# Patient Record
Sex: FEMALE | Race: WHITE | NOT HISPANIC OR LATINO | Employment: FULL TIME | URBAN - METROPOLITAN AREA
[De-identification: names, ages, dates, MRNs, and addresses within clinical notes are randomized per-mention and may not be internally consistent; named-entity substitution may affect disease eponyms.]

---

## 2017-02-14 ENCOUNTER — OFFICE VISIT (OUTPATIENT)
Dept: ENDOCRINOLOGY | Facility: CLINIC | Age: 52
End: 2017-02-14

## 2017-02-14 VITALS
SYSTOLIC BLOOD PRESSURE: 118 MMHG | HEIGHT: 60 IN | HEART RATE: 89 BPM | DIASTOLIC BLOOD PRESSURE: 71 MMHG | WEIGHT: 141.2 LBS | BODY MASS INDEX: 27.72 KG/M2

## 2017-02-14 DIAGNOSIS — E10.65 TYPE 1 DIABETES MELLITUS WITH HYPERGLYCEMIA (H): ICD-10-CM

## 2017-02-14 DIAGNOSIS — E10.65 TYPE 1 DIABETES MELLITUS WITH HYPERGLYCEMIA (H): Primary | ICD-10-CM

## 2017-02-14 LAB
ALBUMIN SERPL-MCNC: 3.8 G/DL (ref 3.4–5)
ALP SERPL-CCNC: 72 U/L (ref 40–150)
ALT SERPL W P-5'-P-CCNC: 17 U/L (ref 0–50)
AST SERPL W P-5'-P-CCNC: 13 U/L (ref 0–45)
BILIRUB DIRECT SERPL-MCNC: 0.1 MG/DL (ref 0–0.2)
BILIRUB SERPL-MCNC: 0.4 MG/DL (ref 0.2–1.3)
CK SERPL-CCNC: 77 U/L (ref 30–225)
CREAT SERPL-MCNC: 0.62 MG/DL (ref 0.52–1.04)
CREAT UR-MCNC: 83 MG/DL
GFR SERPL CREATININE-BSD FRML MDRD: NORMAL ML/MIN/1.7M2
HBA1C MFR BLD: 8.3 % (ref 4.3–6)
MICROALBUMIN UR-MCNC: <5 MG/L
MICROALBUMIN/CREAT UR: NORMAL MG/G CR (ref 0–25)
POTASSIUM SERPL-SCNC: 4.6 MMOL/L (ref 3.4–5.3)
PROT SERPL-MCNC: 7.4 G/DL (ref 6.8–8.8)
T4 FREE SERPL-MCNC: 0.84 NG/DL (ref 0.76–1.46)
TSH SERPL DL<=0.05 MIU/L-ACNC: 1.57 MU/L (ref 0.4–4)

## 2017-02-14 ASSESSMENT — PAIN SCALES - GENERAL: PAINLEVEL: NO PAIN (0)

## 2017-02-14 NOTE — LETTER
2/14/2017       RE: Kiera Dobson  [address]  [facility] [address]     Dear Colleague,    Thank you for referring your patient, Kiera Dobson, to the [facility] ENDOCRINOLOGY at [facility]. Please see a copy of my visit note below.    HPI  Kiera Dobson is a 51 year old female with type 1 diabetes mellitus here today for a follow up visit.  She has mild/moderate nonproliferative diabetic retinopathy. No peripheral neuropathy or nephropathy.  Her hx is also significant for hypoglycemia unawareness.  Kiera was last seen in our clinic in August 2016.  She denies any recent severe hypoglycemia.  She has been using her Medtronic 530G insulin pump and Dexcom.  Her current basal insulin rates are:  Midnight= 0.4 units/hr.  3:30 am = 0.7 units/hr.  10 am  = 0.7 units/hr.  2200= 0.5 units/hr.  Pt's I/C ratio is 1:15.  Sensitivity is 70.  Her 24 hr basal dose is 16 units/24 hrs.  Pt's A1C is 8.3 % today and her previous A1C was 7.5 %.   We downloaded pt's insulin pump and Dexcom today.  She needs to check her blood sugar more frequent and bolus at the time of her meals. She has been using bolus insulin after meals.  Her average glucose was 232 with SD 96 over the past month.  No frequent hypoglycemia on her Dexcom download.  Most of her blood sugar are high.  On ROS today,she reports leg pain and some lower back/hip pain. Her legs feel restless.  No numbness in her feet.  Pt denies visual changes, n/v, SOB or cough.  No chest pain at his time or abd pain or diarrhea.      DIABETES CARE:  Retinopathy:mild/moderate nonproliferative diabetic retinopathy.  She was last seen here by Oph in 4/2016.  Nephropathy: urine microalbuminuria negative today. Neuropathy: none.  Feet: good pulses, no ulcers and normal monofilamentous exam.  Taking ASA: yes.  Lipids:   in Feb 2016.   Pt is taking Simvastatin.    ROS  Please see under HPI.    Allergies  Allergies   Allergen Reactions      Ampicillin Sodium Rash       Medications  Current Outpatient Prescriptions   Medication Sig Dispense Refill     insulin aspart (NOVOLOG VIAL) 100 UNITS/ML VIAL Use in insulin pump.  Pt uses appox 50-60 units in 24 hrs. 60 mL 3     simvastatin (ZOCOR) 80 MG tablet Take 1 tablet (80 mg) by mouth At Bedtime 90 tablet 3     aspirin 81 MG chewable tablet Take 81 mg by mouth every other day  108 tablet 3     blood glucose (ERICA CONTOUR NEXT) test strip Use to test blood sugar 10  times daily or as directed. 300 strip 11     FLUoxetine HCl (PROZAC PO) Take 10 mg by mouth        glucose ([employer] GLUCOSE) 40 % GEL Take 15 g by mouth as needed       glucose blood VI test strips strip Test up to ten times per day as directed 5 Box 8     glucagon (GLUCAGON EMERGENCY) 1 MG injection Inject 1 mg into the muscle once for 1 dose Use as directed 1 mg 2       Family History  family history includes Alzheimer Disease in her father; CANCER in her father; DIABETES in her father; Glaucoma in her father and mother; Hypertension in her mother.    Social History  Smoke: none.  ETOH: rare.   with children.  Pt works full time in a lab.    Past Medical History  Past Medical History   Diagnosis Date     Diabetes mellitus (H)      Type 1     Elevated cholesterol      Glaucoma      Glaucoma suspect     Glaucoma suspect      Kidney stones      Nonsenile cataract      Past Surgical History   Procedure Laterality Date     C sections  99,97,08     Extracorporeal shock wave lithotripsy, cystoscopy, insert stent ureter(s), combined  2004     Appendectomy open  1981     Biopsy of skin lesion  12/30/2011     returned January 2012 for additional removal     Laser holmium lithotripsy ureter(s), insert stent, combined  12/12/2013     Procedure: COMBINED CYSTOSCOPY, URETEROSCOPY, LASER HOLMIUM LITHOTRIPSY URETER(S), INSERT STENT;  Right Ureteroscopy Holmium Laser Lithotripsy Stent Placement;  Surgeon: Kirill Marlow MD;  Location: [facility] OR     S/P  right shoulder surgery in Dec 2012.    Physical Exam  /71  Pulse 89  Ht 1.524 m (5')  Wt 64 kg (141 lb 3.2 oz)  BMI 27.58 kg/m2  Body mass index is 27.58 kg/(m^2).    HEENT:  PERRLA; fundi not examined.  NECK: Thyroid nontender.  LUNGS: Clear b/l.  CARDIAC:  RRR.  ABDOMEN: Insulin infusion set and dexcom intact. Nontender.  Some areas of lipohypertrophy.  EXTREMITIES: No edema. Equal strength in LE's b/l.  FEET: Warm, no ulcers and normal monofilamentous exam.    RESULTS  Creatinine   Date Value Ref Range Status   02/14/2017 0.62 0.52 - 1.04 mg/dL Final   02/07/2008 0.80 0.60 - 1.30 mg/dL Final     GFR Estimate   Date Value Ref Range Status   02/14/2017 >90  Non  GFR Calc   >60 mL/min/1.7m2 Final     Hemoglobin A1C   Date Value Ref Range Status   07/16/2013 7.9 (A) 4.3 - 6.0 % Final     Potassium   Date Value Ref Range Status   02/14/2017 4.6 3.4 - 5.3 mmol/L Final     ALT   Date Value Ref Range Status   02/14/2017 17 0 - 50 U/L Final     AST   Date Value Ref Range Status   02/14/2017 13 0 - 45 U/L Final     TSH   Date Value Ref Range Status   02/14/2017 1.57 0.40 - 4.00 mU/L Final     T4 Free   Date Value Ref Range Status   02/14/2017 0.84 0.76 - 1.46 ng/dL Final       Cholesterol   Date Value Ref Range Status   02/10/2016 190 <200 mg/dL Final   08/18/2014 161 <200 mg/dL Final     Comment:     LDL Cholesterol is the primary guide to therapy.   The NCEP recommends further evaluation of: patients with cholesterol greater   than 200 mg/dL if additional risk factors are present, cholesterol greater   than   240 mg/dL, triglycerides greater than 150 mg/dL, or HDL less than 40 mg/dL.       HDL Cholesterol   Date Value Ref Range Status   02/10/2016 78 >49 mg/dL Final   08/18/2014 60 >50 mg/dL Final     LDL Cholesterol Calculated   Date Value Ref Range Status   02/10/2016 101 (H) <100 mg/dL Final     Comment:     Above desirable:  100-129 mg/dl   Borderline High:  130-159 mg/dL   High:              160-189 mg/dL   Very high:       >189 mg/dl     08/18/2014 88 0 - 129 mg/dL Final     Comment:     LDL Cholesterol is the primary guide to therapy: LDL-cholesterol goal in high   risk patients is <100 mg/dL and in very high risk patients is <70 mg/dL.       Triglycerides   Date Value Ref Range Status   02/10/2016 58 <150 mg/dL Final     Comment:     Fasting specimen   08/18/2014 67 0 - 150 mg/dL Final     Comment:     Fasting specimen     Cholesterol/HDL Ratio   Date Value Ref Range Status   08/18/2014 2.7 0.0 - 5.0 Final   09/17/2013 2.3 0.0 - 5.0 Final     A1C      8.3   2/14/2017  A1C      7.5   8/18/2016  A1C      7.6   5/5/2016  A1C      7.8    2/10/2016  A1C      7.9    9/2/2015  A1C      7.7    5/2015  A1C      8.6    1/28/2015  A1C      8.8    9/26/2014  A1C      8.0    7/2/2014  A1C      8.4    2/28/2014    ASSESSMENT/PLAN:    1.  TYPE 1 DIABETES MELLITUS: Uncontrolled type 1 diabetes mellitus at this time.  I asked Kiera to be sure and check her blood sugar premeals and at hs daily and to use bolus insulin at the time of her meal and snack.  No change in basal insulin rates today.  She is to continue to use her Dexcom sensor daily.  She remains normotensive.   Feet in good condition.  She is taking ASA daily.  Pt's TSH is normal today.    2.  MILD/MODERATE NPDR: Last seen by Oph here in 3/2016.  I placed an order for her to see Oph here this spring.    3.  HX OF + MICROALBUMINURIA:  Pt's microalbuminuria negative today.  Her creat/GFR are stable.    4. HYPERLIPIDEMIA:   in 2/2016.  Pt remains on high dose Simvastatin.  Since she has been on the high dose Simvastatin for years and tolerating this dose, Dr. Chahal would like her to remain on Simvastatin 80 mg daily.  Pt's hepatic panel and CK were normal today.    5.  ADHD: She is being followed by Yumi ANSARI ( [phone] ).  She remains on RX.    6.  TINNITUS: Seen here by ENT dx hearing loss.    7. HYPOGLYCEMIA UNAWARENESS: Major problem  for Kiera and she had 4 severe hypoglycemic episodes over the past 12 months.  Reminded her NOT to change her basal insulin rates or I/C ratio or settings without talking to use first and to wear her Dexcom daily.   She denies having any severe hypoglycemia since we last saw her in August 2016.    8.  HIP/LEG PAIN:  Will have pt see PCP for evaluation.  She also reports mild lower back pain.  Her CK was normal today.      9.   Return to Endocrine Clinic to see me in 2 months and  in 4 month.        Jo Jean PA-C

## 2017-02-14 NOTE — NURSING NOTE
Chief Complaint   Patient presents with     RECHECK     f/u type 1 DM        Initial /71  Pulse 89  Ht 1.524 m (5')  Wt 64 kg (141 lb 3.2 oz)  BMI 27.58 kg/m2 Estimated body mass index is 27.58 kg/(m^2) as calculated from the following:    Height as of this encounter: 1.524 m (5').    Weight as of this encounter: 64 kg (141 lb 3.2 oz).  Medication Reconciliation: complete     Amy Salazar CMA       Performed A1C test - patient tolerated well.    Amy Salazar CMA

## 2017-02-14 NOTE — MR AVS SNAPSHOT
After Visit Summary   2/14/2017    Kiera Dobson    MRN: [id number]           Patient Information     Date Of Birth          1965        Visit Information        Provider Department      2/14/2017 10:00 AM Jo Jean PA-C [facility] Endocrinology        Today's Diagnoses     Type 1 diabetes mellitus with hyperglycemia (H)    -  1       Follow-ups after your visit        Additional Services     FAMILY PRACTICE REFERRAL       Your provider has referred you to: PREFERRED PROVIDERS:  Please schedule pt to see Dr. May.    Please be aware that coverage of these services is subject to the terms and limitations of your health insurance plan.  Call member services at your health plan with any benefit or coverage questions.      Please bring the following with you to your appointment:    (1) Any X-Rays, CTs or MRIs which have been performed.  Contact the facility where they were done to arrange for  prior to your scheduled appointment.    (2) List of current medications   (3) This referral request   (4) Any documents/labs given to you for this referral            OPHTHALMOLOGY ADULT REFERRAL       Your provider has referred you to: PREFERRED PROVIDERS:  Schedule pt to see Oph for annual eye exam.    Please be aware that coverage of these services is subject to the terms and limitations of your health insurance plan.  Call member services at your health plan with any benefit or coverage questions.      Please bring the following with you to your appointment:    (1) Any X-Rays, CTs or MRIs which have been performed.  Contact the facility where they were done to arrange for  prior to your scheduled appointment.    (2) List of current medications  (3) This referral request   (4) Any documents/labs given to you for this referral                  Your next 10 appointments already scheduled     Feb 14, 2017 11:30 AM [facility]   LAB with [facility] ([facility])    [address] [place] [address]  [address] [facility] [address] [phone]           Patient must bring picture ID.  Patient should be prepared to give a urine specimen  Please do not eat 10-12 hours before your appointment if you are coming in fasting for labs on lipids, cholesterol, or glucose (sugar).  Pregnant women should follow their Care Team instructions. Water with medications is okay. Do not drink coffee or other fluids.   If you have concerns about taking  your medications, please ask at office or if scheduling via [employer], send a message by clicking on Secure Messaging, Message Your Care Team.            Feb 20, 2017  4:00 PM CST   (Arrive by 3:45 PM)   New Patient Visit with Mary Beth Mansfield MD   [facility] ([facility])    [address] [place] [address]  [address] [facility] [address] [phone]            Mar 28, 2017  8:15 AM CDT   RETURN RETINA with Devika Dorsey MD   Eye Clinic ([facility])    [address]  [address] [address]  [facility] [address]   [phone]            Apr 05, 2017  8:30 AM CDT   (Arrive by 8:15 AM)   RETURN DIABETES with Jo Jean PA-C   [facility] ([facility])    [address] [place] [address]  [address] [facility] [id number] [phone]              Future tests that were ordered for you today     Open Future Orders        Priority Expected Expires Ordered    TSH Routine  2/14/2018 2/14/2017    Creatinine Routine  2/14/2018 2/14/2017    Potassium Routine  2/14/2018 2/14/2017    CK total Routine  2/14/2018 2/14/2017    Hepatic panel Routine  2/14/2018 2/14/2017    T4 free Routine  2/14/2018 2/14/2017            Who to contact     Please call your clinic at [phone] to:    Ask questions about your health    Make or cancel appointments    Discuss your medicines    Learn about your test results    Speak to your doctor    If you have compliments or concerns about an experience at your clinic, or if you wish to file a complaint, please contact [facility] Patient Relations at [phone] or email us at [email or web address]         Additional Information About Your Visit        MyChart Information     [employer] gives you secure access to your electronic health record. If you see a primary care provider, you can also send messages to your care team and make appointments. If you have questions, please call your primary care clinic.  If you do not have a primary care provider, please call [phone] and they will assist you.      [employer] is an electronic gateway that provides easy, online access to your medical records. With [employer], you can request a clinic appointment, read your test results, renew a prescription or communicate with your care team.     To access your existing account, please contact your [facility] or call [phone] for assistance.        Care EveryWhere ID     This is your Care EveryWhere ID. This could be used by other organizations to access your [place] medical records  [id number]        Your Vitals Were     Pulse Height BMI (Body Mass Index)             89 1.524 m (5') 27.58 kg/m2          Blood Pressure from Last 3 Encounters:   02/14/17 118/71   08/18/16 114/62   06/20/16 113/70    Weight from Last 3 Encounters:   02/14/17 64 kg (141 lb 3.2 oz)   08/18/16 64.2 kg (141 lb 8 oz)   06/20/16 63.5 kg (139 lb 14.4 oz)              We Performed the Following     Albumin Random Urine Quantitative     FAMILY PRACTICE REFERRAL     OPHTHALMOLOGY ADULT REFERRAL          Today's Medication Changes          These changes are accurate as of: 2/14/17 11:20 AM.  If you have any questions, ask your nurse or doctor.               Stop taking these medicines if you haven't already. Please contact your care team if you have questions.     RITALIN 5 MG tablet    Generic drug:  methylphenidate   Stopped by:  Jo Jean PA-C                    Primary Care Provider Office Phone # Fax #    Paola Chahal [phone]        [facility] [address] [facility] [address]  [facility] [address]        Thank you!     Thank you for choosing [facility]  for your care. Our goal is always to provide you with excellent care. Hearing back from our patients is one way we can continue to improve our services. Please take a few minutes to complete the written survey that you may receive in the mail after your visit with us. Thank you!             Your Updated Medication List - Protect others around you: Learn how to safely use, store and throw away your medicines at www.disposemymeds.org.          This list is accurate as of: 2/14/17 11:20 AM.  Always use your most recent med list.                   Brand Name Dispense Instructions for use    aspirin 81 MG chewable tablet     108 tablet    Take 81 mg by mouth every other day       * blood glucose monitoring test strip    no brand specified    5 Box    Test up to ten times per day as directed       * blood glucose monitoring test strip    ERICA CONTOUR NEXT    300 strip    Use to test blood sugar 10  times daily or as directed.       CVS GLUCOSE 40 % Gel gel   Generic drug:  glucose      Take 15 g by mouth as needed       glucagon 1 MG kit    GLUCAGON EMERGENCY    1 mg    Inject 1 mg into the muscle once for 1 dose Use as directed       insulin aspart 100 UNITS/ML injection    NovoLOG VIAL    60 mL    Use in insulin pump.  Pt uses appox 50-60 units in 24 hrs.       PROZAC PO      Take 10 mg by mouth       simvastatin 80 MG tablet    ZOCOR    90 tablet    Take 1 tablet (80 mg) by mouth At Bedtime       * Notice:  This list has 2 medication(s) that are the same as other medications prescribed for you. Read the directions carefully, and ask your doctor or other care provider to review them with you.

## 2017-02-15 NOTE — PROGRESS NOTES
HPI  Kiera Dobson is a 51 year old female with type 1 diabetes mellitus here today for a follow up visit.  She has mild/moderate nonproliferative diabetic retinopathy. No peripheral neuropathy or nephropathy.  Her hx is also significant for hypoglycemia unawareness.  Kiera was last seen in our clinic in August 2016.  She denies any recent severe hypoglycemia.  She has been using her Medtronic 530G insulin pump and Dexcom.  Her current basal insulin rates are:  Midnight= 0.4 units/hr.  3:30 am = 0.7 units/hr.  10 am  = 0.7 units/hr.  2200= 0.5 units/hr.  Pt's I/C ratio is 1:15.  Sensitivity is 70.  Her 24 hr basal dose is 16 units/24 hrs.  Pt's A1C is 8.3 % today and her previous A1C was 7.5 %.   We downloaded pt's insulin pump and Dexcom today.  She needs to check her blood sugar more frequent and bolus at the time of her meals. She has been using bolus insulin after meals.  Her average glucose was 232 with SD 96 over the past month.  No frequent hypoglycemia on her Dexcom download.  Most of her blood sugar are high.  On ROS today,she reports leg pain and some lower back/hip pain. Her legs feel restless.  No numbness in her feet.  Pt denies visual changes, n/v, SOB or cough.  No chest pain at his time or abd pain or diarrhea.      DIABETES CARE:  Retinopathy:mild/moderate nonproliferative diabetic retinopathy.  She was last seen here by Oph in 4/2016.  Nephropathy: urine microalbuminuria negative today. Neuropathy: none.  Feet: good pulses, no ulcers and normal monofilamentous exam.  Taking ASA: yes.  Lipids:   in Feb 2016.   Pt is taking Simvastatin.    ROS  Please see under HPI.    Allergies  Allergies   Allergen Reactions     Ampicillin Sodium Rash       Medications  Current Outpatient Prescriptions   Medication Sig Dispense Refill     insulin aspart (NOVOLOG VIAL) 100 UNITS/ML VIAL Use in insulin pump.  Pt uses appox 50-60 units in 24 hrs. 60 mL 3     simvastatin (ZOCOR) 80 MG tablet Take 1 tablet (80  mg) by mouth At Bedtime 90 tablet 3     aspirin 81 MG chewable tablet Take 81 mg by mouth every other day  108 tablet 3     blood glucose (ERICA CONTOUR NEXT) test strip Use to test blood sugar 10  times daily or as directed. 300 strip 11     FLUoxetine HCl (PROZAC PO) Take 10 mg by mouth        glucose ([employer]S GLUCOSE) 40 % GEL Take 15 g by mouth as needed       glucose blood VI test strips strip Test up to ten times per day as directed 5 Box 8     glucagon (GLUCAGON EMERGENCY) 1 MG injection Inject 1 mg into the muscle once for 1 dose Use as directed 1 mg 2       Family History  family history includes Alzheimer Disease in her father; CANCER in her father; DIABETES in her father; Glaucoma in her father and mother; Hypertension in her mother.    Social History  Smoke: none.  ETOH: rare.   with children.  Pt works full time in a lab.    Past Medical History  Past Medical History   Diagnosis Date     Diabetes mellitus (H)      Type 1     Elevated cholesterol      Glaucoma      Glaucoma suspect     Glaucoma suspect      Kidney stones      Nonsenile cataract      Past Surgical History   Procedure Laterality Date     C sections  99,97,08     Extracorporeal shock wave lithotripsy, cystoscopy, insert stent ureter(s), combined  2004     Appendectomy open  1981     Biopsy of skin lesion  12/30/2011     returned January 2012 for additional removal     Laser holmium lithotripsy ureter(s), insert stent, combined  12/12/2013     Procedure: COMBINED CYSTOSCOPY, URETEROSCOPY, LASER HOLMIUM LITHOTRIPSY URETER(S), INSERT STENT;  Right Ureteroscopy Holmium Laser Lithotripsy Stent Placement;  Surgeon: Kirill Marlow MD;  Location: [facility]     S/P right shoulder surgery in Dec 2012.    Physical Exam  /71  Pulse 89  Ht 1.524 m (5')  Wt 64 kg (141 lb 3.2 oz)  BMI 27.58 kg/m2  Body mass index is 27.58 kg/(m^2).    HEENT:  PERRLA; fundi not examined.  NECK: Thyroid nontender.  LUNGS: Clear b/l.  CARDIAC:  RRR.  ABDOMEN:  Insulin infusion set and dexcom intact. Nontender.  Some areas of lipohypertrophy.  EXTREMITIES: No edema. Equal strength in LE's b/l.  FEET: Warm, no ulcers and normal monofilamentous exam.    RESULTS  Creatinine   Date Value Ref Range Status   02/14/2017 0.62 0.52 - 1.04 mg/dL Final   02/07/2008 0.80 0.60 - 1.30 mg/dL Final     GFR Estimate   Date Value Ref Range Status   02/14/2017 >90  Non  GFR Calc   >60 mL/min/1.7m2 Final     Hemoglobin A1C   Date Value Ref Range Status   07/16/2013 7.9 (A) 4.3 - 6.0 % Final     Potassium   Date Value Ref Range Status   02/14/2017 4.6 3.4 - 5.3 mmol/L Final     ALT   Date Value Ref Range Status   02/14/2017 17 0 - 50 U/L Final     AST   Date Value Ref Range Status   02/14/2017 13 0 - 45 U/L Final     TSH   Date Value Ref Range Status   02/14/2017 1.57 0.40 - 4.00 mU/L Final     T4 Free   Date Value Ref Range Status   02/14/2017 0.84 0.76 - 1.46 ng/dL Final       Cholesterol   Date Value Ref Range Status   02/10/2016 190 <200 mg/dL Final   08/18/2014 161 <200 mg/dL Final     Comment:     LDL Cholesterol is the primary guide to therapy.   The NCEP recommends further evaluation of: patients with cholesterol greater   than 200 mg/dL if additional risk factors are present, cholesterol greater   than   240 mg/dL, triglycerides greater than 150 mg/dL, or HDL less than 40 mg/dL.       HDL Cholesterol   Date Value Ref Range Status   02/10/2016 78 >49 mg/dL Final   08/18/2014 60 >50 mg/dL Final     LDL Cholesterol Calculated   Date Value Ref Range Status   02/10/2016 101 (H) <100 mg/dL Final     Comment:     Above desirable:  100-129 mg/dl   Borderline High:  130-159 mg/dL   High:             160-189 mg/dL   Very high:       >189 mg/dl     08/18/2014 88 0 - 129 mg/dL Final     Comment:     LDL Cholesterol is the primary guide to therapy: LDL-cholesterol goal in high   risk patients is <100 mg/dL and in very high risk patients is <70 mg/dL.       Triglycerides   Date  Value Ref Range Status   02/10/2016 58 <150 mg/dL Final     Comment:     Fasting specimen   08/18/2014 67 0 - 150 mg/dL Final     Comment:     Fasting specimen     Cholesterol/HDL Ratio   Date Value Ref Range Status   08/18/2014 2.7 0.0 - 5.0 Final   09/17/2013 2.3 0.0 - 5.0 Final     A1C      8.3   2/14/2017  A1C      7.5   8/18/2016  A1C      7.6   5/5/2016  A1C      7.8    2/10/2016  A1C      7.9    9/2/2015  A1C      7.7    5/2015  A1C      8.6    1/28/2015  A1C      8.8    9/26/2014  A1C      8.0    7/2/2014  A1C      8.4    2/28/2014    ASSESSMENT/PLAN:    1.  TYPE 1 DIABETES MELLITUS: Uncontrolled type 1 diabetes mellitus at this time.  I asked Kiera to be sure and check her blood sugar premeals and at hs daily and to use bolus insulin at the time of her meal and snack.  No change in basal insulin rates today.  She is to continue to use her Dexcom sensor daily.  She remains normotensive.   Feet in good condition.  She is taking ASA daily.  Pt's TSH is normal today.    2.  MILD/MODERATE NPDR: Last seen by Oph here in 3/2016.  I placed an order for her to see Oph here this spring.    3.  HX OF + MICROALBUMINURIA:  Pt's microalbuminuria negative today.  Her creat/GFR are stable.    4. HYPERLIPIDEMIA:   in 2/2016.  Pt remains on high dose Simvastatin.  Since she has been on the high dose Simvastatin for years and tolerating this dose, Dr. Chahal would like her to remain on Simvastatin 80 mg daily.  Pt's hepatic panel and CK were normal today.    5.  ADHD: She is being followed by Yumi ANSARI ( [phone] ).  She remains on RX.    6.  TINNITUS: Seen here by ENT dx hearing loss.    7. HYPOGLYCEMIA UNAWARENESS: Major problem for Kiera and she had 4 severe hypoglycemic episodes over the past 12 months.  Reminded her NOT to change her basal insulin rates or I/C ratio or settings without talking to use first and to wear her Dexcom daily.   She denies having any severe hypoglycemia since we last saw her in  August 2016.    8.  HIP/LEG PAIN:  Will have pt see PCP for evaluation.  She also reports mild lower back pain.  Her CK was normal today.      9.   Return to Endocrine Clinic to see me in 2 months and  in 4 month.

## 2017-02-20 ENCOUNTER — OFFICE VISIT (OUTPATIENT)
Dept: INTERNAL MEDICINE | Facility: CLINIC | Age: 52
End: 2017-02-20

## 2017-02-20 VITALS
SYSTOLIC BLOOD PRESSURE: 111 MMHG | OXYGEN SATURATION: 98 % | BODY MASS INDEX: 27.88 KG/M2 | RESPIRATION RATE: 18 BRPM | WEIGHT: 142 LBS | HEART RATE: 83 BPM | HEIGHT: 60 IN | TEMPERATURE: 98 F | DIASTOLIC BLOOD PRESSURE: 73 MMHG

## 2017-02-20 DIAGNOSIS — Z23 NEED FOR TDAP VACCINATION: ICD-10-CM

## 2017-02-20 DIAGNOSIS — Z23 NEED FOR INFLUENZA VACCINATION: ICD-10-CM

## 2017-02-20 DIAGNOSIS — M25.551 BILATERAL HIP PAIN: ICD-10-CM

## 2017-02-20 DIAGNOSIS — Z76.89 ENCOUNTER TO ESTABLISH CARE WITH NEW DOCTOR: Primary | ICD-10-CM

## 2017-02-20 DIAGNOSIS — E10.8 TYPE 1 DIABETES MELLITUS WITH COMPLICATION (H): ICD-10-CM

## 2017-02-20 DIAGNOSIS — M25.552 BILATERAL HIP PAIN: ICD-10-CM

## 2017-02-20 ASSESSMENT — PAIN SCALES - GENERAL: PAINLEVEL: MILD PAIN (3)

## 2017-02-20 NOTE — NURSING NOTE
"FLU VACCINE QUESTIONNAIRE:  Ask the following questions of all parties who want influenza vaccination:     CONTRAINDICATIONS  1.  Is the patient age less than 6 months?  NO  2.  Has the person to be vaccinated ever had Guillain-[place] syndrome? NO  3.  Has the person to be vaccinated had the vaccine this year? NO  4.  Is the person to be vaccinated sick today? NO  5.  Does the person to be vaccinated have an allergy to eggs or a component of the vaccine? NO  6.  Has the person to vaccinated ever had a serious reaction to an influenza vaccination in the past? NO    If the answer to ALL of the above questions is \"No\", then please administer the influenza vaccine per the standard protocol.  If the patient answered \"Yes\" to questions 1 or 2, do not administer the vaccine. If the patient answered \"Yes\" to question 3, do not administer the vaccine unless the patient is a child receiving the vaccine in two doses. If the patient answered \"Yes\" to questions 4, 5, and/or 6, get additional details on sickness and/or reaction and refer to provider. If you have any questions regarding contraindications, please refer to the provider.                                                         INFLUENZA VACCINATION NOTE      Information sheet given to patient and questions answered.        Administered Influenza Fluzone Quadrivalent and TDaP (see Immunizations in Chart Review). Patient tolerated well.  Leander Apodaca CMA at 4:50 PM on 2/20/2017       "

## 2017-02-20 NOTE — MR AVS SNAPSHOT
After Visit Summary   2/20/2017    Kiera Dobson    MRN: [id number]           Patient Information     Date Of Birth          1965        Visit Information        Provider Department      2/20/2017 4:00 PM Mary Beth Gonzalez MD [facility]        Today's Diagnoses     Encounter to establish care with new doctor    -  1    Need for Tdap vaccination        Need for influenza vaccination        Bilateral hip pain        Type 1 diabetes mellitus with complication (H)          Care Instructions    [facility] Center Medication Refill Request Information:  * Please contact your pharmacy regarding ANY request for medication refills.  ** [facility] Prescription Fax = [phone]  * Please allow 3 business days for routine medication refills.  * Please allow 5 business days for controlled substance medication refills.     [facility] Center Test Result notification information:  *You will be notified with in 7-10 days of your appointment day regarding the results of your test.  If you are on MyChart you will be notified as soon as the provider has reviewed the results and signed off on them.    Primary Care Center [phone]       For muscle pain: Consider taking Co-Q 10, 100 mg daily.     Make sure to come in fasting at least 8-10 hours for your labs.          Follow-ups after your visit        Follow-up notes from your care team     Return in about 4 weeks (around 3/20/2017) for Pap.      Your next 10 appointments already scheduled     Mar 24, 2017  8:10 AM CDT   (Arrive by 7:55 AM)   XR PELVIS AND HIP BILATERAL 2 VIEWS with UCXR1   [facility] ([facility])    [address] [address] [phone]           Please bring a list of your current medicines to your exam. (Include vitamins, minerals and over-thecounter medicines.) Leave your valuables at home.  Tell your doctor if there is a chance you may  be pregnant.  You do not need to do anything special for this exam.            Mar 24, 2017  8:30 AM CDT   LAB with  LAB   [facility]    [address] [place] [address]  1st Floor  [facility] [id number] [phone]           Patient must bring picture ID.  Patient should be prepared to give a urine specimen  Please do not eat 10-12 hours before your appointment if you are coming in fasting for labs on lipids, cholesterol, or glucose (sugar).  Pregnant women should follow their Care Team instructions. Water with medications is okay. Do not drink coffee or other fluids.   If you have concerns about taking  your medications, please ask at office or if scheduling via [employer], send a message by clicking on Secure Messaging, Message Your Care Team.            Mar 24, 2017  9:00 AM CDT   (Arrive by 8:45 AM)   Return Visit with Mary Beth Mansfield MD   [facility] ([facility])    [address] [place] [address]  4th [facility] [id number] [phone]            Mar 28, 2017  8:15 AM CDT   RETURN RETINA with Devika Dorsey MD   [facility] ([facility])    [address]  [address] [facility]  9th Fl Clin [address]  [facility] [address]   [phone]            Apr 05, 2017  8:30 AM CDT   (Arrive by 8:15 AM)   RETURN DIABETES with Jo Jean PA-C   [facility] ([facility])    [address] [place] [address]  3rd Floor  [facility] [id number] [phone]              Future tests that were ordered for you today     Open Future Orders        Priority Expected Expires Ordered    XR Pelvis and Hip Bilateral 2 Views Routine 2/20/2017 2/20/2018 2/20/2017    Lipid panel reflex to direct LDL Routine 2/20/2017 3/6/2017 2/20/2017            Who to contact     Please call your clinic at [phone] to:    Ask questions about your health    Make or cancel appointments    Discuss your  "medicines    Learn about your test results    Speak to your doctor   If you have compliments or concerns about an experience at your clinic, or if you wish to file a complaint, please contact [facility] Patient Relations at [phone] or email us at [email or web address]         Additional Information About Your Visit        MyChart Information     [employer] gives you secure access to your electronic health record. If you see a primary care provider, you can also send messages to your care team and make appointments. If you have questions, please call your primary care clinic.  If you do not have a primary care provider, please call [phone] and they will assist you.      [employer] is an electronic gateway that provides easy, online access to your medical records. With [employer], you can request a clinic appointment, read your test results, renew a prescription or communicate with your care team.     To access your existing account, please contact your [facility] or call [phone] for assistance.        Care EveryWhere ID     This is your Care EveryWhere ID. This could be used by other organizations to access your [facility] medical records  [id number]        Your Vitals Were     Pulse Temperature Respirations Height Pulse Oximetry Breastfeeding?    83 98  F (36.7  C) (Oral) 18 1.518 m (4' 11.75\") 98% No    BMI (Body Mass Index)                   27.97 kg/m2            Blood Pressure from Last 3 Encounters:   02/20/17 111/73   02/14/17 118/71   08/18/16 114/62    Weight from Last 3 Encounters:   02/20/17 64.4 kg (142 lb)   02/14/17 64 kg (141 lb 3.2 oz)   08/18/16 64.2 kg (141 lb 8 oz)              We Performed the Following     FLU VACCINE, AGE >=3 YR     TDAP (BOOSTRIX), AGE 10-64 YR        Primary Care Provider Office Phone # Fax #    Paola Chahal [phone]        [facility] [address] [place] [address]  [facility] " [id number]        Thank you!     Thank you for choosing [facility]  for your care. Our goal is always to provide you with excellent care. Hearing back from our patients is one way we can continue to improve our services. Please take a few minutes to complete the written survey that you may receive in the mail after your visit with us. Thank you!             Your Updated Medication List - Protect others around you: Learn how to safely use, store and throw away your medicines at www.disposemymeds.org.          This list is accurate as of: 2/20/17  4:56 PM.  Always use your most recent med list.                   Brand Name Dispense Instructions for use    aspirin 81 MG chewable tablet     108 tablet    Take 81 mg by mouth every other day       * blood glucose monitoring test strip    no brand specified    5 Box    Test up to ten times per day as directed       * blood glucose monitoring test strip    ERICA CONTOUR NEXT    300 strip    Use to test blood sugar 10  times daily or as directed.       CVS GLUCOSE 40 % Gel gel   Generic drug:  glucose      Take 15 g by mouth as needed       glucagon 1 MG kit    GLUCAGON EMERGENCY    1 mg    Inject 1 mg into the muscle once for 1 dose Use as directed       humaLOG 100 UNIT/ML injection   Generic drug:  insulin lispro          insulin aspart 100 UNITS/ML injection    NovoLOG VIAL    60 mL    Use in insulin pump.  Pt uses appox 50-60 units in 24 hrs.       PROZAC PO      Take 10 mg by mouth       simvastatin 80 MG tablet    ZOCOR    90 tablet    Take 1 tablet (80 mg) by mouth At Bedtime       * Notice:  This list has 2 medication(s) that are the same as other medications prescribed for you. Read the directions carefully, and ask your doctor or other care provider to review them with you.

## 2017-02-20 NOTE — NURSING NOTE
.  Chief Complaint   Patient presents with     Establish Care     Here to establish care     Leander Apodaca CMA at 4:15 PM on 2/20/2017

## 2017-02-20 NOTE — PROGRESS NOTES
"S: Kiera is here to establish care. She has had T1DM for 47 years. She was diagnosed at 28 mos old. She has an insulin pump and follows with endocrine, however, she has been told to establish primary care for her other medical nees. She does get chest pain when her blood sugar is elevated. She also has been having pain in her feet. She has seen podiatry and has some tendon issues. She also gets pain in her hips and legs when she lies down at night. She has been tested for neuropathy and does not have this, so was advised to see primary care.     PMHx, Surg Hx, Fam Hx, Soc Hx Reviewed.     Social History     Social History     Marital status:      Spouse name: N/A     Number of children: N/A     Years of education: N/A     Occupational History     Not on file.     Social History Main Topics     Smoking status: Never Smoker     Smokeless tobacco: Never Used     Alcohol use Yes      Comment: occasionally 1-2 beers     Drug use: No     Sexual activity: Yes     Partners: Male     Other Topics Concern     Not on file     Social History Narrative      ROS: 10 point ROS neg other than the symptoms noted above in the HPI.    PE:   /73 (BP Location: Right arm, Patient Position: Chair, Cuff Size: Adult Regular)  Pulse 83  Temp 98  F (36.7  C) (Oral)  Resp 18  Ht 1.518 m (4' 11.75\")  Wt 64.4 kg (142 lb)  SpO2 98%  Breastfeeding? No  BMI 27.97 kg/m2  General: Pleasant female, in NAD  ENT: TMs normal, oropharynx clear  Neck: no LAD  Resp: lungs CAB  CV: Heart RRR, no MRG  Abd: Soft, NT, ND, nl bowel sounds  Ext: WWP, no LE edema  MSK: Pain with R hip flexion, IR/ER, no pain with L hip flexion, IR/ER  Neuro: AO3, no focal deficits.     A/P:   Kiera was seen today for establish care.    Diagnoses and all orders for this visit:    Encounter to establish care with new doctor  Health Maintenance   Topic Date Due     FOOT EXAM Q1 YEAR( NO INBASKET)  11/04/1966     PAP SCREENING Q3 YR (SYSTEM ASSIGNED)  11/04/1988     LIPID " MONITORING Q1 YEAR( NO INBASKET)  02/10/2017     EYE EXAM Q1 YEAR( NO INBASKET)  04/19/2017     A1C Q6 MO( NO INBASKET)  08/14/2017     CREATININE Q1 YEAR (NO INBASKET)  02/14/2018     MICROALBUMIN Q1 YEAR( NO INBASKET)  02/14/2018     TSH W/ FREE T4 REFLEX Q2 YEAR (NO INBASKET)  02/14/2019     TETANUS IMMUNIZATION (SYSTEM ASSIGNED)  02/20/2027     PNEUMOVAX 1X HI RISK PATIENT < 65 (NO IB MSG)  Completed     INFLUENZA VACCINE (SYSTEM ASSIGNED)  Completed   Will return for pap smear at future date    Need for Tdap vaccination  -     TDAP (BOOSTRIX), AGE 10-64 YR    Need for influenza vaccination  -     FLU VACCINE, AGE >=3 YR    Bilateral hip pain. Concern for OA as prior xray showed mild degenerative changes.   -     XR Pelvis and Hip Bilateral 2 Views; Future   Consider taking co-q 10 to help with muscle pain    Type 1 diabetes mellitus with complication (H)  -     Lipid panel reflex to direct LDL; Future      Mary Beth Mansfield MD

## 2017-02-20 NOTE — PATIENT INSTRUCTIONS
[facility] Medication Refill Request Information:  * Please contact your pharmacy regarding ANY request for medication refills.  ** [facility] Prescription Fax = [phone]  * Please allow 3 business days for routine medication refills.  * Please allow 5 business days for controlled substance medication refills.     [facility] Test Result notification information:  *You will be notified with in 7-10 days of your appointment day regarding the results of your test.  If you are on MyChart you will be notified as soon as the provider has reviewed the results and signed off on them.    [facility] [phone]       For muscle pain: Consider taking Co-Q 10, 100 mg daily.     Make sure to come in fasting at least 8-10 hours for your labs.

## 2017-03-24 ENCOUNTER — OFFICE VISIT (OUTPATIENT)
Dept: INTERNAL MEDICINE | Facility: CLINIC | Age: 52
End: 2017-03-24

## 2017-03-24 VITALS
BODY MASS INDEX: 28.16 KG/M2 | WEIGHT: 143 LBS | SYSTOLIC BLOOD PRESSURE: 118 MMHG | HEART RATE: 86 BPM | DIASTOLIC BLOOD PRESSURE: 73 MMHG

## 2017-03-24 DIAGNOSIS — R07.89 ATYPICAL CHEST PAIN: ICD-10-CM

## 2017-03-24 DIAGNOSIS — M25.551 HIP PAIN, RIGHT: ICD-10-CM

## 2017-03-24 DIAGNOSIS — E10.8 TYPE 1 DIABETES MELLITUS WITH COMPLICATION (H): ICD-10-CM

## 2017-03-24 DIAGNOSIS — Z12.4 SCREENING FOR CERVICAL CANCER: Primary | ICD-10-CM

## 2017-03-24 LAB
CHOLEST SERPL-MCNC: 154 MG/DL
HDLC SERPL-MCNC: 73 MG/DL
INTERPRETATION ECG - MUSE: NORMAL
LDLC SERPL CALC-MCNC: 69 MG/DL
NONHDLC SERPL-MCNC: 81 MG/DL
TRIGL SERPL-MCNC: 59 MG/DL

## 2017-03-24 ASSESSMENT — PAIN SCALES - GENERAL: PAINLEVEL: NO PAIN (0)

## 2017-03-24 NOTE — MR AVS SNAPSHOT
After Visit Summary   3/24/2017    Kiera Dobson    MRN: [id number]           Patient Information     Date Of Birth          1965        Visit Information        Provider Department      3/24/2017 9:00 AM Mary Beth Gonzalez MD [facility]        Today's Diagnoses     Screening for cervical cancer    -  1    Atypical chest pain        Hip pain, right          Care Instructions    Primary Care Center Medication Refill Request Information:  * Please contact your pharmacy regarding ANY request for medication refills.  ** [facility] Prescription Fax = [phone]  * Please allow 3 business days for routine medication refills.  * Please allow 5 business days for controlled substance medication refills.     Primary Care Center Test Result notification information:  *You will be notified within 7-10 days of your appointment day regarding the results of your test.  If you are on MyChart you will be notified as soon as the provider has reviewed the results and signed off on them.    Please schedule the following appointments:  Sports Medicine [phone] (5th Floor [facility])          Follow-ups after your visit        Additional Services     SPORTS MEDICINE REFERRAL       Your provider has referred you to:  [facility]: Sports Medicine Clinic [facility] [phone]   [email or web address]    Please be aware that coverage of these services is subject to the terms and limitations of your health insurance plan.  Call member services at your health plan with any benefit or coverage questions.      Please bring the following to your appointment:    >>   Any x-rays, CTs or MRIs which have been performed.  Contact the facility where they were done to arrange for  prior to your scheduled appointment.    >>   List of current medications   >>   This referral request   >>   Any documents/labs given to you for this referral                  Your  next 10 appointments already scheduled     Mar 28, 2017  8:15 AM CDT   RETURN RETINA with Devika Dorsey MD   Eye Clinic ([facility])    [address]  [address]  9th [address] Clin [address]a  [facility] [address] [phone]            Apr 05, 2017  8:30 AM CDT   (Arrive by 8:15 AM)   RETURN DIABETES with ML Jaime [facility] Endocrinology ([facility] [place])    [address]  3rd Floor  [facility] [place] [id number] [phone]              Who to contact     Please call your clinic at [phone] to:    Ask questions about your health    Make or cancel appointments    Discuss your medicines    Learn about your test results    Speak to your doctor   If you have compliments or concerns about an experience at your clinic, or if you wish to file a complaint, please contact [facility] Patient Relations at [phone] or email us at [email or web address]         Additional Information About Your Visit        SciGitharResponsa Information     Leeot gives you secure access to your electronic health record. If you see a primary care provider, you can also send messages to your care team and make appointments. If you have questions, please call your primary care clinic.  If you do not have a primary care provider, please call [phone] and they will assist you.      GoGarden is an electronic gateway that provides easy, online access to your medical records. With GoGarden, you can request a clinic appointment, read your test results, renew a prescription or communicate with your care team.     To access your existing account, please contact your [facility] or call [phone] for assistance.        Care EveryWhere ID     This is your Care EveryWhere ID. This could be used by other organizations to access your [place] medical records  [id number]        Your Vitals Were     Pulse BMI (Body  Mass Index)                86 28.16 kg/m2           Blood Pressure from Last 3 Encounters:   03/24/17 118/73   02/20/17 111/73   02/14/17 118/71    Weight from Last 3 Encounters:   03/24/17 64.9 kg (143 lb)   02/20/17 64.4 kg (142 lb)   02/14/17 64 kg (141 lb 3.2 oz)              We Performed the Following     EKG 12-lead complete w/read - Clinics     HPV High Risk Types DNA Cervical     Pap imaged thin layer diagnostic with HPV (select HPV order below)     SPORTS MEDICINE REFERRAL        Primary Care Provider Office Phone # Fax #    Mary Beth Nely Mansfield [phone]       [address] [place] [address]        Thank you!     Thank you for choosing [facility]  for your care. Our goal is always to provide you with excellent care. Hearing back from our patients is one way we can continue to improve our services. Please take a few minutes to complete the written survey that you may receive in the mail after your visit with us. Thank you!             Your Updated Medication List - Protect others around you: Learn how to safely use, store and throw away your medicines at www.disposemymeds.org.          This list is accurate as of: 3/24/17  9:28 AM.  Always use your most recent med list.                   Brand Name Dispense Instructions for use    aspirin 81 MG chewable tablet     108 tablet    Take 81 mg by mouth every other day       * blood glucose monitoring test strip    no brand specified    5 Box    Test up to ten times per day as directed       * blood glucose monitoring test strip    ERICA CONTOUR NEXT    300 strip    Use to test blood sugar 10  times daily or as directed.       CVS GLUCOSE 40 % Gel gel   Generic drug:  glucose      Take 15 g by mouth as needed       glucagon 1 MG kit    GLUCAGON EMERGENCY    1 mg    Inject 1 mg into the muscle once for 1 dose Use as directed       humaLOG 100 UNIT/ML injection   Generic drug:  insulin  lispro          insulin aspart 100 UNITS/ML injection    NovoLOG VIAL    60 mL    Use in insulin pump.  Pt uses appox 50-60 units in 24 hrs.       PROZAC PO      Take 10 mg by mouth       simvastatin 80 MG tablet    ZOCOR    90 tablet    Take 1 tablet (80 mg) by mouth At Bedtime       * Notice:  This list has 2 medication(s) that are the same as other medications prescribed for you. Read the directions carefully, and ask your doctor or other care provider to review them with you.

## 2017-03-24 NOTE — PROGRESS NOTES
S: Kiera is here for a pap smear today. She had her hip xrays earlier today and also lipids drawn. She continues to have R groin pain. She also notes her sugars have been higher for a while, and while this happened she was having chest pains. These are substernal, last for a few hours, not associated with activity. She had a stress test in 2010 that was normal. She had a normal EKG a year ago. She has had these pains for years when her sugars are high.     PMHx, Prob list reviewed    O:   /73  Pulse 86  Wt 64.9 kg (143 lb)  BMI 28.16 kg/m2  General: Pleasant female, in NAD  Resp: lungs CAB  CV: Heart RRR, no MRG  Gyn: External genitalia, vulva, vagina without lesions. No vaginal discharge. Cervix normal in appearance, no discharge    EKG: NSR, no ST changes    A/P:   Kiera was seen today for gyn exam.    Diagnoses and all orders for this visit:    Screening for cervical cancer  -     Pap imaged thin layer diagnostic with HPV (select HPV order below)  -     HPV High Risk Types DNA Cervical    Atypical chest pain. EKG completely normal, likely not cardiac.   -     EKG 12-lead complete w/read - Clinics    Hip pain, right. Based on exam last time, and xrays today, feel would benefit from seeing someone in ortho, eval if needs to have injection vs PT vs replacement  -     SPORTS MEDICINE REFERRAL      Mary Beth Mansfield MD  03/24/17

## 2017-03-24 NOTE — NURSING NOTE
Chief Complaint   Patient presents with     Gyn Exam     Pt is here for a pap smear.      Hanh Johnson LPN March 24, 2017 9:14 AM

## 2017-03-24 NOTE — PATIENT INSTRUCTIONS
Primary Care Center Medication Refill Request Information:  * Please contact your pharmacy regarding ANY request for medication refills.  ** [facility] Prescription Fax = [phone]  * Please allow 3 business days for routine medication refills.  * Please allow 5 business days for controlled substance medication refills.     Primary Care Center Test Result notification information:  *You will be notified within 7-10 days of your appointment day regarding the results of your test.  If you are on MyChart you will be notified as soon as the provider has reviewed the results and signed off on them.    Please schedule the following appointments:  Sports Medicine [phone] (5th Floor [facility])

## 2017-03-28 LAB
COPATH REPORT: NORMAL
PAP: NORMAL

## 2017-03-30 LAB
FINAL DIAGNOSIS: NORMAL
HPV HR 12 DNA CVX QL NAA+PROBE: NEGATIVE
HPV16 DNA SPEC QL NAA+PROBE: NEGATIVE
HPV18 DNA SPEC QL NAA+PROBE: NEGATIVE
SPECIMEN DESCRIPTION: NORMAL

## 2017-04-03 ENCOUNTER — OFFICE VISIT (OUTPATIENT)
Dept: OPHTHALMOLOGY | Facility: CLINIC | Age: 52
End: 2017-04-03
Attending: OPHTHALMOLOGY
Payer: COMMERCIAL

## 2017-04-03 DIAGNOSIS — E10.3399: ICD-10-CM

## 2017-04-03 DIAGNOSIS — H40.003 GLAUCOMA SUSPECT, BOTH EYES: ICD-10-CM

## 2017-04-03 DIAGNOSIS — E10.9 TYPE 1 DIABETES, HBA1C GOAL < 7% (H): Primary | ICD-10-CM

## 2017-04-03 PROCEDURE — 92134 CPTRZ OPH DX IMG PST SGM RTA: CPT | Mod: ZF | Performed by: OPHTHALMOLOGY

## 2017-04-03 PROCEDURE — 99213 OFFICE O/P EST LOW 20 MIN: CPT | Mod: ZF

## 2017-04-03 PROCEDURE — 92015 DETERMINE REFRACTIVE STATE: CPT | Mod: ZF

## 2017-04-03 ASSESSMENT — REFRACTION_MANIFEST
OS_AXIS: 180
OS_ADD: +2.00
OD_CYLINDER: +0.25
OD_AXIS: 014
OD_SPHERE: -0.75
OS_SPHERE: -0.25
OD_ADD: +2.00
OS_CYLINDER: +0.50

## 2017-04-03 ASSESSMENT — TONOMETRY
OD_IOP_MMHG: 14
IOP_METHOD: TONOPEN
OS_IOP_MMHG: 10

## 2017-04-03 ASSESSMENT — REFRACTION_WEARINGRX
OS_SPHERE: -0.25
OS_AXIS: 153
OD_SPHERE: -1.00
OS_CYLINDER: +0.25
OD_CYLINDER: +0.75
OD_AXIS: 025

## 2017-04-03 ASSESSMENT — SLIT LAMP EXAM - LIDS
COMMENTS: NORMAL
COMMENTS: NORMAL

## 2017-04-03 ASSESSMENT — VISUAL ACUITY
OD_CC: J1
OS_CC: 20/30
CORRECTION_TYPE: GLASSES
METHOD: SNELLEN - LINEAR
OS_CC+: +2
OS_CC: J1
OD_CC+: -1
OD_CC: 20/30

## 2017-04-03 ASSESSMENT — EXTERNAL EXAM - LEFT EYE: OS_EXAM: NORMAL

## 2017-04-03 ASSESSMENT — CONF VISUAL FIELD
OS_NORMAL: 1
OD_NORMAL: 1

## 2017-04-03 ASSESSMENT — CUP TO DISC RATIO
OS_RATIO: 0.6
OD_RATIO: 0.6

## 2017-04-03 ASSESSMENT — EXTERNAL EXAM - RIGHT EYE: OD_EXAM: NORMAL

## 2017-04-03 NOTE — MR AVS SNAPSHOT
After Visit Summary   4/3/2017    Kiera Dobson    MRN: [id number]           Patient Information     Date Of Birth          1965        Visit Information        Provider Department      4/3/2017 9:15 AM Devika Dorsey MD Eye Clinic        Today's Diagnoses     Type 1 diabetes, HbA1c goal < 7% (H)    -  1    Glaucoma suspect, both eyes        Moderate nonproliferative diabetic retinopathy associated with type 1 diabetes mellitus, without macular edema (H)           Follow-ups after your visit        Follow-up notes from your care team     Return in about 1 year (around 4/3/2018) for OCT OU.      Your next 10 appointments already scheduled     Apr 05, 2017  8:30 AM CDT   (Arrive by 8:15 AM)   RETURN DIABETES with Jo Jean PA-C   [facility] Endocrinology ([facility] [place])    [address]  3rd [facility] [id number] [phone]            Jun 06, 2017  9:15 AM CDT   RETURN GENERAL with Curtis Costa MD   Eye Clinic ([facility])    [address]  [address]  [address] Clin [address]  [facility] [id number] [phone]              Future tests that were ordered for you today     Open Future Orders        Priority Expected Expires Ordered    OCT Retina Spectralis OU (both eyes) Routine  10/5/2018 4/3/2017            Who to contact     Please call your clinic at [phone] to:    Ask questions about your health    Make or cancel appointments    Discuss your medicines    Learn about your test results    Speak to your doctor   If you have compliments or concerns about an experience at your clinic, or if you wish to file a complaint, please contact [facility] Patient Relations at [phone] or email us at [email or web address]         Additional Information About Your Visit        MyChart Information     "[employer]hart gives you secure access to your electronic health record. If you  see a primary care provider, you can also send messages to your care team and make appointments. If you have questions, please call your primary care clinic.  If you do not have a primary care provider, please call [phone] and they will assist you.      GlucoVista is an electronic gateway that provides easy, online access to your medical records. With GlucoVista, you can request a clinic appointment, read your test results, renew a prescription or communicate with your care team.     To access your existing account, please contact your [facility] or call [phone] for assistance.        Care EveryWhere ID     This is your Care EveryWhere ID. This could be used by other organizations to access your [place] medical records  [id number]         Blood Pressure from Last 3 Encounters:   03/24/17 118/73   02/20/17 111/73   02/14/17 118/71    Weight from Last 3 Encounters:   03/24/17 64.9 kg (143 lb)   02/20/17 64.4 kg (142 lb)   02/14/17 64 kg (141 lb 3.2 oz)              We Performed the Following     OCT Retina Spectralis OU (both eyes)        Primary Care Provider Office Phone # Fax #    Mary Beth Nely Mansfield [phone]       [address] [address]        Thank you!     Thank you for choosing EYE CLINIC  for your care. Our goal is always to provide you with excellent care. Hearing back from our patients is one way we can continue to improve our services. Please take a few minutes to complete the written survey that you may receive in the mail after your visit with us. Thank you!             Your Updated Medication List - Protect others around you: Learn how to safely use, store and throw away your medicines at www.disposemymeds.org.          This list is accurate as of: 4/3/17 11:42 AM.  Always use your most recent med list.                   Brand Name Dispense Instructions for use    aspirin 81 MG chewable tablet      108 tablet    Take 81 mg by mouth every other day       * blood glucose monitoring test strip    no brand specified    5 Box    Test up to ten times per day as directed       * blood glucose monitoring test strip    ERICA CONTOUR NEXT    300 strip    Use to test blood sugar 10  times daily or as directed.       CVS GLUCOSE 40 % Gel gel   Generic drug:  glucose      Take 15 g by mouth as needed       glucagon 1 MG kit    GLUCAGON EMERGENCY    1 mg    Inject 1 mg into the muscle once for 1 dose Use as directed       humaLOG 100 UNIT/ML injection   Generic drug:  insulin lispro          insulin aspart 100 UNITS/ML injection    NovoLOG VIAL    60 mL    Use in insulin pump.  Pt uses appox 50-60 units in 24 hrs.       PROZAC PO      Take 10 mg by mouth       simvastatin 80 MG tablet    ZOCOR    90 tablet    Take 1 tablet (80 mg) by mouth At Bedtime       * Notice:  This list has 2 medication(s) that are the same as other medications prescribed for you. Read the directions carefully, and ask your doctor or other care provider to review them with you.

## 2017-04-03 NOTE — NURSING NOTE
Chief Complaints and History of Present Illnesses   Patient presents with     Diabetic Eye Exam     s/p Glaucoma suspect, both eyes (Primary Dx); Cortical, lamellar...     HPI    Affected eye(s):  Both   Symptoms:     Decreased vision   No floaters   No flashes   No tearing   Dryness      Duration:  12 months   Frequency:  Constant       Do you have eye pain now?:  Yes   Location:  OS   Pain Level:  Mild Pain (2)   Pain Duration:  2 days   Pain Frequency:  Intermittent   Pain Characteristics:  Aching      Comments:  States va is the same since last visit.                        A1C                      7.9                 07/16/2013                 A1C                      7.9                 04/03/2013                 A1C                      8.4                 02/07/2013                 A1C                      8.2                 10/10/2012                 A1C                      9.0                 09/04/2012     BS: 180 on average, 77 this morning           Anton Vela  9:34 AM April 3, 2017

## 2017-04-03 NOTE — PROGRESS NOTES
CC - Diabetes mellitus eval    INTERVAL HISTORY -   No changes, mild glare, ?increaed floaters.  Last A1c 8.3 on 12/2016    HPI  -  Diabetes mellitus I sees Tirso    RETINAL IMAGING  OCT 4/3/17  RE: thin no fluid, stable  LE: thin no fluid, stable      FA 3-22-16  Right eye - (transit) normal filling, normal macula, 1+ peripheral MAs and ischemia, no NV  Left eye - normal filling, normal macula, 1+ peripheral MAs and ischemia, no NV    ASSESSMENT:    1.  Mild/moderate NPDR (based on FA) OU with DM I no DME   - last FA 3/2016 showed retinopathy, not visible on exam   - BP/BG control   - RTC 1 year    2.  NS OU   - mild, has seen Igor      3. OAG suspect   - has seen Igor last 4/2016   - advise f/u next few months    4.  Asteroid hyalosis OD    return to clinic 1 year retina, OCT OU    ATTESTATION     Attending Physician Attestation:     Complete documentation of historical and exam elements from today's encounter can be found in the full encounter summary report (not redupilcated in this progress note).  I personally obtained the chief complaint(s) and hisotry of present illness., I have confirmed and edited as necessary the Past medical history/Past Surgical History, Social history, Family medical history,  Review of systems, and exam/neuro findings as obtained by the technician or others. I have examined this patient myself. , I personally viewed the relevant tests, image(s), reports, and studies listed above and the documentation reflects my findings and interpretation. and I formulated and edited as necessary the assessment and plan and discussed the findings and management plan with the patient and family.    Devika Dorsey MD, PhD  , Vitreoretinal Surgery  Department of Ophthalmology  [facility]

## 2017-04-05 ENCOUNTER — OFFICE VISIT (OUTPATIENT)
Dept: ENDOCRINOLOGY | Facility: CLINIC | Age: 52
End: 2017-04-05

## 2017-04-05 VITALS
HEIGHT: 60 IN | BODY MASS INDEX: 28.21 KG/M2 | WEIGHT: 143.7 LBS | DIASTOLIC BLOOD PRESSURE: 70 MMHG | HEART RATE: 97 BPM | SYSTOLIC BLOOD PRESSURE: 112 MMHG

## 2017-04-05 DIAGNOSIS — E10.319 TYPE 1 DIABETES MELLITUS WITH RETINOPATHY OF BOTH EYES WITHOUT MACULAR EDEMA, UNSPECIFIED RETINOPATHY SEVERITY (H): Primary | ICD-10-CM

## 2017-04-05 NOTE — MR AVS SNAPSHOT
After Visit Summary   4/5/2017    Kiera Dobson    MRN: [id number]           Patient Information     Date Of Birth          1965        Visit Information        Provider Department      4/5/2017 8:30 AM Jo Jean PA-C [facility] Endocrinology         Follow-ups after your visit        Your next 10 appointments already scheduled     Jun 01, 2017  3:00 PM CDT   (Arrive by 2:45 PM)   RETURN DIABETES with ML Jaime [facility] Endocrinology ([facility] [place])    [address]  3rd [facility] [id number] [phone]            Jun 06, 2017  9:15 AM CDT   RETURN GENERAL with Curtis Costa MD   Eye Clinic ([facility])    [address] [facility]  [address]  [address] Clin [address]  [facility] [place] [id number] [phone]              Who to contact     Please call your clinic at [phone] to:    Ask questions about your health    Make or cancel appointments    Discuss your medicines    Learn about your test results    Speak to your doctor   If you have compliments or concerns about an experience at your clinic, or if you wish to file a complaint, please contact [facility] Patient Relations at [phone] or email us at [email or web address]         Additional Information About Your Visit        MyChart Information     [employer] gives you secure access to your electronic health record. If you see a primary care provider, you can also send messages to your care team and make appointments. If you have questions, please call your primary care clinic.  If you do not have a primary care provider, please call [phone] and they will assist you.      JackPot Rewards is an electronic gateway that provides easy, online access to your medical records. With Jack[employer] Rewards, you can request a clinic appointment, read your test results, renew a prescription or communicate with your care team.     To access your  "existing account, please contact your [facility] or call [phone] for assistance.        Care EveryWhere ID     This is your Care EveryWhere ID. This could be used by other organizations to access your [place] medical records  [id number]        Your Vitals Were     Pulse Height BMI (Body Mass Index)             97 1.518 m (4' 11.75\") 28.3 kg/m2          Blood Pressure from Last 3 Encounters:   04/05/17 112/70   03/24/17 118/73   02/20/17 111/73    Weight from Last 3 Encounters:   04/05/17 65.2 kg (143 lb 11.2 oz)   03/24/17 64.9 kg (143 lb)   02/20/17 64.4 kg (142 lb)              Today, you had the following     No orders found for display         Today's Medication Changes          These changes are accurate as of: 4/5/17  9:19 AM.  If you have any questions, ask your nurse or doctor.               Stop taking these medicines if you haven't already. Please contact your care team if you have questions.     humaLOG 100 UNIT/ML injection   Generic drug:  insulin lispro                    Primary Care Provider Office Phone # Fax #    Mary Beth Nely Mansfield [phone]       [address] [address]        Thank you!     Thank you for choosing [facility]  for your care. Our goal is always to provide you with excellent care. Hearing back from our patients is one way we can continue to improve our services. Please take a few minutes to complete the written survey that you may receive in the mail after your visit with us. Thank you!             Your Updated Medication List - Protect others around you: Learn how to safely use, store and throw away your medicines at www.disposemymeds.org.          This list is accurate as of: 4/5/17  9:19 AM.  Always use your most recent med list.                   Brand Name Dispense Instructions for use    aspirin 81 MG chewable tablet     108 tablet    Take 81 mg by mouth every " other day       * blood glucose monitoring test strip    no brand specified    5 Box    Test up to ten times per day as directed       * blood glucose monitoring test strip    ERICA CONTOUR NEXT    300 strip    Use to test blood sugar 10  times daily or as directed.       CVS GLUCOSE 40 % Gel gel   Generic drug:  glucose      Take 15 g by mouth as needed       glucagon 1 MG kit    GLUCAGON EMERGENCY    1 mg    Inject 1 mg into the muscle once for 1 dose Use as directed       insulin aspart 100 UNITS/ML injection    NovoLOG VIAL    60 mL    Use in insulin pump.  Pt uses appox 50-60 units in 24 hrs.       PROZAC PO      Take 10 mg by mouth       simvastatin 80 MG tablet    ZOCOR    90 tablet    Take 1 tablet (80 mg) by mouth At Bedtime       * Notice:  This list has 2 medication(s) that are the same as other medications prescribed for you. Read the directions carefully, and ask your doctor or other care provider to review them with you.

## 2017-04-05 NOTE — PROGRESS NOTES
HPI  Kiera Dobson is a 51 year old female with type 1 diabetes mellitus here today for a follow up visit.  She has mild/moderate nonproliferative diabetic retinopathy. No peripheral neuropathy or nephropathy.  Her hx is also significant for hypoglycemia unawareness.  She denies any recent severe hypoglycemia.  She has been using her Medtronic 530G insulin pump and Dexcom.  Her current basal insulin rates are:  Midnight= 0.4 units/hr.  3:30 am = 0.7 units/hr.  10 am  = 0.7 units/hr.  2200= 0.5 units/hr.  Pt's I/C ratio is 1:15.  Sensitivity is 70.  Her 24 hr basal dose is 15.35 units/24 hrs.  Pt's A1C was 8.3 % in 2/2017 and her previous A1C was 7.5 %.   We downloaded pt's insulin pump and Dexcom today.  She has been checking her blood sugar more frequent and using bolus insulin for all food intake.  Her average glucose was 189 ( was 232 )  over the past month.  No frequent hypoglycemia on her Dexcom download.  On ROS today,she reports leg, back and hip pain. She has been told she has arthritis.  No numbness in her feet.  Pt denies visual changes, n/v, SOB or cough.  No chest pain at his time or abd pain or diarrhea.      DIABETES CARE:  Retinopathy:mild/moderate nonproliferative diabetic retinopathy.  She was last seen here by Oph yesterday.  Nephropathy: urine microalbuminuria negative in 2/2017. Neuropathy: none.  Feet: good pulses, no ulcers and normal monofilamentous exam.  Taking ASA: yes.  Lipids:  LDL 69 on 3/24/2017.    Pt is taking Simvastatin.    ROS  Please see under HPI.    Allergies  Allergies   Allergen Reactions     Ampicillin Sodium Rash       Medications  Current Outpatient Prescriptions   Medication Sig Dispense Refill     insulin aspart (NOVOLOG VIAL) 100 UNITS/ML VIAL Use in insulin pump.  Pt uses appox 50-60 units in 24 hrs. 60 mL 3     simvastatin (ZOCOR) 80 MG tablet Take 1 tablet (80 mg) by mouth At Bedtime 90 tablet 3     aspirin 81 MG chewable tablet Take 81 mg by mouth every other day   "108 tablet 3     blood glucose (ERICA CONTOUR NEXT) test strip Use to test blood sugar 10  times daily or as directed. 300 strip 11     FLUoxetine HCl (PROZAC PO) Take 10 mg by mouth        glucose ([employer] GLUCOSE) 40 % GEL Take 15 g by mouth as needed       glucose blood VI test strips strip Test up to ten times per day as directed 5 Box 8     glucagon (GLUCAGON EMERGENCY) 1 MG injection Inject 1 mg into the muscle once for 1 dose Use as directed 1 mg 2       Family History  family history includes Alzheimer Disease in her father; Autoimmune Disease in her mother; CANCER in her father; DIABETES in her father; Glaucoma in her father and mother; Hypertension in her mother.    Social History  Smoke: none.  ETOH: rare.   with children.  Pt works full time in a lab.    Past Medical History  Past Medical History:   Diagnosis Date     Diabetes mellitus (H)     Type 1     Elevated cholesterol      Glaucoma     Glaucoma suspect     Glaucoma suspect      Kidney stones      Nonsenile cataract      Past Surgical History:   Procedure Laterality Date     APPENDECTOMY OPEN  1981     BIOPSY OF SKIN LESION  12/30/2011    returned January 2012 for additional removal     c sections  99,97,08     EXTRACORPOREAL SHOCK WAVE LITHOTRIPSY, CYSTOSCOPY, INSERT STENT URETER(S), COMBINED  2004     LASER HOLMIUM LITHOTRIPSY URETER(S), INSERT STENT, COMBINED  12/12/2013    Procedure: COMBINED CYSTOSCOPY, URETEROSCOPY, LASER HOLMIUM LITHOTRIPSY URETER(S), INSERT STENT;  Right Ureteroscopy Holmium Laser Lithotripsy Stent Placement;  Surgeon: Kirill Marlow MD;  Location: [facility]     S/P right shoulder surgery in Dec 2012.    Physical Exam  /70 (BP Location: Right arm, Patient Position: Chair, Cuff Size: Adult Regular)  Pulse 97  Ht 1.518 m (4' 11.75\")  Wt 65.2 kg (143 lb 11.2 oz)  BMI 28.3 kg/m2  Body mass index is 28.3 kg/(m^2).    HEENT:  PERRLA; fundi not examined.  NECK: Thyroid nontender.  LUNGS: Clear b/l.  CARDIAC:  " RRR.  ABDOMEN: Insulin infusion set and dexcom intact. Nontender.  Some areas of lipohypertrophy.  EXTREMITIES: No edema. Equal strength in LE's b/l.  FEET: Warm, no ulcers and normal monofilamentous exam.    RESULTS  Creatinine   Date Value Ref Range Status   02/14/2017 0.62 0.52 - 1.04 mg/dL Final     GFR Estimate   Date Value Ref Range Status   02/14/2017 >90  Non  GFR Calc   >60 mL/min/1.7m2 Final     Hemoglobin A1C   Date Value Ref Range Status   07/16/2013 7.9 (A) 4.3 - 6.0 % Final     Potassium   Date Value Ref Range Status   02/14/2017 4.6 3.4 - 5.3 mmol/L Final     ALT   Date Value Ref Range Status   02/14/2017 17 0 - 50 U/L Final     AST   Date Value Ref Range Status   02/14/2017 13 0 - 45 U/L Final     TSH   Date Value Ref Range Status   02/14/2017 1.57 0.40 - 4.00 mU/L Final     T4 Free   Date Value Ref Range Status   02/14/2017 0.84 0.76 - 1.46 ng/dL Final       Cholesterol   Date Value Ref Range Status   03/24/2017 154 <200 mg/dL Final   02/10/2016 190 <200 mg/dL Final     HDL Cholesterol   Date Value Ref Range Status   03/24/2017 73 >49 mg/dL Final   02/10/2016 78 >49 mg/dL Final     LDL Cholesterol Calculated   Date Value Ref Range Status   03/24/2017 69 <100 mg/dL Final     Comment:     Desirable:       <100 mg/dl   02/10/2016 101 (H) <100 mg/dL Final     Comment:     Above desirable:  100-129 mg/dl   Borderline High:  130-159 mg/dL   High:             160-189 mg/dL   Very high:       >189 mg/dl       Triglycerides   Date Value Ref Range Status   03/24/2017 59 <150 mg/dL Final   02/10/2016 58 <150 mg/dL Final     Comment:     Fasting specimen     Cholesterol/HDL Ratio   Date Value Ref Range Status   08/18/2014 2.7 0.0 - 5.0 Final   09/17/2013 2.3 0.0 - 5.0 Final       A1C      8.3   2/14/2017  A1C      7.5   8/18/2016  A1C      7.6   5/5/2016  A1C      7.8    2/10/2016  A1C      7.9    9/2/2015  A1C      7.7    5/2015  A1C      8.6    1/28/2015  A1C      8.8    9/26/2014  A1C       8.0    7/2/2014  A1C      8.4    2/28/2014    ASSESSMENT/PLAN:    1.  TYPE 1 DIABETES MELLITUS:Type 1 diabetes mellitus complicated by retinopathy and hypoglycemia unawareness.  Kiera's blood sugar values have improved. She is checking her blood sugar more frequent and using bolus insulin for all food intake.  No change in insulin rates today.  I asked Kiera to be sure and check her blood sugar premeals and at hs daily and to use bolus insulin at the time of her meal and snack.  She is to continue to use her Dexcom sensor daily.  She remains normotensive.   Feet in good condition.  She is taking ASA daily.  Pt's TSH is normal in Feb 2017.    2.  MILD/MODERATE NPDR: Last seen by Oph here yesterday.    3.  HX OF + MICROALBUMINURIA:  Pt's microalbuminuria negative in 2/2017.  Her creat/GFR are stable.    4. HYPERLIPIDEMIA:  LDL 69 in 3/2017.  Pt remains on high dose Simvastatin.  Since she has been on the high dose Simvastatin for years and tolerating this dose, Dr. Chahal would like her to remain on Simvastatin 80 mg daily.  Pt's hepatic panel and CK were normal in 2/2017.    5.  ADHD: She is being followed by Yumi ANSARI ( [phone] ).  She remains on RX.    6.  TINNITUS: Seen here by ENT dx hearing loss.    7. HYPOGLYCEMIA UNAWARENESS: She denies having any recent severe hypoglycemia.  Continue to wear Dexcom daily.    8. Return to Endocrine Clinic to see me in May 2017 to check A1C.

## 2017-04-05 NOTE — NURSING NOTE
"Chief Complaint   Patient presents with     RECHECK     DIABETES TYPE 1 F/U        Initial /70 (BP Location: Right arm, Patient Position: Chair, Cuff Size: Adult Regular)  Pulse 97  Ht 1.518 m (4' 11.75\")  Wt 65.2 kg (143 lb 11.2 oz)  BMI 28.3 kg/m2 Estimated body mass index is 28.3 kg/(m^2) as calculated from the following:    Height as of this encounter: 1.518 m (4' 11.75\").    Weight as of this encounter: 65.2 kg (143 lb 11.2 oz).  Medication Reconciliation: complete         Amy Salazar, MACI         "

## 2017-04-05 NOTE — LETTER
4/5/2017       RE: Kiera Dobson  [address]  [facility] [address]     Dear Colleague,    Thank you for referring your patient, Kiera Dobson, to the [facility] ENDOCRINOLOGY at [facility]. Please see a copy of my visit note below.    HPI  Kiera Dobson is a 51 year old female with type 1 diabetes mellitus here today for a follow up visit.  She has mild/moderate nonproliferative diabetic retinopathy. No peripheral neuropathy or nephropathy.  Her hx is also significant for hypoglycemia unawareness.  She denies any recent severe hypoglycemia.  She has been using her Medtronic 530G insulin pump and Dexcom.  Her current basal insulin rates are:  Midnight= 0.4 units/hr.  3:30 am = 0.7 units/hr.  10 am  = 0.7 units/hr.  2200= 0.5 units/hr.  Pt's I/C ratio is 1:15.  Sensitivity is 70.  Her 24 hr basal dose is 15.35 units/24 hrs.  Pt's A1C was 8.3 % in 2/2017 and her previous A1C was 7.5 %.   We downloaded pt's insulin pump and Dexcom today.  She has been checking her blood sugar more frequent and using bolus insulin for all food intake.  Her average glucose was 189 ( was 232 )  over the past month.  No frequent hypoglycemia on her Dexcom download.  On ROS today,she reports leg, back and hip pain. She has been told she has arthritis.  No numbness in her feet.  Pt denies visual changes, n/v, SOB or cough.  No chest pain at his time or abd pain or diarrhea.      DIABETES CARE:  Retinopathy:mild/moderate nonproliferative diabetic retinopathy.  She was last seen here by Oph yesterday.  Nephropathy: urine microalbuminuria negative in 2/2017. Neuropathy: none.  Feet: good pulses, no ulcers and normal monofilamentous exam.  Taking ASA: yes.  Lipids:  LDL 69 on 3/24/2017.    Pt is taking Simvastatin.    ROS  Please see under HPI.    Allergies  Allergies   Allergen Reactions     Ampicillin Sodium Rash       Medications  Current Outpatient Prescriptions   Medication Sig Dispense Refill      insulin aspart (NOVOLOG VIAL) 100 UNITS/ML VIAL Use in insulin pump.  Pt uses appox 50-60 units in 24 hrs. 60 mL 3     simvastatin (ZOCOR) 80 MG tablet Take 1 tablet (80 mg) by mouth At Bedtime 90 tablet 3     aspirin 81 MG chewable tablet Take 81 mg by mouth every other day  108 tablet 3     blood glucose (ERICA CONTOUR NEXT) test strip Use to test blood sugar 10  times daily or as directed. 300 strip 11     FLUoxetine HCl (PROZAC PO) Take 10 mg by mouth        glucose (CVS GLUCOSE) 40 % GEL Take 15 g by mouth as needed       glucose blood VI test strips strip Test up to ten times per day as directed 5 Box 8     glucagon (GLUCAGON EMERGENCY) 1 MG injection Inject 1 mg into the muscle once for 1 dose Use as directed 1 mg 2       Family History  family history includes Alzheimer Disease in her father; Autoimmune Disease in her mother; CANCER in her father; DIABETES in her father; Glaucoma in her father and mother; Hypertension in her mother.    Social History  Smoke: none.  ETOH: rare.   with children.  Pt works full time in a lab.    Past Medical History  Past Medical History:   Diagnosis Date     Diabetes mellitus (H)     Type 1     Elevated cholesterol      Glaucoma     Glaucoma suspect     Glaucoma suspect      Kidney stones      Nonsenile cataract      Past Surgical History:   Procedure Laterality Date     APPENDECTOMY OPEN  1981     BIOPSY OF SKIN LESION  12/30/2011    returned January 2012 for additional removal     c sections  99,97,08     EXTRACORPOREAL SHOCK WAVE LITHOTRIPSY, CYSTOSCOPY, INSERT STENT URETER(S), COMBINED  2004     LASER HOLMIUM LITHOTRIPSY URETER(S), INSERT STENT, COMBINED  12/12/2013    Procedure: COMBINED CYSTOSCOPY, URETEROSCOPY, LASER HOLMIUM LITHOTRIPSY URETER(S), INSERT STENT;  Right Ureteroscopy Holmium Laser Lithotripsy Stent Placement;  Surgeon: Kirill Marlow MD;  Location: [facility]     S/P right shoulder surgery in Dec 2012.    Physical Exam  /70 (BP Location:  "Right arm, Patient Position: Chair, Cuff Size: Adult Regular)  Pulse 97  Ht 1.518 m (4' 11.75\")  Wt 65.2 kg (143 lb 11.2 oz)  BMI 28.3 kg/m2  Body mass index is 28.3 kg/(m^2).    HEENT:  PERRLA; fundi not examined.  NECK: Thyroid nontender.  LUNGS: Clear b/l.  CARDIAC:  RRR.  ABDOMEN: Insulin infusion set and dexcom intact. Nontender.  Some areas of lipohypertrophy.  EXTREMITIES: No edema. Equal strength in LE's b/l.  FEET: Warm, no ulcers and normal monofilamentous exam.    RESULTS  Creatinine   Date Value Ref Range Status   02/14/2017 0.62 0.52 - 1.04 mg/dL Final     GFR Estimate   Date Value Ref Range Status   02/14/2017 >90  Non  GFR Calc   >60 mL/min/1.7m2 Final     Hemoglobin A1C   Date Value Ref Range Status   07/16/2013 7.9 (A) 4.3 - 6.0 % Final     Potassium   Date Value Ref Range Status   02/14/2017 4.6 3.4 - 5.3 mmol/L Final     ALT   Date Value Ref Range Status   02/14/2017 17 0 - 50 U/L Final     AST   Date Value Ref Range Status   02/14/2017 13 0 - 45 U/L Final     TSH   Date Value Ref Range Status   02/14/2017 1.57 0.40 - 4.00 mU/L Final     T4 Free   Date Value Ref Range Status   02/14/2017 0.84 0.76 - 1.46 ng/dL Final       Cholesterol   Date Value Ref Range Status   03/24/2017 154 <200 mg/dL Final   02/10/2016 190 <200 mg/dL Final     HDL Cholesterol   Date Value Ref Range Status   03/24/2017 73 >49 mg/dL Final   02/10/2016 78 >49 mg/dL Final     LDL Cholesterol Calculated   Date Value Ref Range Status   03/24/2017 69 <100 mg/dL Final     Comment:     Desirable:       <100 mg/dl   02/10/2016 101 (H) <100 mg/dL Final     Comment:     Above desirable:  100-129 mg/dl   Borderline High:  130-159 mg/dL   High:             160-189 mg/dL   Very high:       >189 mg/dl       Triglycerides   Date Value Ref Range Status   03/24/2017 59 <150 mg/dL Final   02/10/2016 58 <150 mg/dL Final     Comment:     Fasting specimen     Cholesterol/HDL Ratio   Date Value Ref Range Status   08/18/2014 2.7 " 0.0 - 5.0 Final   09/17/2013 2.3 0.0 - 5.0 Final       A1C      8.3   2/14/2017  A1C      7.5   8/18/2016  A1C      7.6   5/5/2016  A1C      7.8    2/10/2016  A1C      7.9    9/2/2015  A1C      7.7    5/2015  A1C      8.6    1/28/2015  A1C      8.8    9/26/2014  A1C      8.0    7/2/2014  A1C      8.4    2/28/2014    ASSESSMENT/PLAN:    1.  TYPE 1 DIABETES MELLITUS:Type 1 diabetes mellitus complicated by retinopathy and hypoglycemia unawareness.  Kiera's blood sugar values have improved. She is checking her blood sugar more frequent and using bolus insulin for all food intake.  No change in insulin rates today.  I asked Kiera to be sure and check her blood sugar premeals and at hs daily and to use bolus insulin at the time of her meal and snack.  She is to continue to use her Dexcom sensor daily.  She remains normotensive.   Feet in good condition.  She is taking ASA daily.  Pt's TSH is normal in Feb 2017.    2.  MILD/MODERATE NPDR: Last seen by Oph here yesterday.    3.  HX OF + MICROALBUMINURIA:  Pt's microalbuminuria negative in 2/2017.  Her creat/GFR are stable.    4. HYPERLIPIDEMIA:  LDL 69 in 3/2017.  Pt remains on high dose Simvastatin.  Since she has been on the high dose Simvastatin for years and tolerating this dose, Dr. Chahal would like her to remain on Simvastatin 80 mg daily.  Pt's hepatic panel and CK were normal in 2/2017.    5.  ADHD: She is being followed by Yumi ANSARI ( [phone] ).  She remains on RX.    6.  TINNITUS: Seen here by ENT dx hearing loss.    7. HYPOGLYCEMIA UNAWARENESS: She denies having any recent severe hypoglycemia.  Continue to wear Dexcom daily.    8. Return to Endocrine Clinic to see me in May 2017 to check A1C.    Again, thank you for allowing me to participate in the care of your patient.      Sincerely,    Jo Jean PA-C

## 2017-04-30 DIAGNOSIS — E10.9 DIABETES MELLITUS TYPE 1 (H): Primary | ICD-10-CM

## 2017-05-01 ENCOUNTER — TELEPHONE (OUTPATIENT)
Dept: ENDOCRINOLOGY | Facility: CLINIC | Age: 52
End: 2017-05-01

## 2017-05-02 NOTE — TELEPHONE ENCOUNTER
[facility] Prior Authorization Team   Phone: [phone]  Fax: [phone]    PA Initiation    Medication: ERICA CONTOUR NEXT) test strip  Insurance Company: [employer] - Phone [phone] Fax [phone]  Pharmacy Filling the Rx: [id number] - [place], [place] - [address] [facility]  Filling Pharmacy Phone: [phone]  Filling Pharmacy Fax:    Start Date: 5/2/2017

## 2017-05-04 NOTE — TELEPHONE ENCOUNTER
Prior Authorization Approval    Authorization Effective Date: 5/4/2017  Authorization Expiration Date: 5/4/2018  Medication: ERICA CONTOUR NEXT) test strip - APPROVED  Approved Dose/Quantity:   Reference #: [id number]   Insurance Company: [employer] - Phone [phone] Fax [phone]  Expected CoPay: $60.07     CoPay Card Available:      Foundation Assistance Needed:    Which Pharmacy is filling the prescription (Not needed for infusion/clinic administered): [id number] - [address]  Pharmacy Notified: Yes  Patient Notified: Yes Comment:  LEFT VOICE MESSAGE

## 2017-05-18 ASSESSMENT — ENCOUNTER SYMPTOMS
MYALGIAS: 1
NIGHT SWEATS: 1
LEG PAIN: 1
HALLUCINATIONS: 0
TINGLING: 1
INCREASED ENERGY: 0
PARALYSIS: 0
ALTERED TEMPERATURE REGULATION: 1
NECK MASS: 0
NUMBNESS: 1
STIFFNESS: 1
ARTHRALGIAS: 1
HYPOTENSION: 0
EYE REDNESS: 0
WEIGHT LOSS: 0
LOSS OF CONSCIOUSNESS: 0
HOARSE VOICE: 0
SINUS CONGESTION: 0
EXERCISE INTOLERANCE: 0
NECK PAIN: 1
EYE WATERING: 0
TREMORS: 0
RESPIRATORY PAIN: 0
EYE IRRITATION: 0
POLYDIPSIA: 0
MUSCLE WEAKNESS: 0
INSOMNIA: 1
DECREASED CONCENTRATION: 1
FEVER: 0
WEIGHT GAIN: 0
WEAKNESS: 0
SPEECH CHANGE: 0
DISTURBANCES IN COORDINATION: 0
DIZZINESS: 0
MUSCLE CRAMPS: 1
SMELL DISTURBANCE: 0
TACHYCARDIA: 0
LIGHT-HEADEDNESS: 0
POLYPHAGIA: 0
DOUBLE VISION: 0
SYNCOPE: 0
EYE PAIN: 0
NERVOUS/ANXIOUS: 1
SNORES LOUDLY: 0
PANIC: 0
SORE THROAT: 1
BACK PAIN: 0
TROUBLE SWALLOWING: 0
CLAUDICATION: 1
POSTURAL DYSPNEA: 0
CHILLS: 1
SLEEP DISTURBANCES DUE TO BREATHING: 0
PALPITATIONS: 0
COUGH: 1
DECREASED LIBIDO: 0
JOINT SWELLING: 0
ORTHOPNEA: 0
DYSPNEA ON EXERTION: 1
SHORTNESS OF BREATH: 1
HYPERTENSION: 0
MEMORY LOSS: 1
DECREASED APPETITE: 1
HEADACHES: 1
SINUS PAIN: 0
SEIZURES: 0
HEMOPTYSIS: 0
FATIGUE: 1
HOT FLASHES: 1
COUGH DISTURBING SLEEP: 0
WHEEZING: 0
DEPRESSION: 0
LEG SWELLING: 0
SPUTUM PRODUCTION: 0
TASTE DISTURBANCE: 0

## 2017-06-01 ENCOUNTER — OFFICE VISIT (OUTPATIENT)
Dept: ENDOCRINOLOGY | Facility: CLINIC | Age: 52
End: 2017-06-01

## 2017-06-01 VITALS
WEIGHT: 146 LBS | HEART RATE: 97 BPM | DIASTOLIC BLOOD PRESSURE: 70 MMHG | BODY MASS INDEX: 29.43 KG/M2 | SYSTOLIC BLOOD PRESSURE: 116 MMHG | HEIGHT: 59 IN

## 2017-06-01 DIAGNOSIS — E10.9 TYPE 1 DIABETES MELLITUS WITHOUT COMPLICATION (H): Primary | ICD-10-CM

## 2017-06-01 LAB — HBA1C MFR BLD: 7.7 % (ref 4.3–6)

## 2017-06-01 ASSESSMENT — PAIN SCALES - GENERAL: PAINLEVEL: NO PAIN (0)

## 2017-06-01 NOTE — MR AVS SNAPSHOT
After Visit Summary   6/1/2017    Kiera Dobson    MRN: [id number]           Patient Information     Date Of Birth          1965        Visit Information        Provider Department      6/1/2017 3:00 PM Jo Jean PA-C [facility] Endocrinology         Follow-ups after your visit        Your next 10 appointments already scheduled     Jun 06, 2017  9:15 AM CDT   RETURN GENERAL with Curtis Costa MD   Eye Clinic ([facility])    [address]  [address] [place] [address]  [facility]  [facility] [address]   [phone]            Aug 15, 2017 11:00 AM CDT   (Arrive by 10:45 AM)   RETURN DIABETES with Paola Chahal MD   [facility] Endocrinology ([facility])    [address] [place] [id number] [phone]            Dec 01, 2017  9:30 AM CST   (Arrive by 9:15 AM)   RETURN DIABETES with ML Jaime [facility] Endocrinology ([facility])    [address] [place] [id number] [phone]              Who to contact     Please call your clinic at [phone] to:    Ask questions about your health    Make or cancel appointments    Discuss your medicines    Learn about your test results    Speak to your doctor   If you have compliments or concerns about an experience at your clinic, or if you wish to file a complaint, please contact [facility] Patient Relations at [phone] or email us at [email or web address]         Additional Information About Your Visit        MyChart Information     [employer] gives you secure access to your electronic health record. If you see a primary care provider, you can also send messages to your care team and make appointments. If you have questions, please call your primary care clinic.  If you do not have a primary care provider, please call [phone] and they will assist you.    "   "[employer] is an electronic gateway that provides easy, online access to your medical records. With "[employer] you can request a clinic appointment, read your test results, renew a prescription or communicate with your care team.     To access your existing account, please contact your [facility] or call [phone] for assistance.        Care EveryWhere ID     This is your Care EveryWhere ID. This could be used by other organizations to access your [place] medical records  [id number]        Your Vitals Were     Pulse Height BMI (Body Mass Index)             97 1.499 m (4' 11\") 29.49 kg/m2          Blood Pressure from Last 3 Encounters:   06/01/17 116/70   04/05/17 112/70   03/24/17 118/73    Weight from Last 3 Encounters:   06/01/17 66.2 kg (146 lb)   04/05/17 65.2 kg (143 lb 11.2 oz)   03/24/17 64.9 kg (143 lb)              Today, you had the following     No orders found for display       Primary Care Provider Office Phone # Fax #    Mary Beth Nely Mansfield [phone]       [address]  [facility] [address]        Thank you!     Thank you for choosing [facility]  for your care. Our goal is always to provide you with excellent care. Hearing back from our patients is one way we can continue to improve our services. Please take a few minutes to complete the written survey that you may receive in the mail after your visit with us. Thank you!             Your Updated Medication List - Protect others around you: Learn how to safely use, store and throw away your medicines at www.disposemymeds.org.          This list is accurate as of: 6/1/17  3:35 PM.  Always use your most recent med list.                   Brand Name Dispense Instructions for use    aspirin 81 MG chewable tablet     108 tablet    Take 81 mg by mouth every other day       * blood glucose monitoring test strip    no brand specified    5 Box    Test up to ten " times per day as directed       * blood glucose monitoring test strip    ERICA CONTOUR NEXT    300 strip    Use to test blood sugar 10  times daily or as directed.       [employer] GLUCOSE 40 % Gel gel   Generic drug:  glucose      Take 15 g by mouth as needed       glucagon 1 MG kit    GLUCAGON EMERGENCY    1 mg    Inject 1 mg into the muscle once for 1 dose Use as directed       insulin aspart 100 UNITS/ML injection    NovoLOG VIAL    60 mL    Use in insulin pump.  Pt uses appox 50-60 units in 24 hrs.       PROZAC PO      Take 10 mg by mouth       simvastatin 80 MG tablet    ZOCOR    90 tablet    Take 1 tablet (80 mg) by mouth At Bedtime       * Notice:  This list has 2 medication(s) that are the same as other medications prescribed for you. Read the directions carefully, and ask your doctor or other care provider to review them with you.

## 2017-06-01 NOTE — LETTER
6/1/2017     RE: Kiera Dobson  [address]  [facility] [address]     Dear Colleague,    Thank you for referring your patient, Kiera Dobson, to the [facility] ENDOCRINOLOGY at [facility]. Please see a copy of my visit note below.    HPI  Kiera Dobson is a 51 year old female with type 1 diabetes mellitus here today for a follow up visit.  She has mild/moderate nonproliferative diabetic retinopathy. No peripheral neuropathy or nephropathy.  Her hx is also significant for hypoglycemia unawareness.  She denies any recent severe hypoglycemia.  She has been using her Medtronic 530G insulin pump and Dexcom.  Her current basal insulin rates are:  Midnight= 0.4 units/hr.  3:30 am = 0.7 units/hr.  10 am  = 0.7 units/hr.  2200= 0.5 units/hr.  Pt's I/C ratio is 1:15.  Sensitivity is 70.  Her 24 hr basal dose is 15.35 units/24 hrs.  Pt's A1C is 7.7 % today and her previous A1C was 8.3 % in 2/2017.  We downloaded pt's insulin pump and Dexcom today.  Her postprandial blood sugar values are high.  No frequent hypoglycemia on her Dexcom download.  On ROS today,she reports leg, back and hip pain. She has been told she has arthritis.  No numbness in her feet.  Pt denies visual changes, n/v, SOB or cough.  No chest pain at his time or abd pain or diarrhea.      DIABETES CARE:  Retinopathy:mild/moderate nonproliferative diabetic retinopathy.  She was last seen here by Oph in 4/2017.  Nephropathy: urine microalbuminuria negative in 2/2017. Neuropathy: none.  Feet: good pulses, no ulcers and normal monofilamentous exam.  Taking ASA: yes.  Lipids:  LDL 69 on 3/24/2017.    Pt is taking Simvastatin.    ROS  Please see under HPI.    Allergies  Allergies   Allergen Reactions     Ampicillin Sodium Rash       Medications  Current Outpatient Prescriptions   Medication Sig Dispense Refill     blood glucose monitoring (ERICA CONTOUR NEXT) test strip Use to test blood sugar 10  times daily or as  "directed. 300 strip 11     insulin aspart (NOVOLOG VIAL) 100 UNITS/ML VIAL Use in insulin pump.  Pt uses appox 50-60 units in 24 hrs. 60 mL 3     simvastatin (ZOCOR) 80 MG tablet Take 1 tablet (80 mg) by mouth At Bedtime 90 tablet 3     aspirin 81 MG chewable tablet Take 81 mg by mouth every other day  108 tablet 3     FLUoxetine HCl (PROZAC PO) Take 10 mg by mouth        glucose (CVS GLUCOSE) 40 % GEL Take 15 g by mouth as needed       glucose blood VI test strips strip Test up to ten times per day as directed 5 Box 8     glucagon (GLUCAGON EMERGENCY) 1 MG injection Inject 1 mg into the muscle once for 1 dose Use as directed 1 mg 2       Family History  family history includes Alzheimer Disease in her father; Autoimmune Disease in her mother; CANCER in her father; DIABETES in her father; Glaucoma in her father and mother; Hypertension in her mother.    Social History  Smoke: none.  ETOH: rare.   with children.  Pt works full time in a lab.    Past Medical History  Past Medical History:   Diagnosis Date     Diabetes mellitus (H)     Type 1     Elevated cholesterol      Glaucoma     Glaucoma suspect     Glaucoma suspect      Kidney stones      Nonsenile cataract      Past Surgical History:   Procedure Laterality Date     APPENDECTOMY OPEN  1981     BIOPSY OF SKIN LESION  12/30/2011    returned January 2012 for additional removal     c sections  99,97,08     EXTRACORPOREAL SHOCK WAVE LITHOTRIPSY, CYSTOSCOPY, INSERT STENT URETER(S), COMBINED  2004     LASER HOLMIUM LITHOTRIPSY URETER(S), INSERT STENT, COMBINED  12/12/2013    Procedure: COMBINED CYSTOSCOPY, URETEROSCOPY, LASER HOLMIUM LITHOTRIPSY URETER(S), INSERT STENT;  Right Ureteroscopy Holmium Laser Lithotripsy Stent Placement;  Surgeon: Kirill Marlow MD;  Location: [facility] OR     S/P right shoulder surgery in Dec 2012.    Physical Exam  /70  Pulse 97  Ht 1.499 m (4' 11\")  Wt 66.2 kg (146 lb)  BMI 29.49 kg/m2  Body mass index is 29.49 " kg/(m^2).    HEENT:  PERRLA; fundi not examined.  NECK: Thyroid nontender.  LUNGS: Clear b/l.  CARDIAC:  RRR.  ABDOMEN: Insulin infusion set and dexcom intact. Nontender.  Some areas of lipohypertrophy.  EXTREMITIES: No edema. Equal strength in LE's b/l.  FEET: Warm, no ulcers and normal monofilamentous exam.    RESULTS  Creatinine   Date Value Ref Range Status   02/14/2017 0.62 0.52 - 1.04 mg/dL Final     GFR Estimate   Date Value Ref Range Status   02/14/2017 >90  Non  GFR Calc   >60 mL/min/1.7m2 Final     Hemoglobin A1C   Date Value Ref Range Status   07/16/2013 7.9 (A) 4.3 - 6.0 % Final     Potassium   Date Value Ref Range Status   02/14/2017 4.6 3.4 - 5.3 mmol/L Final     ALT   Date Value Ref Range Status   02/14/2017 17 0 - 50 U/L Final     AST   Date Value Ref Range Status   02/14/2017 13 0 - 45 U/L Final     TSH   Date Value Ref Range Status   02/14/2017 1.57 0.40 - 4.00 mU/L Final     T4 Free   Date Value Ref Range Status   02/14/2017 0.84 0.76 - 1.46 ng/dL Final       Cholesterol   Date Value Ref Range Status   03/24/2017 154 <200 mg/dL Final   02/10/2016 190 <200 mg/dL Final     HDL Cholesterol   Date Value Ref Range Status   03/24/2017 73 >49 mg/dL Final   02/10/2016 78 >49 mg/dL Final     LDL Cholesterol Calculated   Date Value Ref Range Status   03/24/2017 69 <100 mg/dL Final     Comment:     Desirable:       <100 mg/dl   02/10/2016 101 (H) <100 mg/dL Final     Comment:     Above desirable:  100-129 mg/dl   Borderline High:  130-159 mg/dL   High:             160-189 mg/dL   Very high:       >189 mg/dl       Triglycerides   Date Value Ref Range Status   03/24/2017 59 <150 mg/dL Final   02/10/2016 58 <150 mg/dL Final     Comment:     Fasting specimen     Cholesterol/HDL Ratio   Date Value Ref Range Status   08/18/2014 2.7 0.0 - 5.0 Final   09/17/2013 2.3 0.0 - 5.0 Final     A1C      7.7   6/1/2017  A1C      8.3   2/14/2017  A1C      7.5   8/18/2016  A1C      7.6   5/5/2016  A1C      7.8     2/10/2016  A1C      7.9    9/2/2015  A1C      7.7    5/2015  A1C      8.6    1/28/2015  A1C      8.8    9/26/2014  A1C      8.0    7/2/2014  A1C      8.4    2/28/2014    ASSESSMENT/PLAN:    1.  TYPE 1 DIABETES MELLITUS:Type 1 diabetes mellitus complicated by retinopathy and hypoglycemia unawareness.  Kiera's postprandial blood sugar values have been high.  I had her change her I/C ratio to 1:12 ( was 1:15 ).  She is checking her blood sugar premeals daily and take bolus insulin for all food intake.  She is to continue to use her Dexcom sensor daily.  She remains normotensive.   Feet in good condition.  She is taking ASA daily.  Pt's TSH is normal in Feb 2017.    2.  MILD/MODERATE NPDR: Last seen by Oph here in 4/2017.    3.  HX OF + MICROALBUMINURIA:  Pt's microalbuminuria negative in 2/2017.  Her creat/GFR are stable.    4. HYPERLIPIDEMIA:  LDL 69 in 3/2017.  Pt remains on high dose Simvastatin.  Since she has been on the high dose Simvastatin for years and tolerating this dose, Dr. Chahal would like her to remain on Simvastatin 80 mg daily.  Pt's hepatic panel and CK were normal in 2/2017.    5.  ADHD: She is being followed by Yumi ANSARI ( [phone] ).  She remains on RX.    6.  TINNITUS: Seen here by ENT dx hearing loss.    7. HYPOGLYCEMIA UNAWARENESS: She denies having any recent severe hypoglycemia.  Continue to wear Dexcom daily.    8. Return to Endocrine Clinic to see Dr. Chahal in August 2017.    Again, thank you for allowing me to participate in the care of your patient.      Sincerely,    Jo Jean PA-C

## 2017-06-06 DIAGNOSIS — H40.003 GLAUCOMA SUSPECT, BILATERAL: Primary | ICD-10-CM

## 2017-06-13 NOTE — PROGRESS NOTES
HPI  Kiera Dobson is a 51 year old female with type 1 diabetes mellitus here today for a follow up visit.  She has mild/moderate nonproliferative diabetic retinopathy. No peripheral neuropathy or nephropathy.  Her hx is also significant for hypoglycemia unawareness.  She denies any recent severe hypoglycemia.  She has been using her Medtronic 530G insulin pump and Dexcom.  Her current basal insulin rates are:  Midnight= 0.4 units/hr.  3:30 am = 0.7 units/hr.  10 am  = 0.7 units/hr.  2200= 0.5 units/hr.  Pt's I/C ratio is 1:15.  Sensitivity is 70.  Her 24 hr basal dose is 15.35 units/24 hrs.  Pt's A1C is 7.7 % today and her previous A1C was 8.3 % in 2/2017.  We downloaded pt's insulin pump and Dexcom today.  Her postprandial blood sugar values are high.  No frequent hypoglycemia on her Dexcom download.  On ROS today,she reports leg, back and hip pain. She has been told she has arthritis.  No numbness in her feet.  Pt denies visual changes, n/v, SOB or cough.  No chest pain at his time or abd pain or diarrhea.      DIABETES CARE:  Retinopathy:mild/moderate nonproliferative diabetic retinopathy.  She was last seen here by Oph in 4/2017.  Nephropathy: urine microalbuminuria negative in 2/2017. Neuropathy: none.  Feet: good pulses, no ulcers and normal monofilamentous exam.  Taking ASA: yes.  Lipids:  LDL 69 on 3/24/2017.    Pt is taking Simvastatin.    ROS  Please see under HPI.    Allergies  Allergies   Allergen Reactions     Ampicillin Sodium Rash       Medications  Current Outpatient Prescriptions   Medication Sig Dispense Refill     blood glucose monitoring (ERICA CONTOUR NEXT) test strip Use to test blood sugar 10  times daily or as directed. 300 strip 11     insulin aspart (NOVOLOG VIAL) 100 UNITS/ML VIAL Use in insulin pump.  Pt uses appox 50-60 units in 24 hrs. 60 mL 3     simvastatin (ZOCOR) 80 MG tablet Take 1 tablet (80 mg) by mouth At Bedtime 90 tablet 3     aspirin 81 MG chewable tablet Take 81 mg by  "mouth every other day  108 tablet 3     FLUoxetine HCl (PROZAC PO) Take 10 mg by mouth        glucose (CVS GLUCOSE) 40 % GEL Take 15 g by mouth as needed       glucose blood VI test strips strip Test up to ten times per day as directed 5 Box 8     glucagon (GLUCAGON EMERGENCY) 1 MG injection Inject 1 mg into the muscle once for 1 dose Use as directed 1 mg 2       Family History  family history includes Alzheimer Disease in her father; Autoimmune Disease in her mother; CANCER in her father; DIABETES in her father; Glaucoma in her father and mother; Hypertension in her mother.    Social History  Smoke: none.  ETOH: rare.   with children.  Pt works full time in a lab.    Past Medical History  Past Medical History:   Diagnosis Date     Diabetes mellitus (H)     Type 1     Elevated cholesterol      Glaucoma     Glaucoma suspect     Glaucoma suspect      Kidney stones      Nonsenile cataract      Past Surgical History:   Procedure Laterality Date     APPENDECTOMY OPEN  1981     BIOPSY OF SKIN LESION  12/30/2011    returned January 2012 for additional removal     c sections  99,97,08     EXTRACORPOREAL SHOCK WAVE LITHOTRIPSY, CYSTOSCOPY, INSERT STENT URETER(S), COMBINED  2004     LASER HOLMIUM LITHOTRIPSY URETER(S), INSERT STENT, COMBINED  12/12/2013    Procedure: COMBINED CYSTOSCOPY, URETEROSCOPY, LASER HOLMIUM LITHOTRIPSY URETER(S), INSERT STENT;  Right Ureteroscopy Holmium Laser Lithotripsy Stent Placement;  Surgeon: Kirill Marlow MD;  Location: [facility]     S/P right shoulder surgery in Dec 2012.    Physical Exam  /70  Pulse 97  Ht 1.499 m (4' 11\")  Wt 66.2 kg (146 lb)  BMI 29.49 kg/m2  Body mass index is 29.49 kg/(m^2).    HEENT:  PERRLA; fundi not examined.  NECK: Thyroid nontender.  LUNGS: Clear b/l.  CARDIAC:  RRR.  ABDOMEN: Insulin infusion set and dexcom intact. Nontender.  Some areas of lipohypertrophy.  EXTREMITIES: No edema. Equal strength in LE's b/l.  FEET: Warm, no ulcers and normal " monofilamentous exam.    RESULTS  Creatinine   Date Value Ref Range Status   02/14/2017 0.62 0.52 - 1.04 mg/dL Final     GFR Estimate   Date Value Ref Range Status   02/14/2017 >90  Non  GFR Calc   >60 mL/min/1.7m2 Final     Hemoglobin A1C   Date Value Ref Range Status   07/16/2013 7.9 (A) 4.3 - 6.0 % Final     Potassium   Date Value Ref Range Status   02/14/2017 4.6 3.4 - 5.3 mmol/L Final     ALT   Date Value Ref Range Status   02/14/2017 17 0 - 50 U/L Final     AST   Date Value Ref Range Status   02/14/2017 13 0 - 45 U/L Final     TSH   Date Value Ref Range Status   02/14/2017 1.57 0.40 - 4.00 mU/L Final     T4 Free   Date Value Ref Range Status   02/14/2017 0.84 0.76 - 1.46 ng/dL Final       Cholesterol   Date Value Ref Range Status   03/24/2017 154 <200 mg/dL Final   02/10/2016 190 <200 mg/dL Final     HDL Cholesterol   Date Value Ref Range Status   03/24/2017 73 >49 mg/dL Final   02/10/2016 78 >49 mg/dL Final     LDL Cholesterol Calculated   Date Value Ref Range Status   03/24/2017 69 <100 mg/dL Final     Comment:     Desirable:       <100 mg/dl   02/10/2016 101 (H) <100 mg/dL Final     Comment:     Above desirable:  100-129 mg/dl   Borderline High:  130-159 mg/dL   High:             160-189 mg/dL   Very high:       >189 mg/dl       Triglycerides   Date Value Ref Range Status   03/24/2017 59 <150 mg/dL Final   02/10/2016 58 <150 mg/dL Final     Comment:     Fasting specimen     Cholesterol/HDL Ratio   Date Value Ref Range Status   08/18/2014 2.7 0.0 - 5.0 Final   09/17/2013 2.3 0.0 - 5.0 Final     A1C      7.7   6/1/2017  A1C      8.3   2/14/2017  A1C      7.5   8/18/2016  A1C      7.6   5/5/2016  A1C      7.8    2/10/2016  A1C      7.9    9/2/2015  A1C      7.7    5/2015  A1C      8.6    1/28/2015  A1C      8.8    9/26/2014  A1C      8.0    7/2/2014  A1C      8.4    2/28/2014    ASSESSMENT/PLAN:    1.  TYPE 1 DIABETES MELLITUS:Type 1 diabetes mellitus complicated by retinopathy and hypoglycemia  unawareness.  Kiera's postprandial blood sugar values have been high.  I had her change her I/C ratio to 1:12 ( was 1:15 ).  She is checking her blood sugar premeals daily and take bolus insulin for all food intake.  She is to continue to use her Dexcom sensor daily.  She remains normotensive.   Feet in good condition.  She is taking ASA daily.  Pt's TSH is normal in Feb 2017.    2.  MILD/MODERATE NPDR: Last seen by Oph here in 4/2017.    3.  HX OF + MICROALBUMINURIA:  Pt's microalbuminuria negative in 2/2017.  Her creat/GFR are stable.    4. HYPERLIPIDEMIA:  LDL 69 in 3/2017.  Pt remains on high dose Simvastatin.  Since she has been on the high dose Simvastatin for years and tolerating this dose, Dr. Chahal would like her to remain on Simvastatin 80 mg daily.  Pt's hepatic panel and CK were normal in 2/2017.    5.  ADHD: She is being followed by Yumi Ram [facility] ( [phone] ).  She remains on RX.    6.  TINNITUS: Seen here by ENT dx hearing loss.    7. HYPOGLYCEMIA UNAWARENESS: She denies having any recent severe hypoglycemia.  Continue to wear Dexcom daily.    8. Return to Endocrine Clinic to see Dr. Chahal in August 2017.

## 2017-07-13 ENCOUNTER — OFFICE VISIT (OUTPATIENT)
Dept: OPHTHALMOLOGY | Facility: CLINIC | Age: 52
End: 2017-07-13
Attending: OPHTHALMOLOGY
Payer: COMMERCIAL

## 2017-07-13 DIAGNOSIS — H52.13 MYOPIA, BILATERAL: ICD-10-CM

## 2017-07-13 DIAGNOSIS — H25.013 CORTICAL SENILE CATARACT, BILATERAL: ICD-10-CM

## 2017-07-13 DIAGNOSIS — H40.003 GLAUCOMA SUSPECT, BILATERAL: Primary | ICD-10-CM

## 2017-07-13 PROCEDURE — 99212 OFFICE O/P EST SF 10 MIN: CPT | Mod: ZF

## 2017-07-13 PROCEDURE — 92133 CPTRZD OPH DX IMG PST SGM ON: CPT | Mod: ZF | Performed by: OPHTHALMOLOGY

## 2017-07-13 PROCEDURE — 92083 EXTENDED VISUAL FIELD XM: CPT | Mod: ZF | Performed by: OPHTHALMOLOGY

## 2017-07-13 ASSESSMENT — TONOMETRY
IOP_METHOD: APPLANATION
OD_IOP_MMHG: 14
OS_IOP_MMHG: 14

## 2017-07-13 ASSESSMENT — CUP TO DISC RATIO
OS_RATIO: 0.6
OD_RATIO: 0.6

## 2017-07-13 ASSESSMENT — REFRACTION_WEARINGRX
OS_SPHERE: -0.25
OS_AXIS: 153
OD_SPHERE: -1.00
OD_CYLINDER: +0.75
OD_AXIS: 025
OS_CYLINDER: +0.25

## 2017-07-13 ASSESSMENT — VISUAL ACUITY
METHOD: SNELLEN - LINEAR
OS_CC: 20/30
OD_CC: 20/30

## 2017-07-13 ASSESSMENT — CONF VISUAL FIELD
OD_NORMAL: 1
OS_NORMAL: 1

## 2017-07-13 ASSESSMENT — SLIT LAMP EXAM - LIDS
COMMENTS: NORMAL
COMMENTS: NORMAL

## 2017-07-13 ASSESSMENT — EXTERNAL EXAM - RIGHT EYE: OD_EXAM: NORMAL

## 2017-07-13 ASSESSMENT — EXTERNAL EXAM - LEFT EYE: OS_EXAM: NORMAL

## 2017-07-13 NOTE — PROGRESS NOTES
Assessment & Plan      Kiera Dobson is a 51 year old female with the following diagnoses:   1. Glaucoma suspect, bilateral    2. Myopia, bilateral    3. Cortical senile cataract, bilateral         Maximum intraocular pressure upper teens both eyes  Family history: positive in mother and possibly father  Trauma history: negative     Intraocular pressure good today both eyes   Nerve appearance unchanged from previous     Today's testing:  Visual field: Mild inferior scatter as before - improved PSD both eyes   OCT Nerve fiber layer: Large disc area, stable nerve fiber layer thickness both eyes   Early visually significant cortical cataract    Continue to monitor for now  No evidence of glaucoma at this time, recheck gonio, visual field and OCT in 1 year          Attending Physician Attestation:  I have seen and examined this patient.  I have confirmed and edited as necessary the chief complaint(s), history of present illness, review of systems, relevant history, and examination findings as documented by others.  I have personally reviewed the relevant tests, images, and reports as documented above.  I have confirmed and edited as necessary the assessment and plan and agree with this note.  Attending Physician Image/Tesing Attestation: I personally reviewed the ophthalmic test(s) associated with this encounter, agree with the interpretation(s) as documented by the resident/fellow, and have edited the corresponding report(s) as necessary.  Curtis Costa MD

## 2017-07-13 NOTE — NURSING NOTE
Chief Complaints and History of Present Illnesses   Patient presents with     Follow Up For     s/p Glaucoma suspect, both eyes (Primary Dx);      HPI    Affected eye(s):  Both   Symptoms:     No blurred vision   No decreased vision   Floaters (Comment: RE)   No flashes   No Dryness   No itching      Duration:  3 months   Frequency:  Constant       Do you have eye pain now?:  No      Comments:  States va is the same since last visit.  Lab Results       Component                Value               Date                       A1C                      7.9                 07/16/2013                 A1C                      7.9                 04/03/2013                 A1C                      8.4                 02/07/2013                 A1C                      8.2                 10/10/2012                 A1C                      9.0                 09/04/2012    Last A1c  7.3   5/2017          BS: 80 this morning  Anton Vela  3:21 PM July 13, 2017

## 2017-07-13 NOTE — MR AVS SNAPSHOT
After Visit Summary   7/13/2017    Kiera Dobson    MRN: [id number]           Patient Information     Date Of Birth          1965        Visit Information        Provider Department      7/13/2017 3:00 PM Curtis Costa MD [facility]        Today's Diagnoses     Glaucoma suspect, bilateral    -  1    Myopia, bilateral        Cortical senile cataract, bilateral           Follow-ups after your visit        Follow-up notes from your care team     Return in about 1 year (around 7/13/2018) for Gonio with physician prior to dilation, OCT NFL, 24-2 Dynamic VF.      Your next 10 appointments already scheduled     Aug 15, 2017 11:00 AM CDT   (Arrive by 10:45 AM)   RETURN DIABETES with Paola Chahal MD   [facility] ([facility])    [address] [place] [address] [phone]            Dec 01, 2017  9:30 AM CST   (Arrive by 9:15 AM)   RETURN DIABETES with Jo Jean PA-C   [facility] ([facility])    [address] [place] [address] [phone]              Who to contact     Please call your clinic at [phone] to:    Ask questions about your health    Make or cancel appointments    Discuss your medicines    Learn about your test results    Speak to your doctor   If you have compliments or concerns about an experience at your clinic, or if you wish to file a complaint, please contact [facility] Patient Relations at [phone] or email us at [email or web address]         Additional Information About Your Visit        MyChart Information     [employer] gives you secure access to your electronic health record. If you see a primary care provider, you can also send messages to your care team and make appointments. If you have questions, please call your primary care clinic.  If you do not have a primary care  provider, please call [phone] and they will assist you.      [employer] is an electronic gateway that provides easy, online access to your medical records. With [employer], you can request a clinic appointment, read your test results, renew a prescription or communicate with your care team.     To access your existing account, please contact your [facility] or call [phone] for assistance.        Care EveryWhere ID     This is your Care EveryWhere ID. This could be used by other organizations to access your [facility] medical records  [id number]         Blood Pressure from Last 3 Encounters:   06/01/17 116/70   04/05/17 112/70   03/24/17 118/73    Weight from Last 3 Encounters:   06/01/17 66.2 kg (146 lb)   04/05/17 65.2 kg (143 lb 11.2 oz)   03/24/17 64.9 kg (143 lb)              We Performed the Following     Glaucoma Top OU     OCT Optic Nerve RNFL Spectralis OU (both eyes)        Primary Care Provider Office Phone # Fax #    Mary Beth Nely Robby [place] [phone]       [facility] [address]  [facility] [address]        Equal Access to Services     TUNDE COTA AH: Hadii sola loveo Somarcellus, waaxda luqadaha, qaybta kaalmada adeegyada, marylu gallardo. So [facility] [phone].    ATENCIÓN: Si habla español, tiene a burnett disposición servicios gratuitos de asistencia lingüística. [facility] [phone].    We comply with applicable federal civil rights laws and [place] laws. We do not discriminate on the basis of race, color, national origin, age, disability sex, sexual orientation or gender identity.            Thank you!     Thank you for choosing EYE CLINIC  for your care. Our goal is always to provide you with excellent care. Hearing back from our patients is one way we can continue to improve our services. Please take a few minutes to complete the written survey that you may receive in the mail after your visit with  us. Thank you!             Your Updated Medication List - Protect others around you: Learn how to safely use, store and throw away your medicines at www.disposemymeds.org.          This list is accurate as of: 7/13/17  4:28 PM.  Always use your most recent med list.                   Brand Name Dispense Instructions for use Diagnosis    aspirin 81 MG chewable tablet     108 tablet    Take 81 mg by mouth every other day    Diabetes mellitus type 1 (H)       * blood glucose monitoring test strip    no brand specified    5 Box    Test up to ten times per day as directed    Diabetes mellitus (H)       * blood glucose monitoring test strip    ERICA CONTOUR NEXT    300 strip    Use to test blood sugar 10  times daily or as directed.    Diabetes mellitus type 1 (H)       CVS GLUCOSE 40 % Gel gel   Generic drug:  glucose      Take 15 g by mouth as needed        glucagon 1 MG kit    GLUCAGON EMERGENCY    1 mg    Inject 1 mg into the muscle once for 1 dose Use as directed    Diabetes mellitus type I (H)       insulin aspart 100 UNITS/ML injection    NovoLOG VIAL    60 mL    Use in insulin pump.  Pt uses appox 50-60 units in 24 hrs.    Calculus of kidney       PROZAC PO      Take 10 mg by mouth        simvastatin 80 MG tablet    ZOCOR    90 tablet    Take 1 tablet (80 mg) by mouth At Bedtime    Calculus of kidney       * Notice:  This list has 2 medication(s) that are the same as other medications prescribed for you. Read the directions carefully, and ask your doctor or other care provider to review them with you.

## 2017-07-13 NOTE — LETTER
7/13/2017       RE: Kiera Dobson  [address]  [facility] [address]     Dear Colleague,    Thank you for referring your patient, Kiera Dobson, to the EYE CLINIC at [facility]. Please see a copy of my visit note below.    Assessment & Plan      Kiera Dobson is a 51 year old female with the following diagnoses:   1. Glaucoma suspect, bilateral    2. Myopia, bilateral    3. Cortical senile cataract, bilateral         Maximum intraocular pressure upper teens both eyes  Family history: positive in mother and possibly father  Trauma history: negative     Intraocular pressure good today both eyes   Nerve appearance unchanged from previous     Today's testing:  Visual field: Mild inferior scatter as before - improved PSD both eyes   OCT Nerve fiber layer: Large disc area, stable nerve fiber layer thickness both eyes   Early visually significant cortical cataract    Continue to monitor for now  No evidence of glaucoma at this time, recheck gonio, visual field and OCT in 1 year          Attending Physician Attestation:  I have seen and examined this patient.  I have confirmed and edited as necessary the chief complaint(s), history of present illness, review of systems, relevant history, and examination findings as documented by others.  I have personally reviewed the relevant tests, images, and reports as documented above.  I have confirmed and edited as necessary the assessment and plan and agree with this note.  Attending Physician Image/Tesing Attestation: I personally reviewed the ophthalmic test(s) associated with this encounter, agree with the interpretation(s) as documented by the resident/fellow, and have edited the corresponding report(s) as necessary.  Curtis Costa MD    Again, thank you for allowing me to participate in the care of your patient.      Sincerely,    Curtis Costa MD

## 2017-07-29 DIAGNOSIS — N20.0 CALCULUS OF KIDNEY: ICD-10-CM

## 2017-07-31 NOTE — TELEPHONE ENCOUNTER
simvastatin 80 MG     Last Written Prescription Date:  8/1/16  Last Fill Quantity: 90,   # refills: 3  Last Office Visit : 6/1/17  Future Office visit:  8/15/17

## 2017-08-01 RX ORDER — SIMVASTATIN 80 MG
80 TABLET ORAL AT BEDTIME
Qty: 90 TABLET | Refills: 3 | Status: SHIPPED | OUTPATIENT
Start: 2017-08-01 | End: 2018-08-01

## 2017-08-04 ENCOUNTER — MYC REFILL (OUTPATIENT)
Dept: ENDOCRINOLOGY | Facility: CLINIC | Age: 52
End: 2017-08-04

## 2017-08-04 DIAGNOSIS — N20.0 CALCULUS OF KIDNEY: ICD-10-CM

## 2017-08-04 RX ORDER — SIMVASTATIN 80 MG
80 TABLET ORAL AT BEDTIME
Qty: 90 TABLET | Refills: 3 | Status: CANCELLED | OUTPATIENT
Start: 2017-08-04

## 2017-08-15 ENCOUNTER — OFFICE VISIT (OUTPATIENT)
Dept: ENDOCRINOLOGY | Facility: CLINIC | Age: 52
End: 2017-08-15

## 2017-08-15 VITALS
BODY MASS INDEX: 29.76 KG/M2 | SYSTOLIC BLOOD PRESSURE: 94 MMHG | DIASTOLIC BLOOD PRESSURE: 66 MMHG | HEART RATE: 83 BPM | WEIGHT: 147.6 LBS | HEIGHT: 59 IN

## 2017-08-15 DIAGNOSIS — E10.9 TYPE 1 DIABETES MELLITUS WITHOUT COMPLICATION (H): Primary | ICD-10-CM

## 2017-08-15 ASSESSMENT — PAIN SCALES - GENERAL: PAINLEVEL: NO PAIN (0)

## 2017-08-15 NOTE — LETTER
8/15/2017       RE: Kiera Dobson  [address]  [facility] [id number]     Dear Colleague,    Thank you for referring your patient, Kiera Dobson, to the [facility] ENDOCRINOLOGY at [facility]. Please see a copy of my visit note below.    This 51  year old woman returns for f/u of her type 1 diabetes that she has had for 45+ years. I haven't seen her in over a year, but she has been in to see Jazmyne Jean several times.   She is using a  530g pump with Dexcom sensor. She finds this much preferable to the minimed sensor, even though she no longer has the  low threshold suspend feature. Since she started using the dexcom, she hasn't had needed others to assist with the treatment of hypoglycemia. The sensor warns her when she is getting low. She doesn't feel her low sugars herself.    Her current pump settings are  Midnight 0.9  3:30 am 0.7  22:00 0.5   Her carb ratio is 12, and correction is 70 with target of 100/150 midnight to 7 am and 21:00 to midnight; 90/120 during day.    Her low glucose alarm is set at 80, but she usually waits until it drops lower before treating a low.  She says that 95% of the time it does go further.      Her CGM shows her average glucose is 160 with 1.1% below 54 mg/dl, 4.5  Below 70, 60.7% , 34.8% above 180 and 12.8 % above 250.  77% of time the CGM was active during the last 2 weeks.  She is bolusing ~ 3 times per day and almost always uses the bolus wizard.    REview of the CGM data over the last two weeks shows she generally drifts down between midnight and 6 am.  She often drops below 80 about 3 hours after a bolus. She admits that she feels she has to eat to keep her sugars up.  On occasion she forgets to cover her food.  This occurs when she gets busy at work, or is in the car.    She has seen her eye MD and things are OK. She denies feet concerns.  She has no CP, SOB.  Her mood is OK>      Current Outpatient Prescriptions on File Prior  "to Visit:  simvastatin (ZOCOR) 80 MG tablet Take 1 tablet (80 mg) by mouth At Bedtime   blood glucose monitoring (ERICA CONTOUR NEXT) test strip Use to test blood sugar 10  times daily or as directed.   insulin aspart (NOVOLOG VIAL) 100 UNITS/ML VIAL Use in insulin pump.  Pt uses appox 50-60 units in 24 hrs.   aspirin 81 MG chewable tablet Take 81 mg by mouth every other day    FLUoxetine HCl (PROZAC PO) Take 10 mg by mouth    glucose ([employer]S GLUCOSE) 40 % GEL Take 15 g by mouth as needed   glucose blood VI test strips strip Test up to ten times per day as directed   glucagon (GLUCAGON EMERGENCY) 1 MG injection Inject 1 mg into the muscle once for 1 dose Use as directed     No current facility-administered medications on file prior to visit.     ROS: 10 point ROS neg other than the symptoms noted above in the HPI.    SO Hx - Oldest daughter is in college.  Two sons are younger., no cigs    Vital signs:   BP 94/66  Pulse 83  Ht 1.499 m (4' 11\")  Wt 67 kg (147 lb 9.6 oz)  BMI 29.81 kg/m2  Estimated body mass index is 29.81 kg/(m^2) as calculated from the following:    Height as of this encounter: 1.499 m (4' 11\").    Weight as of this encounter: 67 kg (147 lb 9.6 oz).    VSS  NAD  Eyes - no periorbital edema, conjunctival injection, scleral icterus  CV - RRR.  Normal pulses in feet.  No edema  Neuro - sensation intact to monofilament on soles of feet.  DTR 2/4 biceps  Skin - normal texture   Feet - no ulcers     Recent Labs   Lab Test 06/01/17 03/24/17   0906  02/14/17   1157  02/14/17   1149 02/14/17 03/01/16   0223  02/10/16   0949   07/16/13   1635  04/03/13   1516   A1C   --    --    --    --    --    --    --    --    --   7.9*  7.9*   HEMOGLOBINA1  7.7*   --    --    --   8.3*   < >   --    --    < >   --    --    TSH   --    --    --   1.57   --    --    --   1.13   < >   --    --    T4   --    --    --   0.84   --    --    --   0.84   < >   --    --    LDL   --   69   --    --    --    --    --   " 101*   < >   --    --    HDL   --   73   --    --    --    --    --   78   < >   --    --    TRIG   --   59   --    --    --    --    --   58   < >   --    --    CR   --    --    --   0.62   --    --   0.59  0.66   < >   --    --    MICROL   --    --   <5   --    --    --    --   8   < >   --    --     < > = values in this interval not displayed.     Assessment and plan:    1.  Diabetes control.  Her A1c is OK for someone with unawareness.  Encouraged her to abort lows by eating when she sees a double arrow down or when the alarm sounds at 80.  She seems to be low overnight and 3+ hours after a bolus. Will reduce basals to:  Mid 0.85  3:30 am 0.65  22:00 0.5  Will have her stop by to download pump and sensor next week to see if these rates are better for her.    2.  Diabetes complications.  Up to date with screens and has none.    3.  Hyperlipidemia.  On statin.    F/u with Jazmyne Jean in 3 mo and f/u with me in 6 mo      I spent 25 minutes with this patient face to face and explained the conditions and plans (more than 50% of time was counseling/coordination of diabetes care) . The patient understood and is satisfied with today's visit.     Paola Chahal MD

## 2017-08-15 NOTE — MR AVS SNAPSHOT
After Visit Summary   8/15/2017    Kiera Dobson    MRN: [id number]           Patient Information     Date Of Birth          1965        Visit Information        Provider Department      8/15/2017 11:00 AM Paola Chahal MD [facility]        Today's Diagnoses     Type 1 diabetes mellitus without complication (HCC)    -  1      Care Instructions    Stop by next week to have the pump and dexcom downloaded for me to review.    Eat 15 grams of carb when you dexcom alarms at 80    Don't wait to treat a low.  Eat as soon as the dexcom alarms          Follow-ups after your visit        Your next 10 appointments already scheduled     Sep 13, 2017 12:30 PM CDT   (Arrive by 12:15 PM)   RETURN DIABETES with ML Jaime [facility] ([facility])    [address] [place] [address] [id number] [phone]            Dec 01, 2017  9:30 AM CST   (Arrive by 9:15 AM)   RETURN DIABETES with ML Jaime [facility] ([facility])    [address] [place] [address] [id number] [phone]            Feb 13, 2018  3:00 PM CST   (Arrive by 2:45 PM)   RETURN DIABETES with MD XAVI Ravi [facility] ([facility])    [address] [place] [address] [id number] [phone]              Who to contact     Please call your clinic at [phone] to:    Ask questions about your health    Make or cancel appointments    Discuss your medicines    Learn about your test results    Speak to your doctor   If you have compliments or concerns about an experience at your clinic, or if you wish to file a complaint, please contact [facility] Patient Relations at [phone] or email us at [email or web address]         Additional Information About Your Visit        Marry  "Information     [employer] gives you secure access to your electronic health record. If you see a primary care provider, you can also send messages to your care team and make appointments. If you have questions, please call your primary care clinic.  If you do not have a primary care provider, please call [phone] and they will assist you.      [employer] is an electronic gateway that provides easy, online access to your medical records. With [employer], you can request a clinic appointment, read your test results, renew a prescription or communicate with your care team.     To access your existing account, please contact your [facility] or call [phone] for assistance.        Care EveryWhere ID     This is your Care EveryWhere ID. This could be used by other organizations to access your [place] medical records  [id number]        Your Vitals Were     Pulse Height BMI (Body Mass Index)             83 1.499 m (4' 11\") 29.81 kg/m2          Blood Pressure from Last 3 Encounters:   08/15/17 94/66   06/01/17 116/70   04/05/17 112/70    Weight from Last 3 Encounters:   08/15/17 67 kg (147 lb 9.6 oz)   06/01/17 66.2 kg (146 lb)   04/05/17 65.2 kg (143 lb 11.2 oz)              Today, you had the following     No orders found for display       Primary Care Provider Office Phone # Fax #    Mary Beth Nely [place] [phone]       [address]  [facility] [address]        Equal Access to Services     TUNDE COTA : Hadii aad ku hadasho Soomaali, waaxda luqadaha, qaybta kaalmada adeegyada, marylu ghotra . So [facility] [phone].    ATENCIÓN: Si habla español, tiene a burnett disposición servicios gratuitos de asistencia lingüística. Llame al [phone].    We comply with applicable federal civil rights laws and [place] laws. We do not discriminate on the basis of race, color, national origin, age, disability sex, sexual orientation or " gender identity.            Thank you!     Thank you for choosing [facility] ENDOCRINOLOGY  for your care. Our goal is always to provide you with excellent care. Hearing back from our patients is one way we can continue to improve our services. Please take a few minutes to complete the written survey that you may receive in the mail after your visit with us. Thank you!             Your Updated Medication List - Protect others around you: Learn how to safely use, store and throw away your medicines at www.disposemymeds.org.          This list is accurate as of: 8/15/17 11:59 PM.  Always use your most recent med list.                   Brand Name Dispense Instructions for use Diagnosis    aspirin 81 MG chewable tablet     108 tablet    Take 81 mg by mouth every other day    Diabetes mellitus type 1 (H)       * blood glucose monitoring test strip    no brand specified    5 Box    Test up to ten times per day as directed    Diabetes mellitus (H)       * blood glucose monitoring test strip    ERICA CONTOUR NEXT    300 strip    Use to test blood sugar 10  times daily or as directed.    Diabetes mellitus type 1 (H)       CVS GLUCOSE 40 % Gel gel   Generic drug:  glucose      Take 15 g by mouth as needed        glucagon 1 MG kit    GLUCAGON EMERGENCY    1 mg    Inject 1 mg into the muscle once for 1 dose Use as directed    Diabetes mellitus type I (H)       insulin aspart 100 UNITS/ML injection    NovoLOG VIAL    60 mL    Use in insulin pump.  Pt uses appox 50-60 units in 24 hrs.    Calculus of kidney       PROZAC PO      Take 10 mg by mouth        simvastatin 80 MG tablet    ZOCOR    90 tablet    Take 1 tablet (80 mg) by mouth At Bedtime    Calculus of kidney       * Notice:  This list has 2 medication(s) that are the same as other medications prescribed for you. Read the directions carefully, and ask your doctor or other care provider to review them with you.

## 2017-08-15 NOTE — PATIENT INSTRUCTIONS
Stop by next week to have the pump and dexcom downloaded for me to review.    Eat 15 grams of carb when you dexcom alarms at 80    Don't wait to treat a low.  Eat as soon as the dexcom alarms

## 2017-08-16 NOTE — PROGRESS NOTES
This 51  year old woman returns for f/u of her type 1 diabetes that she has had for 45+ years. I haven't seen her in over a year, but she has been in to see Jazmyne Jean several times.   She is using a  530g pump with Dexcom sensor. She finds this much preferable to the minimed sensor, even though she no longer has the  low threshold suspend feature. Since she started using the dexcom, she hasn't had needed others to assist with the treatment of hypoglycemia. The sensor warns her when she is getting low. She doesn't feel her low sugars herself.    Her current pump settings are  Midnight 0.9  3:30 am 0.7  22:00 0.5   Her carb ratio is 12, and correction is 70 with target of 100/150 midnight to 7 am and 21:00 to midnight; 90/120 during day.    Her low glucose alarm is set at 80, but she usually waits until it drops lower before treating a low.  She says that 95% of the time it does go further.      Her CGM shows her average glucose is 160 with 1.1% below 54 mg/dl, 4.5  Below 70, 60.7% , 34.8% above 180 and 12.8 % above 250.  77% of time the CGM was active during the last 2 weeks.  She is bolusing ~ 3 times per day and almost always uses the bolus wizard.    REview of the CGM data over the last two weeks shows she generally drifts down between midnight and 6 am.  She often drops below 80 about 3 hours after a bolus. She admits that she feels she has to eat to keep her sugars up.  On occasion she forgets to cover her food.  This occurs when she gets busy at work, or is in the car.    She has seen her eye MD and things are OK. She denies feet concerns.  She has no CP, SOB.  Her mood is OK>      Current Outpatient Prescriptions on File Prior to Visit:  simvastatin (ZOCOR) 80 MG tablet Take 1 tablet (80 mg) by mouth At Bedtime   blood glucose monitoring ([employer] CONTOUR NEXT) test strip Use to test blood sugar 10  times daily or as directed.   insulin aspart (NOVOLOG VIAL) 100 UNITS/ML VIAL Use in insulin pump.  Pt uses  "appox 50-60 units in 24 hrs.   aspirin 81 MG chewable tablet Take 81 mg by mouth every other day    FLUoxetine HCl (PROZAC PO) Take 10 mg by mouth    glucose (CVS GLUCOSE) 40 % GEL Take 15 g by mouth as needed   glucose blood VI test strips strip Test up to ten times per day as directed   glucagon (GLUCAGON EMERGENCY) 1 MG injection Inject 1 mg into the muscle once for 1 dose Use as directed     No current facility-administered medications on file prior to visit.     ROS: 10 point ROS neg other than the symptoms noted above in the HPI.    SO Hx - Oldest daughter is in college.  Two sons are younger., no cigs    Vital signs:   BP 94/66  Pulse 83  Ht 1.499 m (4' 11\")  Wt 67 kg (147 lb 9.6 oz)  BMI 29.81 kg/m2  Estimated body mass index is 29.81 kg/(m^2) as calculated from the following:    Height as of this encounter: 1.499 m (4' 11\").    Weight as of this encounter: 67 kg (147 lb 9.6 oz).    VSS  NAD  Eyes - no periorbital edema, conjunctival injection, scleral icterus  CV - RRR.  Normal pulses in feet.  No edema  Neuro - sensation intact to monofilament on soles of feet.  DTR 2/4 biceps  Skin - normal texture   Feet - no ulcers     Recent Labs   Lab Test 06/01/17 03/24/17   0906  02/14/17   1157  02/14/17   1149 02/14/17 03/01/16   0223  02/10/16   0949   07/16/13   1635  04/03/13   1516   A1C   --    --    --    --    --    --    --    --    --   7.9*  7.9*   HEMOGLOBINA1  7.7*   --    --    --   8.3*   < >   --    --    < >   --    --    TSH   --    --    --   1.57   --    --    --   1.13   < >   --    --    T4   --    --    --   0.84   --    --    --   0.84   < >   --    --    LDL   --   69   --    --    --    --    --   101*   < >   --    --    HDL   --   73   --    --    --    --    --   78   < >   --    --    TRIG   --   59   --    --    --    --    --   58   < >   --    --    CR   --    --    --   0.62   --    --   0.59  0.66   < >   --    --    MICROL   --    --   <5   --    --    --    --   8  "  < >   --    --     < > = values in this interval not displayed.     Assessment and plan:    1.  Diabetes control.  Her A1c is OK for someone with unawareness.  Encouraged her to abort lows by eating when she sees a double arrow down or when the alarm sounds at 80.  She seems to be low overnight and 3+ hours after a bolus. Will reduce basals to:  Mid 0.85  3:30 am 0.65  22:00 0.5  Will have her stop by to download pump and sensor next week to see if these rates are better for her.    2.  Diabetes complications.  Up to date with screens and has none.    3.  Hyperlipidemia.  On statin.    F/u with Jazmyne Jean in 3 mo and f/u with me in 6 mo      I spent 25 minutes with this patient face to face and explained the conditions and plans (more than 50% of time was counseling/coordination of diabetes care) . The patient understood and is satisfied with today's visit.     Paola Chahal MD

## 2017-09-13 ENCOUNTER — OFFICE VISIT (OUTPATIENT)
Dept: ENDOCRINOLOGY | Facility: CLINIC | Age: 52
End: 2017-09-13

## 2017-09-13 VITALS
HEIGHT: 60 IN | DIASTOLIC BLOOD PRESSURE: 66 MMHG | SYSTOLIC BLOOD PRESSURE: 98 MMHG | HEART RATE: 60 BPM | BODY MASS INDEX: 28.7 KG/M2 | WEIGHT: 146.2 LBS

## 2017-09-13 DIAGNOSIS — E10.9 TYPE 1 DIABETES MELLITUS WITHOUT COMPLICATION (H): Primary | ICD-10-CM

## 2017-09-13 LAB — HBA1C MFR BLD: 7.9 % (ref 4.3–6)

## 2017-09-13 NOTE — MR AVS SNAPSHOT
After Visit Summary   9/13/2017    Kiera Dobson    MRN: [id number]           Patient Information     Date Of Birth          1965        Visit Information        Provider Department      9/13/2017 12:30 PM Jo Jean PA-C M [facility] Endocrinology        Today's Diagnoses     Type 1 diabetes mellitus without complication (HCC)    -  1       Follow-ups after your visit        Your next 10 appointments already scheduled     Oct 18, 2017  1:00 PM CDT   (Arrive by 12:45 PM)   RETURN DIABETES with ML Jaime [facility] Endocrinology ([facility])    [address] [place] [id number]   [phone]            Dec 01, 2017  9:30 AM CST   (Arrive by 9:15 AM)   RETURN DIABETES with ML Jaime [facility] Endocrinology ([facility])    [address] [place] [id number] [phone]            Feb 13, 2018  3:00 PM CST   (Arrive by 2:45 PM)   RETURN DIABETES with MD XAVI Ravi [facility] Endocrinology ([facility])    [address] [place] [id number] [phone]              Who to contact     Please call your clinic at [phone] to:    Ask questions about your health    Make or cancel appointments    Discuss your medicines    Learn about your test results    Speak to your doctor   If you have compliments or concerns about an experience at your clinic, or if you wish to file a complaint, please contact [facility] Patient Relations at [phone] or email us at [email or web address]         Additional Information About Your Visit        MyChart Information     Panonohart gives you secure access to your electronic health record. If you see a primary care provider, you can also send messages to your care team and make appointments. If you have questions, please call your  "primary care clinic.  If you do not have a primary care provider, please call [phone] and they will assist you.      [employer] is an electronic gateway that provides easy, online access to your medical records. With [employer], you can request a clinic appointment, read your test results, renew a prescription or communicate with your care team.     To access your existing account, please contact your [facility] or call [phone] for assistance.        Care EveryWhere ID     This is your Care EveryWhere ID. This could be used by other organizations to access your [place] medical records  [id number]        Your Vitals Were     Pulse Height BMI (Body Mass Index)             60 1.518 m (4' 11.75\") 28.79 kg/m2          Blood Pressure from Last 3 Encounters:   09/13/17 98/66   08/15/17 94/66   06/01/17 116/70    Weight from Last 3 Encounters:   09/13/17 66.3 kg (146 lb 3.2 oz)   08/15/17 67 kg (147 lb 9.6 oz)   06/01/17 66.2 kg (146 lb)              We Performed the Following     Hemoglobin A1c POCT          Today's Medication Changes          These changes are accurate as of: 9/13/17 11:59 PM.  If you have any questions, ask your nurse or doctor.               Stop taking these medicines if you haven't already. Please contact your care team if you have questions.     PROZAC PO                    Primary Care Provider Office Phone # Fax #    Mary Beth Nely Mansfield [phone]       [address] [place] [address] [address] [place] [id number]        Equal Access to Services     TUNDE COTA : Hadii sola noyola Somarcellus, waaxda luqadaha, qaybta kaalmabriana huidanie HubNamianiya gallardo. So [facility] [phone].    ATENCIÓN: Si habla español, tiene a burnett disposición servicios gratuitos de asistencia lingüística. Llame al [phone].    We comply with applicable federal civil rights laws and [place] laws. We do not discriminate on the basis of race, color, " national origin, age, disability sex, sexual orientation or gender identity.            Thank you!     Thank you for choosing [facility] ENDOCRINOLOGY  for your care. Our goal is always to provide you with excellent care. Hearing back from our patients is one way we can continue to improve our services. Please take a few minutes to complete the written survey that you may receive in the mail after your visit with us. Thank you!             Your Updated Medication List - Protect others around you: Learn how to safely use, store and throw away your medicines at www.disposemymeds.org.          This list is accurate as of: 9/13/17 11:59 PM.  Always use your most recent med list.                   Brand Name Dispense Instructions for use Diagnosis    aspirin 81 MG chewable tablet     108 tablet    Take 81 mg by mouth every other day    Diabetes mellitus type 1 (H)       * blood glucose monitoring test strip    no brand specified    5 Box    Test up to ten times per day as directed    Diabetes mellitus (H)       * blood glucose monitoring test strip    ERICA CONTOUR NEXT    300 strip    Use to test blood sugar 10  times daily or as directed.    Diabetes mellitus type 1 (H)       CVS GLUCOSE 40 % Gel gel   Generic drug:  glucose      Take 15 g by mouth as needed        glucagon 1 MG kit    GLUCAGON EMERGENCY    1 mg    Inject 1 mg into the muscle once for 1 dose Use as directed    Diabetes mellitus type I (H)       insulin aspart 100 UNITS/ML injection    NovoLOG VIAL    60 mL    Use in insulin pump.  Pt uses appox 50-60 units in 24 hrs.    Calculus of kidney       simvastatin 80 MG tablet    ZOCOR    90 tablet    Take 1 tablet (80 mg) by mouth At Bedtime    Calculus of kidney       * Notice:  This list has 2 medication(s) that are the same as other medications prescribed for you. Read the directions carefully, and ask your doctor or other care provider to review them with you.

## 2017-09-13 NOTE — NURSING NOTE
"Chief Complaint   Patient presents with     RECHECK     DIABETES TYPE 1 F/U        Initial BP 98/66 (BP Location: Right arm, Patient Position: Sitting, Cuff Size: Adult Regular)  Pulse 60  Ht 1.518 m (4' 11.75\")  Wt 66.3 kg (146 lb 3.2 oz)  BMI 28.79 kg/m2 Estimated body mass index is 28.79 kg/(m^2) as calculated from the following:    Height as of this encounter: 1.518 m (4' 11.75\").    Weight as of this encounter: 66.3 kg (146 lb 3.2 oz).  Medication Reconciliation: complete       Performed A1C test - patient tolerated well.    Amy Salazar, [facility]       "

## 2017-09-13 NOTE — LETTER
9/13/2017       RE: Kiera Dobson  [address]  [place] [facility] [address]     Dear Colleague,    Thank you for referring your patient, Kiera Dobson, to the [facility] ENDOCRINOLOGY at [facility]. Please see a copy of my visit note below.    HPI  Kiera Dobson is a 51 year old female with type 1 diabetes mellitus here today for a follow up visit.  She has mild/moderate nonproliferative diabetic retinopathy. No peripheral neuropathy or nephropathy.  Her hx is also significant for hypoglycemia unawareness.  She denies any recent severe hypoglycemia.  She has been using her Medtronic 530G insulin pump and Dexcom.  Her current basal insulin rates are:  Midnight= 0.85 units/hr.  3 am = 0.65 units/hr.  10 am  = 0.65 units/hr.  2200= 0.5 units/hr.  Pt's I/C ratio is 1:12.  Sensitivity is 70.  Her 24 hr basal dose is 15.9 units/24 hrs.  Pt's A1C is 7.9 % today and her previous A1C was 7.7 % in 6/2017.  We downloaded pt's insulin pump and Dexcom today.  Kiera need to work on checking her blood sugar more frequent.  She has only been averaging 1-2 blood sugar checks per day.  Her blood sugar readings are erratic.  Most of her blood sugar values are high.  Her average glucose was 169 with SD 49 over the past month.  Her Dexcom download did not show any overnight hypoglycemia.  On ROS today, she stopped taking Prozac since she felt that the Prozac was making her very tired.  She did talk to her therapist about this med change.  No visual changes.  She has gained weight.  Kiera reports leg, back and hip pain. She has been told she has arthritis.  No numbness in her feet.  Pt denies n/v, SOB or cough.No chest pain at his time or abd pain or diarrhea.    DIABETES CARE:  Retinopathy:mild/moderate nonproliferative diabetic retinopathy.  She was last seen here by Oph in July 2017.   Nephropathy: urine microalbuminuria negative in 2/2017. Neuropathy: none.  Feet: good pulses, no ulcers and normal  monofilamentous exam.  Taking ASA: yes.  Lipids:  LDL 69 on 3/24/2017.    Pt is taking Simvastatin.    ROS  Please see under HPI.    Allergies  Allergies   Allergen Reactions     Ampicillin Sodium Rash       Medications  Current Outpatient Prescriptions   Medication Sig Dispense Refill     simvastatin (ZOCOR) 80 MG tablet Take 1 tablet (80 mg) by mouth At Bedtime 90 tablet 3     blood glucose monitoring (ERICA CONTOUR NEXT) test strip Use to test blood sugar 10  times daily or as directed. 300 strip 11     insulin aspart (NOVOLOG VIAL) 100 UNITS/ML VIAL Use in insulin pump.  Pt uses appox 50-60 units in 24 hrs. 60 mL 3     aspirin 81 MG chewable tablet Take 81 mg by mouth every other day  108 tablet 3     glucose (CVS GLUCOSE) 40 % GEL Take 15 g by mouth as needed       glucose blood VI test strips strip Test up to ten times per day as directed 5 Box 8     glucagon (GLUCAGON EMERGENCY) 1 MG injection Inject 1 mg into the muscle once for 1 dose Use as directed 1 mg 2       Family History  family history includes Alzheimer Disease in her father; Autoimmune Disease in her mother; CANCER in her father; DIABETES in her father; Glaucoma in her father and mother; Hypertension in her mother.    Social History  Smoke: none.  ETOH: rare.   with children.  Pt works full time in a lab.    Past Medical History  Past Medical History:   Diagnosis Date     Diabetes mellitus (H)     Type 1     Elevated cholesterol      Glaucoma     Glaucoma suspect     Glaucoma suspect      Kidney stones      Nonsenile cataract      Past Surgical History:   Procedure Laterality Date     APPENDECTOMY OPEN  1981     BIOPSY OF SKIN LESION  12/30/2011    returned January 2012 for additional removal     c sections  99,97,08     EXTRACORPOREAL SHOCK WAVE LITHOTRIPSY, CYSTOSCOPY, INSERT STENT URETER(S), COMBINED  2004     LASER HOLMIUM LITHOTRIPSY URETER(S), INSERT STENT, COMBINED  12/12/2013    Procedure: COMBINED CYSTOSCOPY, URETEROSCOPY, LASER  "HOLMIUM LITHOTRIPSY URETER(S), INSERT STENT;  Right Ureteroscopy Holmium Laser Lithotripsy Stent Placement;  Surgeon: Kirill Marlow MD;  Location: [facility] OR     S/P right shoulder surgery in Dec 2012.    Physical Exam  BP 98/66 (BP Location: Right arm, Patient Position: Sitting, Cuff Size: Adult Regular)  Pulse 60  Ht 1.518 m (4' 11.75\")  Wt 66.3 kg (146 lb 3.2 oz)  BMI 28.79 kg/m2  Body mass index is 28.79 kg/(m^2).    HEENT:  PERRLA; fundi not examined.  NECK: Thyroid nontender.  LUNGS: Clear b/l.  CARDIAC:  RRR.  ABDOMEN: Insulin infusion set and dexcom intact. Nontender.  Some areas of lipohypertrophy.  EXTREMITIES: No edema. Equal strength in LE's b/l.  FEET: Warm, no ulcers and normal monofilamentous exam.    RESULTS  Creatinine   Date Value Ref Range Status   02/14/2017 0.62 0.52 - 1.04 mg/dL Final     GFR Estimate   Date Value Ref Range Status   02/14/2017 >90  Non  GFR Calc   >60 mL/min/1.7m2 Final     Hemoglobin A1C   Date Value Ref Range Status   07/16/2013 7.9 (A) 4.3 - 6.0 % Final     Potassium   Date Value Ref Range Status   02/14/2017 4.6 3.4 - 5.3 mmol/L Final     ALT   Date Value Ref Range Status   02/14/2017 17 0 - 50 U/L Final     AST   Date Value Ref Range Status   02/14/2017 13 0 - 45 U/L Final     TSH   Date Value Ref Range Status   02/14/2017 1.57 0.40 - 4.00 mU/L Final     T4 Free   Date Value Ref Range Status   02/14/2017 0.84 0.76 - 1.46 ng/dL Final       Cholesterol   Date Value Ref Range Status   03/24/2017 154 <200 mg/dL Final   02/10/2016 190 <200 mg/dL Final     HDL Cholesterol   Date Value Ref Range Status   03/24/2017 73 >49 mg/dL Final   02/10/2016 78 >49 mg/dL Final     LDL Cholesterol Calculated   Date Value Ref Range Status   03/24/2017 69 <100 mg/dL Final     Comment:     Desirable:       <100 mg/dl   02/10/2016 101 (H) <100 mg/dL Final     Comment:     Above desirable:  100-129 mg/dl   Borderline High:  130-159 mg/dL   High:             160-189 mg/dL   " Very high:       >189 mg/dl       Triglycerides   Date Value Ref Range Status   03/24/2017 59 <150 mg/dL Final   02/10/2016 58 <150 mg/dL Final     Comment:     Fasting specimen     Cholesterol/HDL Ratio   Date Value Ref Range Status   08/18/2014 2.7 0.0 - 5.0 Final   09/17/2013 2.3 0.0 - 5.0 Final     A1C      7.9   9/13/2017  A1C      7.7   6/1/2017  A1C      8.3   2/14/2017  A1C      7.5   8/18/2016  A1C      7.6   5/5/2016  A1C      7.8    2/10/2016  A1C      7.9    9/2/2015  A1C      7.7    5/2015  A1C      8.6    1/28/2015  A1C      8.8    9/26/2014  A1C      8.0    7/2/2014  A1C      8.4    2/28/2014    ASSESSMENT/PLAN:    1.  TYPE 1 DIABETES MELLITUS:Type 1 diabetes mellitus complicated by retinopathy and hypoglycemia unawareness.  Kiera stopped taking her Prozac and has been checking her blood sugars less.  I asked her to see her therapist again and to work on checking her blood sugars fasting each day, at noon, predinner and at bedtime daily.  No change in insulin doses today.  She remains normotensive.   Feet in good condition.  She is taking ASA daily.  Pt's TSH is normal in Feb 2017.    2.  MILD/MODERATE NPDR: Last seen by Oph here in July 2017.    3.  HX OF + MICROALBUMINURIA:  Pt's microalbuminuria negative in 2/2017.  Her creat/GFR are stable.    4. HYPERLIPIDEMIA:  LDL 69 in 3/2017.  Pt remains on high dose Simvastatin.  Since she has been on the high dose Simvastatin for years and tolerating this dose, Dr. Chahal would like her to remain on Simvastatin 80 mg daily.  Pt's hepatic panel and CK were normal in 2/2017.    5.  ADHD: She is being followed by Yumi ANSARI ( [phone] ).  I asked her to see her therapist soon.  Kiera is finding it difficult again to focus on her diabetes care since she stopped the Prozac.    6.  TINNITUS: Seen here by ENT dx hearing loss.    7. HYPOGLYCEMIA UNAWARENESS: She denies having any recent severe hypoglycemia.  Continue to wear Dexcom daily.    8. Return to  Endocrine Clinic to see Dr. Chahal in     Again, thank you for allowing me to participate in the care of your patient.      Sincerely,    Jo Jean PA-C

## 2017-10-18 ENCOUNTER — OFFICE VISIT (OUTPATIENT)
Dept: ENDOCRINOLOGY | Facility: CLINIC | Age: 52
End: 2017-10-18

## 2017-10-18 VITALS — HEIGHT: 60 IN

## 2017-10-18 DIAGNOSIS — Z23 NEED FOR PROPHYLACTIC VACCINATION AND INOCULATION AGAINST INFLUENZA: Primary | ICD-10-CM

## 2017-10-18 ASSESSMENT — PAIN SCALES - GENERAL: PAINLEVEL: NO PAIN (0)

## 2017-10-18 NOTE — MR AVS SNAPSHOT
After Visit Summary   10/18/2017    Kiera Dobson    MRN: [id number]           Patient Information     Date Of Birth          1965        Visit Information        Provider Department      10/18/2017 1:00 PM Jo [facility] PA-C [facility]        Today's Diagnoses     Need for prophylactic vaccination and inoculation against influenza    -  1       Follow-ups after your visit        Your next 10 appointments already scheduled     Dec 01, 2017  9:30 AM CST   (Arrive by 9:15 AM)   RETURN DIABETES with ML [facility] ([facility])    [address] [place] [address] [phone]            Feb 13, 2018  3:00 PM CST   (Arrive by 2:45 PM)   RETURN DIABETES with MD XAVI Ravi [facility] Endocrinology ([facility])    [address] [place] [address] [phone]              Who to contact     Please call your clinic at [phone] to:    Ask questions about your health    Make or cancel appointments    Discuss your medicines    Learn about your test results    Speak to your doctor   If you have compliments or concerns about an experience at your clinic, or if you wish to file a complaint, please contact [facility] Patient Relations at [phone] or email us at [email or web address]         Additional Information About Your Visit        MyChart Information     HashParadet gives you secure access to your electronic health record. If you see a primary care provider, you can also send messages to your care team and make appointments. If you have questions, please call your primary care clinic.  If you do not have a primary care provider, please call [phone] and they will assist you.      TechFaith is an electronic gateway that provides easy, online access to your medical records. With [employer], you  "can request a clinic appointment, read your test results, renew a prescription or communicate with your care team.     To access your existing account, please contact your [facility] or call [phone] for assistance.        Care EveryWhere ID     This is your Care EveryWhere ID. This could be used by other organizations to access your [place] medical records  [id number]        Your Vitals Were     Height                   1.518 m (4' 11.75\")            Blood Pressure from Last 3 Encounters:   10/18/17 (P) 113/74   09/13/17 98/66   08/15/17 94/66    Weight from Last 3 Encounters:   09/13/17 66.3 kg (146 lb 3.2 oz)   08/15/17 67 kg (147 lb 9.6 oz)   06/01/17 66.2 kg (146 lb)              We Performed the Following     C FLU VAC QUADRIVALENT SPLIT VIRUS 3+YRS IM        Primary Care Provider Office Phone # Fax #    Mary Beth Nely Mansfield [phone]       [address] [place] [address] [address]  [facility] [address]        Equal Access to Services     [facility]: Hadii aad ku hadasho Soomaali, waaxda luqadaha, qaybta kaalmada adefroilan, marylu ghotra . So [facility] [phone].    ATENCIÓN: Si habla español, tiene a burnett disposición servicios gratuitos de asistencia lingüística. Marcy al [phone].    We comply with applicable federal civil rights laws and [place] laws. We do not discriminate on the basis of race, color, national origin, age, disability, sex, sexual orientation, or gender identity.            Thank you!     Thank you for choosing [facility] ENDOCRINOLOGY  for your care. Our goal is always to provide you with excellent care. Hearing back from our patients is one way we can continue to improve our services. Please take a few minutes to complete the written survey that you may receive in the mail after your visit with us. Thank you!             Your Updated Medication List - Protect others around you: Learn how to safely use, store " and throw away your medicines at www.disposemymeds.org.          This list is accurate as of: 10/18/17  2:57 PM.  Always use your most recent med list.                   Brand Name Dispense Instructions for use Diagnosis    aspirin 81 MG chewable tablet     108 tablet    Take 81 mg by mouth every other day    Diabetes mellitus type 1 (H)       * blood glucose monitoring test strip    no brand specified    5 Box    Test up to ten times per day as directed    Diabetes mellitus (H)       * blood glucose monitoring test strip    ERICA CONTOUR NEXT    300 strip    Use to test blood sugar 10  times daily or as directed.    Diabetes mellitus type 1 (H)       CVS GLUCOSE 40 % Gel gel   Generic drug:  glucose      Take 15 g by mouth as needed        glucagon 1 MG kit    GLUCAGON EMERGENCY    1 mg    Inject 1 mg into the muscle once for 1 dose Use as directed    Diabetes mellitus type I (H)       insulin aspart 100 UNITS/ML injection    NovoLOG VIAL    60 mL    Use in insulin pump.  Pt uses appox 50-60 units in 24 hrs.    Calculus of kidney       simvastatin 80 MG tablet    ZOCOR    90 tablet    Take 1 tablet (80 mg) by mouth At Bedtime    Calculus of kidney       * Notice:  This list has 2 medication(s) that are the same as other medications prescribed for you. Read the directions carefully, and ask your doctor or other care provider to review them with you.

## 2017-10-18 NOTE — LETTER
10/18/2017       RE: Kiera Dobson  [address]  [place] [facility] [address]     Dear Colleague,    Thank you for referring your patient, Kiera Dobson, to the [facility] ENDOCRINOLOGY at [facility]. Please see a copy of my visit note below.        Again, thank you for allowing me to participate in the care of your patient.      Sincerely,    Jo Jean PA-C

## 2017-10-18 NOTE — PROGRESS NOTES
HPI  Kiera Dobson is a 51 year old female with type 1 diabetes mellitus here today for a follow up visit.  She has mild/moderate nonproliferative diabetic retinopathy. No peripheral neuropathy or nephropathy.  Her hx is also significant for hypoglycemia unawareness.  She denies any recent severe hypoglycemia.  She has been using her Medtronic 530G insulin pump and Dexcom.  Her current basal insulin rates are:  Midnight= 0.85 units/hr.  3 am = 0.65 units/hr.  10 am  = 0.65 units/hr.  2200= 0.5 units/hr.  Pt's I/C ratio is 1:12.  Sensitivity is 70.  Her 24 hr basal dose is 15.9 units/24 hrs.  Pt's A1C was 7.9 % on 9/13/2017 and her previous A1C was 7.7 % in 6/2017.  We downloaded pt's insulin pump and Dexcom today.  Kiera has been having hypoglycemia overnight and in the early am hours.  s are erratic.  Most of her blood sugar values are high.  Her average glucose was 156 over the past month.  On ROS today, she stopped taking Prozac since she felt that the Prozac was making her very tired.  She did talk to her therapist about this med change.  She states she is doing fine at this time. And has been attending yoga classes which she finds helpful.  No visual changes.  She has gained weight.  Kiera reports leg, back and hip pain. She has been told she has arthritis.  No numbness in her feet.  Pt denies n/v, SOB or cough.No chest pain at his time or abd pain or diarrhea.    DIABETES CARE:  Retinopathy:mild/moderate nonproliferative diabetic retinopathy.  She was last seen here by Oph in July 2017.   Nephropathy: urine microalbuminuria negative in 2/2017. Neuropathy: none.  Feet: good pulses, no ulcers and normal monofilamentous exam.  Taking ASA: yes.  Lipids:  LDL 69 on 3/24/2017.    Pt is taking Simvastatin.    ROS  Please see under HPI.    Allergies  Allergies   Allergen Reactions     Ampicillin Sodium Rash       Medications  Current Outpatient Prescriptions   Medication Sig Dispense Refill     simvastatin (ZOCOR) 80 MG  "tablet Take 1 tablet (80 mg) by mouth At Bedtime 90 tablet 3     blood glucose monitoring (ERICA CONTOUR NEXT) test strip Use to test blood sugar 10  times daily or as directed. 300 strip 11     insulin aspart (NOVOLOG VIAL) 100 UNITS/ML VIAL Use in insulin pump.  Pt uses appox 50-60 units in 24 hrs. 60 mL 3     aspirin 81 MG chewable tablet Take 81 mg by mouth every other day  108 tablet 3     glucose (CVS GLUCOSE) 40 % GEL Take 15 g by mouth as needed       glucose blood VI test strips strip Test up to ten times per day as directed 5 Box 8     glucagon (GLUCAGON EMERGENCY) 1 MG injection Inject 1 mg into the muscle once for 1 dose Use as directed 1 mg 2       Family History  family history includes Alzheimer Disease in her father; Autoimmune Disease in her mother; CANCER in her father; DIABETES in her father; Glaucoma in her father and mother; Hypertension in her mother.    Social History  Smoke: none.  ETOH: rare.   with children.  Pt works full time in a lab.    Past Medical History  Past Medical History:   Diagnosis Date     Diabetes mellitus (H)     Type 1     Elevated cholesterol      Glaucoma     Glaucoma suspect     Glaucoma suspect      Kidney stones      Nonsenile cataract      Past Surgical History:   Procedure Laterality Date     APPENDECTOMY OPEN  1981     BIOPSY OF SKIN LESION  12/30/2011    returned January 2012 for additional removal     c sections  99,97,08     EXTRACORPOREAL SHOCK WAVE LITHOTRIPSY, CYSTOSCOPY, INSERT STENT URETER(S), COMBINED  2004     LASER HOLMIUM LITHOTRIPSY URETER(S), INSERT STENT, COMBINED  12/12/2013    Procedure: COMBINED CYSTOSCOPY, URETEROSCOPY, LASER HOLMIUM LITHOTRIPSY URETER(S), INSERT STENT;  Right Ureteroscopy Holmium Laser Lithotripsy Stent Placement;  Surgeon: Kirill Marlow MD;  Location: [facility]     S/P right shoulder surgery in Dec 2012.    Physical Exam  BP (P) 113/74  Pulse (P) 73  Ht 1.518 m (4' 11.75\")  There is no height or weight on file to " calculate BMI.    HEENT:  PERRLA; fundi not examined.  NECK: Thyroid nontender.  LUNGS: Clear b/l.  CARDIAC:  RRR.  ABDOMEN: Insulin infusion set and dexcom intact. Nontender.  Some areas of lipohypertrophy.  EXTREMITIES: No edema. Equal strength in LE's b/l.  FEET: Warm, no ulcers and normal monofilamentous exam.    RESULTS  Creatinine   Date Value Ref Range Status   02/14/2017 0.62 0.52 - 1.04 mg/dL Final     GFR Estimate   Date Value Ref Range Status   02/14/2017 >90  Non  GFR Calc   >60 mL/min/1.7m2 Final     Hemoglobin A1C   Date Value Ref Range Status   07/16/2013 7.9 (A) 4.3 - 6.0 % Final     Potassium   Date Value Ref Range Status   02/14/2017 4.6 3.4 - 5.3 mmol/L Final     ALT   Date Value Ref Range Status   02/14/2017 17 0 - 50 U/L Final     AST   Date Value Ref Range Status   02/14/2017 13 0 - 45 U/L Final     TSH   Date Value Ref Range Status   02/14/2017 1.57 0.40 - 4.00 mU/L Final     T4 Free   Date Value Ref Range Status   02/14/2017 0.84 0.76 - 1.46 ng/dL Final       Cholesterol   Date Value Ref Range Status   03/24/2017 154 <200 mg/dL Final   02/10/2016 190 <200 mg/dL Final     HDL Cholesterol   Date Value Ref Range Status   03/24/2017 73 >49 mg/dL Final   02/10/2016 78 >49 mg/dL Final     LDL Cholesterol Calculated   Date Value Ref Range Status   03/24/2017 69 <100 mg/dL Final     Comment:     Desirable:       <100 mg/dl   02/10/2016 101 (H) <100 mg/dL Final     Comment:     Above desirable:  100-129 mg/dl   Borderline High:  130-159 mg/dL   High:             160-189 mg/dL   Very high:       >189 mg/dl       Triglycerides   Date Value Ref Range Status   03/24/2017 59 <150 mg/dL Final   02/10/2016 58 <150 mg/dL Final     Comment:     Fasting specimen     Cholesterol/HDL Ratio   Date Value Ref Range Status   08/18/2014 2.7 0.0 - 5.0 Final   09/17/2013 2.3 0.0 - 5.0 Final     A1C      7.9   9/13/2017  A1C      7.7   6/1/2017  A1C      8.3   2/14/2017  A1C      7.5   8/18/2016  A1C       7.6   5/5/2016  A1C      7.8    2/10/2016  A1C      7.9    9/2/2015  A1C      7.7    5/2015  A1C      8.6    1/28/2015  A1C      8.8    9/26/2014  A1C      8.0    7/2/2014  A1C      8.4    2/28/2014    ASSESSMENT/PLAN:    1.  TYPE 1 DIABETES MELLITUS:Type 1 diabetes mellitus complicated by retinopathy and hypoglycemia unawareness.  In view of overnight and early am hypoglycemia, I had patient reduce her midnight and 3 am basal insulin rates.  Pt's new basal insulin rates are:  Midnight = 0.8 units/hr.  3 am = 0.6 units/hr.  10 am = 0.65 units/hr.  2200 = 0.5 units/hr.  She remains normotensive.   Feet in good condition.  She is taking ASA daily.  Pt's TSH is normal in Feb 2017.    2.  MILD/MODERATE NPDR: Last seen by Oph here in July 2017.    3.  HX OF + MICROALBUMINURIA:  Pt's microalbuminuria negative in 2/2017.  Her creat/GFR are stable.    4. HYPERLIPIDEMIA:  LDL 69 in 3/2017.  Pt remains on high dose Simvastatin.  Since she has been on the high dose Simvastatin for years and tolerating this dose, Dr. Chahal would like her to remain on Simvastatin 80 mg daily.  Pt's hepatic panel and CK were normal in 2/2017.    5.  ADHD: She is being followed by Yumi ANSARI ( [phone] ).  I asked her to see her therapist in follow up.    6.  TINNITUS: Seen here by ENT dx hearing loss.    7. HYPOGLYCEMIA UNAWARENESS: She denies having any recent severe hypoglycemia.  She is having low blood sugars in the 60 's overnight and in the early am hours.  Continue to wear Dexcom daily.    8. Return to Endocrine Clinic to see me in Dec 2017 and Dr. Chahal in Feb 2018.

## 2017-10-18 NOTE — PROGRESS NOTES
Injectable Influenza Immunization Documentation    1.  Is the person to be vaccinated sick today?   No    2. Does the person to be vaccinated have an allergy to a component   of the vaccine?   No    3. Has the person to be vaccinated ever had a serious reaction   to influenza vaccine in the past?   No    4. Has the person to be vaccinated ever had Guillain-Barré syndrome?   No    Form completed by ezio nixon

## 2017-10-18 NOTE — NURSING NOTE
Chief Complaint   Patient presents with     RECHECK     F/U TYPE I DM     Anupama Jacques, [facility]  Endocrinology & Diabetes 3G

## 2017-11-08 DIAGNOSIS — N20.0 CALCULUS OF KIDNEY: ICD-10-CM

## 2017-11-30 ASSESSMENT — ENCOUNTER SYMPTOMS
HALLUCINATIONS: 0
JOINT SWELLING: 1
EYE PAIN: 0
PANIC: 0
EYE REDNESS: 0
POLYDIPSIA: 1
SMELL DISTURBANCE: 0
MEMORY LOSS: 1
MUSCLE WEAKNESS: 1
SINUS CONGESTION: 0
TROUBLE SWALLOWING: 0
EYE WATERING: 0
HEMATURIA: 0
FEVER: 0
ALTERED TEMPERATURE REGULATION: 1
LOSS OF CONSCIOUSNESS: 0
WEIGHT GAIN: 1
INSOMNIA: 1
BACK PAIN: 1
TASTE DISTURBANCE: 0
ORTHOPNEA: 0
LIGHT-HEADEDNESS: 0
SYNCOPE: 0
DECREASED LIBIDO: 1
FLANK PAIN: 0
DYSURIA: 1
WEAKNESS: 1
LEG PAIN: 1
PARALYSIS: 0
EYE IRRITATION: 0
PALPITATIONS: 1
DIFFICULTY URINATING: 0
SKIN CHANGES: 0
SLEEP DISTURBANCES DUE TO BREATHING: 0
TREMORS: 1
SORE THROAT: 0
SINUS PAIN: 0
CHILLS: 1
DECREASED APPETITE: 0
HOARSE VOICE: 0
NIGHT SWEATS: 1
HOT FLASHES: 1
NECK PAIN: 1
DISTURBANCES IN COORDINATION: 0
SEIZURES: 0
NECK MASS: 0
FATIGUE: 1
SPEECH CHANGE: 1
HYPERTENSION: 0
DEPRESSION: 1
MYALGIAS: 1
POLYPHAGIA: 0
NUMBNESS: 1
POOR WOUND HEALING: 0
HEADACHES: 1
DECREASED CONCENTRATION: 1
STIFFNESS: 1
DOUBLE VISION: 0
INCREASED ENERGY: 1
EXERCISE INTOLERANCE: 1
HYPOTENSION: 1
WEIGHT LOSS: 0
DIZZINESS: 1
ARTHRALGIAS: 1
NERVOUS/ANXIOUS: 1
NAIL CHANGES: 0
MUSCLE CRAMPS: 0
TINGLING: 1

## 2017-12-01 ENCOUNTER — OFFICE VISIT (OUTPATIENT)
Dept: ENDOCRINOLOGY | Facility: CLINIC | Age: 52
End: 2017-12-01

## 2017-12-01 VITALS
DIASTOLIC BLOOD PRESSURE: 68 MMHG | HEIGHT: 60 IN | SYSTOLIC BLOOD PRESSURE: 102 MMHG | WEIGHT: 142 LBS | BODY MASS INDEX: 27.88 KG/M2 | HEART RATE: 92 BPM

## 2017-12-01 DIAGNOSIS — E10.65 TYPE 1 DIABETES MELLITUS WITH HYPERGLYCEMIA (H): Primary | ICD-10-CM

## 2017-12-01 LAB — HBA1C MFR BLD: 7.4 % (ref 4.3–6)

## 2017-12-01 NOTE — LETTER
12/1/2017       RE: Kiera Dobson  [address]  [place] [id number]     Dear Colleague,    Thank you for referring your patient, Kiera Dobson, to the [facility] ENDOCRINOLOGY at [facility]. Please see a copy of my visit note below.    HPI  Kiera Dobson is a 52 year old female with type 1 diabetes mellitus here today for a follow up visit.  She has mild/moderate nonproliferative diabetic retinopathy. No peripheral neuropathy or nephropathy.  Her hx is also significant for hypoglycemia unawareness.  She denies any recent severe hypoglycemia.  She has been using her Medtronic 530G insulin pump and Dexcom.  Her current basal insulin rates are:  Midnight= 0.8 units/hr.  3 am = 0.6 units/hr.  10 am  = 0.65 units/hr.  2200= 0.5 units/hr.  Pt's I/C ratio is 1:12.  Sensitivity is 70.  Her 24 hr basal dose is 15.4 units/24 hrs.  Pt's A1C is 7.4 % today and her previous A1C was 7.9 % on 9/13/2017.   We downloaded pt's insulin pump and Dexcom today.  She has not been checking her blood sugar frequent enough.  Kiera has 1-2 blood sugar checks per day.  Her blood sugar readings are erratic.  She has noticed she has better insulin absorption when she places in infusion set on her abdomen and avoids her arms and legs.  Her average glucose on her Dexcom download was 162.  No overnight hypoglycemia.  On ROS today, she reports fatigue.  No visual changes.  She has lost some weight.  Kiera reports leg, back and hip pain. She has been told she has arthritis.  No numbness in her feet.  Pt denies n/v, SOB or cough.No chest pain at his time or abd pain or diarrhea.    DIABETES CARE:  Retinopathy:mild/moderate nonproliferative diabetic retinopathy.  She was last seen here by Oph in July 2017.   Nephropathy: urine microalbuminuria negative in 2/2017. Neuropathy: none.  Feet: good pulses, no ulcers and normal monofilamentous exam.  Taking ASA: yes.  Lipids:  LDL 69 on 3/24/2017.    Pt is taking  Simvastatin.    ROS  Please see under HPI.    Allergies  Allergies   Allergen Reactions     Ampicillin Sodium Rash       Medications  Current Outpatient Prescriptions   Medication Sig Dispense Refill     NOVOLOG VIAL 100 UNIT/ML soln USE IN INSULIN PUMP. PATIENT USES APPROXIMITELY 50-60 UNITS IN 24 HOURS 60 mL 3     simvastatin (ZOCOR) 80 MG tablet Take 1 tablet (80 mg) by mouth At Bedtime 90 tablet 3     blood glucose monitoring (ERICA CONTOUR NEXT) test strip Use to test blood sugar 10  times daily or as directed. 300 strip 11     aspirin 81 MG chewable tablet Take 81 mg by mouth every other day  108 tablet 3     glucose (CVS GLUCOSE) 40 % GEL Take 15 g by mouth as needed       glucose blood VI test strips strip Test up to ten times per day as directed 5 Box 8     glucagon (GLUCAGON EMERGENCY) 1 MG injection Inject 1 mg into the muscle once for 1 dose Use as directed 1 mg 2       Family History  family history includes Alzheimer Disease in her father; Autoimmune Disease in her mother; CANCER in her father; DIABETES in her father; Glaucoma in her father and mother; Hypertension in her mother.    Social History  Smoke: none.  ETOH: rare.   with children.  Pt works full time in a lab.    Past Medical History  Past Medical History:   Diagnosis Date     Diabetes mellitus (H)     Type 1     Elevated cholesterol      Glaucoma     Glaucoma suspect     Glaucoma suspect      Kidney stones      Nonsenile cataract      Past Surgical History:   Procedure Laterality Date     APPENDECTOMY OPEN  1981     BIOPSY OF SKIN LESION  12/30/2011    returned January 2012 for additional removal     c sections  99,97,08     EXTRACORPOREAL SHOCK WAVE LITHOTRIPSY, CYSTOSCOPY, INSERT STENT URETER(S), COMBINED  2004     LASER HOLMIUM LITHOTRIPSY URETER(S), INSERT STENT, COMBINED  12/12/2013    Procedure: COMBINED CYSTOSCOPY, URETEROSCOPY, LASER HOLMIUM LITHOTRIPSY URETER(S), INSERT STENT;  Right Ureteroscopy Holmium Laser Lithotripsy  "Stent Placement;  Surgeon: Kirill Marlow MD;  Location: [facility]     S/P right shoulder surgery in Dec 2012.    Physical Exam  /68 (BP Location: Right arm, Patient Position: Sitting, Cuff Size: Adult Regular)  Pulse 92  Ht 1.518 m (4' 11.75\")  Wt 64.4 kg (142 lb)  BMI 27.97 kg/m2  Body mass index is 27.97 kg/(m^2).    HEENT:  PERRLA; fundi not examined.  NECK: Thyroid nontender.  LUNGS: Clear b/l.  CARDIAC:  RRR.  ABDOMEN: Insulin infusion set and dexcom intact. Nontender.  Some areas of lipohypertrophy.  EXTREMITIES: No edema. Equal strength in LE's b/l.  FEET: Warm, no ulcers and normal monofilamentous exam.    RESULTS  Creatinine   Date Value Ref Range Status   02/14/2017 0.62 0.52 - 1.04 mg/dL Final     GFR Estimate   Date Value Ref Range Status   02/14/2017 >90  Non  GFR Calc   >60 mL/min/1.7m2 Final     Hemoglobin A1C   Date Value Ref Range Status   07/16/2013 7.9 (A) 4.3 - 6.0 % Final     Potassium   Date Value Ref Range Status   02/14/2017 4.6 3.4 - 5.3 mmol/L Final     ALT   Date Value Ref Range Status   02/14/2017 17 0 - 50 U/L Final     AST   Date Value Ref Range Status   02/14/2017 13 0 - 45 U/L Final     TSH   Date Value Ref Range Status   02/14/2017 1.57 0.40 - 4.00 mU/L Final     T4 Free   Date Value Ref Range Status   02/14/2017 0.84 0.76 - 1.46 ng/dL Final       Cholesterol   Date Value Ref Range Status   03/24/2017 154 <200 mg/dL Final   02/10/2016 190 <200 mg/dL Final     HDL Cholesterol   Date Value Ref Range Status   03/24/2017 73 >49 mg/dL Final   02/10/2016 78 >49 mg/dL Final     LDL Cholesterol Calculated   Date Value Ref Range Status   03/24/2017 69 <100 mg/dL Final     Comment:     Desirable:       <100 mg/dl   02/10/2016 101 (H) <100 mg/dL Final     Comment:     Above desirable:  100-129 mg/dl   Borderline High:  130-159 mg/dL   High:             160-189 mg/dL   Very high:       >189 mg/dl       Triglycerides   Date Value Ref Range Status   03/24/2017 59 <150 " mg/dL Final   02/10/2016 58 <150 mg/dL Final     Comment:     Fasting specimen     Cholesterol/HDL Ratio   Date Value Ref Range Status   08/18/2014 2.7 0.0 - 5.0 Final   09/17/2013 2.3 0.0 - 5.0 Final     A1C      7.4   12/1/2017  A1C      7.9   9/13/2017  A1C      7.7   6/1/2017  A1C      8.3   2/14/2017  A1C      7.5   8/18/2016  A1C      7.6   5/5/2016  A1C      7.8    2/10/2016  A1C      7.9    9/2/2015  A1C      7.7    5/2015  A1C      8.6    1/28/2015  A1C      8.8    9/26/2014  A1C      8.0    7/2/2014  A1C      8.4    2/28/2014    ASSESSMENT/PLAN:    1.  TYPE 1 DIABETES MELLITUS:Type 1 diabetes mellitus complicated by retinopathy and hypoglycemia unawareness.  I asked Kiera to check her FBS each am and also check her blood sugar prelunch/at noon and predinner DAILY and to use her abdomen for her infusion site since she gets better insulin absorption when she uses her abdomen.  She remains normotensive.   Feet in good condition.  She is taking ASA daily.  Pt's TSH is normal in Feb 2017.  Kiera had the flu vaccine this season.    2.  MILD/MODERATE NPDR: Last seen by Oph here in July 2017.    3.  HX OF + MICROALBUMINURIA:  Pt's microalbuminuria negative in 2/2017.  Her creat/GFR are stable.    4. HYPERLIPIDEMIA:  LDL 69 in 3/2017.  Pt remains on high dose Simvastatin.  Since she has been on the high dose Simvastatin for years and tolerating this dose, Dr. Chahal would like her to remain on Simvastatin 80 mg daily.  Pt's hepatic panel and CK were normal in 2/2017.    5.  ADHD: She is being followed by Yumi ANSARI ( [phone] ).  I asked her to see her therapist in follow up.  Kiera is not longer taking Prozac.    6.  TINNITUS: Seen here by ENT dx hearing loss.    7. HYPOGLYCEMIA UNAWARENESS: She denies having any recent severe hypoglycemia.    Continue to wear Dexcom daily.    8. Return to Endocrine Clinic to see Dr. Chahal in early Feb 2018.    Sincerely,    Jo Jean PA-C

## 2017-12-01 NOTE — MR AVS SNAPSHOT
After Visit Summary   12/1/2017    Kiera Dobson    MRN: [id number]           Patient Information     Date Of Birth          1965        Visit Information        Provider Department      12/1/2017 9:30 AM Jo Jean PA-C [facility] Endocrinology        Today's Diagnoses     Type 1 diabetes mellitus with hyperglycemia (H)    -  1       Follow-ups after your visit        Your next 10 appointments already scheduled     Feb 13, 2018  3:00 PM CST   (Arrive by 2:45 PM)   RETURN DIABETES with MD XAVI Ravi [facility] Endocrinology ([facility])    [address] [place] [address] [id number] [phone]              Who to contact     Please call your clinic at [phone] to:    Ask questions about your health    Make or cancel appointments    Discuss your medicines    Learn about your test results    Speak to your doctor   If you have compliments or concerns about an experience at your clinic, or if you wish to file a complaint, please contact [facility] Patient Relations at [phone] or email us at [email or web address]         Additional Information About Your Visit        MyChart Information     [facility] [facility] [employer] gives you secure access to your electronic health record. If you see a primary care provider, you can also send messages to your care team and make appointments. If you have questions, please call your primary care clinic.  If you do not have a primary care provider, please call [phone] and they will assist you.      [employer] is an electronic gateway that provides easy, online access to your medical records. With [employer], you can request a clinic appointment, read your test results, renew a prescription or communicate with your care team.     To access your existing account, please contact your [facility] or call [phone] for assistance.        Care  "EveryWhere ID     This is your Care EveryWhere ID. This could be used by other organizations to access your [place] medical records  [id number]        Your Vitals Were     Pulse Height BMI (Body Mass Index)             92 1.518 m (4' 11.75\") 27.97 kg/m2          Blood Pressure from Last 3 Encounters:   12/01/17 102/68   10/18/17 (P) 113/74   09/13/17 98/66    Weight from Last 3 Encounters:   12/01/17 64.4 kg (142 lb)   09/13/17 66.3 kg (146 lb 3.2 oz)   08/15/17 67 kg (147 lb 9.6 oz)              We Performed the Following     Hemoglobin A1c POCT        Primary Care Provider Office Phone # Fax #    Mary Beth Nely Mansfield [phone]       [address] [place] [phone]  [facility] [address]        Equal Access to Services     : Hadii sola gavin hadasho Somarcellus, waaxda luqadaha, qaybta kaalmada adeegyada, waxay luis enriquein hayrobert ghotra . So [facility] [phone].    ATENCIÓN: Si habla español, tiene a burnett disposición servicios gratuitos de asistencia lingüística. Marcy al [phone].    We comply with applicable federal civil rights laws and [place] laws. We do not discriminate on the basis of race, color, national origin, age, disability, sex, sexual orientation, or gender identity.            Thank you!     Thank you for choosing [facility] ENDOCRINOLOGY  for your care. Our goal is always to provide you with excellent care. Hearing back from our patients is one way we can continue to improve our services. Please take a few minutes to complete the written survey that you may receive in the mail after your visit with us. Thank you!             Your Updated Medication List - Protect others around you: Learn how to safely use, store and throw away your medicines at www.disposemymeds.org.          This list is accurate as of: 12/1/17 11:59 PM.  Always use your most recent med list.                   Brand Name Dispense Instructions for use Diagnosis    aspirin 81 MG chewable tablet "     108 tablet    Take 81 mg by mouth every other day    Diabetes mellitus type 1 (H)       * blood glucose monitoring test strip    no brand specified    5 Box    Test up to ten times per day as directed    Diabetes mellitus (H)       * blood glucose monitoring test strip    ERICA CONTOUR NEXT    300 strip    Use to test blood sugar 10  times daily or as directed.    Diabetes mellitus type 1 (H)       CVS GLUCOSE 40 % Gel gel   Generic drug:  glucose      Take 15 g by mouth as needed        glucagon 1 MG kit    GLUCAGON EMERGENCY    1 mg    Inject 1 mg into the muscle once for 1 dose Use as directed    Diabetes mellitus type I (H)       NovoLOG VIAL 100 UNITS/ML injection   Generic drug:  insulin aspart     60 mL    USE IN INSULIN PUMP. PATIENT USES APPROXIMITELY 50-60 UNITS IN 24 HOURS    Calculus of kidney       simvastatin 80 MG tablet    ZOCOR    90 tablet    Take 1 tablet (80 mg) by mouth At Bedtime    Calculus of kidney       * Notice:  This list has 2 medication(s) that are the same as other medications prescribed for you. Read the directions carefully, and ask your doctor or other care provider to review them with you.

## 2017-12-01 NOTE — NURSING NOTE
"Chief Complaint   Patient presents with     RECHECK     DIABETES TYPE 1 F/U        Initial /68 (BP Location: Right arm, Patient Position: Sitting, Cuff Size: Adult Regular)  Pulse 92  Ht 1.518 m (4' 11.75\")  Wt 64.4 kg (142 lb)  BMI 27.97 kg/m2 Estimated body mass index is 27.97 kg/(m^2) as calculated from the following:    Height as of this encounter: 1.518 m (4' 11.75\").    Weight as of this encounter: 64.4 kg (142 lb).  Medication Reconciliation: complete       Performed A1C test - patient tolerated well.    Amy Salazar, [facility]       "

## 2017-12-05 NOTE — PROGRESS NOTES
HPI  Kiera Dobson is a 52 year old female with type 1 diabetes mellitus here today for a follow up visit.  She has mild/moderate nonproliferative diabetic retinopathy. No peripheral neuropathy or nephropathy.  Her hx is also significant for hypoglycemia unawareness.  She denies any recent severe hypoglycemia.  She has been using her Medtronic 530G insulin pump and Dexcom.  Her current basal insulin rates are:  Midnight= 0.8 units/hr.  3 am = 0.6 units/hr.  10 am  = 0.65 units/hr.  2200= 0.5 units/hr.  Pt's I/C ratio is 1:12.  Sensitivity is 70.  Her 24 hr basal dose is 15.4 units/24 hrs.  Pt's A1C is 7.4 % today and her previous A1C was 7.9 % on 9/13/2017.   We downloaded pt's insulin pump and Dexcom today.  She has not been checking her blood sugar frequent enough.  Kiera has 1-2 blood sugar checks per day.  Her blood sugar readings are erratic.  She has noticed she has better insulin absorption when she places in infusion set on her abdomen and avoids her arms and legs.  Her average glucose on her Dexcom download was 162.  No overnight hypoglycemia.  On ROS today, she reports fatigue.  No visual changes.  She has lost some weight.  Kiera reports leg, back and hip pain. She has been told she has arthritis.  No numbness in her feet.  Pt denies n/v, SOB or cough.No chest pain at his time or abd pain or diarrhea.    DIABETES CARE:  Retinopathy:mild/moderate nonproliferative diabetic retinopathy.  She was last seen here by Oph in July 2017.   Nephropathy: urine microalbuminuria negative in 2/2017. Neuropathy: none.  Feet: good pulses, no ulcers and normal monofilamentous exam.  Taking ASA: yes.  Lipids:  LDL 69 on 3/24/2017.    Pt is taking Simvastatin.    ROS  Please see under HPI.    Allergies  Allergies   Allergen Reactions     Ampicillin Sodium Rash       Medications  Current Outpatient Prescriptions   Medication Sig Dispense Refill     NOVOLOG VIAL 100 UNIT/ML soln USE IN INSULIN PUMP. PATIENT USES APPROXIMITELY  "50-60 UNITS IN 24 HOURS 60 mL 3     simvastatin (ZOCOR) 80 MG tablet Take 1 tablet (80 mg) by mouth At Bedtime 90 tablet 3     blood glucose monitoring (ERICA CONTOUR NEXT) test strip Use to test blood sugar 10  times daily or as directed. 300 strip 11     aspirin 81 MG chewable tablet Take 81 mg by mouth every other day  108 tablet 3     glucose ([facility]S GLUCOSE) 40 % GEL Take 15 g by mouth as needed       glucose blood VI test strips strip Test up to ten times per day as directed 5 Box 8     glucagon (GLUCAGON EMERGENCY) 1 MG injection Inject 1 mg into the muscle once for 1 dose Use as directed 1 mg 2       Family History  family history includes Alzheimer Disease in her father; Autoimmune Disease in her mother; CANCER in her father; DIABETES in her father; Glaucoma in her father and mother; Hypertension in her mother.    Social History  Smoke: none.  ETOH: rare.   with children.  Pt works full time in a lab.    Past Medical History  Past Medical History:   Diagnosis Date     Diabetes mellitus (H)     Type 1     Elevated cholesterol      Glaucoma     Glaucoma suspect     Glaucoma suspect      Kidney stones      Nonsenile cataract      Past Surgical History:   Procedure Laterality Date     APPENDECTOMY OPEN  1981     BIOPSY OF SKIN LESION  12/30/2011    returned January 2012 for additional removal     c sections  99,97,08     EXTRACORPOREAL SHOCK WAVE LITHOTRIPSY, CYSTOSCOPY, INSERT STENT URETER(S), COMBINED  2004     LASER HOLMIUM LITHOTRIPSY URETER(S), INSERT STENT, COMBINED  12/12/2013    Procedure: COMBINED CYSTOSCOPY, URETEROSCOPY, LASER HOLMIUM LITHOTRIPSY URETER(S), INSERT STENT;  Right Ureteroscopy Holmium Laser Lithotripsy Stent Placement;  Surgeon: Kirill Marlow MD;  Location: [facility]     S/P right shoulder surgery in Dec 2012.    Physical Exam  /68 (BP Location: Right arm, Patient Position: Sitting, Cuff Size: Adult Regular)  Pulse 92  Ht 1.518 m (4' 11.75\")  Wt 64.4 kg (142 lb)  BMI " 27.97 kg/m2  Body mass index is 27.97 kg/(m^2).    HEENT:  PERRLA; fundi not examined.  NECK: Thyroid nontender.  LUNGS: Clear b/l.  CARDIAC:  RRR.  ABDOMEN: Insulin infusion set and dexcom intact. Nontender.  Some areas of lipohypertrophy.  EXTREMITIES: No edema. Equal strength in LE's b/l.  FEET: Warm, no ulcers and normal monofilamentous exam.    RESULTS  Creatinine   Date Value Ref Range Status   02/14/2017 0.62 0.52 - 1.04 mg/dL Final     GFR Estimate   Date Value Ref Range Status   02/14/2017 >90  Non  GFR Calc   >60 mL/min/1.7m2 Final     Hemoglobin A1C   Date Value Ref Range Status   07/16/2013 7.9 (A) 4.3 - 6.0 % Final     Potassium   Date Value Ref Range Status   02/14/2017 4.6 3.4 - 5.3 mmol/L Final     ALT   Date Value Ref Range Status   02/14/2017 17 0 - 50 U/L Final     AST   Date Value Ref Range Status   02/14/2017 13 0 - 45 U/L Final     TSH   Date Value Ref Range Status   02/14/2017 1.57 0.40 - 4.00 mU/L Final     T4 Free   Date Value Ref Range Status   02/14/2017 0.84 0.76 - 1.46 ng/dL Final       Cholesterol   Date Value Ref Range Status   03/24/2017 154 <200 mg/dL Final   02/10/2016 190 <200 mg/dL Final     HDL Cholesterol   Date Value Ref Range Status   03/24/2017 73 >49 mg/dL Final   02/10/2016 78 >49 mg/dL Final     LDL Cholesterol Calculated   Date Value Ref Range Status   03/24/2017 69 <100 mg/dL Final     Comment:     Desirable:       <100 mg/dl   02/10/2016 101 (H) <100 mg/dL Final     Comment:     Above desirable:  100-129 mg/dl   Borderline High:  130-159 mg/dL   High:             160-189 mg/dL   Very high:       >189 mg/dl       Triglycerides   Date Value Ref Range Status   03/24/2017 59 <150 mg/dL Final   02/10/2016 58 <150 mg/dL Final     Comment:     Fasting specimen     Cholesterol/HDL Ratio   Date Value Ref Range Status   08/18/2014 2.7 0.0 - 5.0 Final   09/17/2013 2.3 0.0 - 5.0 Final     A1C      7.4   12/1/2017  A1C      7.9   9/13/2017  A1C      7.7    6/1/2017  A1C      8.3   2/14/2017  A1C      7.5   8/18/2016  A1C      7.6   5/5/2016  A1C      7.8    2/10/2016  A1C      7.9    9/2/2015  A1C      7.7    5/2015  A1C      8.6    1/28/2015  A1C      8.8    9/26/2014  A1C      8.0    7/2/2014  A1C      8.4    2/28/2014    ASSESSMENT/PLAN:    1.  TYPE 1 DIABETES MELLITUS:Type 1 diabetes mellitus complicated by retinopathy and hypoglycemia unawareness.  I asked Kiera to check her FBS each am and also check her blood sugar prelunch/at noon and predinner DAILY and to use her abdomen for her infusion site since she gets better insulin absorption when she uses her abdomen.  She remains normotensive.   Feet in good condition.  She is taking ASA daily.  Pt's TSH is normal in Feb 2017.  Kiera had the flu vaccine this season.    2.  MILD/MODERATE NPDR: Last seen by Oph here in July 2017.    3.  HX OF + MICROALBUMINURIA:  Pt's microalbuminuria negative in 2/2017.  Her creat/GFR are stable.    4. HYPERLIPIDEMIA:  LDL 69 in 3/2017.  Pt remains on high dose Simvastatin.  Since she has been on the high dose Simvastatin for years and tolerating this dose, Dr. Chahal would like her to remain on Simvastatin 80 mg daily.  Pt's hepatic panel and CK were normal in 2/2017.    5.  ADHD: She is being followed by Yumi ANSARI ( [phone] ).  I asked her to see her therapist in follow up.  Kiera is not longer taking Prozac.    6.  TINNITUS: Seen here by ENT dx hearing loss.    7. HYPOGLYCEMIA UNAWARENESS: She denies having any recent severe hypoglycemia.    Continue to wear Dexcom daily.    8. Return to Endocrine Clinic to see Dr. Chahal in early Feb 2018.

## 2018-02-13 ENCOUNTER — OFFICE VISIT (OUTPATIENT)
Dept: ENDOCRINOLOGY | Facility: CLINIC | Age: 53
End: 2018-02-13
Payer: COMMERCIAL

## 2018-02-13 VITALS
WEIGHT: 143.2 LBS | DIASTOLIC BLOOD PRESSURE: 75 MMHG | SYSTOLIC BLOOD PRESSURE: 139 MMHG | BODY MASS INDEX: 28.87 KG/M2 | HEART RATE: 109 BPM | HEIGHT: 59 IN

## 2018-02-13 DIAGNOSIS — E10.9 TYPE 1 DIABETES MELLITUS WITHOUT COMPLICATION (H): Primary | ICD-10-CM

## 2018-02-13 ASSESSMENT — PAIN SCALES - GENERAL: PAINLEVEL: NO PAIN (0)

## 2018-02-13 NOTE — PATIENT INSTRUCTIONS
Use square wave bolus at the start of a meal you will be eating over a period of time.    Enter all carbs and sugars before meals so you can use the pump setting in your pump to calculate a bolus    Come back and download pump and CGM before Tues noon next week

## 2018-02-13 NOTE — MR AVS SNAPSHOT
After Visit Summary   2/13/2018    Kiera Dobson    MRN: [id number]           Patient Information     Date Of Birth          1965        Visit Information        Provider Department      2/13/2018 3:00 PM Paola Chahal MD [facility]        Today's Diagnoses     Type 1 diabetes mellitus without complication (HCC)    -  1      Care Instructions    Use square wave bolus at the start of a meal you will be eating over a period of time.    Enter all carbs and sugars before meals so you can use the pump setting in your pump to calculate a bolus    Come back and download pump and CGM before Tues noon next week            Follow-ups after your visit        Your next 10 appointments already scheduled     Mar 15, 2018 11:00 AM CDT   (Arrive by 10:45 AM)   RETURN DIABETES with Jo Jean PA-C   [facility] ([facility])    [address] [place] [id number] [phone]            May 29, 2018 11:00 AM CDT   (Arrive by 10:45 AM)   RETURN DIABETES with Paola Chahal MD   [facility] ([facility])    [address] [place] [id number] [phone]            Aug 14, 2018 12:00 PM CDT   (Arrive by 11:45 AM)   RETURN DIABETES with Paola Chahal MD   [facility] ([facility])    [address] [place] [id number] [phone]              Future tests that were ordered for you today     Open Future Orders        Priority Expected Expires Ordered    Albumin Random Urine Quantitative with Creat Ratio Routine 2/13/2018 2/20/2018 2/13/2018    Creatinine Routine 2/13/2018 2/20/2018 2/13/2018            Who to contact     Please call your clinic at [phone] to:    Ask questions about your health    Make or cancel appointments    Discuss your medicines    Learn about your test  "results    Speak to your doctor            Additional Information About Your Visit        "[employer]hart Information     [employer] gives you secure access to your electronic health record. If you see a primary care provider, you can also send messages to your care team and make appointments. If you have questions, please call your primary care clinic.  If you do not have a primary care provider, please call [phone] and they will assist you.      [employer] is an electronic gateway that provides easy, online access to your medical records. With [employer], you can request a clinic appointment, read your test results, renew a prescription or communicate with your care team.     To access your existing account, please contact your [facility] or call [phone] for assistance.        Care EveryWhere ID     This is your Care EveryWhere ID. This could be used by other organizations to access your [facility] medical records  [id number]        Your Vitals Were     Pulse Height BMI (Body Mass Index)             109 1.499 m (4' 11\") 28.92 kg/m2          Blood Pressure from Last 3 Encounters:   02/13/18 139/75   12/01/17 102/68   10/18/17 (P) 113/74    Weight from Last 3 Encounters:   02/13/18 65 kg (143 lb 3.2 oz)   12/01/17 64.4 kg (142 lb)   09/13/17 66.3 kg (146 lb 3.2 oz)               Primary Care Provider Office Phone # Fax #    Mary Beth Nely Mansfield [phone]       [address]  [facility] [address]        Equal Access to Services     TUNDE COTA AH: Hadii aad ku hadasho Soomaali, waaxda luqadaha, qaybta kaalmada adeegyada, marylu gallardo. So [facility] [phone].    ATENCIÓN: Si habla español, tiene a burnett disposición servicios gratuitos de asistencia lingüística. Llame al [phone].    We comply with applicable federal civil rights laws and [place] laws. We do not discriminate on the basis of race, color, national origin, age, " disability, sex, sexual orientation, or gender identity.            Thank you!     Thank you for choosing [facility]  for your care. Our goal is always to provide you with excellent care. Hearing back from our patients is one way we can continue to improve our services. Please take a few minutes to complete the written survey that you may receive in the mail after your visit with us. Thank you!             Your Updated Medication List - Protect others around you: Learn how to safely use, store and throw away your medicines at www.disposemymeds.org.          This list is accurate as of 2/13/18  5:39 PM.  Always use your most recent med list.                   Brand Name Dispense Instructions for use Diagnosis    aspirin 81 MG chewable tablet     108 tablet    Take 81 mg by mouth every other day    Diabetes mellitus type 1 (H)       * blood glucose monitoring test strip    no brand specified    5 Box    Test up to ten times per day as directed    Diabetes mellitus (H)       * blood glucose monitoring test strip    ERICA CONTOUR NEXT    300 strip    Use to test blood sugar 10  times daily or as directed.    Diabetes mellitus type 1 (H)       CVS GLUCOSE 40 % Gel gel   Generic drug:  glucose      Take 15 g by mouth as needed        glucagon 1 MG kit    GLUCAGON EMERGENCY    1 mg    Inject 1 mg into the muscle once for 1 dose Use as directed    Diabetes mellitus type I (H)       NovoLOG VIAL 100 UNITS/ML injection   Generic drug:  insulin aspart     60 mL    USE IN INSULIN PUMP. PATIENT USES APPROXIMITELY 50-60 UNITS IN 24 HOURS    Calculus of kidney       simvastatin 80 MG tablet    ZOCOR    90 tablet    Take 1 tablet (80 mg) by mouth At Bedtime    Calculus of kidney       * Notice:  This list has 2 medication(s) that are the same as other medications prescribed for you. Read the directions carefully, and ask your doctor or other care provider to review them with you.

## 2018-02-13 NOTE — LETTER
2/13/2018       RE: Kiera Dobson  [address]  [facility] [id number]     Dear Colleague,    Thank you for referring your patient, Kiera Dobson, to the [facility] at [facility]. Please see a copy of my visit note below.    This 52 year old woman returns for f/u of her type 1 diabetes that she has had for 45+ years.  She is co-managed with sarah Jean    She is using a  530g pump with Dexcom sensor. She finds this much preferable to the minimed sensor, even though she no longer has the  low threshold suspend feature. Since she started using the dexcom, she hasn't had needed others to assist with the treatment of hypoglycemia. The sensor warns her when she is getting low. She doesn't feel her low sugars herself.        Her current pump settings are  Midnight 0.75  3:00 am 0.6  10 am 0.65  10 pm 0.5    Her carb ratio is 12, and correction is 70 with target of 100/150 midnight to 7 am and 21:00 to midnight; 90/120 during day.  However she has not been using the bolus wizard lately and as we reviewed the data it became clear that she is giving herself 1 unit for every 70 g of carbohydrate that she eats.  She has not been entering glucose values into the pump so a correction can be can be calculated.  She has been giving herself correction doses occasionally but she cannot tell me exactly how she calculates them.    Her CGM shows her average glucose is 179 with 3% below 54 mg/dl, 5% below 70, 40% between  , 52%  above 180.  93% of time the CGM was active during the last 2 weeks.  She is bolusing ~ 3 times per day.  She has suspended her pump for hours at a time on 5 of the last 14 days to avoid low blood sugars.    Review of the CGM data over the last two weeks shows she sometimes drifts down to below 75 3 AM but generally is above or within target overnight.  During the day, it appears she always gives herself a bolus more than 30 minutes after the sugars  "start to go up with food.  She admits she is not having much success in giving herself a bolus before her breakfast and lunch.  This is because she eats while she is driving or working and she was concerned about getting low with these activities.  Because she has been running so low pre-meal, she does not want to bolus until after her meals.  When she does this, she frequently gets up into the 300s after a meal.  There are several days where she is low for hours at a time.  She then becomes hyperglycemic does a correction and dropped low again.  She says her life    She has seen her eye MD and things are OK. She denies feet concerns.  She has no CP, SOB.  Her mood is OK.      Current Outpatient Prescriptions on File Prior to Visit:  NOVOLOG VIAL 100 UNIT/ML soln USE IN INSULIN PUMP. PATIENT USES APPROXIMITELY 50-60 UNITS IN 24 HOURS   simvastatin (ZOCOR) 80 MG tablet Take 1 tablet (80 mg) by mouth At Bedtime   blood glucose monitoring (ERICA CONTOUR NEXT) test strip Use to test blood sugar 10  times daily or as directed.   glucagon (GLUCAGON EMERGENCY) 1 MG injection Inject 1 mg into the muscle once for 1 dose Use as directed   aspirin 81 MG chewable tablet Take 81 mg by mouth every other day    glucose (CVS GLUCOSE) 40 % GEL Take 15 g by mouth as needed   glucose blood VI test strips strip Test up to ten times per day as directed     No current facility-administered medications on file prior to visit.     ROS: 10 point ROS neg other than the symptoms noted above in the HPI.    So Hx -  with three children.  10 yo son will be going to national chess tournament in [place] in April and she will be accompanying him    Vital signs:   /75  Pulse 109  Ht 1.499 m (4' 11\")  Wt 65 kg (143 lb 3.2 oz)  BMI 28.92 kg/m2  Estimated body mass index is 28.92 kg/(m^2) as calculated from the following:    Height as of this encounter: 1.499 m (4' 11\").    Weight as of this encounter: 65 kg (143 lb 3.2 " oz).    NAD  Eyes - no periorbital edema, conjunctival injection, scleral icterus  Neck - no thyromegaly  Lungs - clear  CV - RRR.  Normal S1, S2 w.o murmur or gallop   Neuro -   DTR 2/4 biceps  Skin - normal texture   Mood - not depressed      Recent Labs   Lab Test 12/01/17 09/13/17 03/24/17   0906  02/14/17   1157  02/14/17   1149   03/01/16   0223  02/10/16   0949   07/16/13   1635  04/03/13   1516   A1C   --    --    --    --    --    --    --    --    --    --   7.9*  7.9*   HEMOGLOBINA1  7.4*  7.9*   < >   --    --    --    < >   --    --    < >   --    --    TSH   --    --    --    --    --   1.57   --    --   1.13   < >   --    --    T4   --    --    --    --    --   0.84   --    --   0.84   < >   --    --    LDL   --    --    --   69   --    --    --    --   101*   < >   --    --    HDL   --    --    --   73   --    --    --    --   78   < >   --    --    TRIG   --    --    --   59   --    --    --    --   58   < >   --    --    CR   --    --    --    --    --   0.62   --   0.59  0.66   < >   --    --    MICROL   --    --    --    --   <5   --    --    --   8   < >   --    --     < > = values in this interval not displayed.     A1c 7.4    Assessment and plan:    1.  Diabetes control.  Her A1c is quite good but her overall glycemic control is very erratic.  I think this is largely because she stopped using the bolus wizard and is using the wrong numbers for her manual calculations of her doses.  I urged her to use the settings in the pump, always entered glucose values and carbs she plans to eat, and carefully monitor her sugars for the next few days.  I would like her to come back and download both the pump and the CGM in about a week so I can review them.  I am concerned that the basal rate at 3 AM is too high and that she has too high of a basal rate during the day, but I cannot really justify changing these today based on the data we have available to us.    2.  Diabetes complications.  Will check  renal function today.  Up to date with eye visits.  Feet are OK.    3.  CVD risk. She is on statin.  BP is OK.    F/u with Jazmyne Jean in one month, me in May and again in 6 mo    Paola Chahal MD

## 2018-02-13 NOTE — PROGRESS NOTES
This 52 year old woman returns for f/u of her type 1 diabetes that she has had for 45+ years.  She is co-managed with sarah Jean    She is using a  530g pump with Dexcom sensor. She finds this much preferable to the minimed sensor, even though she no longer has the  low threshold suspend feature. Since she started using the dexcom, she hasn't had needed others to assist with the treatment of hypoglycemia. The sensor warns her when she is getting low. She doesn't feel her low sugars herself.        Her current pump settings are  Midnight 0.75  3:00 am 0.6  10 am 0.65  10 pm 0.5    Her carb ratio is 12, and correction is 70 with target of 100/150 midnight to 7 am and 21:00 to midnight; 90/120 during day.  However she has not been using the bolus wizard lately and as we reviewed the data it became clear that she is giving herself 1 unit for every 70 g of carbohydrate that she eats.  She has not been entering glucose values into the pump so a correction can be can be calculated.  She has been giving herself correction doses occasionally but she cannot tell me exactly how she calculates them.    Her CGM shows her average glucose is 179 with 3% below 54 mg/dl, 5% below 70, 40% between  , 52%  above 180.  93% of time the CGM was active during the last 2 weeks.  She is bolusing ~ 3 times per day.  She has suspended her pump for hours at a time on 5 of the last 14 days to avoid low blood sugars.    Review of the CGM data over the last two weeks shows she sometimes drifts down to below 75 3 AM but generally is above or within target overnight.  During the day, it appears she always gives herself a bolus more than 30 minutes after the sugars start to go up with food.  She admits she is not having much success in giving herself a bolus before her breakfast and lunch.  This is because she eats while she is driving or working and she was concerned about getting low with these activities.  Because she has been running so low  "pre-meal, she does not want to bolus until after her meals.  When she does this, she frequently gets up into the 300s after a meal.  There are several days where she is low for hours at a time.  She then becomes hyperglycemic does a correction and dropped low again.  She says her life    She has seen her eye MD and things are OK. She denies feet concerns.  She has no CP, SOB.  Her mood is OK.      Current Outpatient Prescriptions on File Prior to Visit:  NOVOLOG VIAL 100 UNIT/ML soln USE IN INSULIN PUMP. PATIENT USES APPROXIMITELY 50-60 UNITS IN 24 HOURS   simvastatin (ZOCOR) 80 MG tablet Take 1 tablet (80 mg) by mouth At Bedtime   blood glucose monitoring (myZamana CONTOUR NEXT) test strip Use to test blood sugar 10  times daily or as directed.   glucagon (GLUCAGON EMERGENCY) 1 MG injection Inject 1 mg into the muscle once for 1 dose Use as directed   aspirin 81 MG chewable tablet Take 81 mg by mouth every other day    glucose (CVS GLUCOSE) 40 % GEL Take 15 g by mouth as needed   glucose blood VI test strips strip Test up to ten times per day as directed     No current facility-administered medications on file prior to visit.     ROS: 10 point ROS neg other than the symptoms noted above in the HPI.    So Hx -  with three children.  10 yo son will be going to national chess tournament in [place] in April and she will be accompanying him    Vital signs:   /75  Pulse 109  Ht 1.499 m (4' 11\")  Wt 65 kg (143 lb 3.2 oz)  BMI 28.92 kg/m2  Estimated body mass index is 28.92 kg/(m^2) as calculated from the following:    Height as of this encounter: 1.499 m (4' 11\").    Weight as of this encounter: 65 kg (143 lb 3.2 oz).    NAD  Eyes - no periorbital edema, conjunctival injection, scleral icterus  Neck - no thyromegaly  Lungs - clear  CV - RRR.  Normal S1, S2 w.o murmur or gallop   Neuro -   DTR 2/4 biceps  Skin - normal texture   Mood - not depressed      Recent Labs   Lab Test 12/01/17 09/13/17 03/24/17   " 0906  02/14/17   1157  02/14/17   1149   03/01/16   0223  02/10/16   0949   07/16/13   1635  04/03/13   1516   A1C   --    --    --    --    --    --    --    --    --    --   7.9*  7.9*   HEMOGLOBINA1  7.4*  7.9*   < >   --    --    --    < >   --    --    < >   --    --    TSH   --    --    --    --    --   1.57   --    --   1.13   < >   --    --    T4   --    --    --    --    --   0.84   --    --   0.84   < >   --    --    LDL   --    --    --   69   --    --    --    --   101*   < >   --    --    HDL   --    --    --   73   --    --    --    --   78   < >   --    --    TRIG   --    --    --   59   --    --    --    --   58   < >   --    --    CR   --    --    --    --    --   0.62   --   0.59  0.66   < >   --    --    MICROL   --    --    --    --   <5   --    --    --   8   < >   --    --     < > = values in this interval not displayed.     A1c 7.4    Assessment and plan:    1.  Diabetes control.  Her A1c is quite good but her overall glycemic control is very erratic.  I think this is largely because she stopped using the bolus wizard and is using the wrong numbers for her manual calculations of her doses.  I urged her to use the settings in the pump, always entered glucose values and carbs she plans to eat, and carefully monitor her sugars for the next few days.  I would like her to come back and download both the pump and the CGM in about a week so I can review them.  I am concerned that the basal rate at 3 AM is too high and that she has too high of a basal rate during the day, but I cannot really justify changing these today based on the data we have available to us.    2.  Diabetes complications.  Will check renal function today.  Up to date with eye visits.  Feet are OK.    3.  CVD risk. She is on statin.  BP is OK.    F/u with Jazmyne Jean in one month, me in May and again in 6 mo    Paola Chahal MD

## 2018-03-15 ENCOUNTER — OFFICE VISIT (OUTPATIENT)
Dept: ENDOCRINOLOGY | Facility: CLINIC | Age: 53
End: 2018-03-15
Payer: COMMERCIAL

## 2018-03-15 VITALS
BODY MASS INDEX: 28.54 KG/M2 | DIASTOLIC BLOOD PRESSURE: 74 MMHG | WEIGHT: 141.3 LBS | HEART RATE: 96 BPM | SYSTOLIC BLOOD PRESSURE: 125 MMHG

## 2018-03-15 DIAGNOSIS — E10.65 TYPE 1 DIABETES MELLITUS WITH HYPERGLYCEMIA (H): Primary | ICD-10-CM

## 2018-03-15 LAB — HBA1C MFR BLD: 7.4 % (ref 4.3–6)

## 2018-03-15 ASSESSMENT — PAIN SCALES - GENERAL: PAINLEVEL: MILD PAIN (2)

## 2018-03-15 NOTE — MR AVS SNAPSHOT
After Visit Summary   3/15/2018    Kiera Dobson    MRN: [id number]           Patient Information     Date Of Birth          1965        Visit Information        Provider Department      3/15/2018 11:00 AM Jo Jean PA-C [facility]        Today's Diagnoses     Type 1 diabetes mellitus with hyperglycemia (H)    -  1       Follow-ups after your visit        Your next 10 appointments already scheduled     May 29, 2018 11:00 AM CDT   (Arrive by 10:45 AM)   RETURN DIABETES with MD XAVI Ravi [facility] ([facility])    [address] [place] [id number] [phone]            Aug 14, 2018 12:00 PM CDT   (Arrive by 11:45 AM)   RETURN DIABETES with Paola Chahal MD   [facility] ([facility])    [address] [place] [id number] [phone]              Who to contact     Please call your clinic at [phone] to:    Ask questions about your health    Make or cancel appointments    Discuss your medicines    Learn about your test results    Speak to your doctor            Additional Information About Your Visit        [employer] Information     [employer] gives you secure access to your electronic health record. If you see a primary care provider, you can also send messages to your care team and make appointments. If you have questions, please call your primary care clinic.  If you do not have a primary care provider, please call [phone] and they will assist you.      [employer] is an electronic gateway that provides easy, online access to your medical records. With [employer], you can request a clinic appointment, read your test results, renew a prescription or communicate with your care team.     To access your existing account, please contact your [facility] or call [phone] for assistance.        Care  EveryWhere ID     This is your Care EveryWhere ID. This could be used by other organizations to access your [facility] medical records  [id number]        Your Vitals Were     Pulse BMI (Body Mass Index)                96 28.54 kg/m2           Blood Pressure from Last 3 Encounters:   03/15/18 125/74   02/13/18 139/75   12/01/17 102/68    Weight from Last 3 Encounters:   03/15/18 64.1 kg (141 lb 4.8 oz)   02/13/18 65 kg (143 lb 3.2 oz)   12/01/17 64.4 kg (142 lb)              We Performed the Following     Hemoglobin A1c POCT        Primary Care Provider Office Phone # Fax #    Mary Beth Nely Mansfield [phone]       [address] [place] [address] [address] [place] [id number]        Equal Access to Services     [facility] CIPRIANO : Hadii aad ku hadasho Somarcellus, waaxda luqadaha, qaybta kaalmada galindo, marylu ghotra . [facility] [phone].    ATENCIÓN: Si habla español, tiene a burnett disposición servicios gratuitos de asistencia lingüística. Marcy al [phone].    We comply with applicable federal civil rights laws and [place] laws. We do not discriminate on the basis of race, color, national origin, age, disability, sex, sexual orientation, or gender identity.            Thank you!     Thank you for choosing [facility]  for your care. Our goal is always to provide you with excellent care. Hearing back from our patients is one way we can continue to improve our services. Please take a few minutes to complete the written survey that you may receive in the mail after your visit with us. Thank you!             Your Updated Medication List - Protect others around you: Learn how to safely use, store and throw away your medicines at www.disposemymeds.org.          This list is accurate as of 3/15/18 11:59 PM.  Always use your most recent med list.                   Brand Name Dispense Instructions for use Diagnosis    aspirin 81 MG chewable tablet     108 tablet    Take 81  mg by mouth every other day    Diabetes mellitus type 1 (H)       * blood glucose monitoring test strip    no brand specified    5 Box    Test up to ten times per day as directed    Diabetes mellitus (H)       * blood glucose monitoring test strip    ERICA CONTOUR NEXT    300 strip    Use to test blood sugar 10  times daily or as directed.    Diabetes mellitus type 1 (H)       CVS GLUCOSE 40 % Gel gel   Generic drug:  glucose      Take 15 g by mouth as needed        glucagon 1 MG kit    GLUCAGON EMERGENCY    1 mg    Inject 1 mg into the muscle once for 1 dose Use as directed    Diabetes mellitus type I (H)       NovoLOG VIAL 100 UNITS/ML injection   Generic drug:  insulin aspart     60 mL    USE IN INSULIN PUMP. PATIENT USES APPROXIMITELY 50-60 UNITS IN 24 HOURS    Calculus of kidney       simvastatin 80 MG tablet    ZOCOR    90 tablet    Take 1 tablet (80 mg) by mouth At Bedtime    Calculus of kidney       * Notice:  This list has 2 medication(s) that are the same as other medications prescribed for you. Read the directions carefully, and ask your doctor or other care provider to review them with you.

## 2018-03-15 NOTE — LETTER
3/15/2018     RE: Kiera Dobson  [address]  [facility] [address]     Dear Colleague,    Thank you for referring your patient, Kiera Dobson, to the [facility] ENDOCRINOLOGY at [facility]. Please see a copy of my visit note below.    HPI  Kiera Dobson is a 52 year old female with type 1 diabetes mellitus here today for a follow up visit.  Kiera has had type 1 diabetes for 45 + years.  She has mild/moderate nonproliferative diabetic retinopathy. No peripheral neuropathy or nephropathy.  Her hx is also significant for hypoglycemia unawareness.   She has been using her Medtronic 530G insulin pump and Dexcom.  Her current basal insulin rates are:  Midnight= 0.7 units/hr.  3 am = 0.6 units/hr.  10 am  = 0.65 units/hr.  2200= 0.5 units/hr.  Pt's I/C ratio is 1:12.  Sensitivity is 70.  Her 24 hr basal dose is 15.1 units/24 hrs.  Pt's A1C is 7.4 % today and her previous A1C was 7.4 % on 12/1/2017.   We downloaded pt's insulin pump and Dexcom today.  She has been doing a better job of using the BolusWizard feature on her insulin pump and not suspending her insulin pump as frequent.  Her average glucose was 166 with SD 66 over the past 2 weeks.  She has been doing a better job with entering her glucose value in her pump and receiving her insulin correction dose.  No frequent hypoglycemia.  She has noticed she has better insulin absorption when she places her infusion set on her abdomen and avoids her arms and legs.  On ROS today, she is back to exercising and trying to make healthy food choices.  No visual changes.  Kiera reports leg, back and hip pain. She has been told she has arthritis.  No numbness in her feet.  Pt denies n/v, SOB or cough.No chest pain at his time or abd pain or diarrhea.    DIABETES CARE:  Retinopathy:mild/moderate nonproliferative diabetic retinopathy.  She was last seen here by Oph in July 2017.   Nephropathy: urine microalbuminuria negative in 2/2017.  Neuropathy: none.  Feet: good pulses, no ulcers and normal monofilamentous exam.  Taking ASA: yes.  Lipids:  LDL 69 on 3/24/2017.    Pt is taking Simvastatin.    ROS  Please see under HPI.    Allergies  Allergies   Allergen Reactions     Ampicillin Sodium Rash       Medications  Current Outpatient Prescriptions   Medication Sig Dispense Refill     NOVOLOG VIAL 100 UNIT/ML soln USE IN INSULIN PUMP. PATIENT USES APPROXIMITELY 50-60 UNITS IN 24 HOURS 60 mL 3     simvastatin (ZOCOR) 80 MG tablet Take 1 tablet (80 mg) by mouth At Bedtime 90 tablet 3     blood glucose monitoring ([employer] CONTOUR NEXT) test strip Use to test blood sugar 10  times daily or as directed. 300 strip 11     aspirin 81 MG chewable tablet Take 81 mg by mouth every other day  108 tablet 3     glucose (CVS GLUCOSE) 40 % GEL Take 15 g by mouth as needed       glucose blood VI test strips strip Test up to ten times per day as directed 5 Box 8     glucagon (GLUCAGON EMERGENCY) 1 MG injection Inject 1 mg into the muscle once for 1 dose Use as directed 1 mg 2       Family History  family history includes Alzheimer Disease in her father; Autoimmune Disease in her mother; CANCER in her father; DIABETES in her father; Glaucoma in her father and mother; Hypertension in her mother.    Social History  Smoke: none.  ETOH: rare.   with children.  Pt works full time in a lab.    Past Medical History  Past Medical History:   Diagnosis Date     Diabetes mellitus (H)     Type 1     Elevated cholesterol      Glaucoma     Glaucoma suspect     Glaucoma suspect      Kidney stones      Nonsenile cataract      Past Surgical History:   Procedure Laterality Date     APPENDECTOMY OPEN  1981     BIOPSY OF SKIN LESION  12/30/2011    returned January 2012 for additional removal     c sections  99,97,08     EXTRACORPOREAL SHOCK WAVE LITHOTRIPSY, CYSTOSCOPY, INSERT STENT URETER(S), COMBINED  2004     LASER HOLMIUM LITHOTRIPSY URETER(S), INSERT STENT, COMBINED  12/12/2013     Procedure: COMBINED CYSTOSCOPY, URETEROSCOPY, LASER HOLMIUM LITHOTRIPSY URETER(S), INSERT STENT;  Right Ureteroscopy Holmium Laser Lithotripsy Stent Placement;  Surgeon: Kirill Marlow MD;  Location: [facility] OR     S/P right shoulder surgery in Dec 2012.    Physical Exam  /74  Pulse 96  Wt 64.1 kg (141 lb 4.8 oz)  BMI 28.54 kg/m2  Body mass index is 28.54 kg/(m^2).    HEENT:  PERRLA; fundi not examined.  NECK: Thyroid nontender.  LUNGS: Clear b/l.  CARDIAC:  RRR.  ABDOMEN:Nontender. Some areas of lipohypertrophy.  EXTREMITIES: No edema. Equal strength in LE's b/l.  FEET: Warm, no ulcers and normal monofilamentous exam.    RESULTS  Creatinine   Date Value Ref Range Status   02/14/2017 0.62 0.52 - 1.04 mg/dL Final     GFR Estimate   Date Value Ref Range Status   02/14/2017 >90  Non  GFR Calc   >60 mL/min/1.7m2 Final     Hemoglobin A1C   Date Value Ref Range Status   07/16/2013 7.9 (A) 4.3 - 6.0 % Final     Potassium   Date Value Ref Range Status   02/14/2017 4.6 3.4 - 5.3 mmol/L Final     ALT   Date Value Ref Range Status   02/14/2017 17 0 - 50 U/L Final     AST   Date Value Ref Range Status   02/14/2017 13 0 - 45 U/L Final     TSH   Date Value Ref Range Status   02/14/2017 1.57 0.40 - 4.00 mU/L Final     T4 Free   Date Value Ref Range Status   02/14/2017 0.84 0.76 - 1.46 ng/dL Final       Cholesterol   Date Value Ref Range Status   03/24/2017 154 <200 mg/dL Final   02/10/2016 190 <200 mg/dL Final     HDL Cholesterol   Date Value Ref Range Status   03/24/2017 73 >49 mg/dL Final   02/10/2016 78 >49 mg/dL Final     LDL Cholesterol Calculated   Date Value Ref Range Status   03/24/2017 69 <100 mg/dL Final     Comment:     Desirable:       <100 mg/dl   02/10/2016 101 (H) <100 mg/dL Final     Comment:     Above desirable:  100-129 mg/dl   Borderline High:  130-159 mg/dL   High:             160-189 mg/dL   Very high:       >189 mg/dl       Triglycerides   Date Value Ref Range Status   03/24/2017 59  <150 mg/dL Final   02/10/2016 58 <150 mg/dL Final     Comment:     Fasting specimen     Cholesterol/HDL Ratio   Date Value Ref Range Status   08/18/2014 2.7 0.0 - 5.0 Final   09/17/2013 2.3 0.0 - 5.0 Final     A1C      7.4   3/15/2018  A1C      7.4   12/1/2017  A1C      7.9   9/13/2017  A1C      7.7   6/1/2017  A1C      8.3   2/14/2017  A1C      7.5   8/18/2016  A1C      7.6   5/5/2016  A1C      7.8    2/10/2016  A1C      7.9    9/2/2015  A1C      7.7    5/2015  A1C      8.6    1/28/2015  A1C      8.8    9/26/2014  A1C      8.0    7/2/2014  A1C      8.4    2/28/2014    ASSESSMENT/PLAN:    1.  TYPE 1 DIABETES MELLITUS:Type 1 diabetes mellitus complicated by retinopathy and hypoglycemia unawareness.  The only change I made on her insulin pump today was to change her target from 7 am - 7 pm to 100-120.  She will continue to working entering her blood sugar in her pump, use the BolusWizard feature and bolus at the time she eats food.   I asked Kiera to check her FBS each am and also check her blood sugar prelunch/at noon and predinner DAILY and to use her abdomen for her infusion site since she gets better insulin absorption when she uses her abdomen.  She remains normotensive.   Feet in good condition.  She is taking ASA daily.  Pt's TSH is normal in Feb 2017.  Kiera had the flu vaccine this season.    2.  MILD/MODERATE NPDR: Last seen by Oph here in July 2017.    3.  HX OF + MICROALBUMINURIA:  Pt's microalbuminuria negative in 2/2017.  Her creat/GFR are stable.    4. HYPERLIPIDEMIA:  LDL 69 in 3/2017.  Pt remains on high dose Simvastatin.  Since she has been on the high dose Simvastatin for years and tolerating this dose.  Pt's hepatic panel and CK were normal in 2/2017.    5.  ADHD: She is being followed by Yumi ANSARI ( [phone] ).    6.  TINNITUS: Seen here by ENT dx hearing loss.    7. HYPOGLYCEMIA UNAWARENESS: She denies having any recent severe hypoglycemia.    Continue to wear Dexcom daily.    8. Return  to Endocrine Clinic to see Dr. Chahal in May 2018.    Again, thank you for allowing me to participate in the care of your patient.      Sincerely,    Jo Jean PA-C

## 2018-03-15 NOTE — NURSING NOTE
"Chief Complaint   Patient presents with     RECHECK     return diabetes        Initial /74  Pulse 96  Wt 64.1 kg (141 lb 4.8 oz)  BMI 28.54 kg/m2 Estimated body mass index is 28.54 kg/(m^2) as calculated from the following:    Height as of 2/13/18: 1.499 m (4' 11\").    Weight as of this encounter: 64.1 kg (141 lb 4.8 oz).  Medication Reconciliation: complete    "

## 2018-03-17 NOTE — PROGRESS NOTES
HPI  Kiera Dobson is a 52 year old female with type 1 diabetes mellitus here today for a follow up visit.  Kiera has had type 1 diabetes for 45 + years.  She has mild/moderate nonproliferative diabetic retinopathy. No peripheral neuropathy or nephropathy.  Her hx is also significant for hypoglycemia unawareness.   She has been using her Medtronic 530G insulin pump and Dexcom.  Her current basal insulin rates are:  Midnight= 0.7 units/hr.  3 am = 0.6 units/hr.  10 am  = 0.65 units/hr.  2200= 0.5 units/hr.  Pt's I/C ratio is 1:12.  Sensitivity is 70.  Her 24 hr basal dose is 15.1 units/24 hrs.  Pt's A1C is 7.4 % today and her previous A1C was 7.4 % on 12/1/2017.   We downloaded pt's insulin pump and Dexcom today.  She has been doing a better job of using the BolusWizard feature on her insulin pump and not suspending her insulin pump as frequent.  Her average glucose was 166 with SD 66 over the past 2 weeks.  She has been doing a better job with entering her glucose value in her pump and receiving her insulin correction dose.  No frequent hypoglycemia.  She has noticed she has better insulin absorption when she places her infusion set on her abdomen and avoids her arms and legs.  On ROS today, she is back to exercising and trying to make healthy food choices.  No visual changes.  Kiera reports leg, back and hip pain. She has been told she has arthritis.  No numbness in her feet.  Pt denies n/v, SOB or cough.No chest pain at his time or abd pain or diarrhea.    DIABETES CARE:  Retinopathy:mild/moderate nonproliferative diabetic retinopathy.  She was last seen here by Oph in July 2017.   Nephropathy: urine microalbuminuria negative in 2/2017. Neuropathy: none.  Feet: good pulses, no ulcers and normal monofilamentous exam.  Taking ASA: yes.  Lipids:  LDL 69 on 3/24/2017.    Pt is taking Simvastatin.    ROS  Please see under HPI.    Allergies  Allergies   Allergen Reactions     Ampicillin Sodium Rash        Medications  Current Outpatient Prescriptions   Medication Sig Dispense Refill     NOVOLOG VIAL 100 UNIT/ML soln USE IN INSULIN PUMP. PATIENT USES APPROXIMITELY 50-60 UNITS IN 24 HOURS 60 mL 3     simvastatin (ZOCOR) 80 MG tablet Take 1 tablet (80 mg) by mouth At Bedtime 90 tablet 3     blood glucose monitoring (ERICA CONTOUR NEXT) test strip Use to test blood sugar 10  times daily or as directed. 300 strip 11     aspirin 81 MG chewable tablet Take 81 mg by mouth every other day  108 tablet 3     glucose ([facility] GLUCOSE) 40 % GEL Take 15 g by mouth as needed       glucose blood VI test strips strip Test up to ten times per day as directed 5 Box 8     glucagon (GLUCAGON EMERGENCY) 1 MG injection Inject 1 mg into the muscle once for 1 dose Use as directed 1 mg 2       Family History  family history includes Alzheimer Disease in her father; Autoimmune Disease in her mother; CANCER in her father; DIABETES in her father; Glaucoma in her father and mother; Hypertension in her mother.    Social History  Smoke: none.  ETOH: rare.   with children.  Pt works full time in a lab.    Past Medical History  Past Medical History:   Diagnosis Date     Diabetes mellitus (H)     Type 1     Elevated cholesterol      Glaucoma     Glaucoma suspect     Glaucoma suspect      Kidney stones      Nonsenile cataract      Past Surgical History:   Procedure Laterality Date     APPENDECTOMY OPEN  1981     BIOPSY OF SKIN LESION  12/30/2011    returned January 2012 for additional removal     c sections  99,97,08     EXTRACORPOREAL SHOCK WAVE LITHOTRIPSY, CYSTOSCOPY, INSERT STENT URETER(S), COMBINED  2004     LASER HOLMIUM LITHOTRIPSY URETER(S), INSERT STENT, COMBINED  12/12/2013    Procedure: COMBINED CYSTOSCOPY, URETEROSCOPY, LASER HOLMIUM LITHOTRIPSY URETER(S), INSERT STENT;  Right Ureteroscopy Holmium Laser Lithotripsy Stent Placement;  Surgeon: Kirill Marlow MD;  Location: [facility]     S/P right shoulder surgery in Dec  2012.    Physical Exam  /74  Pulse 96  Wt 64.1 kg (141 lb 4.8 oz)  BMI 28.54 kg/m2  Body mass index is 28.54 kg/(m^2).    HEENT:  PERRLA; fundi not examined.  NECK: Thyroid nontender.  LUNGS: Clear b/l.  CARDIAC:  RRR.  ABDOMEN:Nontender. Some areas of lipohypertrophy.  EXTREMITIES: No edema. Equal strength in LE's b/l.  FEET: Warm, no ulcers and normal monofilamentous exam.    RESULTS  Creatinine   Date Value Ref Range Status   02/14/2017 0.62 0.52 - 1.04 mg/dL Final     GFR Estimate   Date Value Ref Range Status   02/14/2017 >90  Non  GFR Calc   >60 mL/min/1.7m2 Final     Hemoglobin A1C   Date Value Ref Range Status   07/16/2013 7.9 (A) 4.3 - 6.0 % Final     Potassium   Date Value Ref Range Status   02/14/2017 4.6 3.4 - 5.3 mmol/L Final     ALT   Date Value Ref Range Status   02/14/2017 17 0 - 50 U/L Final     AST   Date Value Ref Range Status   02/14/2017 13 0 - 45 U/L Final     TSH   Date Value Ref Range Status   02/14/2017 1.57 0.40 - 4.00 mU/L Final     T4 Free   Date Value Ref Range Status   02/14/2017 0.84 0.76 - 1.46 ng/dL Final       Cholesterol   Date Value Ref Range Status   03/24/2017 154 <200 mg/dL Final   02/10/2016 190 <200 mg/dL Final     HDL Cholesterol   Date Value Ref Range Status   03/24/2017 73 >49 mg/dL Final   02/10/2016 78 >49 mg/dL Final     LDL Cholesterol Calculated   Date Value Ref Range Status   03/24/2017 69 <100 mg/dL Final     Comment:     Desirable:       <100 mg/dl   02/10/2016 101 (H) <100 mg/dL Final     Comment:     Above desirable:  100-129 mg/dl   Borderline High:  130-159 mg/dL   High:             160-189 mg/dL   Very high:       >189 mg/dl       Triglycerides   Date Value Ref Range Status   03/24/2017 59 <150 mg/dL Final   02/10/2016 58 <150 mg/dL Final     Comment:     Fasting specimen     Cholesterol/HDL Ratio   Date Value Ref Range Status   08/18/2014 2.7 0.0 - 5.0 Final   09/17/2013 2.3 0.0 - 5.0 Final     A1C      7.4   3/15/2018  A1C      7.4    12/1/2017  A1C      7.9   9/13/2017  A1C      7.7   6/1/2017  A1C      8.3   2/14/2017  A1C      7.5   8/18/2016  A1C      7.6   5/5/2016  A1C      7.8    2/10/2016  A1C      7.9    9/2/2015  A1C      7.7    5/2015  A1C      8.6    1/28/2015  A1C      8.8    9/26/2014  A1C      8.0    7/2/2014  A1C      8.4    2/28/2014    ASSESSMENT/PLAN:    1.  TYPE 1 DIABETES MELLITUS:Type 1 diabetes mellitus complicated by retinopathy and hypoglycemia unawareness.  The only change I made on her insulin pump today was to change her target from 7 am - 7 pm to 100-120.  She will continue to working entering her blood sugar in her pump, use the BolusWizard feature and bolus at the time she eats food.   I asked Kiera to check her FBS each am and also check her blood sugar prelunch/at noon and predinner DAILY and to use her abdomen for her infusion site since she gets better insulin absorption when she uses her abdomen.  She remains normotensive.   Feet in good condition.  She is taking ASA daily.  Pt's TSH is normal in Feb 2017.  Kiera had the flu vaccine this season.    2.  MILD/MODERATE NPDR: Last seen by Oph here in July 2017.    3.  HX OF + MICROALBUMINURIA:  Pt's microalbuminuria negative in 2/2017.  Her creat/GFR are stable.    4. HYPERLIPIDEMIA:  LDL 69 in 3/2017.  Pt remains on high dose Simvastatin.  Since she has been on the high dose Simvastatin for years and tolerating this dose.  Pt's hepatic panel and CK were normal in 2/2017.    5.  ADHD: She is being followed by [facility] ( [phone] ).    6.  TINNITUS: Seen here by ENT dx hearing loss.    7. HYPOGLYCEMIA UNAWARENESS: She denies having any recent severe hypoglycemia.    Continue to wear Dexcom daily.    8. Return to [facility]ocrine Clinic to see Dr. Chahal in May 2018.

## 2018-08-01 DIAGNOSIS — N20.0 CALCULUS OF KIDNEY: ICD-10-CM

## 2018-08-02 RX ORDER — SIMVASTATIN 80 MG
80 TABLET ORAL AT BEDTIME
Qty: 30 TABLET | Refills: 0 | Status: SHIPPED | OUTPATIENT
Start: 2018-08-02 | End: 2019-03-20

## 2018-08-02 NOTE — TELEPHONE ENCOUNTER
simvastatin (ZOCOR) 80 MG tablet   Last Written Prescription Date:  8/1/17  Last Fill Quantity: 90,   # refills: 3  Last Office Visit :3/15/18  Future Office visit:  8/14/18    Routing refill request to provider for review/approval because: LDL

## 2018-08-14 ENCOUNTER — OFFICE VISIT (OUTPATIENT)
Dept: ENDOCRINOLOGY | Facility: CLINIC | Age: 53
End: 2018-08-14
Payer: COMMERCIAL

## 2018-08-14 VITALS
HEIGHT: 60 IN | SYSTOLIC BLOOD PRESSURE: 125 MMHG | BODY MASS INDEX: 27.56 KG/M2 | DIASTOLIC BLOOD PRESSURE: 77 MMHG | WEIGHT: 140.4 LBS | HEART RATE: 92 BPM

## 2018-08-14 DIAGNOSIS — E10.9 TYPE 1 DIABETES MELLITUS WITHOUT COMPLICATION (H): ICD-10-CM

## 2018-08-14 DIAGNOSIS — E10.9 TYPE 1 DIABETES MELLITUS WITHOUT COMPLICATION (H): Primary | ICD-10-CM

## 2018-08-14 LAB
CHOLEST SERPL-MCNC: 152 MG/DL
CREAT SERPL-MCNC: 0.62 MG/DL (ref 0.52–1.04)
CREAT UR-MCNC: 105 MG/DL
GFR SERPL CREATININE-BSD FRML MDRD: >90 ML/MIN/1.7M2
HBA1C MFR BLD: 7.6 % (ref 4.3–6)
HDLC SERPL-MCNC: 61 MG/DL
LDLC SERPL CALC-MCNC: 72 MG/DL
MICROALBUMIN UR-MCNC: <5 MG/L
MICROALBUMIN/CREAT UR: NORMAL MG/G CR (ref 0–25)
NONHDLC SERPL-MCNC: 91 MG/DL
TRIGL SERPL-MCNC: 94 MG/DL
TSH SERPL DL<=0.005 MIU/L-ACNC: 1.93 MU/L (ref 0.4–4)

## 2018-08-14 NOTE — NURSING NOTE
Chief Complaint   Patient presents with     RECHECK     Type I Diabetes      Performed capillary puncture for A1C testing. Patient tolerated well.    Mahnaz Salmeron, [facility]  Endocrinology & Diabetes

## 2018-08-14 NOTE — PROGRESS NOTES
This 52 year old woman returns for f/u of her type 1 diabetes that she has had for 45+ years.  She is co-managed with sarah Jean    She is using a  530g pump with Dexcom sensor.      Her current pump settings are  Midnight 0.7  3:00 am 0.55  10 am 0.65  10 pm 0.5    Her carb ratio is 12    correction  70 with target of 100/150 midnight to 7 am and 21:00 to midnight; 100/120 during day.  Active insulin is 3 hours.  During the last month, she wore her Dex, 89% of the time.  Her average glucose on the sensor was 169.  She was in target 50% of the time, above 1 8042% of the time, above 2 5014.5% of the time, below 78.1% of the time, and below 50 for 2.5% of the time.    Reviewing the individual days along with her pump settings shows that she uses the bolus wizard about half of the time.  She tends to fall between midnight and 6 AM most days.  She felt to below 70 about 50% of the time during the last 2 weeks.  During the day, when she uses the bolus wizard she generally comes to target but when she just uses the easy bolus she gets quite high, into the low 300s.  She generally recognizes her low sugars when she hears the low glucose alarm go off on her sensor.  Her sugars do seem to be higher at bedtime than they do before dinner.    She has seen her eye MD and things are OK. She denies feet concerns.  She has no SOB.  Her mood is OK, but she is very stressed by her son's mental health issues.  She has occasional CP that she has had for years and has not been found to be ischemic in origin.  The pain is made worse when she assumes a crouched over position and presses on her sternum.    She also has had some hot flashes in recent weeks.  Her last menstrual period was a year ago.  These episodes are associated with flushing, sweating, and feeling very warm.  They last several minutes before going away.  She has not had any tremor or weight loss.    Current Outpatient Prescriptions on File Prior to Visit:  aspirin 81 MG  "chewable tablet Take 81 mg by mouth every other day    blood glucose monitoring (ERICA CONTOUR NEXT) test strip Use to test blood sugar 10  times daily or as directed.   glucose ([facility] GLUCOSE) 40 % GEL Take 15 g by mouth as needed   glucose blood VI test strips strip Test up to ten times per day as directed   NOVOLOG VIAL 100 UNIT/ML soln USE IN INSULIN PUMP. PATIENT USES APPROXIMITELY 50-60 UNITS IN 24 HOURS   simvastatin (ZOCOR) 80 MG tablet Take 1 tablet (80 mg) by mouth At Bedtime   glucagon (GLUCAGON EMERGENCY) 1 MG injection Inject 1 mg into the muscle once for 1 dose Use as directed     No current facility-administered medications on file prior to visit.     ROS: 10 point ROS neg other than the symptoms noted above in the HPI.    So Hx -  with 3 children.  17 yo son is depressed, anxious and in therapy    Vital signs:   /77 (BP Location: Right arm, Patient Position: Sitting, Cuff Size: Adult Regular)  Pulse 92  Ht 1.511 m (4' 11.5\")  Wt 63.7 kg (140 lb 6.4 oz)  BMI 27.88 kg/m2  Estimated body mass index is 27.88 kg/(m^2) as calculated from the following:    Height as of this encounter: 1.511 m (4' 11.5\").    Weight as of this encounter: 63.7 kg (140 lb 6.4 oz).    VSS  NAD  Eyes - no periorbital edema, conjunctival injection, scleral icterus  Neck - no thyromegaly  CV - RRR.  Normal pulses in feet.  No edema  Neuro - sensation intact to monofilament on soles of feet.  DTR 2/4 biceps  Skin - normal texture   Feet - no ulcers     Recent Labs   Lab Test  08/14/18   1322  08/14/18   1320 08/14/18 03/15/18   03/24/17   0906  02/14/17   1157  02/14/17   1149   02/10/16   0949   07/16/13   1635  04/03/13   1516   A1C   --    --    --    --    --    --    --    --    --    --    --   7.9*  7.9*   HEMOGLOBINA1   --    --   7.6*  7.4*   < >   --    --    --    < >   --    < >   --    --    TSH   --   1.93   --    --    --    --    --   1.57   --   1.13   < >   --    --    T4   --    --    --    --    -- "    --    --   0.84   --   0.84   < >   --    --    LDL   --   72   --    --    --   69   --    --    --   101*   < >   --    --    HDL   --   61   --    --    --   73   --    --    --   78   < >   --    --    TRIG   --   94   --    --    --   59   --    --    --   58   < >   --    --    CR   --   0.62   --    --    --    --    --   0.62   < >  0.66   < >   --    --    MICROL  <5   --    --    --    --    --   <5   --    --   8   < >   --    --     < > = values in this interval not displayed.       Assessment and plan:    1.  Diabetes control.  Kiera continues to struggle with recognizing her low blood sugars, although with the Dex com she has not had severe hypoglycemia in recent months.  Her overall glucose control is pretty reasonable but she does get hypoglycemic most mornings and high after 10 pm.  Consequently, we made the following changes to her basal rates:   Midnight 0.65 (was 0.7)  3 am 0.55  10 am 0.65  22:00 0.6 (was 0.5)   Given her past history with severe hypoglycemia and her ongoing inability to recognize low glucose values, I think she would be much better managed with a low glucose suspend pump.  I will have her meet with the diabetes educator to talk about her choices.  It is not clear that her pump is out of warranty.  If it is not, I will see if we can petition her insurer to allow her to make a pump change now.  This could be  life-saving change for her.    2.  Diabetes complications.  We will check her renal function today.  She is up-to-date with her eye visits.  She has no foot concerns.    3.  Hot flashes.  These do sound like perimenopausal hot flashes.  However, I will check a TSH to be sure she has not developed hypothyroidism.    4.perimenopausal status.  I will check her DEXA to be sure her bone density is not severely low.  Women with diabetes are at increased risk for osteoporosis and I would not want her to start menopause with a low bone density.    5.CVD risk.  Her blood pressure is  well controlled.  She is on a statin.        F/u with Jazmyne Jean in 3 mo and f/u with me in 6 mo    Paola Chahal MD

## 2018-08-14 NOTE — LETTER
8/14/2018       RE: Kiera Dobson  [address]  [place] [facility] [address]     Dear Colleague,    Thank you for referring your patient, Kiera Dobosn, to the [facility] ENDOCRINOLOGY at [facility]. Please see a copy of my visit note below.    This 52 year old woman returns for f/u of her type 1 diabetes that she has had for 45+ years.  She is co-managed with sarah Jean    She is using a  530g pump with Dexcom sensor.      Her current pump settings are  Midnight 0.7  3:00 am 0.55  10 am 0.65  10 pm 0.5    Her carb ratio is 12    correction  70 with target of 100/150 midnight to 7 am and 21:00 to midnight; 100/120 during day.  Active insulin is 3 hours.  During the last month, she wore her Dex, 89% of the time.  Her average glucose on the sensor was 169.  She was in target 50% of the time, above 1 8042% of the time, above 2 5014.5% of the time, below 78.1% of the time, and below 50 for 2.5% of the time.    Reviewing the individual days along with her pump settings shows that she uses the bolus wizard about half of the time.  She tends to fall between midnight and 6 AM most days.  She felt to below 70 about 50% of the time during the last 2 weeks.  During the day, when she uses the bolus wizard she generally comes to target but when she just uses the easy bolus she gets quite high, into the low 300s.  She generally recognizes her low sugars when she hears the low glucose alarm go off on her sensor.  Her sugars do seem to be higher at bedtime than they do before dinner.    She has seen her eye MD and things are OK. She denies feet concerns.  She has no SOB.  Her mood is OK, but she is very stressed by her son's mental health issues.  She has occasional CP that she has had for years and has not been found to be ischemic in origin.  The pain is made worse when she assumes a crouched over position and presses on her sternum.    She also has had some hot flashes in recent weeks.  Her  "last menstrual period was a year ago.  These episodes are associated with flushing, sweating, and feeling very warm.  They last several minutes before going away.  She has not had any tremor or weight loss.    Current Outpatient Prescriptions on File Prior to Visit:  aspirin 81 MG chewable tablet Take 81 mg by mouth every other day    blood glucose monitoring (ERICA CONTOUR NEXT) test strip Use to test blood sugar 10  times daily or as directed.   glucose ([facility] GLUCOSE) 40 % GEL Take 15 g by mouth as needed   glucose blood VI test strips strip Test up to ten times per day as directed   NOVOLOG VIAL 100 UNIT/ML soln USE IN INSULIN PUMP. PATIENT USES APPROXIMITELY 50-60 UNITS IN 24 HOURS   simvastatin (ZOCOR) 80 MG tablet Take 1 tablet (80 mg) by mouth At Bedtime   glucagon (GLUCAGON EMERGENCY) 1 MG injection Inject 1 mg into the muscle once for 1 dose Use as directed     No current facility-administered medications on file prior to visit.     ROS: 10 point ROS neg other than the symptoms noted above in the HPI.    So Hx -  with 3 children.  17 yo son is depressed, anxious and in therapy    Vital signs:   /77 (BP Location: Right arm, Patient Position: Sitting, Cuff Size: Adult Regular)  Pulse 92  Ht 1.511 m (4' 11.5\")  Wt 63.7 kg (140 lb 6.4 oz)  BMI 27.88 kg/m2  Estimated body mass index is 27.88 kg/(m^2) as calculated from the following:    Height as of this encounter: 1.511 m (4' 11.5\").    Weight as of this encounter: 63.7 kg (140 lb 6.4 oz).    VSS  NAD  Eyes - no periorbital edema, conjunctival injection, scleral icterus  Neck - no thyromegaly  CV - RRR.  Normal pulses in feet.  No edema  Neuro - sensation intact to monofilament on soles of feet.  DTR 2/4 biceps  Skin - normal texture   Feet - no ulcers     Recent Labs   Lab Test  08/14/18   1322  08/14/18   1320 08/14/18 03/15/18   03/24/17   0906  02/14/17   1157  02/14/17   1149   02/10/16   0949   07/16/13   1635  04/03/13   1516   A1C   --   "  --    --    --    --    --    --    --    --    --    --   7.9*  7.9*   HEMOGLOBINA1   --    --   7.6*  7.4*   < >   --    --    --    < >   --    < >   --    --    TSH   --   1.93   --    --    --    --    --   1.57   --   1.13   < >   --    --    T4   --    --    --    --    --    --    --   0.84   --   0.84   < >   --    --    LDL   --   72   --    --    --   69   --    --    --   101*   < >   --    --    HDL   --   61   --    --    --   73   --    --    --   78   < >   --    --    TRIG   --   94   --    --    --   59   --    --    --   58   < >   --    --    CR   --   0.62   --    --    --    --    --   0.62   < >  0.66   < >   --    --    MICROL  <5   --    --    --    --    --   <5   --    --   8   < >   --    --     < > = values in this interval not displayed.       Assessment and plan:    1.  Diabetes control.  Kiera continues to struggle with recognizing her low blood sugars, although with the Dex com she has not had severe hypoglycemia in recent months.  Her overall glucose control is pretty reasonable but she does get hypoglycemic most mornings and high after 10 pm.  Consequently, we made the following changes to her basal rates:   Midnight 0.65 (was 0.7)  3 am 0.55  10 am 0.65  22:00 0.6 (was 0.5)   Given her past history with severe hypoglycemia and her ongoing inability to recognize low glucose values, I think she would be much better managed with a low glucose suspend pump.  I will have her meet with the diabetes educator to talk about her choices.  It is not clear that her pump is out of warranty.  If it is not, I will see if we can petition her insurer to allow her to make a pump change now.  This could be  life-saving change for her.    2.  Diabetes complications.  We will check her renal function today.  She is up-to-date with her eye visits.  She has no foot concerns.    3.  Hot flashes.  These do sound like perimenopausal hot flashes.  However, I will check a TSH to be sure she has not developed  hypothyroidism.    4.perimenopausal status.  I will check her DEXA to be sure her bone density is not severely low.  Women with diabetes are at increased risk for osteoporosis and I would not want her to start menopause with a low bone density.    5.CVD risk.  Her blood pressure is well controlled.  She is on a statin.        F/u with Jazmyne Jean in 3 mo and f/u with me in 6 mo      Again, thank you for allowing me to participate in the care of your patient.      Sincerely,    Paola Chahal MD

## 2018-08-14 NOTE — MR AVS SNAPSHOT
After Visit Summary   8/14/2018    Kiera Dobson    MRN: [id number]           Patient Information     Date Of Birth          1965        Visit Information        Provider Department      8/14/2018 12:00 PM Paola Chahal MD [facility]        Today's Diagnoses     Type 1 diabetes mellitus without complication (HCC)    -  1       Follow-ups after your visit        Additional Services     DIABETES EDUCATOR REFERRAL       DIABETES SELF MANAGEMENT TRAINING (DSMT)      Your provider has referred you to Diabetes Education: [facility]: Diabetes Education - [facility] [phone] [email or web address]    A  will call you to make your appointment. If it has been more than 3 business days since your referral was placed, please call the above phone number to schedule.    Type of training and number of hours: Previous Diagnosis: Follow-up DSMT - 2 hours.    Diabetes Type: Type 1   Medicare covers: 10 hours of initial DSMT in 12 month period from the time of first visit, plus 2 hours of follow-up DSMT annually, and additional hours as requested for insulin training.         Diabetes Co-Morbidities: none               A1C Goal:  <8.0       A1C is: Lab Results       Component                Value               Date                       A1C                      7.9                 07/16/2013              MEDICAL NUTRITION THERAPY (MNT) for Diabetes    Medical Nutrition Therapy with a Registered Dietitian can be provided in coordination with Diabetes Self-Management Training to assist in achieving optimal diabetes management.    MNT Type and Hours: Do not initiate MNT at this time.                       Medicare will cover: 3 hours initial MNT in 12 month period after first visit, plus 2 hours of follow-up MNT annually        Diabetes Education Topics: Insulin Pump Therapy: Current  Pump User  Would like her to get low glucose suspend pump    Special Educational Needs Requiring Individual DSMT: None      Please be aware that coverage of these services is subject to the terms and limitations of your health insurance plan.  Call member services at your health plan to determine Diabetes Self-Management Training (Codes  and ) and Medical Nutrition Therapy (Codes 84954 and 49314) benefits and ask which blood glucose monitor brands are covered by your plan.  Please bring the following with you to your appointment:    (1)  List of current medications   (2)  List of Blood Glucose Monitor brands that are covered by your insurance plan  (3)  Blood Glucose Monitor and log book  (4)   Food records for the 3 days prior to your visit    The Certified Diabetes Educator may make diabetes medication adjustments per the CDE Protocol and Collaborative Practice Agreement.                  Follow-up notes from your care team     Return for f/u 3 mo with Jazmyne Jean and f/u 6 mo with me.      Your next 10 appointments already scheduled     Aug 14, 2018  1:15 PM CDT   LAB with  LAB   [facility] ([facility])    [address] [place] [address] [phone]           Please do not eat 10-12 hours before your appointment if you are coming in fasting for labs on lipids, cholesterol, or glucose (sugar). This does not apply to pregnant women. Water, hot tea and black coffee (with nothing added) are okay. Do not drink other fluids, diet soda or chew gum.            Aug 29, 2018  3:30 PM CDT   (Arrive by 3:15 PM)   Diabetic Education with Arlin Calles RN   [facility] ([facility])    [address] [place] [address]   [phone]            Nov 14, 2018  9:30 AM CST   (Arrive by 9:15 AM)   RETURN DIABETES with Jo Jean PA-C   [facility] ([facility])  "   [address] [id number] [phone]            Feb 12, 2019  2:30 PM CST   (Arrive by 2:15 PM)   RETURN DIABETES with Paola Chahal MD   [facility] ([facility])    [address] [id number] [phone]              Future tests that were ordered for you today     Open Future Orders        Priority Expected Expires Ordered    Creatinine Routine 8/14/2018 9/14/2018 8/14/2018    Lipid Profile Routine 8/14/2018 9/14/2018 8/14/2018    TSH Routine 8/14/2018 9/14/2018 8/14/2018    Albumin Random Urine Quantitative with Creat Ratio Routine 8/14/2018 9/14/2018 8/14/2018            Who to contact     Please call your clinic at [phone] to:    Ask questions about your health    Make or cancel appointments    Discuss your medicines    Learn about your test results    Speak to your doctor            Additional Information About Your Visit        [employer] Information     [employer] gives you secure access to your electronic health record. If you see a primary care provider, you can also send messages to your care team and make appointments. If you have questions, please call your primary care clinic.  If you do not have a primary care provider, please call [phone] and they will assist you.      [employer] is an electronic gateway that provides easy, online access to your medical records. With [employer], you can request a clinic appointment, read your test results, renew a prescription or communicate with your care team.     To access your existing account, please contact your [facility] or call [phone] for assistance.        Care EveryWhere ID     This is your Care EveryWhere ID. This could be used by other organizations to access your [place] medical records  [id number]        Your Vitals Were     Pulse Height BMI (Body Mass Index)             92 1.511 m (4' 11.5\") 27.88 " kg/m2          Blood Pressure from Last 3 Encounters:   08/14/18 125/77   03/15/18 125/74   02/13/18 139/75    Weight from Last 3 Encounters:   08/14/18 63.7 kg (140 lb 6.4 oz)   03/15/18 64.1 kg (141 lb 4.8 oz)   02/13/18 65 kg (143 lb 3.2 oz)              We Performed the Following     DIABETES EDUCATOR REFERRAL     Hemoglobin A1c POCT        Primary Care Provider Office Phone # Fax #    Mary Beth Nely Mansfield [phone]       [address] [place] [address] 741  [facility] [address]        Equal Access to Services     [facility] : Hadii sola Lazo, waaxda albina, qaybta kaalmada galindo, marylu ghotra . So [facility] [phone].    ATENCIÓN: Si habla español, tiene a burnett disposición servicios gratuitos de asistencia lingüística. LlAshtabula [facility] Medical Center [phone].    We comply with applicable federal civil rights laws and [place] laws. We do not discriminate on the basis of race, color, national origin, age, disability, sex, sexual orientation, or gender identity.            Thank you!     Thank you for choosing [facility] ENDOCRINOLOGY  for your care. Our goal is always to provide you with excellent care. Hearing back from our patients is one way we can continue to improve our services. Please take a few minutes to complete the written survey that you may receive in the mail after your visit with us. Thank you!             Your Updated Medication List - Protect others around you: Learn how to safely use, store and throw away your medicines at www.disposemymeds.org.          This list is accurate as of 8/14/18  1:05 PM.  Always use your most recent med list.                   Brand Name Dispense Instructions for use Diagnosis    aspirin 81 MG chewable tablet     108 tablet    Take 81 mg by mouth every other day    Diabetes mellitus type 1 (H)       * blood glucose monitoring test strip    no brand specified    5 Box    Test up to ten times per day as directed    Diabetes  mellitus (H)       * blood glucose monitoring test strip    ERICA CONTOUR NEXT    300 strip    Use to test blood sugar 10  times daily or as directed.    Diabetes mellitus type 1 (H)       CVS GLUCOSE 40 % (400 mg/mL) Gel gel   Generic drug:  glucose      Take 15 g by mouth as needed        glucagon 1 MG kit    GLUCAGON EMERGENCY    1 mg    Inject 1 mg into the muscle once for 1 dose Use as directed    Diabetes mellitus type I (H)       NovoLOG VIAL 100 UNITS/ML injection   Generic drug:  insulin aspart     60 mL    USE IN INSULIN PUMP. PATIENT USES APPROXIMITELY 50-60 UNITS IN 24 HOURS    Calculus of kidney       simvastatin 80 MG tablet    ZOCOR    30 tablet    Take 1 tablet (80 mg) by mouth At Bedtime    Calculus of kidney       * Notice:  This list has 2 medication(s) that are the same as other medications prescribed for you. Read the directions carefully, and ask your doctor or other care provider to review them with you.

## 2018-09-13 ENCOUNTER — ALLIED HEALTH/NURSE VISIT (OUTPATIENT)
Dept: EDUCATION SERVICES | Facility: CLINIC | Age: 53
End: 2018-09-13
Payer: COMMERCIAL

## 2018-09-13 DIAGNOSIS — E10.65 TYPE 1 DIABETES MELLITUS WITH HYPERGLYCEMIA (H): Primary | ICD-10-CM

## 2018-09-13 DIAGNOSIS — N20.0 CALCULUS OF KIDNEY: ICD-10-CM

## 2018-09-13 NOTE — MR AVS SNAPSHOT
After Visit Summary   9/13/2018    Kiera Dobson    MRN: [id number]           Patient Information     Date Of Birth          1965        Visit Information        Provider Department      9/13/2018 3:30 PM Arlin Calles RN [facility]        Care Instructions    1.  Expect contact from [employer].      2.  Let me know if you decide to go ahead with it so we can initiate paperwork on our end and schedule your pump start.    Arlin Calles RN,CDE  [address] [address]  Phone: [phone]  [email or web address]                Follow-ups after your visit        Your next 10 appointments already scheduled     Nov 14, 2018  9:30 AM CST   (Arrive by 9:15 AM)   RETURN DIABETES with Jo Jean PA-C   [facility] ([facility])    [address] [place] [address] [phone]            Feb 12, 2019  2:30 PM CST   (Arrive by 2:15 PM)   RETURN DIABETES with Paola Chahal MD   [facility] ([facility])    [address] [place] [address] [phone]              Who to contact     Please call your clinic at [phone] to:    Ask questions about your health    Make or cancel appointments    Discuss your medicines    Learn about your test results    Speak to your doctor            Additional Information About Your Visit        [employer] Information     [employer] gives you secure access to your electronic health record. If you see a primary care provider, you can also send messages to your care team and make appointments. If you have questions, please call your primary care clinic.  If you do not have a primary care provider, please call [phone] and they will assist you.      [employer] is an electronic gateway that provides easy, online access to your medical records. With [employer], you can request a clinic appointment, read  your test results, renew a prescription or communicate with your care team.     To access your existing account, please contact your [facility] or call [phone] for assistance.        Care EveryWhere ID     This is your Care EveryWhere ID. This could be used by other organizations to access your [place] medical records  [id number]         Blood Pressure from Last 3 Encounters:   08/14/18 125/77   03/15/18 125/74   02/13/18 139/75    Weight from Last 3 Encounters:   08/14/18 63.7 kg (140 lb 6.4 oz)   03/15/18 64.1 kg (141 lb 4.8 oz)   02/13/18 65 kg (143 lb 3.2 oz)              Today, you had the following     No orders found for display       Primary Care Provider Office Phone # Fax #    Mary Beth Nely Mansfield [phone]       [address] [place] [address] 741  [facility] [address]        Equal Access to Services     TUNDE COTA : Hadii aad ku hadasho Soomaali, waaxda luqadaha, qaybta kaalmada adeegyada, waxay luis enriquein danita ghotra . So [facility] [phone].    ATENCIÓN: Si habla español, tiene a burnett disposición servicios gratuitos de asistencia lingüística. Llame al [phone].    We comply with applicable federal civil rights laws and [place] laws. We do not discriminate on the basis of race, color, national origin, age, disability, sex, sexual orientation, or gender identity.            Thank you!     Thank you for choosing [facility] DIABETES  for your care. Our goal is always to provide you with excellent care. Hearing back from our patients is one way we can continue to improve our services. Please take a few minutes to complete the written survey that you may receive in the mail after your visit with us. Thank you!             Your Updated Medication List - Protect others around you: Learn how to safely use, store and throw away your medicines at www.disposemymeds.org.          This list is accurate as of 9/13/18  4:22 PM.  Always use your most  recent med list.                   Brand Name Dispense Instructions for use Diagnosis    aspirin 81 MG chewable tablet     108 tablet    Take 81 mg by mouth every other day    Diabetes mellitus type 1 (H)       * blood glucose monitoring test strip    no brand specified    5 Box    Test up to ten times per day as directed    Diabetes mellitus (H)       * blood glucose monitoring test strip    ERICA CONTOUR NEXT    300 strip    Use to test blood sugar 10  times daily or as directed.    Diabetes mellitus type 1 (H)       CVS GLUCOSE 40 % (400 mg/mL) Gel gel   Generic drug:  glucose      Take 15 g by mouth as needed        glucagon 1 MG kit    GLUCAGON EMERGENCY    1 mg    Inject 1 mg into the muscle once for 1 dose Use as directed    Diabetes mellitus type I (H)       NovoLOG VIAL 100 UNITS/ML injection   Generic drug:  insulin aspart     60 mL    USE IN INSULIN PUMP. PATIENT USES APPROXIMITELY 50-60 UNITS IN 24 HOURS    Calculus of kidney       simvastatin 80 MG tablet    ZOCOR    30 tablet    Take 1 tablet (80 mg) by mouth At Bedtime    Calculus of kidney       * Notice:  This list has 2 medication(s) that are the same as other medications prescribed for you. Read the directions carefully, and ask your doctor or other care provider to review them with you.

## 2018-09-13 NOTE — PATIENT INSTRUCTIONS
1.  Expect contact from [employer].      2.  Let me know if you decide to go ahead with it so we can initiate paperwork on our end and schedule your pump start.    Arlin Calles RN,CDE  [address] [place] [address]  Phone: [phone]  [email or web address]

## 2018-09-16 NOTE — TELEPHONE ENCOUNTER
simvastatin (ZOCOR) 80 MG  Last Written Prescription Date:  8/2/18  Last Fill Quantity: 30,   # refills: 0  Last Office Visit : 8/14/18  Future Office visit:   11/14/18    Routing refill request to provider for review/approval because:  PLEASE AUTH/SIGN MED OVERRIDE

## 2018-09-17 NOTE — PROGRESS NOTES
"DIABETES EDUCATION NOTE:    Kiera Dobson presents today for education related to Type 1 diabetes.    Patient is being treated with:  diet, exercise, insulin and SMBG  She is accompanied by self    PATIENT CONCERNS RELATED TO DIABETES SELF MANAGEMENT:  Controlling blood sugar    Current Diabetes Management per Patient:  Taking diabetes medications?   yes:     Diabetes Medication(s)     Diabetic Other Sig    glucagon (GLUCAGON EMERGENCY) 1 MG injection Inject 1 mg into the muscle once for 1 dose Use as directed    glucose (CVS GLUCOSE) 40 % GEL Take 15 g by mouth as needed    Insulin Sig    NOVOLOG VIAL 100 UNIT/ML soln USE IN INSULIN PUMP. PATIENT USES APPROXIMITELY 50-60 UNITS IN 24 HOURS        Monitoring  Patient glucose self monitoring as follows: four times daily.   BG meter: Contour Next USB Link (with Medtronic Pump) meter  BG results: not available     BG values are: Not in goal  Patient's most recent   Lab Results   Component Value Date    A1C 7.9 07/16/2013    is not meeting goal of <7.0    Exercise: no regular exercise program    Nutrition:   Patient currently eats 3 meals 0-3 snacks     Hypoglycemia:   Patient is at risk of hypoglycemia? Yes           Stress Management:                        EDUCATION and INSTRUCTION PROVIDED AT THIS VISIT:           Reviewed and demonstrated operation of the following pumps:  The Omnipod 400, Medtronic 670G with Guardian 3 sensor, and Tandem X2 with Dexcom G6 sensor.     Discussed unique features of each pump and what qualities are most important to patient.  Discussion included the operation of the 670G Hybrid Closed Loop system and the particular behaviors around that pump that need to be adhered to, including using the bolus calculator, counting carbohydrates accurately, calibrating the sensor appropriately.  Discussed that the results seen in the studies of the system that were done were a result of staying in \"auto\" mode > 85% of the time.      Explained the " upgrade process, which includes making sure that warranty has  on the current pump, completing the CMN, processing through the pump company and approval by insurance company.  Also reviewed training that would be necessary to ensure safe operation of the pump.      Kiera Mckay is interested in pursuing a Tandem insulin pump.  She likes her DexCom and does not want to stop using that.     Patient-stated goal written and given to Kiera Mckay Stage.  Verbalized and demonstrated understanding of instructions.     PLAN:  AOB sent to DexCom this evening  AVS printed and given to patient    FOLLOW-UP:    For pump start    Time spent with patient at today's visit was 60 minutes.      Any diabetes medication dose changes were made via the CDE Protocol and Collaborative Practice Agreement with [facility] and [facility].  A copy of this encounter was provided to patient's referring provider.

## 2018-09-18 ENCOUNTER — MEDICAL CORRESPONDENCE (OUTPATIENT)
Dept: HEALTH INFORMATION MANAGEMENT | Facility: CLINIC | Age: 53
End: 2018-09-18

## 2018-09-18 ENCOUNTER — DOCUMENTATION ONLY (OUTPATIENT)
Dept: ENDOCRINOLOGY | Facility: CLINIC | Age: 53
End: 2018-09-18

## 2018-09-18 RX ORDER — SIMVASTATIN 80 MG
80 TABLET ORAL AT BEDTIME
Qty: 90 TABLET | Refills: 3 | Status: SHIPPED | OUTPATIENT
Start: 2018-09-18 | End: 2019-09-09

## 2018-09-18 NOTE — PROGRESS NOTES
[facility] CLINICAL DOCUMENTATION    Form Documentation Form or Letter Request    Type or form/letter needing completion: elli  Provider: Tirso/Stevan  Has provider seen patient for office visit related to reason for form request? Yes  Date form needed: ASAP  Once completed: Fax form to: Paul

## 2018-09-19 ENCOUNTER — MEDICAL CORRESPONDENCE (OUTPATIENT)
Dept: HEALTH INFORMATION MANAGEMENT | Facility: CLINIC | Age: 53
End: 2018-09-19

## 2018-11-14 ENCOUNTER — OFFICE VISIT (OUTPATIENT)
Dept: ENDOCRINOLOGY | Facility: CLINIC | Age: 53
End: 2018-11-14
Payer: COMMERCIAL

## 2018-11-14 VITALS
DIASTOLIC BLOOD PRESSURE: 73 MMHG | HEART RATE: 125 BPM | WEIGHT: 137 LBS | HEIGHT: 60 IN | SYSTOLIC BLOOD PRESSURE: 121 MMHG | BODY MASS INDEX: 26.9 KG/M2

## 2018-11-14 DIAGNOSIS — E10.9 TYPE 1 DIABETES MELLITUS WITHOUT COMPLICATION (H): Primary | ICD-10-CM

## 2018-11-14 LAB — HBA1C MFR BLD: 7.9 % (ref 4.3–6)

## 2018-11-14 ASSESSMENT — PAIN SCALES - GENERAL: PAINLEVEL: NO PAIN (0)

## 2018-11-14 NOTE — MR AVS SNAPSHOT
After Visit Summary   11/14/2018    Kiera Dobson    MRN: [id number]           Patient Information     Date Of Birth          1965        Visit Information        Provider Department      11/14/2018 9:30 AM Jo Jean PA-C [facility] End[facility]        Today's Diagnoses     Type 1 diabetes mellitus without complication (HCC)    -  1       Follow-ups after your visit        Your next 10 appointments already scheduled     Nov 15, 2018  8:30 AM CST   Nurse Visit with  Med Nurse   [facility] ([facility])    [address] [place] [address] [phone]            Feb 12, 2019  2:30 PM CST   (Arrive by 2:15 PM)   RETURN DIABETES with MD XAVI Ravi [facility] ([facility])    [address] [place] [address] [phone]              Who to contact     Please call your clinic at [phone] to:    Ask questions about your health    Make or cancel appointments    Discuss your medicines    Learn about your test results    Speak to your doctor            Additional Information About Your Visit        MyChart Information     [employer] gives you secure access to your electronic health record. If you see a primary care provider, you can also send messages to your care team and make appointments. If you have questions, please call your primary care clinic.  If you do not have a primary care provider, please call [phone] and they will assist you.      [employer] is an electronic gateway that provides easy, online access to your medical records. With [employer], you can request a clinic appointment, read your test results, renew a prescription or communicate with your care team.     To access your existing account, please contact your [facility] or call [phone] for assistance.        Care EveryWhere ID     This is your Care  "EveryWhere ID. This could be used by other organizations to access your [place] medical records  [id number]        Your Vitals Were     Pulse Height BMI (Body Mass Index)             125 1.511 m (4' 11.5\") 27.21 kg/m2          Blood Pressure from Last 3 Encounters:   11/14/18 121/73   08/14/18 125/77   03/15/18 125/74    Weight from Last 3 Encounters:   11/14/18 62.1 kg (137 lb)   08/14/18 63.7 kg (140 lb 6.4 oz)   03/15/18 64.1 kg (141 lb 4.8 oz)              We Performed the Following     Hemoglobin A1c POCT        Primary Care Provider Office Phone # Fax #    Mary Beth Nely Mansfield [phone] [address] [facility] [address]        Equal Access to Services     [facility] : Hadii aad ku hadasho Somarcellus, waaxda luqadaha, qaybta kaalmada adeudayyada, marylu ghotra . So [facility] [phone].    ATENCIÓN: Si habla español, tiene a burnett disposición servicios gratuitos de asistencia lingüística. Llame al [phone].    We comply with applicable federal civil rights laws and [place] laws. We do not discriminate on the basis of race, color, national origin, age, disability, sex, sexual orientation, or gender identity.            Thank you!     Thank you for choosing [facility] ENDOCRINOLOGY  for your care. Our goal is always to provide you with excellent care. Hearing back from our patients is one way we can continue to improve our services. Please take a few minutes to complete the written survey that you may receive in the mail after your visit with us. Thank you!             Your Updated Medication List - Protect others around you: Learn how to safely use, store and throw away your medicines at www.disposemymeds.org.          This list is accurate as of 11/14/18 10:37 AM.  Always use your most recent med list.                   Brand Name Dispense Instructions for use Diagnosis    aspirin 81 MG chewable tablet     108 tablet    Take 81 mg by mouth every " other day    Diabetes mellitus type 1 (H)       * blood glucose monitoring test strip    no brand specified    5 Box    Test up to ten times per day as directed    Diabetes mellitus (H)       * blood glucose monitoring test strip    ERICA CONTOUR NEXT    300 strip    Use to test blood sugar 10  times daily or as directed.    Diabetes mellitus type 1 (H)       CVS GLUCOSE 40 % (400 mg/mL) Gel gel   Generic drug:  glucose      Take 15 g by mouth as needed        glucagon 1 MG kit    GLUCAGON EMERGENCY    1 mg    Inject 1 mg into the muscle once for 1 dose Use as directed    Diabetes mellitus type I (H)       NovoLOG VIAL 100 UNITS/ML injection   Generic drug:  insulin aspart     60 mL    USE IN INSULIN PUMP. PATIENT USES APPROXIMITELY 50-60 UNITS IN 24 HOURS    Calculus of kidney       * simvastatin 80 MG tablet    ZOCOR    30 tablet    Take 1 tablet (80 mg) by mouth At Bedtime    Calculus of kidney       * simvastatin 80 MG tablet    ZOCOR    90 tablet    Take 1 tablet (80 mg) by mouth At Bedtime    Calculus of kidney       * Notice:  This list has 4 medication(s) that are the same as other medications prescribed for you. Read the directions carefully, and ask your doctor or other care provider to review them with you.

## 2018-11-14 NOTE — PROGRESS NOTES
HPI  Kiera Dobson is a 53 year old female with type 1 diabetes mellitus here today for a follow up visit.  Kiera has had type 1 diabetes for 45 + years.  She has mild/moderate nonproliferative diabetic retinopathy. No peripheral neuropathy or nephropathy.  Her hx is also significant for hypoglycemia unawareness.  She has been using her Medtronic 530G insulin pump and Dexcom.  Her current basal insulin rates are:  Midnight= 0.65 units/hr.  3 am = 0.55 units/hr.  10 am  = 0.65 units/hr.  22:30= 0.6 units/hr.  Pt's I/C ratio is 1:12.  Sensitivity is 70.  Her 24 hr basal dose is 14.825 units/24 hrs.  Pt's A1C is 7.9 % today and her previous A1C was 7.6 %.  We downloaded pt's insulin pump and Dexcom today.  Kiera has been having am hypoglycemia.  She also has higher blood sugar readings late afternoon and late evening.  She states she has been taking her insulin bolus after she eats.  Reminded her to bolus before meals.  Her average glucose was 169 with SD 69 over the past 30 days.  On ROS today, she states she is under a lot of stress at home and work.  I suggested that she meet with Felicita Renae for support and evaluation which she has agreed to today.  She denies any thoughts of self harm.  Pt states her chest is sore and she has been having headaches from the stress.  Her chest is sore when she presses on her chest. No n/v or diaphoresis and she has had several stable EKGs done in the past. She did not want to have an EKG done today.  No visual changes.  No numbness in her feet or feet.  Pt denies SOB or cough.  Pt denies abd pain, diarrhea, dysuria or hematuria.  No foot ulcers on exam today.    DIABETES CARE:  Retinopathy:mild/moderate nonproliferative diabetic retinopathy.  She was last seen here by Oph in July 2017.   Nephropathy: urine microalbuminuria negative in 8/2018. Neuropathy: none.  Feet: good pulses, no ulcers and normal monofilamentous exam today.  Taking ASA: yes.  72 on 8/14/2018.    Pt is taking  Simvastatin.    ROS  Please see under HPI.    Allergies  Allergies   Allergen Reactions     Ampicillin Sodium Rash       Medications  Current Outpatient Prescriptions   Medication Sig Dispense Refill     aspirin 81 MG chewable tablet Take 81 mg by mouth every other day  108 tablet 3     blood glucose monitoring (ERICA CONTOUR NEXT) test strip Use to test blood sugar 10  times daily or as directed. 300 strip 11     glucose ([facility] GLUCOSE) 40 % GEL Take 15 g by mouth as needed       glucose blood VI test strips strip Test up to ten times per day as directed 5 Box 8     NOVOLOG VIAL 100 UNIT/ML soln USE IN INSULIN PUMP. PATIENT USES APPROXIMITELY 50-60 UNITS IN 24 HOURS 60 mL 3     simvastatin (ZOCOR) 80 MG tablet Take 1 tablet (80 mg) by mouth At Bedtime 90 tablet 3     simvastatin (ZOCOR) 80 MG tablet Take 1 tablet (80 mg) by mouth At Bedtime 30 tablet 0     glucagon (GLUCAGON EMERGENCY) 1 MG injection Inject 1 mg into the muscle once for 1 dose Use as directed 1 mg 2       Family History  family history includes Alzheimer Disease in her father; Autoimmune Disease in her mother; Cancer in her father; Diabetes in her father; Glaucoma in her father and mother; Hypertension in her mother.    Social History  Smoke: none.  ETOH: rare.   with children.  Pt works full time in a lab.    Past Medical History  Past Medical History:   Diagnosis Date     Diabetes mellitus (H)     Type 1     Elevated cholesterol      Glaucoma     Glaucoma suspect     Glaucoma suspect      Kidney stones      Nonsenile cataract      Past Surgical History:   Procedure Laterality Date     APPENDECTOMY OPEN  1981     BIOPSY OF SKIN LESION  12/30/2011    returned January 2012 for additional removal     c sections  99,97,08     EXTRACORPOREAL SHOCK WAVE LITHOTRIPSY, CYSTOSCOPY, INSERT STENT URETER(S), COMBINED  2004     LASER HOLMIUM LITHOTRIPSY URETER(S), INSERT STENT, COMBINED  12/12/2013    Procedure: COMBINED CYSTOSCOPY, URETEROSCOPY, LASER  "HOLMIUM LITHOTRIPSY URETER(S), INSERT STENT;  Right Ureteroscopy Holmium Laser Lithotripsy Stent Placement;  Surgeon: Kirill Marlow MD;  Location: [facility] OR     S/P right shoulder surgery in Dec 2012.    Physical Exam  /73  Pulse 125  Ht 1.511 m (4' 11.5\")  Wt 62.1 kg (137 lb)  BMI 27.21 kg/m2  Body mass index is 27.21 kg/(m^2).    HEENT:  PERRLA; fundi not examined.  NECK: Thyroid nontender.  LUNGS: Clear b/l.  CARDIAC:  RRR.  ABDOMEN:Nontender. Some areas of lipohypertrophy.  EXTREMITIES: No edema. Equal strength in LE's b/l.  FEET: Warm, no ulcers and normal monofilamentous exam.    RESULTS  Creatinine   Date Value Ref Range Status   08/14/2018 0.62 0.52 - 1.04 mg/dL Final     GFR Estimate   Date Value Ref Range Status   08/14/2018 >90 >60 mL/min/1.7m2 Final     Comment:     Non  GFR Calc     Hemoglobin A1C   Date Value Ref Range Status   07/16/2013 7.9 (A) 4.3 - 6.0 % Final     Potassium   Date Value Ref Range Status   02/14/2017 4.6 3.4 - 5.3 mmol/L Final     ALT   Date Value Ref Range Status   02/14/2017 17 0 - 50 U/L Final     AST   Date Value Ref Range Status   02/14/2017 13 0 - 45 U/L Final     TSH   Date Value Ref Range Status   08/14/2018 1.93 0.40 - 4.00 mU/L Final     T4 Free   Date Value Ref Range Status   02/14/2017 0.84 0.76 - 1.46 ng/dL Final       Cholesterol   Date Value Ref Range Status   08/14/2018 152 <200 mg/dL Final   03/24/2017 154 <200 mg/dL Final     HDL Cholesterol   Date Value Ref Range Status   08/14/2018 61 >49 mg/dL Final   03/24/2017 73 >49 mg/dL Final     LDL Cholesterol Calculated   Date Value Ref Range Status   08/14/2018 72 <100 mg/dL Final     Comment:     Desirable:       <100 mg/dl   03/24/2017 69 <100 mg/dL Final     Comment:     Desirable:       <100 mg/dl     Triglycerides   Date Value Ref Range Status   08/14/2018 94 <150 mg/dL Final   03/24/2017 59 <150 mg/dL Final     Cholesterol/HDL Ratio   Date Value Ref Range Status   08/18/2014 2.7 0.0 - " 5.0 Final   09/17/2013 2.3 0.0 - 5.0 Final     A1C      7.9   11/14/2018    A1C      7.6   8/14/2018  A1C      7.4   3/15/2018  A1C      7.4   12/1/2017  A1C      7.9   9/13/2017  A1C      7.7   6/1/2017  A1C      8.3   2/14/2017  A1C      7.5   8/18/2016  A1C      7.6   5/5/2016  A1C      7.8    2/10/2016  A1C      7.9    9/2/2015  A1C      7.7    5/2015  A1C      8.6    1/28/2015  A1C      8.8    9/26/2014  A1C      8.0    7/2/2014  A1C      8.4    2/28/2014    ASSESSMENT/PLAN:    1.  TYPE 1 DIABETES MELLITUS:Type 1 diabetes mellitus complicated by retinopathy and hypoglycemia unawareness.  I had Kiera change her 3 am basal insulin rate to 0.5 units/hr today ( was 0.55 units/hr ) in view of am hypoglycemia.  If she continues to have am hypoglycemia, sh is to further reduce her 3 am basal insulin rate 0.45 units/hr.  No other insulin rate changes today.  She is in the process of upgrading her insulin pump.  She remains normotensive.   Feet in good condition with normal monofilamentous exam today.  She is taking ASA daily.  Pt's TSH is normal in August 2018.  Flu vaccine given today.    2.  MILD/MODERATE NPDR: Last seen by Oph here in July 2017.  Reminded her to see Oph annually.    3.  HX OF + MICROALBUMINURIA:  Pt's microalbuminuria negative in Aug 2018.  Her creat/GFR are stable.    4. HYPERLIPIDEMIA:  LDL 72 in 8/2018.   Pt remains on high dose Simvastatin.  Since she has been on the high dose Simvastatin for years and tolerating this dose.    5.  ADHD/STRESS: Pt to meet with Felicita Renae.    6. HYPOGLYCEMIA UNAWARENESS: She denies having any recent severe hypoglycemia.    Continue to wear Dexcom daily.    7. Return to Endocrine Clinic to see Dr. Chahal in Feb 2019.

## 2018-11-14 NOTE — LETTER
11/14/2018       RE: Kiera Dobson  [address]  [facility] [place] [address]     Dear Colleague,    Thank you for referring your patient, Kiera Dobson, to the [facility] ENDOCRINOLOGY at [facility]. Please see a copy of my visit note below.    HPI  Kiera Dobson is a 53 year old female with type 1 diabetes mellitus here today for a follow up visit.  Kiera has had type 1 diabetes for 45 + years.  She has mild/moderate nonproliferative diabetic retinopathy. No peripheral neuropathy or nephropathy.  Her hx is also significant for hypoglycemia unawareness.  She has been using her Medtronic 530G insulin pump and Dexcom.  Her current basal insulin rates are:  Midnight= 0.65 units/hr.  3 am = 0.55 units/hr.  10 am  = 0.65 units/hr.  22:30= 0.6 units/hr.  Pt's I/C ratio is 1:12.  Sensitivity is 70.  Her 24 hr basal dose is 14.825 units/24 hrs.  Pt's A1C is 7.9 % today and her previous A1C was 7.6 %.  We downloaded pt's insulin pump and Dexcom today.  Kiera has been having am hypoglycemia.  She also has higher blood sugar readings late afternoon and late evening.  She states she has been taking her insulin bolus after she eats.  Reminded her to bolus before meals.  Her average glucose was 169 with SD 69 over the past 30 days.  On ROS today, she states she is under a lot of stress at home and work.  I suggested that she meet with Felicita Reane for support and evaluation which she has agreed to today.  She denies any thoughts of self harm.  Pt states her chest is sore and she has been having headaches from the stress.  Her chest is sore when she presses on her chest. No n/v or diaphoresis and she has had several stable EKGs done in the past. She did not want to have an EKG done today.  No visual changes.  No numbness in her feet or feet.  Pt denies SOB or cough.  Pt denies abd pain, diarrhea, dysuria or hematuria.  No foot ulcers on exam today.    DIABETES CARE:  Retinopathy:mild/moderate  nonproliferative diabetic retinopathy.  She was last seen here by Oph in July 2017.   Nephropathy: urine microalbuminuria negative in 8/2018. Neuropathy: none.  Feet: good pulses, no ulcers and normal monofilamentous exam today.  Taking ASA: yes.  72 on 8/14/2018.    Pt is taking Simvastatin.    ROS  Please see under HPI.    Allergies  Allergies   Allergen Reactions     Ampicillin Sodium Rash       Medications  Current Outpatient Prescriptions   Medication Sig Dispense Refill     aspirin 81 MG chewable tablet Take 81 mg by mouth every other day  108 tablet 3     blood glucose monitoring (ERICA CONTOUR NEXT) test strip Use to test blood sugar 10  times daily or as directed. 300 strip 11     glucose ([facility] GLUCOSE) 40 % GEL Take 15 g by mouth as needed       glucose blood VI test strips strip Test up to ten times per day as directed 5 Box 8     NOVOLOG VIAL 100 UNIT/ML soln USE IN INSULIN PUMP. PATIENT USES APPROXIMITELY 50-60 UNITS IN 24 HOURS 60 mL 3     simvastatin (ZOCOR) 80 MG tablet Take 1 tablet (80 mg) by mouth At Bedtime 90 tablet 3     simvastatin (ZOCOR) 80 MG tablet Take 1 tablet (80 mg) by mouth At Bedtime 30 tablet 0     glucagon (GLUCAGON EMERGENCY) 1 MG injection Inject 1 mg into the muscle once for 1 dose Use as directed 1 mg 2       Family History  family history includes Alzheimer Disease in her father; Autoimmune Disease in her mother; Cancer in her father; Diabetes in her father; Glaucoma in her father and mother; Hypertension in her mother.    Social History  Smoke: none.  ETOH: rare.   with children.  Pt works full time in a lab.    Past Medical History  Past Medical History:   Diagnosis Date     Diabetes mellitus (H)     Type 1     Elevated cholesterol      Glaucoma     Glaucoma suspect     Glaucoma suspect      Kidney stones      Nonsenile cataract      Past Surgical History:   Procedure Laterality Date     APPENDECTOMY OPEN  1981     BIOPSY OF SKIN LESION  12/30/2011    returned January  "2012 for additional removal     c sections  99,97,08     EXTRACORPOREAL SHOCK WAVE LITHOTRIPSY, CYSTOSCOPY, INSERT STENT URETER(S), COMBINED  2004     LASER HOLMIUM LITHOTRIPSY URETER(S), INSERT STENT, COMBINED  12/12/2013    Procedure: COMBINED CYSTOSCOPY, URETEROSCOPY, LASER HOLMIUM LITHOTRIPSY URETER(S), INSERT STENT;  Right Ureteroscopy Holmium Laser Lithotripsy Stent Placement;  Surgeon: Kirill Marlow MD;  Location: [facility] OR     S/P right shoulder surgery in Dec 2012.    Physical Exam  /73  Pulse 125  Ht 1.511 m (4' 11.5\")  Wt 62.1 kg (137 lb)  BMI 27.21 kg/m2  Body mass index is 27.21 kg/(m^2).    HEENT:  PERRLA; fundi not examined.  NECK: Thyroid nontender.  LUNGS: Clear b/l.  CARDIAC:  RRR.  ABDOMEN:Nontender. Some areas of lipohypertrophy.  EXTREMITIES: No edema. Equal strength in LE's b/l.  FEET: Warm, no ulcers and normal monofilamentous exam.    RESULTS  Creatinine   Date Value Ref Range Status   08/14/2018 0.62 0.52 - 1.04 mg/dL Final     GFR Estimate   Date Value Ref Range Status   08/14/2018 >90 >60 mL/min/1.7m2 Final     Comment:     Non  GFR Calc     Hemoglobin A1C   Date Value Ref Range Status   07/16/2013 7.9 (A) 4.3 - 6.0 % Final     Potassium   Date Value Ref Range Status   02/14/2017 4.6 3.4 - 5.3 mmol/L Final     ALT   Date Value Ref Range Status   02/14/2017 17 0 - 50 U/L Final     AST   Date Value Ref Range Status   02/14/2017 13 0 - 45 U/L Final     TSH   Date Value Ref Range Status   08/14/2018 1.93 0.40 - 4.00 mU/L Final     T4 Free   Date Value Ref Range Status   02/14/2017 0.84 0.76 - 1.46 ng/dL Final       Cholesterol   Date Value Ref Range Status   08/14/2018 152 <200 mg/dL Final   03/24/2017 154 <200 mg/dL Final     HDL Cholesterol   Date Value Ref Range Status   08/14/2018 61 >49 mg/dL Final   03/24/2017 73 >49 mg/dL Final     LDL Cholesterol Calculated   Date Value Ref Range Status   08/14/2018 72 <100 mg/dL Final     Comment:     Desirable:       " <100 mg/dl   03/24/2017 69 <100 mg/dL Final     Comment:     Desirable:       <100 mg/dl     Triglycerides   Date Value Ref Range Status   08/14/2018 94 <150 mg/dL Final   03/24/2017 59 <150 mg/dL Final     Cholesterol/HDL Ratio   Date Value Ref Range Status   08/18/2014 2.7 0.0 - 5.0 Final   09/17/2013 2.3 0.0 - 5.0 Final     A1C      7.9   11/14/2018    A1C      7.6   8/14/2018  A1C      7.4   3/15/2018  A1C      7.4   12/1/2017  A1C      7.9   9/13/2017  A1C      7.7   6/1/2017  A1C      8.3   2/14/2017  A1C      7.5   8/18/2016  A1C      7.6   5/5/2016  A1C      7.8    2/10/2016  A1C      7.9    9/2/2015  A1C      7.7    5/2015  A1C      8.6    1/28/2015  A1C      8.8    9/26/2014  A1C      8.0    7/2/2014  A1C      8.4    2/28/2014    ASSESSMENT/PLAN:    1.  TYPE 1 DIABETES MELLITUS:Type 1 diabetes mellitus complicated by retinopathy and hypoglycemia unawareness.  I had Kiera change her 3 am basal insulin rate to 0.5 units/hr today ( was 0.55 units/hr ) in view of am hypoglycemia.  If she continues to have am hypoglycemia, sh is to further reduce her 3 am basal insulin rate 0.45 units/hr.  No other insulin rate changes today.  She is in the process of upgrading her insulin pump.  She remains normotensive.   Feet in good condition with normal monofilamentous exam today.  She is taking ASA daily.  Pt's TSH is normal in August 2018.  Flu vaccine given today.    2.  MILD/MODERATE NPDR: Last seen by Oph here in July 2017.  Reminded her to see Oph annually.    3.  HX OF + MICROALBUMINURIA:  Pt's microalbuminuria negative in Aug 2018.  Her creat/GFR are stable.    4. HYPERLIPIDEMIA:  LDL 72 in 8/2018.   Pt remains on high dose Simvastatin.  Since she has been on the high dose Simvastatin for years and tolerating this dose.    5.  ADHD/STRESS: Pt to meet with Felicita Renae.    6. HYPOGLYCEMIA UNAWARENESS: She denies having any recent severe hypoglycemia.    Continue to wear Dexcom daily.    7. Return to Endocrine Clinic  to see Dr. Chahal in Feb 2019.    Again, thank you for allowing me to participate in the care of your patient.      Sincerely,    Jo Jean PA-C

## 2019-01-02 ENCOUNTER — TELEPHONE (OUTPATIENT)
Dept: ENDOCRINOLOGY | Facility: CLINIC | Age: 54
End: 2019-01-02

## 2019-01-02 NOTE — TELEPHONE ENCOUNTER
[facility] Call Center    Phone Message    May a detailed message be left on voicemail: yes    Reason for Call: Other: [facility] called in to see if we received fax for pts CGN/Diabetic Supplies. Please confirm by calling [phone]. Thank you.     Action Taken: Message routed to:  [facility] ([facility]): Endocrinology

## 2019-02-12 ENCOUNTER — OFFICE VISIT (OUTPATIENT)
Dept: ENDOCRINOLOGY | Facility: CLINIC | Age: 54
End: 2019-02-12
Payer: COMMERCIAL

## 2019-02-12 VITALS
BODY MASS INDEX: 27.05 KG/M2 | SYSTOLIC BLOOD PRESSURE: 134 MMHG | DIASTOLIC BLOOD PRESSURE: 71 MMHG | HEIGHT: 60 IN | HEART RATE: 101 BPM | WEIGHT: 137.8 LBS

## 2019-02-12 DIAGNOSIS — M94.0 COSTOCHONDRITIS: ICD-10-CM

## 2019-02-12 DIAGNOSIS — G44.219 EPISODIC TENSION-TYPE HEADACHE, NOT INTRACTABLE: ICD-10-CM

## 2019-02-12 DIAGNOSIS — E10.9 TYPE 1 DIABETES MELLITUS WITHOUT COMPLICATION (H): Primary | ICD-10-CM

## 2019-02-12 LAB — HBA1C MFR BLD: 7.4 % (ref 4.3–6)

## 2019-02-12 ASSESSMENT — MIFFLIN-ST. JEOR: SCORE: 1153.62

## 2019-02-12 NOTE — NURSING NOTE
Chief Complaint   Patient presents with     RECHECK     Type 1 Diabetes     Capillary puncture performed for Hemoglobin A1C test. Patient tolerated well.    Naheed Gallardo MA

## 2019-02-12 NOTE — Clinical Note
Not sure I signed the neurology referral in time for her to have appt made before she left.  Can you check and if not, help her make appt?Thanks!Francine Chahal

## 2019-02-12 NOTE — LETTER
RE: Kiera Dobson  [address]  [place] [address]     Dear Colleague,    Thank you for referring your patient, Kiera Dobson, to the [facility] ENDOCRINOLOGY at [facility]. Please see a copy of my visit note below.    This 53 year old woman returns for f/u of her type 1 diabetes that she has had for 45+ years.  She is co-managed with sarah Jean    She is using a  530g pump with Dexcom sensor.      Her current pump settings are  Midnight 0.65  2:00 am 0.5  10 am 0.65  10:30 pm 0.6    Her carb ratio is 12    correction  70 with target of 100/150 midnight to 7 am and 21:00 to midnight; 100/120 during day.    Active insulin is 3 hours.    During the last month, she wore her Dex, 93% of the time.  Her average glucose on the sensor was 171.  She was in target 54% of the time, 180-250 42% of the time, above 250 10.9% of the time, 50-70 3.2% of the time, and below 50 for 0.9% of the time.    Reviewing the individual days along with her pump settings shows that she boluses 4.8 times a day on average, nearly all of them done manually.    She tends to fall between midnight and 6 AM if she has a late dinner and boluses after she eats.  On other nights she tends to be stable.  She has a lot of difficulty getting her meal boluses before and even with her meal.    She generally recognizes her low sugars when she hears the low glucose alarm go off on her sensor.  She makes sure her BG is above 100 whenever she drives.    She has seen her eye MD and things are OK. She denies feet concerns.  She has no SOB.  Her mood is OK, but she is very stressed by her son's mental health issues.  She has occasional CP that she has had for years and has not been found to be ischemic in origin. The pain has been worse lately and actually interferes with her ability to do her usual work.  Sometimes she feels nauseated with the pain, but she never has vomiting, SOB.  The pain is made worse when she  "assumes a crouched over position and presses on her sternum.  She has taken ibuprofen 600 mg to relieve these sx and that hasn't worked.    She also has had a stabbing pain occurs behind her left ear about 4 times a day.  It lasts for about 10 seconds. This has been going on for weeks.  This too interferes with her ability to concentrate on her daily activities. She doesn't have pain elsewhere in her head.  The pain is not preceded by any change in vision, smell.  She has no neurological sx with it.    She is up to date with her eye visits and has no vision concerns.  Her feet are fine.\      Current Outpatient Medications on File Prior to Visit:  aspirin 81 MG chewable tablet Take 81 mg by mouth every other day    glucose ([facility] GLUCOSE) 40 % GEL Take 15 g by mouth as needed   glucose blood VI test strips strip Test up to ten times per day as directed   NOVOLOG VIAL 100 UNIT/ML soln USE IN INSULIN PUMP. PATIENT USES APPROXIMITELY 50-60 UNITS IN 24 HOURS   simvastatin (ZOCOR) 80 MG tablet Take 1 tablet (80 mg) by mouth At Bedtime   simvastatin (ZOCOR) 80 MG tablet Take 1 tablet (80 mg) by mouth At Bedtime   glucagon (GLUCAGON EMERGENCY) 1 MG injection Inject 1 mg into the muscle once for 1 dose Use as directed     No current facility-administered medications on file prior to visit.     ROS: 10 point ROS neg other than the symptoms noted above in the HPI.  So Hx -  with 3 children.  Sons age 11 and 18 both have mental health issues that are stressful for her.    Vital signs:   /71   Pulse 101   Ht 1.511 m (4' 11.5\")   Wt 62.5 kg (137 lb 12.8 oz)   BMI 27.37 kg/m     Estimated body mass index is 27.37 kg/m  as calculated from the following:    Height as of this encounter: 1.511 m (4' 11.5\").    Weight as of this encounter: 62.5 kg (137 lb 12.8 oz).    VSS  NAD  Eyes - no periorbital edema, conjunctival injection, scleral icterus  Neck - no thyromegaly  Lungs - clear  CV - RRR.  Normal S1, S2 w/o murmur " or gallop.  No carotid bruits.  JVD absent at 45 degree..  No edema  Chest - non tender when pressure is applied to lateral ribs, but she winces when I press on sternum.  She has no palpable mass over sternum  Abd - NBS, soft and non-tender without hepatomegaly or masses  Neuro- DTR 2/4 biceps  Skin - normal texture   Mood - anxious.      Recent Labs   Lab Test 02/12/19 11/14/18 08/14/18  1322 08/14/18  1320  03/24/17  0906 02/14/17  1157 02/14/17  1149  02/10/16  0949  07/16/13  1635 04/03/13  1516   A1C  --   --   --   --   --   --   --   --   --   --   --  7.9* 7.9*   HEMOGLOBINA1 7.4* 7.9*  --   --    < >  --   --   --    < >  --    < >  --   --    TSH  --   --   --  1.93  --   --   --  1.57  --  1.13   < >  --   --    T4  --   --   --   --   --   --   --  0.84  --  0.84   < >  --   --    LDL  --   --   --  72  --  69  --   --   --  101*   < >  --   --    HDL  --   --   --  61  --  73  --   --   --  78   < >  --   --    TRIG  --   --   --  94  --  59  --   --   --  58   < >  --   --    CR  --   --   --  0.62  --   --   --  0.62   < > 0.66   < >  --   --    MICROL  --   --  <5  --   --   --  <5  --   --  8   < >  --   --     < > = values in this interval not displayed.       Assessment and plan:    1.  Diabetes control.  Her overall control is fine and she is not having serious hypoglycemia.  However, I think she would do better if she were able to get her insulin bolus 15 minutes before her meals.  Unfortunately we have been unsuccessful in overcoming the barriers she confronts in doing so.  I did recommend she consider an alternative approach such as using fiasp or afrezza.  She is not eager to do either of these.  Her 530 G pump is out of warranty.  I recommended she reviewed the available pumps with our nurse educator.  Since she likes her Dex com, she might do well by getting a tandem IQ pump.  The low glucose suspended function would be very beneficial for her.    2.  Diabetes complications.  Up to date  with screens and has none.    3.  Chest pain.  This is been a long-standing problem for her that has not really changed over the years.  On exam today she has clear evidence of costochondritis.  I recommended she take ibuprofen 600 mg every 8 hours for the next week or 2 to see if that relieves the pain.  She does not appear to have other evidence of rheumatologic disease so this is probably just an isolated costochondritis.  If this pain is not relieved by ibuprofen, I will ask her to see her primary care doctor for further evaluation.  I also sent her some exercises in the mail that she might try to relieve her pain.    4.CVD risk.  She is on a statin and her LDL is at target.  Her blood pressure is fine.    5.  Headaches.  Will refer to neurology for w/u    F/u with Jazmyne Jean in 3 mo and f/u with me in 6 mo      I spent 40 minutes with this patient face to face and explained the conditions and plans (more than 50% of time was counseling/coordination of diabetes care) . The patient understood and is satisfied with today's visit.     Paola Chahal MD

## 2019-02-12 NOTE — PATIENT INSTRUCTIONS
We talked about the tanden IQ pump and fiasp, the fast acting novolog.    Take ibuprofen 400 mg every 8 hours with food for 1-2 weeks

## 2019-02-12 NOTE — PROGRESS NOTES
This 53 year old woman returns for f/u of her type 1 diabetes that she has had for 45+ years.  She is co-managed with sarah Jean    She is using a  530g pump with Dexcom sensor.      Her current pump settings are  Midnight 0.65  2:00 am 0.5  10 am 0.65  10:30 pm 0.6    Her carb ratio is 12    correction  70 with target of 100/150 midnight to 7 am and 21:00 to midnight; 100/120 during day.    Active insulin is 3 hours.    During the last month, she wore her Dex, 93% of the time.  Her average glucose on the sensor was 171.  She was in target 54% of the time, 180-250 42% of the time, above 250 10.9% of the time, 50-70 3.2% of the time, and below 50 for 0.9% of the time.    Reviewing the individual days along with her pump settings shows that she boluses 4.8 times a day on average, nearly all of them done manually.    She tends to fall between midnight and 6 AM if she has a late dinner and boluses after she eats.  On other nights she tends to be stable.  She has a lot of difficulty getting her meal boluses before and even with her meal.    She generally recognizes her low sugars when she hears the low glucose alarm go off on her sensor.  She makes sure her BG is above 100 whenever she drives.    She has seen her eye MD and things are OK. She denies feet concerns.  She has no SOB.  Her mood is OK, but she is very stressed by her son's mental health issues.  She has occasional CP that she has had for years and has not been found to be ischemic in origin. The pain has been worse lately and actually interferes with her ability to do her usual work.  Sometimes she feels nauseated with the pain, but she never has vomiting, SOB.  The pain is made worse when she assumes a crouched over position and presses on her sternum.  She has taken ibuprofen 600 mg to relieve these sx and that hasn't worked.    She also has had a stabbing pain occurs behind her left ear about 4 times a day.  It lasts for about 10 seconds. This has been  "going on for weeks.  This too interferes with her ability to concentrate on her daily activities. She doesn't have pain elsewhere in her head.  The pain is not preceded by any change in vision, smell.  She has no neurological sx with it.    She is up to date with her eye visits and has no vision concerns.  Her feet are fine.\      Current Outpatient Medications on File Prior to Visit:  aspirin 81 MG chewable tablet Take 81 mg by mouth every other day    glucose ([facility] GLUCOSE) 40 % GEL Take 15 g by mouth as needed   glucose blood VI test strips strip Test up to ten times per day as directed   NOVOLOG VIAL 100 UNIT/ML soln USE IN INSULIN PUMP. PATIENT USES APPROXIMITELY 50-60 UNITS IN 24 HOURS   simvastatin (ZOCOR) 80 MG tablet Take 1 tablet (80 mg) by mouth At Bedtime   simvastatin (ZOCOR) 80 MG tablet Take 1 tablet (80 mg) by mouth At Bedtime   glucagon (GLUCAGON EMERGENCY) 1 MG injection Inject 1 mg into the muscle once for 1 dose Use as directed     No current facility-administered medications on file prior to visit.     ROS: 10 point ROS neg other than the symptoms noted above in the HPI.  So Hx -  with 3 children.  Sons age 11 and 18 both have mental health issues that are stressful for her.    Vital signs:   /71   Pulse 101   Ht 1.511 m (4' 11.5\")   Wt 62.5 kg (137 lb 12.8 oz)   BMI 27.37 kg/m    Estimated body mass index is 27.37 kg/m  as calculated from the following:    Height as of this encounter: 1.511 m (4' 11.5\").    Weight as of this encounter: 62.5 kg (137 lb 12.8 oz).    VSS  NAD  Eyes - no periorbital edema, conjunctival injection, scleral icterus  Neck - no thyromegaly  Lungs - clear  CV - RRR.  Normal S1, S2 w/o murmur or gallop.  No carotid bruits.  JVD absent at 45 degree..  No edema  Chest - non tender when pressure is applied to lateral ribs, but she winces when I press on sternum.  She has no palpable mass over sternum  Abd - NBS, soft and non-tender without hepatomegaly or " masses  Neuro- DTR 2/4 biceps  Skin - normal texture   Mood - anxious.      Recent Labs   Lab Test 02/12/19 11/14/18 08/14/18  1322 08/14/18  1320  03/24/17  0906 02/14/17  1157 02/14/17  1149  02/10/16  0949  07/16/13  1635 04/03/13  1516   A1C  --   --   --   --   --   --   --   --   --   --   --  7.9* 7.9*   HEMOGLOBINA1 7.4* 7.9*  --   --    < >  --   --   --    < >  --    < >  --   --    TSH  --   --   --  1.93  --   --   --  1.57  --  1.13   < >  --   --    T4  --   --   --   --   --   --   --  0.84  --  0.84   < >  --   --    LDL  --   --   --  72  --  69  --   --   --  101*   < >  --   --    HDL  --   --   --  61  --  73  --   --   --  78   < >  --   --    TRIG  --   --   --  94  --  59  --   --   --  58   < >  --   --    CR  --   --   --  0.62  --   --   --  0.62   < > 0.66   < >  --   --    MICROL  --   --  <5  --   --   --  <5  --   --  8   < >  --   --     < > = values in this interval not displayed.       Assessment and plan:    1.  Diabetes control.  Her overall control is fine and she is not having serious hypoglycemia.  However, I think she would do better if she were able to get her insulin bolus 15 minutes before her meals.  Unfortunately we have been unsuccessful in overcoming the barriers she confronts in doing so.  I did recommend she consider an alternative approach such as using fiasp or afrezza.  She is not eager to do either of these.  Her 530 G pump is out of warranty.  I recommended she reviewed the available pumps with our nurse educator.  Since she likes her Dex com, she might do well by getting a tandem IQ pump.  The low glucose suspended function would be very beneficial for her.    2.  Diabetes complications.  Up to date with screens and has none.    3.  Chest pain.  This is been a long-standing problem for her that has not really changed over the years.  On exam today she has clear evidence of costochondritis.  I recommended she take ibuprofen 600 mg every 8 hours for the next week  or 2 to see if that relieves the pain.  She does not appear to have other evidence of rheumatologic disease so this is probably just an isolated costochondritis.  If this pain is not relieved by ibuprofen, I will ask her to see her primary care doctor for further evaluation.  I also sent her some exercises in the mail that she might try to relieve her pain.    4.CVD risk.  She is on a statin and her LDL is at target.  Her blood pressure is fine.    5.  Headaches.  Will refer to neurology for w/u    F/u with Jazmyne Jean in 3 mo and f/u with me in 6 mo      I spent 40 minutes with this patient face to face and explained the conditions and plans (more than 50% of time was counseling/coordination of diabetes care) . The patient understood and is satisfied with today's visit.     Paola Chahal MD

## 2019-03-06 ENCOUNTER — OFFICE VISIT (OUTPATIENT)
Dept: NEUROLOGY | Facility: CLINIC | Age: 54
End: 2019-03-06
Payer: COMMERCIAL

## 2019-03-06 VITALS — HEART RATE: 86 BPM | SYSTOLIC BLOOD PRESSURE: 131 MMHG | OXYGEN SATURATION: 100 % | DIASTOLIC BLOOD PRESSURE: 66 MMHG

## 2019-03-06 DIAGNOSIS — H93.A9 PULSATILE TINNITUS: ICD-10-CM

## 2019-03-06 DIAGNOSIS — Z86.39 HISTORY OF DIABETES MELLITUS, TYPE I: ICD-10-CM

## 2019-03-06 DIAGNOSIS — G44.86 CERVICOGENIC HEADACHE: ICD-10-CM

## 2019-03-06 DIAGNOSIS — R29.2 HYPERREFLEXIA: ICD-10-CM

## 2019-03-06 DIAGNOSIS — Z87.898 HISTORY OF LOSS OF CONSCIOUSNESS: ICD-10-CM

## 2019-03-06 DIAGNOSIS — R51.9 HEADACHE DISORDER: Primary | ICD-10-CM

## 2019-03-06 ASSESSMENT — ENCOUNTER SYMPTOMS
NIGHT SWEATS: 1
LOSS OF CONSCIOUSNESS: 0
TINGLING: 1
SYNCOPE: 0
CHILLS: 1
DECREASED CONCENTRATION: 1
SPEECH CHANGE: 1
DEPRESSION: 1
SLEEP DISTURBANCES DUE TO BREATHING: 0
INSOMNIA: 1
POLYDIPSIA: 1
LEG PAIN: 0
NUMBNESS: 1
EXERCISE INTOLERANCE: 1
TREMORS: 0
DIZZINESS: 0
HEADACHES: 1
SEIZURES: 0
DISTURBANCES IN COORDINATION: 0
LIGHT-HEADEDNESS: 0
WEAKNESS: 1
FEVER: 0
HYPOTENSION: 0
FATIGUE: 1
HALLUCINATIONS: 0
HYPERTENSION: 1
NERVOUS/ANXIOUS: 1
PARALYSIS: 0
ALTERED TEMPERATURE REGULATION: 1
PALPITATIONS: 1
WEIGHT LOSS: 0
ORTHOPNEA: 0
POLYPHAGIA: 0
PANIC: 0
WEIGHT GAIN: 0
INCREASED ENERGY: 1
DECREASED APPETITE: 0
MEMORY LOSS: 1

## 2019-03-06 ASSESSMENT — PAIN SCALES - GENERAL: PAINLEVEL: MODERATE PAIN (4)

## 2019-03-06 NOTE — LETTER
"3/6/2019       RE: Kiera Dobson  [address]  [facility] [address]     Dear Colleague,    Thank you for referring your patient, Kiera Dobson, to the [facility] NEUROLOGY at [facility]. Please see a copy of my visit note below.    Re: Kiera Dobson      MRN# [id number]  YOB: 1965  Date of Visit:3/6/2019    OUTPATIENT NEUROLOGY VISIT NOTE    Chief Complaint:  Headache evaluation    History of Present Illness  Kiera Dobson is a 53-year-old female presents to the clinic today for headache evaluation.    Headache History:      Onset History:  All thru life but for about a year \"different headaches\". Father -DM type 1 and \"brain tumor\" and mother a MG    Current Headache Pattern:      Frequency (How many headache days per month?):  Daily headaches for about a year-wakes up with a throbbing on both sides of head lasting for a couple of hours and takes ibuprofen and headache becomes milder/subsides but it does not go away and headache returns and takes ibuprofen again -takes ibuprofen 2-3 times per day 600 mg     Duration of Headache:  Daily but an intense headache for about an hour     Aura: none   Associated Symptoms:  Heart beating/Ringing in the ears for a year,        Description of Headache Pain & Location:  Left sided sharp pain or throbbing for a second and goes away, throbbing and pressure on both sides, pain level 4/10 but goes up to 7/10     Treatments Tried:  Ibuprofen almost daily     Have you needed to utilize the Emergency Room to treat your headache symptoms?  If so, how often and when was the last time used:  No  Are Headaches worsening over time?  No    What makes your headaches better? Destruction and moving around and not thinking of headache    What makes your headaches worse or triggers your headaches? Blood sugars fluctuations (when needs to bring her blood sugar down),     Sleeping-does not sleep well and restless and \"all over the " "place\"   Caffeine -\"watering down coffee\" a small amount but with a lot of water  ADD and stopped taking medications because messed with her mood -\"put too much in the focus and could not it\"  Depression/anxiety -I'm Ok, but kids therapy  Patient reports that she \"snaps\" easily and gets angry    No recent head injuries but in the past  History of DM related seizures-last LOC a little a year ago with blood sugar of 12 and was found by her co-worker and was issue with a pump.   Patient woke on the ground and no clue if she hit her head. Had headache afterwards.     Denies history of head or neck trauma, dizziness, vertigo, loss of consciousness,  blurred vision, hearing difficulty, speech or swallowing difficulty, weakness or numbness in face, arms or legs, urinary or bowel incontinence, coordination problems or gait difficulty, fever or chills.    Neurodiagnostic Testing    MRI brain about 15 years ago for DM studies but not available for review in PACS  Lab reviewed  Normal in 2018  Past Medical History reviewed and verified with the patient  ADD/ADHD  Headache for a year  Past Medical History:   Diagnosis Date     Diabetes mellitus (H)     Type 1     Elevated cholesterol      Glaucoma     Glaucoma suspect     Glaucoma suspect      Kidney stones      Nonsenile cataract        Past Surgical History reviewed and verified with the patient   x3  Past Surgical History:   Procedure Laterality Date     APPENDECTOMY OPEN       BIOPSY OF SKIN LESION  2011    returned 2012 for additional removal     c sections  99,97,08     EXTRACORPOREAL SHOCK WAVE LITHOTRIPSY, CYSTOSCOPY, INSERT STENT URETER(S), COMBINED       LASER HOLMIUM LITHOTRIPSY URETER(S), INSERT STENT, COMBINED  2013    Procedure: COMBINED CYSTOSCOPY, URETEROSCOPY, LASER HOLMIUM LITHOTRIPSY URETER(S), INSERT STENT;  Right Ureteroscopy Holmium Laser Lithotripsy Stent Placement;  Surgeon: Kirill Marlow MD;  Location: [facility] OR "       Family History reviewed and verified with the patient  Mother -RA, MG, poly nephritis, heart condition  Father-lung CA and metastatic brain tumor, DM type 1, dementia,  at the age of 79, OCD  4 siblings -they are all fine and may be sister has OCD     Social History:   for 23 but together 26, three kids-22,19 and 11  Social History     Tobacco Use     Smoking status: Never Smoker     Smokeless tobacco: Never Used   Substance Use Topics     Alcohol use: Yes     Comment: occasionally 1-2 beers    reviewed and verified with the patient     Allergies   Allergen Reactions     Ampicillin Sodium Rash       Current Outpatient Medications   Medication Sig Dispense Refill     aspirin 81 MG chewable tablet Take 81 mg by mouth every other day  108 tablet 3     blood glucose (ERICA CONTOUR NEXT) test strip Use to test blood sugar 4-6 times daily or as directed. 200 strip 11     glucose ([facility] GLUCOSE) 40 % GEL Take 15 g by mouth as needed       glucose blood VI test strips strip Test up to ten times per day as directed 5 Box 8     NOVOLOG VIAL 100 UNIT/ML soln USE IN INSULIN PUMP. PATIENT USES APPROXIMITELY 50-60 UNITS IN 24 HOURS 60 mL 3     simvastatin (ZOCOR) 80 MG tablet Take 1 tablet (80 mg) by mouth At Bedtime 90 tablet 3     simvastatin (ZOCOR) 80 MG tablet Take 1 tablet (80 mg) by mouth At Bedtime 30 tablet 0     glucagon (GLUCAGON EMERGENCY) 1 MG injection Inject 1 mg into the muscle once for 1 dose Use as directed 1 mg 2   reviewed and verified with the patient    Review of Systems:   A 12-point ROS including constitutional, eyes, ENT, respiratory, cardiovascular, gastroenterology, genitourinary, integumentary, musculoskeletal, neurology, hematology and psychiatric were all reviewed with the patient and completed at the Neuroscience Services Question nary and as mentioned in the HPI.   Left hand index finger for about 2-3 months and feeling of soreness  Chest pain daily for many years and was evaluated  and presumed chest wall/costochondritis    General Exam:   /66 (BP Location: Right arm, Patient Position: Sitting, Cuff Size: Adult Regular)   Pulse 86   SpO2 100%   GEN: Awake, NAD; good eye contact, responses appropriately, healthy appearing,   HEENT: Head atraumatic/Normocephalic. Scalp normal. Paracervical tendeness, Pupils equally round, 4 mm, reactive to light and accommodation, sclera and conjunctiva normal. Fundoscopic examination reveals normal vessels no papilledema.   Neck: Easily moveable without resistance  Heart: S1/S2 appreciated, RRR, no m/r/g, no carotid bruits  Lungs:Lungs are clear to auscultation bilaterally, no wheezes or crackles.   Neurological Examination:  The patient is alert and oriented times four. Has good attention and concentration.  Speech is fluent without dysarthria.   Cranial nerves:  CN I deferred.   CN II: Intact and full visual fields to confrontation bilaterally. CN III, IV, VI: EOM intact. There is no nystagmus. Has conjugated gaze. Intact direct and consensual pupillary light reflexes.   CN V: Intact and symmetrical to facial sensation in the V1 through V3 bilaterally.   CN VII: Intact and symmetrical eyebrow and lid raise and eyelid closure, smiles and frown.   CN VIII: Intact to finger rub bilaterally.   CN IX and X: The palates elevates symmetrical. The uvula is midline.   CN XII: The tongue protrudes midline with no atrophy or fasciculations.   Motor exam: The patient has a normal bulk and tone throughout. There is no atrophy, fasciculations, clonus, or abnormal movements appreciated.   Strength Exam:  5/5 strength at shoulder abduction, elbow flexion or extension, wrist flexion or extension, finger abduction, , hip flexion and extension, knee flexion and extension, and dorsiflexion and plantarflexion bilaterally.   Sensation is intact to light touch  throughout. Reflexes are 2+ symmetrical at biceps, triceps, brachioradialis, 3+ patellar, and not able to  illicit Achilles.   Negative Babinski with downgoing toes bilaterally.   Coordination reveals finger-nose-finger, rapid alternating movements, finger tapping with normal speed and accuracy.   Station and gait is normal. There is no ataxia. Can walk on the toes, heels, and tandem walk without difficulty.Has no drift and a negative Romberg.    Assessment and Plan:  Headache daily and a new in onset about a year ago and intermittent sharp pain lasting for a second but may reoccur within a minute with neck movement. Headache possible  mixed etiology -MOH, cervicogenic, tension or other etiology.  Headache for a year and onset around time of episode of severe hypoglycemia with LOC. Pulsitive tinnitus . History of DM and last brain MRI about 15 years ago. Recommended brain MRI for secondary headaches and return after brain MRI to discuss treatment after an evaluation.    I discussed all my recommendation with Kiera Dobson. The patient verbalizes understanding and comfortable with the plan. The patient has our clinic phone number to call with any questions or concerns. All of the patient's questions were answered from the best of my current knowledge.     Thank you for letting me be a part of the treatment team for Kiera Dobson    Time spent with pt answering questions, discussing findings, counseling and coordinating care was more than 50% the appointment time, 52  minutes.     RAULITO Borrero, CNP  [facility]

## 2019-03-06 NOTE — PROGRESS NOTES
"Re: Kiera Dobson      MRN# [id number]  YOB: 1965  Date of Visit:3/6/2019    OUTPATIENT NEUROLOGY VISIT NOTE    Chief Complaint:  Headache evaluation    History of Present Illness  Kiera Dobson is a 53-year-old female presents to the clinic today for headache evaluation.    Headache History:      Onset History:  All thru life but for about a year \"different headaches\". Father -DM type 1 and \"brain tumor\" and mother a MG    Current Headache Pattern:      Frequency (How many headache days per month?):  Daily headaches for about a year-wakes up with a throbbing on both sides of head lasting for a couple of hours and takes ibuprofen and headache becomes milder/subsides but it does not go away and headache returns and takes ibuprofen again -takes ibuprofen 2-3 times per day 600 mg     Duration of Headache:  Daily but an intense headache for about an hour     Aura: none   Associated Symptoms:  Heart beating/Ringing in the ears for a year,        Description of Headache Pain & Location:  Left sided sharp pain or throbbing for a second and goes away, throbbing and pressure on both sides, pain level 4/10 but goes up to 7/10     Treatments Tried:  Ibuprofen almost daily     Have you needed to utilize the Emergency Room to treat your headache symptoms?  If so, how often and when was the last time used:  No  Are Headaches worsening over time?  No    What makes your headaches better? Destruction and moving around and not thinking of headache    What makes your headaches worse or triggers your headaches? Blood sugars fluctuations (when needs to bring her blood sugar down),     Sleeping-does not sleep well and restless and \"all over the place\"   Caffeine -\"watering down coffee\" a small amount but with a lot of water  ADD and stopped taking medications because messed with her mood -\"put too much in the focus and could not it\"  Depression/anxiety -I'm Ok, but kids therapy  Patient reports that she \"snaps\" easily " and gets angry    No recent head injuries but in the past  History of DM related seizures-last LOC a little a year ago with blood sugar of 12 and was found by her co-worker and was issue with a pump.   Patient woke on the ground and no clue if she hit her head. Had headache afterwards.     Denies history of head or neck trauma, dizziness, vertigo, loss of consciousness,  blurred vision, hearing difficulty, speech or swallowing difficulty, weakness or numbness in face, arms or legs, urinary or bowel incontinence, coordination problems or gait difficulty, fever or chills.    Neurodiagnostic Testing    MRI brain about 15 years ago for DM studies but not available for review in PACS  Lab reviewed  Normal in 2018  Past Medical History reviewed and verified with the patient  ADD/ADHD  Headache for a year  Past Medical History:   Diagnosis Date     Diabetes mellitus (H)     Type 1     Elevated cholesterol      Glaucoma     Glaucoma suspect     Glaucoma suspect      Kidney stones      Nonsenile cataract        Past Surgical History reviewed and verified with the patient   x3  Past Surgical History:   Procedure Laterality Date     APPENDECTOMY OPEN       BIOPSY OF SKIN LESION  2011    returned 2012 for additional removal     c sections  99,97,08     EXTRACORPOREAL SHOCK WAVE LITHOTRIPSY, CYSTOSCOPY, INSERT STENT URETER(S), COMBINED       LASER HOLMIUM LITHOTRIPSY URETER(S), INSERT STENT, COMBINED  2013    Procedure: COMBINED CYSTOSCOPY, URETEROSCOPY, LASER HOLMIUM LITHOTRIPSY URETER(S), INSERT STENT;  Right Ureteroscopy Holmium Laser Lithotripsy Stent Placement;  Surgeon: Kirill Marlow MD;  Location: [facility] OR       Family History reviewed and verified with the patient  Mother -RA, MG, poly nephritis, heart condition  Father-lung CA and metastatic brain tumor, DM type 1, dementia,  at the age of 79, OCD  4 siblings -they are all fine and may be sister has OCD     Social  History:   for 23 but together 26, three kids-22,19 and 11  Social History     Tobacco Use     Smoking status: Never Smoker     Smokeless tobacco: Never Used   Substance Use Topics     Alcohol use: Yes     Comment: occasionally 1-2 beers    reviewed and verified with the patient     Allergies   Allergen Reactions     Ampicillin Sodium Rash       Current Outpatient Medications   Medication Sig Dispense Refill     aspirin 81 MG chewable tablet Take 81 mg by mouth every other day  108 tablet 3     blood glucose (ERICA CONTOUR NEXT) test strip Use to test blood sugar 4-6 times daily or as directed. 200 strip 11     glucose ([facility] GLUCOSE) 40 % GEL Take 15 g by mouth as needed       glucose blood VI test strips strip Test up to ten times per day as directed 5 Box 8     NOVOLOG VIAL 100 UNIT/ML soln USE IN INSULIN PUMP. PATIENT USES APPROXIMITELY 50-60 UNITS IN 24 HOURS 60 mL 3     simvastatin (ZOCOR) 80 MG tablet Take 1 tablet (80 mg) by mouth At Bedtime 90 tablet 3     simvastatin (ZOCOR) 80 MG tablet Take 1 tablet (80 mg) by mouth At Bedtime 30 tablet 0     glucagon (GLUCAGON EMERGENCY) 1 MG injection Inject 1 mg into the muscle once for 1 dose Use as directed 1 mg 2   reviewed and verified with the patient    Review of Systems:   A 12-point ROS including constitutional, eyes, ENT, respiratory, cardiovascular, gastroenterology, genitourinary, integumentary, musculoskeletal, neurology, hematology and psychiatric were all reviewed with the patient and completed at the [facility] Question nary and as mentioned in the HPI.   Left hand index finger for about 2-3 months and feeling of soreness  Chest pain daily for many years and was evaluated and presumed chest wall/costochondritis    General Exam:   /66 (BP Location: Right arm, Patient Position: Sitting, Cuff Size: Adult Regular)   Pulse 86   SpO2 100%   GEN: Awake, NAD; good eye contact, responses appropriately, healthy appearing,   HEENT: Head  atraumatic/Normocephalic. Scalp normal. Paracervical tendeness, Pupils equally round, 4 mm, reactive to light and accommodation, sclera and conjunctiva normal. Fundoscopic examination reveals normal vessels no papilledema.   Neck: Easily moveable without resistance  Heart: S1/S2 appreciated, RRR, no m/r/g, no carotid bruits  Lungs:Lungs are clear to auscultation bilaterally, no wheezes or crackles.   Neurological Examination:  The patient is alert and oriented times four. Has good attention and concentration.  Speech is fluent without dysarthria.   Cranial nerves:  CN I deferred.   CN II: Intact and full visual fields to confrontation bilaterally. CN III, IV, VI: EOM intact. There is no nystagmus. Has conjugated gaze. Intact direct and consensual pupillary light reflexes.   CN V: Intact and symmetrical to facial sensation in the V1 through V3 bilaterally.   CN VII: Intact and symmetrical eyebrow and lid raise and eyelid closure, smiles and frown.   CN VIII: Intact to finger rub bilaterally.   CN IX and X: The palates elevates symmetrical. The uvula is midline.   CN XII: The tongue protrudes midline with no atrophy or fasciculations.   Motor exam: The patient has a normal bulk and tone throughout. There is no atrophy, fasciculations, clonus, or abnormal movements appreciated.   Strength Exam:  5/5 strength at shoulder abduction, elbow flexion or extension, wrist flexion or extension, finger abduction, , hip flexion and extension, knee flexion and extension, and dorsiflexion and plantarflexion bilaterally.   Sensation is intact to light touch  throughout. Reflexes are 2+ symmetrical at biceps, triceps, brachioradialis, 3+ patellar, and not able to illicit Achilles.   Negative Babinski with downgoing toes bilaterally.   Coordination reveals finger-nose-finger, rapid alternating movements, finger tapping with normal speed and accuracy.   Station and gait is normal. There is no ataxia. Can walk on the toes, heels,  and tandem walk without difficulty.Has no drift and a negative Romberg.    Assessment and Plan:  Headache daily and a new in onset about a year ago and intermittent sharp pain lasting for a second but may reoccur within a minute with neck movement. Headache possible  mixed etiology -MOH, cervicogenic, tension or other etiology.  Headache for a year and onset around time of episode of severe hypoglycemia with LOC. Pulsitive tinnitus . History of DM and last brain MRI about 15 years ago. Recommended brain MRI for secondary headaches and return after brain MRI to discuss treatment after an evaluation.    I discussed all my recommendation with Keira Dobson. The patient verbalizes understanding and comfortable with the plan. The patient has our clinic phone number to call with any questions or concerns. All of the patient's questions were answered from the best of my current knowledge.     Thank you for letting me be a part of the treatment team for Kiera Dobson      Time spent with pt answering questions, discussing findings, counseling and coordinating care was more than 50% the appointment time, 52  minutes.         RAULITO Borrero, CNP  [facility]    Answers for HPI/ROS submitted by the patient on 3/6/2019   General Symptoms: Yes  Skin Symptoms: No  HENT Symptoms: No  EYE SYMPTOMS: No  HEART SYMPTOMS: Yes  LUNG SYMPTOMS: No  INTESTINAL SYMPTOMS: No  URINARY SYMPTOMS: No  GYNECOLOGIC SYMPTOMS: No  BREAST SYMPTOMS: No  SKELETAL SYMPTOMS: No  BLOOD SYMPTOMS: No  NERVOUS SYSTEM SYMPTOMS: Yes  MENTAL HEALTH SYMPTOMS: Yes  Fever: No  Loss of appetite: No  Weight loss: No  Weight gain: No  Fatigue: Yes  Night sweats: Yes  Chills: Yes  Increased stress: Yes  Excessive hunger: No  Excessive thirst: Yes  Feeling hot or cold when others believe the temperature is normal: Yes  Loss of height: No  Post-operative complications: No  Surgical site pain: No  Hallucinations: No  Change in or Loss of Energy:  Yes  Hyperactivity: Yes  Confusion: Yes  Chest pain or pressure: Yes  Fast or irregular heartbeat: Yes  Pain in legs with walking: No  Trouble breathing while lying down: No  Fingers or toes appear blue: No  High blood pressure: Yes  Low blood pressure: No  Fainting: No  Murmurs: Yes  Pacemaker: No  Varicose veins: No  Edema or swelling: Yes  Wake up at night with shortness of breath: No  Light-headedness: No  Exercise intolerance: Yes  Trouble with coordination: No  Dizziness or trouble with balance: No  Fainting or black-out spells: No  Memory loss: Yes  Headache: Yes  Seizures: No  Speech problems: Yes  Tingling: Yes  Tremor: No  Weakness: Yes  Difficulty walking: No  Paralysis: No  Numbness: Yes  Nervous or Anxious: Yes  Depression: Yes  Trouble sleeping: Yes  Trouble thinking or concentrating: Yes  Mood changes: Yes  Panic attacks: No

## 2019-03-14 ENCOUNTER — ANCILLARY PROCEDURE (OUTPATIENT)
Dept: MRI IMAGING | Facility: CLINIC | Age: 54
End: 2019-03-14
Attending: NURSE PRACTITIONER
Payer: COMMERCIAL

## 2019-03-14 DIAGNOSIS — G44.86 CERVICOGENIC HEADACHE: ICD-10-CM

## 2019-03-14 DIAGNOSIS — R51.9 HEADACHE DISORDER: ICD-10-CM

## 2019-03-14 DIAGNOSIS — Z87.898 HISTORY OF LOSS OF CONSCIOUSNESS: ICD-10-CM

## 2019-03-14 DIAGNOSIS — H93.A9 PULSATILE TINNITUS: ICD-10-CM

## 2019-03-14 DIAGNOSIS — Z86.39 HISTORY OF DIABETES MELLITUS, TYPE I: ICD-10-CM

## 2019-03-14 DIAGNOSIS — R29.2 HYPERREFLEXIA: ICD-10-CM

## 2019-03-14 RX ORDER — GADOBUTROL 604.72 MG/ML
7.5 INJECTION INTRAVENOUS ONCE
Status: COMPLETED | OUTPATIENT
Start: 2019-03-14 | End: 2019-03-14

## 2019-03-14 RX ADMIN — GADOBUTROL 6 ML: 604.72 INJECTION INTRAVENOUS at 18:28

## 2019-03-15 NOTE — DISCHARGE INSTRUCTIONS
MRI Contrast Discharge Instructions    The IV contrast you received today will pass out of your body in your  urine. This will happen in the next 24 hours. You will not feel this process.  Your urine will not change color.    Drink at least 4 extra glasses of water or juice today (unless your doctor  has restricted your fluids). This reduces the stress on your kidneys.  You may take your regular medicines.    If you are on dialysis: It is best to have dialysis today.    If you have a reaction: Most reactions happen right away. If you have  any new symptoms after leaving the hospital (such as hives or swelling),  call your hospital at the correct number below. Or call your family doctor.  If you have breathing distress or wheezing, call 911.    Special instructions: ***    I have read and understand the above information.    Signature:______________________________________ Date:___________    Staff:__________________________________________ Date:___________     Time:__________    [facility] Departments:    ___[place]: [phone]  ___[facility]: [phone]  ___[place]: [phone] ___[facility]: [phone]  ___[facility]: [phone]  ___[facility]: [phone]  ___[phone]  ___[facility]: [phone]  ___[phone]

## 2019-03-15 NOTE — RESULT ENCOUNTER NOTE
Christiano Qureshi  Your recent cervical MRI  images were reviewed. No acute findings at this time  Take care, Taylor

## 2019-03-15 NOTE — RESULT ENCOUNTER NOTE
Christiano Qureshi,   Your recent brain MRI and MRA images were reviewed and no acute findings at this time.   Will review your images at your next follow up visit.   Take care, Taylor

## 2019-03-17 ENCOUNTER — MYC REFILL (OUTPATIENT)
Dept: ENDOCRINOLOGY | Facility: CLINIC | Age: 54
End: 2019-03-17

## 2019-03-17 DIAGNOSIS — N20.0 CALCULUS OF KIDNEY: ICD-10-CM

## 2019-03-18 NOTE — TELEPHONE ENCOUNTER
NOVOLOG VIAL 100 UNIT/ML soln       Last Written Prescription Date:  11-10-17  Last Fill Quantity: 60 ml,   # refills: 3  Last Office Visit : 2-12-19  Future Office visit:  5-22-19    Routing refill request to provider for review/approval because:  Insulin - refilled per clinic      Kathleen M Doege RN

## 2019-03-19 ASSESSMENT — ENCOUNTER SYMPTOMS
NUMBNESS: 1
EYE WATERING: 0
INCREASED ENERGY: 1
PANIC: 0
HOT FLASHES: 1
DIZZINESS: 0
SMELL DISTURBANCE: 0
FEVER: 0
EYE PAIN: 0
WEAKNESS: 1
DOUBLE VISION: 0
EYE IRRITATION: 0
LEG PAIN: 0
LIGHT-HEADEDNESS: 0
TREMORS: 0
SYNCOPE: 0
NERVOUS/ANXIOUS: 1
TINGLING: 1
ORTHOPNEA: 0
LOSS OF CONSCIOUSNESS: 0
HOARSE VOICE: 0
WEIGHT GAIN: 0
CHILLS: 1
DECREASED APPETITE: 0
HEADACHES: 1
SPEECH CHANGE: 1
HYPOTENSION: 0
DEPRESSION: 1
EYE REDNESS: 0
SORE THROAT: 0
POLYDIPSIA: 1
NIGHT SWEATS: 0
HYPERTENSION: 0
ALTERED TEMPERATURE REGULATION: 1
SEIZURES: 0
EXERCISE INTOLERANCE: 1
INSOMNIA: 1
DECREASED LIBIDO: 1
NECK MASS: 0
FATIGUE: 1
SINUS PAIN: 1
MEMORY LOSS: 1
WEIGHT LOSS: 0
SINUS CONGESTION: 0
HALLUCINATIONS: 0
DECREASED CONCENTRATION: 1
TROUBLE SWALLOWING: 0
DISTURBANCES IN COORDINATION: 0
POLYPHAGIA: 0
TASTE DISTURBANCE: 0
PALPITATIONS: 1
SLEEP DISTURBANCES DUE TO BREATHING: 0
PARALYSIS: 0

## 2019-03-20 ENCOUNTER — MEDICAL CORRESPONDENCE (OUTPATIENT)
Dept: HEALTH INFORMATION MANAGEMENT | Facility: CLINIC | Age: 54
End: 2019-03-20

## 2019-03-20 ENCOUNTER — OFFICE VISIT (OUTPATIENT)
Dept: NEUROLOGY | Facility: CLINIC | Age: 54
End: 2019-03-20
Payer: COMMERCIAL

## 2019-03-20 VITALS — SYSTOLIC BLOOD PRESSURE: 130 MMHG | HEART RATE: 84 BPM | DIASTOLIC BLOOD PRESSURE: 62 MMHG | OXYGEN SATURATION: 99 %

## 2019-03-20 DIAGNOSIS — M79.18 CHRONIC MYOFASCIAL PAIN: ICD-10-CM

## 2019-03-20 DIAGNOSIS — Z86.39 HISTORY OF DIABETES MELLITUS, TYPE I: ICD-10-CM

## 2019-03-20 DIAGNOSIS — G89.29 CHRONIC MYOFASCIAL PAIN: ICD-10-CM

## 2019-03-20 DIAGNOSIS — G44.221 CHRONIC TENSION-TYPE HEADACHE, INTRACTABLE: ICD-10-CM

## 2019-03-20 DIAGNOSIS — G44.86 CERVICOGENIC HEADACHE: Primary | ICD-10-CM

## 2019-03-20 RX ORDER — GABAPENTIN 100 MG/1
100 CAPSULE ORAL AT BEDTIME
Qty: 30 CAPSULE | Refills: 1 | Status: SHIPPED | OUTPATIENT
Start: 2019-03-20 | End: 2019-05-16

## 2019-03-20 ASSESSMENT — PAIN SCALES - GENERAL: PAINLEVEL: NO PAIN (0)

## 2019-03-20 NOTE — PROGRESS NOTES
Re: Kiera Dobson      MRN# [id number]  YOB: 1965  Date of Visit:3/20/2019     OUTPATIENT NEUROLOGY VISIT NOTE    Reason for Visit:  Headache follow up    Interval History:  Kiera Dobson is a 53-year-old female presents to the clinic today for headache follow up.   History of DM type 1  Initial Neurology visit for headache on 3/6/2019, see note for details.     Reports that Headaches are daily and patient reports that computer screen may be a contributing factor here.   Last ophthalmology evaluation on 4/03/2017 and due for one  Headache is typical and feels like pulsing pressure. Patient denies light or noise sensitivity. No nausea or vomiting. Patient reports that stress is high work is the worse and family related and health related -DM.   Patient reports that sleeping is poor and restless.   Possible neck/head injury in August of 2018 but work/stress related headaches before that.   Patient reports that she tries to stop ibuprofen  Patient reports that she is afraid to sleep to deep because she may be hyperglycemic or hypoglycemic and may not wake up to readjust the pump settings.   Discussed trial of preventive medication, including a trial of gabapentin for headache prevention, side effects discussed    Headache treatment Plan discussed with the patient:  Physical therapy for possible myofascial/cervicogenic component of patient's headache.   Naproxen 1-2 tabs every 12 hours as needed for pain. Limit use all of your acute pain medications to no more than 10 days per month  Headache preventive treatment-gabapentin 100 mg at bedtime for one week then may try to increase to 200 mg at bedtime.  Physical therapy for myofascial release and craniosacral therapy  Follow up on 5/22/2019    Neurodiagnostic Testing    Reviewed with patient -MRI brain, cervical and MRA neck/brain reviewed and no acute findings to explain patient's symptoms  Impression:    1. Normal 7th and 8th cranial nerves  bilaterally.  2. No aneurysmal dilations or stenosis of the main intracranial  arteries.  3. Patent major cervical arteries.    Past Medical History reviewed    Social History     Tobacco Use     Smoking status: Never Smoker     Smokeless tobacco: Never Used   Substance Use Topics     Alcohol use: Yes     Comment: occasionally 1-2 beers    reviewed and verified with the patient     Allergies   Allergen Reactions     Ampicillin Sodium Rash       Current Outpatient Medications   Medication Sig Dispense Refill     aspirin 81 MG chewable tablet Take 81 mg by mouth every other day  108 tablet 3     blood glucose (ERICA CONTOUR NEXT) test strip Use to test blood sugar 4-6 times daily or as directed. 200 strip 11     glucose ([facility] GLUCOSE) 40 % GEL Take 15 g by mouth as needed       glucose blood VI test strips strip Test up to ten times per day as directed 5 Box 8     insulin aspart (NOVOLOG VIAL) 100 UNITS/ML vial Use with pump  Approx  60 units daily 60 mL 3     simvastatin (ZOCOR) 80 MG tablet Take 1 tablet (80 mg) by mouth At Bedtime 90 tablet 3     glucagon (GLUCAGON EMERGENCY) 1 MG injection Inject 1 mg into the muscle once for 1 dose Use as directed 1 mg 2   reviewed and verified with the patient    Review of Systems:   A 10-point ROS including constitutional, eyes, respiratory, cardiovascular, gastroenterology, genitourinary, integumentary, musculoskeletal, neurology and psychiatric were all negative except as mentioned in the interval history.      General Exam:   /62 (BP Location: Right arm, Patient Position: Chair, Cuff Size: Adult Regular)   Pulse 84   SpO2 99%   GEN: Awake, NAD; good eye contact, responses appropriately, healthy appearing   HEENT: Head atraumatic/Normocephalic. Scalp normal. Pupils equally round, 4 mm, reactive to light and accommodation, sclera and conjunctiva normal. Fundoscopic examination reveals normal vessels no papilledema.   Neck: Easily moveable without resistance  The patient  is alert and oriented times four. Has good attention and concentration. Speech is fluent without dysarthria. EOM intact. There is no nystagmus. Has conjugated gaze. Face is symmetrical. Intact and symmetrical eyebrow and lid raise and eyelid closure, smiles. Hearing Intact to conversation speech. Strength  5/5 in the upper and lower extremities bilaterally. Sensation is intact to  touch throughout.  Reflexes symmetrical at biceps, triceps, brachioradialis, patellar, and Achilles. Coordination reveals finger-nose-finger with normal speed and accuracy. Normal casual gait.  Assessment and Plan:  See Interval History for our discussion and plan    Time spent with pt answering questions, discussing findings, counseling and coordinating care was more than 50% the appointment time, 23  minutes.         RAULITO Borrero, CNP  [facility]

## 2019-03-20 NOTE — PATIENT INSTRUCTIONS
Plan  Physical therapy  Naproxen 1-2 tabs every 12 hours as needed for pain. Limit use all of your acute pain medications to no more than 10 days per month  Headache preventive treatment-gabapentin 100 mg at bedtime for one week then may try to increase to 200 mg at bedtime.  Physical therapy for myofascial release and craniosacral therapy  Follow up on 5/22/2019      Patient Education     Gabapentin capsules or tablets  Brand Names: Active-PAC with Gabapentin, Neurontin  What is this medicine?  GABAPENTIN (GA ba pen tin) is used to control partial seizures in adults with epilepsy. It is also used to treat certain types of nerve pain.  How should I use this medicine?  Take this medicine by mouth with a glass of water. Follow the directions on the prescription label. You can take it with or without food. If it upsets your stomach, take it with food.Take your medicine at regular intervals. Do not take it more often than directed. Do not stop taking except on your doctor's advice.  If you are directed to break the 600 or 800 mg tablets in half as part of your dose, the extra half tablet should be used for the next dose. If you have not used the extra half tablet within 28 days, it should be thrown away.  A special MedGuide will be given to you by the pharmacist with each prescription and refill. Be sure to read this information carefully each time.  Talk to your pediatrician regarding the use of this medicine in children. Special care may be needed.  What side effects may I notice from receiving this medicine?  Side effects that you should report to your doctor or health care professional as soon as possible:    allergic reactions like skin rash, itching or hives, swelling of the face, lips, or tongue    worsening of mood, thoughts or actions of suicide or dying  Side effects that usually do not require medical attention (report to your doctor or health care professional if they continue or are  bothersome):    constipation    difficulty walking or controlling muscle movements    dizziness    nausea    slurred speech    tiredness    tremors    weight gain  What may interact with this medicine?  Do not take this medicine with any of the following medications:    other gabapentin products  This medicine may also interact with the following medications:    alcohol    antacids    antihistamines for allergy, cough and cold    certain medicines for anxiety or sleep    certain medicines for depression or psychotic disturbances    homatropine; hydrocodone    naproxen    narcotic medicines (opiates) for pain    phenothiazines like chlorpromazine, mesoridazine, prochlorperazine, thioridazine  What if I miss a dose?  If you miss a dose, take it as soon as you can. If it is almost time for your next dose, take only that dose. Do not take double or extra doses.  Where should I keep my medicine?  Keep out of reach of children.  This medicine may cause accidental overdose and death if it taken by other adults, children, or pets. Mix any unused medicine with a substance like cat litter or coffee grounds. Then throw the medicine away in a sealed container like a sealed bag or a coffee can with a lid. Do not use the medicine after the expiration date.  Store at room temperature between 15 and 30 degrees C (59 and 86 degrees F).  What should I tell my health care provider before I take this medicine?  They need to know if you have any of these conditions:    kidney disease    suicidal thoughts, plans, or attempt; a previous suicide attempt by you or a family member    an unusual or allergic reaction to gabapentin, other medicines, foods, dyes, or preservatives    pregnant or trying to get pregnant    breast-feeding  What should I watch for while using this medicine?  Visit your doctor or health care professional for regular checks on your progress. You may want to keep a record at home of how you feel your condition is  responding to treatment. You may want to share this information with your doctor or health care professional at each visit. You should contact your doctor or health care professional if your seizures get worse or if you have any new types of seizures. Do not stop taking this medicine or any of your seizure medicines unless instructed by your doctor or health care professional. Stopping your medicine suddenly can increase your seizures or their severity.  Wear a medical identification bracelet or chain if you are taking this medicine for seizures, and carry a card that lists all your medications.  You may get drowsy, dizzy, or have blurred vision. Do not drive, use machinery, or do anything that needs mental alertness until you know how this medicine affects you. To reduce dizzy or fainting spells, do not sit or stand up quickly, especially if you are an older patient. Alcohol can increase drowsiness and dizziness. Avoid alcoholic drinks.  Your mouth may get dry. Chewing sugarless gum or sucking hard candy, and drinking plenty of water will help.  The use of this medicine may increase the chance of suicidal thoughts or actions. Pay special attention to how you are responding while on this medicine. Any worsening of mood, or thoughts of suicide or dying should be reported to your health care professional right away.  Women who become pregnant while using this medicine may enroll in the [place] [employer] Antiepileptic Drug Pregnancy Registry by calling 1-298.651.7253. This registry collects information about the safety of antiepileptic drug use during pregnancy.  NOTE:This sheet is a summary. It may not cover all possible information. If you have questions about this medicine, talk to your doctor, pharmacist, or health care provider. Copyright  2018 [employer]

## 2019-04-03 ENCOUNTER — TRANSFERRED RECORDS (OUTPATIENT)
Dept: HEALTH INFORMATION MANAGEMENT | Facility: CLINIC | Age: 54
End: 2019-04-03

## 2019-04-05 ENCOUNTER — DOCUMENTATION ONLY (OUTPATIENT)
Dept: NEUROLOGY | Facility: CLINIC | Age: 54
End: 2019-04-05

## 2019-04-05 NOTE — PROGRESS NOTES
[facility] to [facility] 4/5/19    Honey Valdez CMA

## 2019-05-16 DIAGNOSIS — G44.86 CERVICOGENIC HEADACHE: ICD-10-CM

## 2019-05-20 RX ORDER — GABAPENTIN 100 MG/1
100 CAPSULE ORAL AT BEDTIME
Qty: 30 CAPSULE | Refills: 1 | Status: SHIPPED | OUTPATIENT
Start: 2019-05-20 | End: 2019-05-22

## 2019-05-22 ENCOUNTER — OFFICE VISIT (OUTPATIENT)
Dept: ENDOCRINOLOGY | Facility: CLINIC | Age: 54
End: 2019-05-22
Payer: COMMERCIAL

## 2019-05-22 VITALS
DIASTOLIC BLOOD PRESSURE: 66 MMHG | SYSTOLIC BLOOD PRESSURE: 105 MMHG | WEIGHT: 137.5 LBS | BODY MASS INDEX: 27.31 KG/M2 | HEART RATE: 86 BPM

## 2019-05-22 DIAGNOSIS — E10.9 TYPE 1 DIABETES MELLITUS WITHOUT COMPLICATION (H): Primary | ICD-10-CM

## 2019-05-22 LAB — HBA1C MFR BLD: 7.2 % (ref 4.3–6)

## 2019-05-22 ASSESSMENT — PAIN SCALES - GENERAL: PAINLEVEL: MILD PAIN (2)

## 2019-05-22 NOTE — PROGRESS NOTES
HPI  Kiera Dobson is a 53 year old female with type 1 diabetes mellitus here today for a follow up visit.  Kiera has had type 1 diabetes for 45 + years.  She has mild/moderate nonproliferative diabetic retinopathy. No peripheral neuropathy or nephropathy.  Her hx is also significant for hypoglycemia unawareness.  She has been using her Medtronic 530G insulin pump and Dexcom.  Her current basal insulin rates are:  Midnight= 0.65 units/hr.  2 am = 0.5 units/hr.  10 am  = 0.65 units/hr.  22:30= 0.6 units/hr.  Her 24 hrs basal insulin dose is 14.325 units/hr.  Pt's I/C ratio is 1:12.  Sensitivity is 70.  Pt's A1C is 7.2 % today and her previous A1C was 7.4 %.  We downloaded pt's insulin pump and Dexcom today.  Her Freestyle Lizett download shows her average glucose was 170 with SD 57.  No frequent hypoglycemia.  On ROS today, she is doing well today.  Pt states her chest is sore and she has been having headaches.  She will be seen again today for her headaches. Her chest is sore when she presses on her chest. No n/v or diaphoresis and she has had several stable EKGs done in the past. She did not want to have an EKG done today. Ibuprofen does help.  No visual changes.  No numbness in her feet or feet.  Pt denies SOB or cough.  Pt denies abd pain, diarrhea, dysuria or hematuria.  No foot ulcers.    DIABETES CARE:  Retinopathy:mild/moderate nonproliferative diabetic retinopathy.  She was last seen here by Oph in July 2017.   Nephropathy: urine microalbuminuria negative in 8/2018. Neuropathy: none.  Feet: good pulses, no ulcers and normal monofilamentous exam today.  Taking ASA: yes.  Lipids: LDL 72 in Aug 2018.    Pt is taking Simvastatin.    ROS  Please see under HPI.    Allergies  Allergies   Allergen Reactions     Ampicillin Sodium Rash       Medications  Current Outpatient Medications   Medication Sig Dispense Refill     aspirin 81 MG chewable tablet Take 81 mg by mouth every other day  108 tablet 3     blood glucose  (ERICA CONTOUR NEXT) test strip Use to test blood sugar 4-6 times daily or as directed. 200 strip 11     glucose ([facility] GLUCOSE) 40 % GEL Take 15 g by mouth as needed       glucose blood VI test strips strip Test up to ten times per day as directed 5 Box 8     insulin aspart (NOVOLOG VIAL) 100 UNITS/ML vial Use with pump  Approx  60 units daily 60 mL 3     simvastatin (ZOCOR) 80 MG tablet Take 1 tablet (80 mg) by mouth At Bedtime 90 tablet 3     glucagon (GLUCAGON EMERGENCY) 1 MG injection Inject 1 mg into the muscle once for 1 dose Use as directed 1 mg 2       Family History  family history includes Alzheimer Disease in her father; Autoimmune Disease in her mother; Cancer in her father; Diabetes in her father; Glaucoma in her father and mother; Hypertension in her mother.    Social History  Smoke: none.  ETOH: rare.   with children.  Pt works full time in a lab.    Past Medical History  Past Medical History:   Diagnosis Date     Diabetes mellitus (H)     Type 1     Elevated cholesterol      Glaucoma     Glaucoma suspect     Glaucoma suspect      Kidney stones      Nonsenile cataract      Past Surgical History:   Procedure Laterality Date     APPENDECTOMY OPEN  1981     BIOPSY OF SKIN LESION  12/30/2011    returned January 2012 for additional removal     c sections  99,97,08     EXTRACORPOREAL SHOCK WAVE LITHOTRIPSY, CYSTOSCOPY, INSERT STENT URETER(S), COMBINED  2004     LASER HOLMIUM LITHOTRIPSY URETER(S), INSERT STENT, COMBINED  12/12/2013    Procedure: COMBINED CYSTOSCOPY, URETEROSCOPY, LASER HOLMIUM LITHOTRIPSY URETER(S), INSERT STENT;  Right Ureteroscopy Holmium Laser Lithotripsy Stent Placement;  Surgeon: Kirill Marlow MD;  Location: [facility]     S/P right shoulder surgery in Dec 2012.    Physical Exam  /66   Pulse 86   Wt 62.4 kg (137 lb 8 oz)   BMI 27.31 kg/m    Body mass index is 27.31 kg/m .  SKIN: Lesion upper buttocks and right foot.  HEENT:  PERRLA; fundi not examined.  NECK: Thyroid  nontender.  LUNGS: Clear b/l.  CARDIAC:  RRR.  ABDOMEN:Nontender. Some areas of lipohypertrophy.  EXTREMITIES: No edema. Equal strength in LE's b/l.  FEET: Warm, no ulcers and normal monofilamentous exam.    RESULTS  Creatinine   Date Value Ref Range Status   08/14/2018 0.62 0.52 - 1.04 mg/dL Final     GFR Estimate   Date Value Ref Range Status   08/14/2018 >90 >60 mL/min/1.7m2 Final     Comment:     Non  GFR Calc     Hemoglobin A1C   Date Value Ref Range Status   07/16/2013 7.9 (A) 4.3 - 6.0 % Final     Potassium   Date Value Ref Range Status   02/14/2017 4.6 3.4 - 5.3 mmol/L Final     ALT   Date Value Ref Range Status   02/14/2017 17 0 - 50 U/L Final     AST   Date Value Ref Range Status   02/14/2017 13 0 - 45 U/L Final     TSH   Date Value Ref Range Status   08/14/2018 1.93 0.40 - 4.00 mU/L Final     T4 Free   Date Value Ref Range Status   02/14/2017 0.84 0.76 - 1.46 ng/dL Final       Cholesterol   Date Value Ref Range Status   08/14/2018 152 <200 mg/dL Final   03/24/2017 154 <200 mg/dL Final     HDL Cholesterol   Date Value Ref Range Status   08/14/2018 61 >49 mg/dL Final   03/24/2017 73 >49 mg/dL Final     LDL Cholesterol Calculated   Date Value Ref Range Status   08/14/2018 72 <100 mg/dL Final     Comment:     Desirable:       <100 mg/dl   03/24/2017 69 <100 mg/dL Final     Comment:     Desirable:       <100 mg/dl     Triglycerides   Date Value Ref Range Status   08/14/2018 94 <150 mg/dL Final   03/24/2017 59 <150 mg/dL Final     Cholesterol/HDL Ratio   Date Value Ref Range Status   08/18/2014 2.7 0.0 - 5.0 Final   09/17/2013 2.3 0.0 - 5.0 Final     A1C      7.2   5/22/2019  A1C      7.4   2/12/2019  A1C      7.9   11/14/2018    A1C      7.6   8/14/2018  A1C      7.4   3/15/2018  A1C      7.4   12/1/2017  A1C      7.9   9/13/2017  A1C      7.7   6/1/2017  A1C      8.3   2/14/2017  A1C      7.5   8/18/2016  A1C      7.6   5/5/2016  A1C      7.8    2/10/2016  A1C      7.9    9/2/2015  A1C       7.7    5/2015  A1C      8.6    1/28/2015  A1C      8.8    9/26/2014  A1C      8.0    7/2/2014  A1C      8.4    2/28/2014    ASSESSMENT/PLAN:    1.  TYPE 1 DIABETES MELLITUS:Type 1 diabetes mellitus complicated by retinopathy and hypoglycemia unawareness.  Kiera's A1C has improved with an A1C of 7.2 % today without frequent hypoglycemia.  No other insulin rate changes today.  She remains normotensive.   Feet in good condition with normal monofilamentous exam today.  Pt's TSH is normal in August 2018.  Flu vaccine given today.    2.  MILD/MODERATE NPDR: Last seen by Oph here in July 2017.    I placed a referral for patient to see Oph here.    3.  HX OF + MICROALBUMINURIA:  Pt's microalbuminuria negative in Aug 2018.  Her creat/GFR are stable.    4. HYPERLIPIDEMIA:  LDL 72 in 8/2018.   Pt remains on high dose Simvastatin.  Since she has been on the high dose Simvastatin for years and tolerating this dose.    5.  ADHD/STRESS: Pt to see her psych staff in follow up.    6. HYPOGLYCEMIA UNAWARENESS: She denies having any recent severe hypoglycemia.    Continue to wear Dexcom daily.    7. SKIN LESIONS: I placed a referral for pt to be seen in Derm Clinic here.    8. Return to Endocrine Clinic to see Dr. Chahal in August 2019.

## 2019-05-22 NOTE — LETTER
5/22/2019       RE: Kiera Dobson  [address]  [place] [address]     Dear Colleague,    Thank you for referring your patient, Kiera Dobson, to the [facility] ENDOCRINOLOGY at [facility]. Please see a copy of my visit note below.    HPI  Kiera Dobson is a 53 year old female with type 1 diabetes mellitus here today for a follow up visit.  Kiera has had type 1 diabetes for 45 + years.  She has mild/moderate nonproliferative diabetic retinopathy. No peripheral neuropathy or nephropathy.  Her hx is also significant for hypoglycemia unawareness.  She has been using her Medtronic 530G insulin pump and Dexcom.  Her current basal insulin rates are:  Midnight= 0.65 units/hr.  2 am = 0.5 units/hr.  10 am  = 0.65 units/hr.  22:30= 0.6 units/hr.  Her 24 hrs basal insulin dose is 14.325 units/hr.  Pt's I/C ratio is 1:12.  Sensitivity is 70.  Pt's A1C is 7.2 % today and her previous A1C was 7.4 %.  We downloaded pt's insulin pump and Dexcom today.  Her Freestyle Lziett download shows her average glucose was 170 with SD 57.  No frequent hypoglycemia.  On ROS today, she is doing well today.  Pt states her chest is sore and she has been having headaches.  She will be seen again today for her headaches. Her chest is sore when she presses on her chest. No n/v or diaphoresis and she has had several stable EKGs done in the past. She did not want to have an EKG done today. Ibuprofen does help.  No visual changes.  No numbness in her feet or feet.  Pt denies SOB or cough.  Pt denies abd pain, diarrhea, dysuria or hematuria.  No foot ulcers.    DIABETES CARE:  Retinopathy:mild/moderate nonproliferative diabetic retinopathy.  She was last seen here by Oph in July 2017.   Nephropathy: urine microalbuminuria negative in 8/2018. Neuropathy: none.  Feet: good pulses, no ulcers and normal monofilamentous exam today.  Taking ASA: yes.  Lipids: LDL 72 in Aug 2018.    Pt is taking  Simvastatin.    ROS  Please see under HPI.    Allergies  Allergies   Allergen Reactions     Ampicillin Sodium Rash       Medications  Current Outpatient Medications   Medication Sig Dispense Refill     aspirin 81 MG chewable tablet Take 81 mg by mouth every other day  108 tablet 3     blood glucose (ERICA CONTOUR NEXT) test strip Use to test blood sugar 4-6 times daily or as directed. 200 strip 11     glucose ([facility] GLUCOSE) 40 % GEL Take 15 g by mouth as needed       glucose blood VI test strips strip Test up to ten times per day as directed 5 Box 8     insulin aspart (NOVOLOG VIAL) 100 UNITS/ML vial Use with pump  Approx  60 units daily 60 mL 3     simvastatin (ZOCOR) 80 MG tablet Take 1 tablet (80 mg) by mouth At Bedtime 90 tablet 3     glucagon (GLUCAGON EMERGENCY) 1 MG injection Inject 1 mg into the muscle once for 1 dose Use as directed 1 mg 2       Family History  family history includes Alzheimer Disease in her father; Autoimmune Disease in her mother; Cancer in her father; Diabetes in her father; Glaucoma in her father and mother; Hypertension in her mother.    Social History  Smoke: none.  ETOH: rare.   with children.  Pt works full time in a lab.    Past Medical History  Past Medical History:   Diagnosis Date     Diabetes mellitus (H)     Type 1     Elevated cholesterol      Glaucoma     Glaucoma suspect     Glaucoma suspect      Kidney stones      Nonsenile cataract      Past Surgical History:   Procedure Laterality Date     APPENDECTOMY OPEN  1981     BIOPSY OF SKIN LESION  12/30/2011    returned January 2012 for additional removal     c sections  99,97,08     EXTRACORPOREAL SHOCK WAVE LITHOTRIPSY, CYSTOSCOPY, INSERT STENT URETER(S), COMBINED  2004     LASER HOLMIUM LITHOTRIPSY URETER(S), INSERT STENT, COMBINED  12/12/2013    Procedure: COMBINED CYSTOSCOPY, URETEROSCOPY, LASER HOLMIUM LITHOTRIPSY URETER(S), INSERT STENT;  Right Ureteroscopy Holmium Laser Lithotripsy Stent Placement;  Surgeon:  Kirill Marlow MD;  Location: [facility]     S/P right shoulder surgery in Dec 2012.    Physical Exam  /66   Pulse 86   Wt 62.4 kg (137 lb 8 oz)   BMI 27.31 kg/m     Body mass index is 27.31 kg/m .  SKIN: Lesion upper buttocks and right foot.  HEENT:  PERRLA; fundi not examined.  NECK: Thyroid nontender.  LUNGS: Clear b/l.  CARDIAC:  RRR.  ABDOMEN:Nontender. Some areas of lipohypertrophy.  EXTREMITIES: No edema. Equal strength in LE's b/l.  FEET: Warm, no ulcers and normal monofilamentous exam.    RESULTS  Creatinine   Date Value Ref Range Status   08/14/2018 0.62 0.52 - 1.04 mg/dL Final     GFR Estimate   Date Value Ref Range Status   08/14/2018 >90 >60 mL/min/1.7m2 Final     Comment:     Non  GFR Calc     Hemoglobin A1C   Date Value Ref Range Status   07/16/2013 7.9 (A) 4.3 - 6.0 % Final     Potassium   Date Value Ref Range Status   02/14/2017 4.6 3.4 - 5.3 mmol/L Final     ALT   Date Value Ref Range Status   02/14/2017 17 0 - 50 U/L Final     AST   Date Value Ref Range Status   02/14/2017 13 0 - 45 U/L Final     TSH   Date Value Ref Range Status   08/14/2018 1.93 0.40 - 4.00 mU/L Final     T4 Free   Date Value Ref Range Status   02/14/2017 0.84 0.76 - 1.46 ng/dL Final       Cholesterol   Date Value Ref Range Status   08/14/2018 152 <200 mg/dL Final   03/24/2017 154 <200 mg/dL Final     HDL Cholesterol   Date Value Ref Range Status   08/14/2018 61 >49 mg/dL Final   03/24/2017 73 >49 mg/dL Final     LDL Cholesterol Calculated   Date Value Ref Range Status   08/14/2018 72 <100 mg/dL Final     Comment:     Desirable:       <100 mg/dl   03/24/2017 69 <100 mg/dL Final     Comment:     Desirable:       <100 mg/dl     Triglycerides   Date Value Ref Range Status   08/14/2018 94 <150 mg/dL Final   03/24/2017 59 <150 mg/dL Final     Cholesterol/HDL Ratio   Date Value Ref Range Status   08/18/2014 2.7 0.0 - 5.0 Final   09/17/2013 2.3 0.0 - 5.0 Final     A1C      7.2   5/22/2019  A1C      7.4    2/12/2019  A1C      7.9   11/14/2018    A1C      7.6   8/14/2018  A1C      7.4   3/15/2018  A1C      7.4   12/1/2017  A1C      7.9   9/13/2017  A1C      7.7   6/1/2017  A1C      8.3   2/14/2017  A1C      7.5   8/18/2016  A1C      7.6   5/5/2016  A1C      7.8    2/10/2016  A1C      7.9    9/2/2015  A1C      7.7    5/2015  A1C      8.6    1/28/2015  A1C      8.8    9/26/2014  A1C      8.0    7/2/2014  A1C      8.4    2/28/2014    ASSESSMENT/PLAN:    1.  TYPE 1 DIABETES MELLITUS:Type 1 diabetes mellitus complicated by retinopathy and hypoglycemia unawareness.  Kiera's A1C has improved with an A1C of 7.2 % today without frequent hypoglycemia.  No other insulin rate changes today.  She remains normotensive.   Feet in good condition with normal monofilamentous exam today.  Pt's TSH is normal in August 2018.  Flu vaccine given today.    2.  MILD/MODERATE NPDR: Last seen by Oph here in July 2017.    I placed a referral for patient to see Oph here.    3.  HX OF + MICROALBUMINURIA:  Pt's microalbuminuria negative in Aug 2018.  Her creat/GFR are stable.    4. HYPERLIPIDEMIA:  LDL 72 in 8/2018.   Pt remains on high dose Simvastatin.  Since she has been on the high dose Simvastatin for years and tolerating this dose.    5.  ADHD/STRESS: Pt to see her psych staff in follow up.    6. HYPOGLYCEMIA UNAWARENESS: She denies having any recent severe hypoglycemia.    Continue to wear Dexcom daily.    7. SKIN LESIONS: I placed a referral for pt to be seen in Derm Clinic here.    8. Return to Endocrine Clinic to see Dr. Chahal in August 2019.    Again, thank you for allowing me to participate in the care of your patient.      Sincerely,    Jo Jean PA-C

## 2019-06-07 ENCOUNTER — DOCUMENTATION ONLY (OUTPATIENT)
Dept: CARE COORDINATION | Facility: CLINIC | Age: 54
End: 2019-06-07

## 2019-06-27 ENCOUNTER — OFFICE VISIT (OUTPATIENT)
Dept: DERMATOLOGY | Facility: CLINIC | Age: 54
End: 2019-06-27
Attending: PHYSICIAN ASSISTANT
Payer: COMMERCIAL

## 2019-06-27 DIAGNOSIS — D22.5 MELANOCYTIC NEVUS OF TRUNK: Primary | ICD-10-CM

## 2019-06-27 DIAGNOSIS — D23.71 DYSPLASTIC NEVUS OF RIGHT LOWER EXTREMITY: ICD-10-CM

## 2019-06-27 ASSESSMENT — PAIN SCALES - GENERAL: PAINLEVEL: NO PAIN (0)

## 2019-06-27 NOTE — PROGRESS NOTES
[facility] Dermatology Note      Dermatology Problem List:    Specialty Problems     None      2009 excision and re-excision of an atypical, dysplastic naevus on left flank    CC:   Derm Problem (Kiera is here regarding a spot on her foot. )        Encounter Date: Jun 27, 2019    History of Present Illness:  Ms. Kiera Dobson is a 53 year old female who presents as a referral from [place].    Patient sent for a dark, 4mm melanocytic naevus on the back or right foot (lateral). No obvious changes, but rather dark    Past Medical History:   Patient Active Problem List   Diagnosis     Pain in shoulder     Type 1 diabetes mellitus without complication (HCC)     Adhesive capsulitis of shoulder     Superior glenoid labrum lesion     Diabetes mellitus type 1 (H)     Cortical, lamellar, or zonular cataract, nonsenile     Glaucoma suspect, both eyes     Myopia     Past Medical History:   Diagnosis Date     Diabetes mellitus (H)     Type 1     Elevated cholesterol      Glaucoma     Glaucoma suspect     Glaucoma suspect      Kidney stones      Nonsenile cataract        Allergy History:     Allergies   Allergen Reactions     Ampicillin Sodium Rash       Social History:     reports that she has never smoked. She has never used smokeless tobacco. She reports that she drinks alcohol. She reports that she does not use drugs.      Family History:  Family History   Problem Relation Age of Onset     Diabetes Father      Alzheimer Disease Father      Cancer Father         lung     Glaucoma Father      Hypertension Mother      Glaucoma Mother      Autoimmune Disease Mother         Myasthenia gravis     Melanoma No family hx of      Skin Cancer No family hx of        Medications:  Current Outpatient Medications   Medication Sig Dispense Refill     aspirin 81 MG chewable tablet Take 81 mg by mouth every other day  108 tablet 3     blood glucose (ERICA CONTOUR NEXT) test strip Use to test blood sugar 4-6 times daily or as  directed. 200 strip 11     glucose ([facility] GLUCOSE) 40 % GEL Take 15 g by mouth as needed       glucose blood VI test strips strip Test up to ten times per day as directed 5 Box 8     insulin aspart (NOVOLOG VIAL) 100 UNITS/ML vial Use with pump  Approx  60 units daily 60 mL 3     simvastatin (ZOCOR) 80 MG tablet Take 1 tablet (80 mg) by mouth At Bedtime 90 tablet 3     glucagon (GLUCAGON EMERGENCY) 1 MG injection Inject 1 mg into the muscle once for 1 dose Use as directed 1 mg 2           Review of Systems:  -As per HPI  -Constitutional: The patient denies fatigue, fevers, chills, unintended weight loss, and night sweats.  -HEENT: Patient denies nonhealing oral sores.  -Skin: As above in HPI. No additional skin concerns.    Physical exam:  Vitals: There were no vitals taken for this visit.  GEN: This is a well developed, well-nourished female in no acute distress, in a pleasant mood.    SKIN: Full skin, which includes the head/face, both arms, chest, back, abdomen,both legs, genitalia and/or groin buttocks, digits and/or nails, was examined.  On the right foot dorsal lateral a 4mm, dark melanocytic naevus with dermatoscopically in the center dark pigment without network and on the border zone some activity (border not regular)  Patient has many other melanocytic naevi on face and trunk and extremities, some of them light brown, the others darker, but all of them dermatoscopically and clinically unsuspicious.    -No other lesions of concern on areas examined.     Impression/Plan:    >> suspicion for dysplastic naevus on right foot dorso-lateral  Shave biopsy:  After discussion of benefits and risks including but not limited to bleeding/bruising, pain/swelling, infection, scar, incomplete removal, nerve damage/numbness, recurrence, and non-diagnostic biopsy, written consent, verbal consent and photographs were obtained. Time-out was performed. The area was cleaned with isopropyl alcohol. 0.5mL of 1% lidocaine with  epinephrine was injected to obtain adequate anesthesia of the lesion on the right foot. A shave biopsy was performed. Hemostasis was achieved with aluminium chloride. Vaseline and a sterile dressing were applied. The patient tolerated the procedure and no complications were noted. The patient was provided with verbal and written post care instructions.     Histology result:  Specimen #: [id number] Collected: 6/27/2019   FINAL DIAGNOSIS: Shave, right foot dorsolateral:  Compound melanocytic nevus. Narrowly removed, but close to the deep margin   ==> totally benign melanocytic naevus and no further actions, just observe necessary    >>other melanocytic naevi = not suspicion    Observe and sun protection      Follow-up in 1 year or if biopsy needs re-excision    I spent a total of 20minutes face to face with Kiera Dobson during today s office visit. Over 50% of this time was spent counseling the patient and/or coordinating care.  Please see Assessment and Plan for details.  This excludes any time spent performing the shave biopsy

## 2019-06-27 NOTE — PATIENT INSTRUCTIONS
Preventive Care:    Colorectal Cancer Screening: During our visit today, we discussed that it is recommended you receive colorectal cancer screening. Please call or make an appointment with your primary care provider to discuss this. You may also call the [facility] scheduling line ([phone]) to set up a colonoscopy appointment.

## 2019-06-27 NOTE — LETTER
6/27/2019       RE: Kiera Dobson  [address]  [facility] [address]     Dear Colleague,    Thank you for referring your patient, Kiera Dobson, to the [facility] DERMATOLOGY at [facility]. Please see a copy of my visit note below.    [facility] Dermatology Note      Dermatology Problem List:    Specialty Problems     None      2009 excision and re-excision of an atypical, dysplastic naevus on left flank    CC:   Derm Problem (Kiera is here regarding a spot on her foot. )        Encounter Date: Jun 27, 2019    History of Present Illness:  Ms. Kiera Dobson is a 53 year old female who presents as a referral from [facility].    Patient sent for a dark, 4mm melanocytic naevus on the back or right foot (lateral). No obvious changes, but rather dark    Past Medical History:   Patient Active Problem List   Diagnosis     Pain in shoulder     Type 1 diabetes mellitus without complication (HCC)     Adhesive capsulitis of shoulder     Superior glenoid labrum lesion     Diabetes mellitus type 1 (H)     Cortical, lamellar, or zonular cataract, nonsenile     Glaucoma suspect, both eyes     Myopia     Past Medical History:   Diagnosis Date     Diabetes mellitus (H)     Type 1     Elevated cholesterol      Glaucoma     Glaucoma suspect     Glaucoma suspect      Kidney stones      Nonsenile cataract        Allergy History:     Allergies   Allergen Reactions     Ampicillin Sodium Rash       Social History:     reports that she has never smoked. She has never used smokeless tobacco. She reports that she drinks alcohol. She reports that she does not use drugs.      Family History:  Family History   Problem Relation Age of Onset     Diabetes Father      Alzheimer Disease Father      Cancer Father         lung     Glaucoma Father      Hypertension Mother      Glaucoma Mother      Autoimmune Disease Mother         Myasthenia gravis     Melanoma No family hx of      Skin Cancer  No family hx of        Medications:  Current Outpatient Medications   Medication Sig Dispense Refill     aspirin 81 MG chewable tablet Take 81 mg by mouth every other day  108 tablet 3     blood glucose (ERICA CONTOUR NEXT) test strip Use to test blood sugar 4-6 times daily or as directed. 200 strip 11     glucose ([facility] GLUCOSE) 40 % GEL Take 15 g by mouth as needed       glucose blood VI test strips strip Test up to ten times per day as directed 5 Box 8     insulin aspart (NOVOLOG VIAL) 100 UNITS/ML vial Use with pump  Approx  60 units daily 60 mL 3     simvastatin (ZOCOR) 80 MG tablet Take 1 tablet (80 mg) by mouth At Bedtime 90 tablet 3     glucagon (GLUCAGON EMERGENCY) 1 MG injection Inject 1 mg into the muscle once for 1 dose Use as directed 1 mg 2           Review of Systems:  -As per HPI  -Constitutional: The patient denies fatigue, fevers, chills, unintended weight loss, and night sweats.  -HEENT: Patient denies nonhealing oral sores.  -Skin: As above in HPI. No additional skin concerns.    Physical exam:  Vitals: There were no vitals taken for this visit.  GEN: This is a well developed, well-nourished female in no acute distress, in a pleasant mood.    SKIN: Full skin, which includes the head/face, both arms, chest, back, abdomen,both legs, genitalia and/or groin buttocks, digits and/or nails, was examined.  On the right foot dorsal lateral a 4mm, dark melanocytic naevus with dermatoscopically in the center dark pigment without network and on the border zone some activity (border not regular)  Patient has many other melanocytic naevi on face and trunk and extremities, some of them light brown, the others darker, but all of them dermatoscopically and clinically unsuspicious.    -No other lesions of concern on areas examined.     Impression/Plan:    >> suspicion for dysplastic naevus on right foot dorso-lateral  Shave biopsy:  After discussion of benefits and risks including but not limited to  bleeding/bruising, pain/swelling, infection, scar, incomplete removal, nerve damage/numbness, recurrence, and non-diagnostic biopsy, written consent, verbal consent and photographs were obtained. Time-out was performed. The area was cleaned with isopropyl alcohol. 0.5mL of 1% lidocaine with epinephrine was injected to obtain adequate anesthesia of the lesion on the right foot. A shave biopsy was performed. Hemostasis was achieved with aluminium chloride. Vaseline and a sterile dressing were applied. The patient tolerated the procedure and no complications were noted. The patient was provided with verbal and written post care instructions.     >>other melanocytic naevi = not suspicion    Observe and sun protection      Follow-up in 1 year or if biopsy needs re-excision    I spent a total of 20minutes face to face with Kiera Dobson during today s office visit. Over 50% of this time was spent counseling the patient and/or coordinating care.  Please see Assessment and Plan for details.  This excludes any time spent performing the shave biopsy    Lidocaine-epinephrine 1-1:975651 % injection   0.5 mL once for one use, starting 6/27/2019 ending 6/27/2019,  2mL disp, R-0, injection  Injected by ISAAC Huynh, ISAAC        Again, thank you for allowing me to participate in the care of your patient.      Sincerely,    Singh Bautista MD

## 2019-06-27 NOTE — PROGRESS NOTES
Lidocaine-epinephrine 1-1:691941 % injection   0.5 mL once for one use, starting 6/27/2019 ending 6/27/2019,  2mL disp, R-0, injection  Injected by ISAAC Huynh RN

## 2019-07-01 LAB — COPATH REPORT: NORMAL

## 2019-07-02 ENCOUNTER — OFFICE VISIT (OUTPATIENT)
Dept: OPHTHALMOLOGY | Facility: CLINIC | Age: 54
End: 2019-07-02
Attending: OPHTHALMOLOGY
Payer: COMMERCIAL

## 2019-07-02 DIAGNOSIS — E10.3399: Primary | ICD-10-CM

## 2019-07-02 DIAGNOSIS — E10.3393 MODERATE NONPROLIFERATIVE DIABETIC RETINOPATHY OF BOTH EYES WITHOUT MACULAR EDEMA ASSOCIATED WITH TYPE 1 DIABETES MELLITUS (H): Primary | ICD-10-CM

## 2019-07-02 PROCEDURE — 92134 CPTRZ OPH DX IMG PST SGM RTA: CPT | Mod: ZF | Performed by: OPHTHALMOLOGY

## 2019-07-02 PROCEDURE — G0463 HOSPITAL OUTPT CLINIC VISIT: HCPCS | Mod: ZF

## 2019-07-02 PROCEDURE — 92015 DETERMINE REFRACTIVE STATE: CPT | Mod: ZF

## 2019-07-02 ASSESSMENT — REFRACTION_WEARINGRX
OD_CYLINDER: +0.75
OS_CYLINDER: +0.25
OS_AXIS: 153
OD_SPHERE: -1.00
OD_AXIS: 025
OS_SPHERE: -0.25

## 2019-07-02 ASSESSMENT — SLIT LAMP EXAM - LIDS
COMMENTS: NORMAL
COMMENTS: NORMAL

## 2019-07-02 ASSESSMENT — REFRACTION_MANIFEST
OS_AXIS: 165
OS_CYLINDER: +0.50
OD_AXIS: 020
OS_SPHERE: PLANO
OD_ADD: +2.50
OD_CYLINDER: +1.00
OD_SPHERE: -1.00
OS_ADD: +2.50

## 2019-07-02 ASSESSMENT — CONF VISUAL FIELD
OS_NORMAL: 1
METHOD: COUNTING FINGERS
OD_NORMAL: 1

## 2019-07-02 ASSESSMENT — CUP TO DISC RATIO
OD_RATIO: 0.6
OS_RATIO: 0.6

## 2019-07-02 ASSESSMENT — EXTERNAL EXAM - RIGHT EYE: OD_EXAM: NORMAL

## 2019-07-02 ASSESSMENT — VISUAL ACUITY
OD_SC: 20/40
OS_SC+: -2
OS_PH_SC: 20/25
OD_PH_SC+: -1
OS_SC: 20/30
OD_PH_SC: 20/20
METHOD: SNELLEN - LINEAR
OS_PH_SC+: +2

## 2019-07-02 ASSESSMENT — TONOMETRY
OD_IOP_MMHG: 14
IOP_METHOD: TONOPEN
OS_IOP_MMHG: 14

## 2019-07-02 ASSESSMENT — EXTERNAL EXAM - LEFT EYE: OS_EXAM: NORMAL

## 2019-07-02 NOTE — PROGRESS NOTES
CC - Diabetes mellitus eval    INTERVAL HISTORY -   Last visit with me 4/2017, VA stable  Last A1c 7.2 on 5/2019    HPI  -  Diabetes mellitus I sees Tirso    RETINAL IMAGING  OCT 7/2/19  OD - retina normal, no fluid, PHF attached  OS - retina normal, no fluid, PHF attached      FA 3-22-16  Right eye - (transit) normal filling, normal macula, 1+ peripheral MAs and ischemia, no NV  Left eye - normal filling, normal macula, 1+ peripheral MAs and ischemia, no NV    ASSESSMENT:    1.  Mild/moderate NPDR (based on FA) OU with DM I no DME   - last FA 3/2016 showed mild  retinopathy, not visible on exam   - BP/BG control   - RTC 1 year repeat FA    2.  NS OU   - mild/moderate   - starting to be visually significant   - sees Igor      3. OAG suspect based on CDR   - has seen Igor    - advise f/u next few months    4.  Asteroid hyalosis OD, syneresis OU   - advised S/Sx RD 7/2019      return to clinic 1 year retina, OCT OU, FA OU transits OD Optos    ATTESTATION     Attending Physician Attestation:      Complete documentation of historical and exam elements from today's encounter can be found in the full encounter summary report (not reduplicated in this progress note).  I personally obtained the chief complaint(s) and history of present illness.  I confirmed and edited as necessary the review of systems, past medical/surgical history, family history, social history, and examination findings as documented by others; and I examined the patient myself.  I personally reviewed the relevant tests, images, and reports as documented above.  I formulated and edited as necessary the assessment and plan and discussed the findings and management plan with the patient and family    Devika Dorsey MD, PhD  , Vitreoretinal Surgery  Department of Ophthalmology  [facility]

## 2019-07-02 NOTE — NURSING NOTE
Chief Complaints and History of Present Illnesses   Patient presents with     Follow Up     2 year follow up Mild/moderate NPDR     Chief Complaint(s) and History of Present Illness(es)     Follow Up     Comments: 2 year follow up Mild/moderate NPDR              Comments     Pt states vision appearing slightly more blurry up close. Increase in floaters in RE over the past few years.  No eye pain today. Pt experiencing more headaches. Pt had MRI with normal results.  DM1 BS: 94currently.  Lab Results       Component                Value               Date                  A1C:   7.2 taken about 1 month ago per pt.     A1C                      7.9                 07/16/2013                 A1C                      7.9                 04/03/2013                 A1C                      8.4                 02/07/2013                 A1C                      8.2                 10/10/2012                 A1C                      9.0                 09/04/2012              Darell BARNARD July 2, 2019 9:28 AM

## 2019-08-20 ENCOUNTER — OFFICE VISIT (OUTPATIENT)
Dept: ENDOCRINOLOGY | Facility: CLINIC | Age: 54
End: 2019-08-20
Payer: COMMERCIAL

## 2019-08-20 VITALS
HEART RATE: 99 BPM | WEIGHT: 138.9 LBS | SYSTOLIC BLOOD PRESSURE: 109 MMHG | DIASTOLIC BLOOD PRESSURE: 70 MMHG | BODY MASS INDEX: 27.58 KG/M2

## 2019-08-20 DIAGNOSIS — E10.3293 CONTROLLED TYPE 1 DIABETES MELLITUS WITH BOTH EYES AFFECTED BY MILD NONPROLIFERATIVE RETINOPATHY WITHOUT MACULAR EDEMA (H): ICD-10-CM

## 2019-08-20 DIAGNOSIS — E10.3293 CONTROLLED TYPE 1 DIABETES MELLITUS WITH BOTH EYES AFFECTED BY MILD NONPROLIFERATIVE RETINOPATHY WITHOUT MACULAR EDEMA (H): Primary | ICD-10-CM

## 2019-08-20 LAB
ANION GAP SERPL CALCULATED.3IONS-SCNC: 2 MMOL/L (ref 3–14)
BUN SERPL-MCNC: 13 MG/DL (ref 7–30)
CHLORIDE SERPL-SCNC: 106 MMOL/L (ref 94–109)
CO2 SERPL-SCNC: 30 MMOL/L (ref 20–32)
CREAT SERPL-MCNC: 0.63 MG/DL (ref 0.52–1.04)
CREAT UR-MCNC: 35 MG/DL
GFR SERPL CREATININE-BSD FRML MDRD: >90 ML/MIN/{1.73_M2}
MICROALBUMIN UR-MCNC: <5 MG/L
MICROALBUMIN/CREAT UR: NORMAL MG/G CR (ref 0–25)
POTASSIUM SERPL-SCNC: 4.4 MMOL/L (ref 3.4–5.3)
SODIUM SERPL-SCNC: 138 MMOL/L (ref 133–144)
TSH SERPL DL<=0.005 MIU/L-ACNC: 1.65 MU/L (ref 0.4–4)

## 2019-08-20 ASSESSMENT — PAIN SCALES - GENERAL: PAINLEVEL: NO PAIN (0)

## 2019-08-20 NOTE — LETTER
8/20/2019       RE: Kiera Dobson  [address]  [facility] [facility] [address]     Dear Colleague,    Thank you for referring your patient, Kiera Dobson, to the [facility] ENDOCRINOLOGY at [facility]. Please see a copy of my visit note below.    This 53 year old woman returns for f/u of her type 1 diabetes that she has had for 45+ years.  She is co-managed with sarah Jean    She is using a  530g pump with Dexcom sensor.      Her current pump settings are  Midnight 0.65  2:00 am 0.5  10 am 0.65  10:30 pm 0.6    Her carb ratio is 12    correction  70 with target of 100/150 midnight to 7 am and 21:00 to midnight; 100/120 during day.    Active insulin is 3 hours.    During the last month, she wore her Dexcom everyday.  Her average glucose on the sensor was 163.  She was in target 61% of the time and below 70 1% of the time.    Reviewing the individual days along with her pump settings shows that she boluses 5.2  times a day on average, nearly all of them done manually.  She has a lot of difficulty getting her meal boluses before and even with her meal. As a result some of her post meal values are too high.  She is generally in target overnight.    She generally recognizes her low sugars when she hears the low glucose alarm go off on her sensor.  She makes sure her BG is above 100 whenever she drives.    She has seen her eye MD and things are OK. She denies feet concerns.  She has no SOB.  Her mood is OK.  She continues to have headaches despite doing the massage therapy recommended by her neurologist.   She continues to take ibuprofen for this pain.      She is up to date with her eye visits and has no vision concerns.  Her feet are fine.    She plans to switch to using a low glucose suspend pump with the Dexcom in the near future.  Her current pump is out of warranty.        Current Outpatient Medications on File Prior to Visit:  aspirin 81 MG chewable tablet Take 81 mg by mouth  "every other day    blood glucose (ERICA CONTOUR NEXT) test strip Use to test blood sugar 4-6 times daily or as directed.   glucose (CVS GLUCOSE) 40 % GEL Take 15 g by mouth as needed   glucose blood VI test strips strip Test up to ten times per day as directed   insulin aspart (NOVOLOG VIAL) 100 UNITS/ML vial Use with pump  Approx  60 units daily   simvastatin (ZOCOR) 80 MG tablet Take 1 tablet (80 mg) by mouth At Bedtime   glucagon (GLUCAGON EMERGENCY) 1 MG injection Inject 1 mg into the muscle once for 1 dose Use as directed     No current facility-administered medications on file prior to visit.     ROS: 10 point ROS neg other than the symptoms noted above in the HPI.    So Hx - no cigs    Vital signs:   /70   Pulse 99   Wt 63 kg (138 lb 14.4 oz)   BMI 27.58 kg/m     Estimated body mass index is 27.58 kg/m  as calculated from the following:    Height as of 2/12/19: 1.511 m (4' 11.5\").    Weight as of this encounter: 63 kg (138 lb 14.4 oz).    VSS  NAD  Eyes - no periorbital edema, conjunctival injection, scleral icterus  CV - RRR.  Normal pulses in feet.  No edema  Neuro - sensation intact to monofilament on soles of feet.  DTR 2/4 biceps  Skin - normal texture   Feet - no ulcers     Recent Labs   Lab Test 08/20/19  1208 08/20/19  1206 05/22/19 02/12/19 08/14/18  1322 08/14/18  1320  03/24/17  0906  02/14/17  1149  02/10/16  0949  07/16/13  1635 04/03/13  1516   A1C  --   --   --   --   --   --   --   --   --   --   --   --   --   --  7.9* 7.9*   HEMOGLOBINA1  --   --  7.2* 7.4*   < >  --   --    < >  --   --   --    < >  --    < >  --   --    TSH  --  1.65  --   --   --   --  1.93  --   --   --  1.57  --  1.13   < >  --   --    T4  --   --   --   --   --   --   --   --   --   --  0.84  --  0.84   < >  --   --    LDL  --   --   --   --   --   --  72  --  69  --   --   --  101*   < >  --   --    HDL  --   --   --   --   --   --  61  --  73  --   --   --  78   < >  --   --    TRIG  --   --   --   --   --  "  --  94  --  59  --   --   --  58   < >  --   --    CR  --  0.63  --   --   --   --  0.62  --   --   --  0.62   < > 0.66   < >  --   --    MICROL <5  --   --   --   --  <5  --   --   --    < >  --   --  8   < >  --   --     < > = values in this interval not displayed.     A1c today 7.5  Assessment and plan:    1.  Diabetes control.  Her A1c has gone up a little bit but overall she is doing quite well.  She will be switching to the tandem IQ with low glucose suspend in the next few weeks.  I think having this device will help her feel more confident with her mealtime boluses since that will allow her to avoid hypoglycemia.  I made no changes in dosing today.    2.  Diabetes complications.  I will check her renal function today.  It has been fine in the past.  She is up-to-date with her eye visits.  She has no problems with her feet.    3.CVD risk.  She is on a statin and her blood pressure is well controlled.    F/u with Jazymne Jean in 3 mo and f/u with me in 6 mo    Paola Chahal MD

## 2019-08-22 NOTE — PROGRESS NOTES
This 53 year old woman returns for f/u of her type 1 diabetes that she has had for 45+ years.  She is co-managed with sarah Jean    She is using a  530g pump with Dexcom sensor.      Her current pump settings are  Midnight 0.65  2:00 am 0.5  10 am 0.65  10:30 pm 0.6    Her carb ratio is 12    correction  70 with target of 100/150 midnight to 7 am and 21:00 to midnight; 100/120 during day.    Active insulin is 3 hours.    During the last month, she wore her Dexcom everyday.  Her average glucose on the sensor was 163.  She was in target 61% of the time and below 70 1% of the time.    Reviewing the individual days along with her pump settings shows that she boluses 5.2  times a day on average, nearly all of them done manually.  She has a lot of difficulty getting her meal boluses before and even with her meal. As a result some of her post meal values are too high.  She is generally in target overnight.    She generally recognizes her low sugars when she hears the low glucose alarm go off on her sensor.  She makes sure her BG is above 100 whenever she drives.    She has seen her eye MD and things are OK. She denies feet concerns.  She has no SOB.  Her mood is OK.  She continues to have headaches despite doing the massage therapy recommended by her neurologist.   She continues to take ibuprofen for this pain.      She is up to date with her eye visits and has no vision concerns.  Her feet are fine.    She plans to switch to using a low glucose suspend pump with the Dexcom in the near future.  Her current pump is out of warranty.        Current Outpatient Medications on File Prior to Visit:  aspirin 81 MG chewable tablet Take 81 mg by mouth every other day    blood glucose (ERICA CONTOUR NEXT) test strip Use to test blood sugar 4-6 times daily or as directed.   glucose (CVS GLUCOSE) 40 % GEL Take 15 g by mouth as needed   glucose blood VI test strips strip Test up to ten times per day as directed   insulin aspart (NOVOLOG  "VIAL) 100 UNITS/ML vial Use with pump  Approx  60 units daily   simvastatin (ZOCOR) 80 MG tablet Take 1 tablet (80 mg) by mouth At Bedtime   glucagon (GLUCAGON EMERGENCY) 1 MG injection Inject 1 mg into the muscle once for 1 dose Use as directed     No current facility-administered medications on file prior to visit.     ROS: 10 point ROS neg other than the symptoms noted above in the HPI.    So Hx - no cigs    Vital signs:   /70   Pulse 99   Wt 63 kg (138 lb 14.4 oz)   BMI 27.58 kg/m    Estimated body mass index is 27.58 kg/m  as calculated from the following:    Height as of 2/12/19: 1.511 m (4' 11.5\").    Weight as of this encounter: 63 kg (138 lb 14.4 oz).    VSS  NAD  Eyes - no periorbital edema, conjunctival injection, scleral icterus  CV - RRR.  Normal pulses in feet.  No edema  Neuro - sensation intact to monofilament on soles of feet.  DTR 2/4 biceps  Skin - normal texture   Feet - no ulcers     Recent Labs   Lab Test 08/20/19  1208 08/20/19  1206 05/22/19 02/12/19 08/14/18  1322 08/14/18  1320  03/24/17  0906  02/14/17  1149  02/10/16  0949  07/16/13  1635 04/03/13  1516   A1C  --   --   --   --   --   --   --   --   --   --   --   --   --   --  7.9* 7.9*   HEMOGLOBINA1  --   --  7.2* 7.4*   < >  --   --    < >  --   --   --    < >  --    < >  --   --    TSH  --  1.65  --   --   --   --  1.93  --   --   --  1.57  --  1.13   < >  --   --    T4  --   --   --   --   --   --   --   --   --   --  0.84  --  0.84   < >  --   --    LDL  --   --   --   --   --   --  72  --  69  --   --   --  101*   < >  --   --    HDL  --   --   --   --   --   --  61  --  73  --   --   --  78   < >  --   --    TRIG  --   --   --   --   --   --  94  --  59  --   --   --  58   < >  --   --    CR  --  0.63  --   --   --   --  0.62  --   --   --  0.62   < > 0.66   < >  --   --    MICROL <5  --   --   --   --  <5  --   --   --    < >  --   --  8   < >  --   --     < > = values in this interval not displayed.     A1c today " 7.5  Assessment and plan:    1.  Diabetes control.  Her A1c has gone up a little bit but overall she is doing quite well.  She will be switching to the tandem IQ with low glucose suspend in the next few weeks.  I think having this device will help her feel more confident with her mealtime boluses since that will allow her to avoid hypoglycemia.  I made no changes in dosing today.    2.  Diabetes complications.  I will check her renal function today.  It has been fine in the past.  She is up-to-date with her eye visits.  She has no problems with her feet.    3.CVD risk.  She is on a statin and her blood pressure is well controlled.    F/u with Jazmyne Jean in 3 mo and f/u with me in 6 mo    Paola Chahal MD                  .

## 2019-08-24 LAB — HBA1C MFR BLD: 7.5 % (ref 4.3–6)

## 2019-09-09 DIAGNOSIS — N20.0 CALCULUS OF KIDNEY: ICD-10-CM

## 2019-09-12 RX ORDER — SIMVASTATIN 80 MG
80 TABLET ORAL AT BEDTIME
Qty: 90 TABLET | Refills: 1 | Status: SHIPPED | OUTPATIENT
Start: 2019-09-12 | End: 2020-03-09

## 2019-09-12 NOTE — TELEPHONE ENCOUNTER
simvastatin (ZOCOR) 80 MG tablet        Last Written Prescription Date:  09/18/2018  Last Fill Quantity: 90,   # refills: 3  Last Office Visit : 08/20/20109  Future Office visit:  11/20/2019    Routing refill request to provider for review/approval because: labs

## 2019-10-01 ENCOUNTER — HEALTH MAINTENANCE LETTER (OUTPATIENT)
Age: 54
End: 2019-10-01

## 2019-11-20 ENCOUNTER — ALLIED HEALTH/NURSE VISIT (OUTPATIENT)
Dept: EDUCATION SERVICES | Facility: CLINIC | Age: 54
End: 2019-11-20
Payer: COMMERCIAL

## 2019-11-20 ENCOUNTER — OFFICE VISIT (OUTPATIENT)
Dept: ENDOCRINOLOGY | Facility: CLINIC | Age: 54
End: 2019-11-20
Payer: COMMERCIAL

## 2019-11-20 VITALS
DIASTOLIC BLOOD PRESSURE: 79 MMHG | SYSTOLIC BLOOD PRESSURE: 133 MMHG | WEIGHT: 138.1 LBS | BODY MASS INDEX: 27.43 KG/M2 | HEART RATE: 114 BPM

## 2019-11-20 DIAGNOSIS — E10.65 TYPE 1 DIABETES MELLITUS WITH HYPERGLYCEMIA (H): Primary | ICD-10-CM

## 2019-11-20 DIAGNOSIS — E10.9 TYPE 1 DIABETES MELLITUS WITHOUT COMPLICATION (H): Primary | ICD-10-CM

## 2019-11-20 ASSESSMENT — PAIN SCALES - GENERAL: PAINLEVEL: NO PAIN (0)

## 2019-11-20 NOTE — PROGRESS NOTES
"Diabetes Self-Management Education & Support    Diabetes Education Self Management & Training    SUBJECTIVE/OBJECTIVE:  Presents for: Individual review  Accompanied by: Self  Diabetes education in the past 24mo: No  Focus of Visit: Insulin Pump  Diabetes type: Type 1  Diabetes management related comments/concerns: getting a new insulin pump, her Medtronic pump is out of warranty  Transportation concerns: No  Cultural Influences/Ethnic Background:  American      Diabetes Symptoms & Complications   Patient Problem List and Family Medical History reviewed for relevant medical history, current medical status, and diabetes risk factors.    Vitals:    Estimated body mass index is 27.43 kg/m  as calculated from the following:    Height as of 2/12/19: 1.511 m (4' 11.5\").    Weight as of an earlier encounter on 11/20/19: 62.6 kg (138 lb 1.6 oz).   Last 3 BP:   BP Readings from Last 3 Encounters:   11/20/19 133/79   08/20/19 109/70   05/22/19 105/66       History   Smoking Status     Never Smoker   Smokeless Tobacco     Never Used       Labs:  Lab Results   Component Value Date    A1C 7.9 07/16/2013     Lab Results   Component Value Date     03/01/2016     Lab Results   Component Value Date    LDL 72 08/14/2018     HDL Cholesterol   Date Value Ref Range Status   08/14/2018 61 >49 mg/dL Final   ]  GFR Estimate   Date Value Ref Range Status   08/20/2019 >90 >60 mL/min/[1.73_m2] Final     Comment:     Non  GFR Calc  Starting 12/18/2018, serum creatinine based estimated GFR (eGFR) will be   calculated using the Chronic Kidney Disease Epidemiology Collaboration   (CKD-EPI) equation.       GFR Estimate If Black   Date Value Ref Range Status   08/20/2019 >90 >60 mL/min/[1.73_m2] Final     Comment:      GFR Calc  Starting 12/18/2018, serum creatinine based estimated GFR (eGFR) will be   calculated using the Chronic Kidney Disease Epidemiology Collaboration   (CKD-EPI) equation.       Lab Results " "  Component Value Date    CR 0.63 2019     No results found for: MICROALBUMIN    Healthy Eating  Carb counts her food and uses her bolus calculator     Monitoring   wearing a DexCom G 6 and is happy with it    Taking Medications  Diabetes Medication(s)     Diabetic Other       glucagon (GLUCAGON EMERGENCY) 1 MG injection    Inject 1 mg into the muscle once for 1 dose Use as directed     glucose (CVS GLUCOSE) 40 % GEL    Take 15 g by mouth as needed    Insulin       insulin aspart (NOVOLOG VIAL) 100 UNITS/ML vial    Use with pump  Approx  60 units daily        Healthy Coping   Patient Activation Measure Survey Score:  No flowsheet data found.    ASSESSMENT:  Longstanding type 1 diabetes, a1c to goal    Patient's most recent   Lab Results   Component Value Date    A1C 7.9 2013    is meeting goal of <8.0    INTERVENTION:     Education provided today on:  Reviewed and demonstrated operation of the following pumps:  The Omnipod Dash, Medtronic 670G with Guardian 3 sensor, and Tandem X2 with Dexcom G6 sensor.     Discussed unique features of each pump and what qualities are most important to patient.  Discussion included the operation of the 670G Hybrid Closed Loop system and the particular behaviors around that pump that need to be adhered to, including using the bolus calculator, counting carbohydrates accurately, calibrating the sensor appropriately.  Discussed that the results seen in the studies of the system that were done were a result of staying in \"auto\" mode > 85% of the time.      Explained the upgrade process, which includes making sure that warranty has  on the current pump, completing the CMN, processing through the pump company and approval by insurance company.  Also reviewed training that would be necessary to ensure safe operation of the pump.      Kiera is interested in the Tandem T:Slim insulin pump.  We filled out an AOB on-line.    Opportunities for ongoing education and support in " diabetes-self management were discussed.    Pt verbalized understanding of concepts discussed and recommendations provided today.       PLAN:  See Patient Instructions for co-developed, patient-stated behavior change goals.  AVS printed and provided to patient today. See Follow-Up section for recommended follow-up.      Time Spent: 60 minutes  Encounter Type: Individual    Any diabetes medication dose changes were made via the CDE Protocol and Collaborative Practice Agreement with the patient's referring provider. A copy of this encounter was shared with the provider.

## 2019-11-20 NOTE — PATIENT INSTRUCTIONS
1. Expect contact from [employer].  They are out of [place].    2.  When you hear that your pump is shipping, contact me to schedule pump start.    Arlin Calles RN,CDE  [address] [address]  Phone: [phone]  [email or web address]

## 2019-11-20 NOTE — PROGRESS NOTES
HPI  Kiera Dobson is a 53 year old female with type 1 diabetes mellitus here today for a follow up visit.  Kiera has had type 1 diabetes for 45 + years.  She has mild/moderate nonproliferative diabetic retinopathy. No peripheral neuropathy or nephropathy.  Her hx is also significant for hypoglycemia unawareness.  She has been using her Medtronic 530G insulin pump and Dexcom.  Her current basal insulin rates are:  Midnight= 0.65 units/hr.  2 am = 0.5 units/hr.  10 am  = 0.65 units/hr.  22:30= 0.6 units/hr.  Her 24 hrs basal insulin dose is 14.325 units/hr.  Pt's I/C ratio is 1:12.  Sensitivity is 70.  Pt's A1C is 7.4 % today and her previous A1C was 7.5 %.  We downloaded pt's insulin pump and Dexcom today.  Her average glucose was 173 with SD 68 over the past 30 days.  She has been using correction insulin too frequent ( stacking of insulin ) and hypoglycemia with rebound hyperglycemia.  She continues to bolus during or after meals.  Kiera also has issues with lipohypertrophy.  On ROS today, she has gained weight.  She started using her treadmill again last week.  Pt states her chest is sore and she has been having headaches.   Her chest is sore when she presses on her chest. No n/v or diaphoresis and she has had several stable EKGs done in the past. Ibuprofen does help.  Some blurred vision.  No numbness in her feet or feet.  Pt denies SOB or cough.  Pt denies abd pain, diarrhea, dysuria or hematuria.  No foot ulcers.    DIABETES CARE:  Retinopathy:mild/moderate nonproliferative diabetic retinopathy.  She was last seen here by Oph in July 2019.  Nephropathy: urine microalbuminuria negative in 8/2019. Neuropathy: none.  Feet: good pulses, no ulcers and normal monofilamentous exam today.  Taking ASA: yes.  Lipids: LDL 72 in Aug 2018.    Pt is taking Simvastatin.    ROS  Please see under HPI.    Allergies  Allergies   Allergen Reactions     Ampicillin Sodium Rash       Medications  Current Outpatient Medications    Medication Sig Dispense Refill     aspirin 81 MG chewable tablet Take 81 mg by mouth every other day  108 tablet 3     blood glucose (ERICA CONTOUR NEXT) test strip Use to test blood sugar 4-6 times daily or as directed. 200 strip 11     glucose ([facility] GLUCOSE) 40 % GEL Take 15 g by mouth as needed       glucose blood VI test strips strip Test up to ten times per day as directed 5 Box 8     insulin aspart (NOVOLOG VIAL) 100 UNITS/ML vial Use with pump  Approx  60 units daily 60 mL 3     simvastatin (ZOCOR) 80 MG tablet Take 1 tablet (80 mg) by mouth At Bedtime 90 tablet 1     glucagon (GLUCAGON EMERGENCY) 1 MG injection Inject 1 mg into the muscle once for 1 dose Use as directed 1 mg 2       Family History  family history includes Alzheimer Disease in her father; Autoimmune Disease in her mother; Cancer in her father; Diabetes in her father; Glaucoma in her father and mother; Hypertension in her mother.    Social History  Smoke: none.  ETOH: rare.   with children.  Pt works full time in a lab.    Past Medical History  Past Medical History:   Diagnosis Date     Diabetes mellitus (H)     Type 1     Elevated cholesterol      Glaucoma     Glaucoma suspect     Glaucoma suspect      Kidney stones      Nonsenile cataract      Past Surgical History:   Procedure Laterality Date     APPENDECTOMY OPEN  1981     BIOPSY OF SKIN LESION  12/30/2011    returned January 2012 for additional removal     c sections  99,97,08     EXTRACORPOREAL SHOCK WAVE LITHOTRIPSY, CYSTOSCOPY, INSERT STENT URETER(S), COMBINED  2004     LASER HOLMIUM LITHOTRIPSY URETER(S), INSERT STENT, COMBINED  12/12/2013    Procedure: COMBINED CYSTOSCOPY, URETEROSCOPY, LASER HOLMIUM LITHOTRIPSY URETER(S), INSERT STENT;  Right Ureteroscopy Holmium Laser Lithotripsy Stent Placement;  Surgeon: Kirill Marlow MD;  Location: [facility] OR     S/P right shoulder surgery in Dec 2012.    Physical Exam  /79   Pulse 114   Wt 62.6 kg (138 lb 1.6 oz)   BMI 27.43  kg/m    Body mass index is 27.43 kg/m .  HEENT:  PERRLA; fundi not examined.  NECK: Thyroid nontender.  LUNGS: Clear b/l.  CARDIAC:  RRR.  ABDOMEN:Nontender. Some areas of lipohypertrophy.  EXTREMITIES: No edema. Equal strength in LE's b/l.  FEET: Warm, no ulcers and normal monofilamentous exam.    RESULTS  Creatinine   Date Value Ref Range Status   08/20/2019 0.63 0.52 - 1.04 mg/dL Final     GFR Estimate   Date Value Ref Range Status   08/20/2019 >90 >60 mL/min/[1.73_m2] Final     Comment:     Non  GFR Calc  Starting 12/18/2018, serum creatinine based estimated GFR (eGFR) will be   calculated using the Chronic Kidney Disease Epidemiology Collaboration   (CKD-EPI) equation.       Hemoglobin A1C   Date Value Ref Range Status   07/16/2013 7.9 (A) 4.3 - 6.0 % Final     Potassium   Date Value Ref Range Status   08/20/2019 4.4 3.4 - 5.3 mmol/L Final     ALT   Date Value Ref Range Status   02/14/2017 17 0 - 50 U/L Final     AST   Date Value Ref Range Status   02/14/2017 13 0 - 45 U/L Final     TSH   Date Value Ref Range Status   08/20/2019 1.65 0.40 - 4.00 mU/L Final     T4 Free   Date Value Ref Range Status   02/14/2017 0.84 0.76 - 1.46 ng/dL Final       Cholesterol   Date Value Ref Range Status   08/14/2018 152 <200 mg/dL Final   03/24/2017 154 <200 mg/dL Final     HDL Cholesterol   Date Value Ref Range Status   08/14/2018 61 >49 mg/dL Final   03/24/2017 73 >49 mg/dL Final     LDL Cholesterol Calculated   Date Value Ref Range Status   08/14/2018 72 <100 mg/dL Final     Comment:     Desirable:       <100 mg/dl   03/24/2017 69 <100 mg/dL Final     Comment:     Desirable:       <100 mg/dl     Triglycerides   Date Value Ref Range Status   08/14/2018 94 <150 mg/dL Final   03/24/2017 59 <150 mg/dL Final     Cholesterol/HDL Ratio   Date Value Ref Range Status   08/18/2014 2.7 0.0 - 5.0 Final   09/17/2013 2.3 0.0 - 5.0 Final     A1C      7.4  11/20/2019  A1C      7.5   8/20/2019  A1C      7.2   5/22/2019  A1C       7.4   2/12/2019  A1C      7.9   11/14/2018    A1C      7.6   8/14/2018  A1C      7.4   3/15/2018  A1C      7.4   12/1/2017  A1C      7.9   9/13/2017  A1C      7.7   6/1/2017  A1C      8.3   2/14/2017  A1C      7.5   8/18/2016  A1C      7.6   5/5/2016  A1C      7.8    2/10/2016  A1C      7.9    9/2/2015  A1C      7.7    5/2015  A1C      8.6    1/28/2015  A1C      8.8    9/26/2014  A1C      8.0    7/2/2014  A1C      8.4    2/28/2014    ASSESSMENT/PLAN:    1.  TYPE 1 DIABETES MELLITUS:Type 1 diabetes mellitus complicated by retinopathy and hypoglycemia unawareness.  Kiera's A1C is 7.4 % today.  She has been using correction insulin too frequent and having more hypoglycemia with rebound hyperglycemia.  I asked her to avoid frequent insulin correction ( stacking of insulin ) and to take her insulin bolus 10-15 minutes prior to eating.  She is to meet with our CDE soon to discuss insulin pump upgrade.  No insulin rate changes today.  She remains normotensive.   Feet in good condition with normal monofilamentous exam today.  Pt's TSH is normal in August 2018.  Flu vaccine given today.    2.  MILD/MODERATE NPDR: Last seen by Oph here in July 2019.    Will schedule pt to see Oph in follow up.    3.  HX OF + MICROALBUMINURIA:  Pt's microalbuminuria negative in Aug 2018.  Her creat/GFR are stable.    4. HYPERLIPIDEMIA:  LDL 72 in 8/2019.   Pt remains on high dose Simvastatin.  Since she has been on the high dose Simvastatin for years and tolerating this dose.    5.  ADHD/STRESS: Pt to see her psych staff in follow up.    6. HYPOGLYCEMIA UNAWARENESS: Continue to wear Dexcom daily.  I will also arrange for her to meet with our CDE to discuss use of the [employer]andem basal IQ insulin pump.    7. Return to Endocrine Clinic to see Dr. Chahal in 2/2020.

## 2019-11-20 NOTE — LETTER
11/20/2019     RE: Kiera Dobson  [address]  [facility] [address]     Dear Colleague,    Thank you for referring your patient, Kiera Dobson, to the [facility] ENDOCRINOLOGY at [facility]. Please see a copy of my visit note below.    HPI  Kiera Dobson is a 53 year old female with type 1 diabetes mellitus here today for a follow up visit.  Kiera has had type 1 diabetes for 45 + years.  She has mild/moderate nonproliferative diabetic retinopathy. No peripheral neuropathy or nephropathy.  Her hx is also significant for hypoglycemia unawareness.  She has been using her Medtronic 530G insulin pump and Dexcom.  Her current basal insulin rates are:  Midnight= 0.65 units/hr.  2 am = 0.5 units/hr.  10 am  = 0.65 units/hr.  22:30= 0.6 units/hr.  Her 24 hrs basal insulin dose is 14.325 units/hr.  Pt's I/C ratio is 1:12.  Sensitivity is 70.  Pt's A1C is 7.4 % today and her previous A1C was 7.5 %.  We downloaded pt's insulin pump and Dexcom today.  Her average glucose was 173 with SD 68 over the past 30 days.  She has been using correction insulin too frequent ( stacking of insulin ) and hypoglycemia with rebound hyperglycemia.  She continues to bolus during or after meals.  Kiera also has issues with lipohypertrophy.  On ROS today, she has gained weight.  She started using her treadmill again last week.  Pt states her chest is sore and she has been having headaches.   Her chest is sore when she presses on her chest. No n/v or diaphoresis and she has had several stable EKGs done in the past. Ibuprofen does help.  Some blurred vision.  No numbness in her feet or feet.  Pt denies SOB or cough.  Pt denies abd pain, diarrhea, dysuria or hematuria.  No foot ulcers.    DIABETES CARE:  Retinopathy:mild/moderate nonproliferative diabetic retinopathy.  She was last seen here by Oph in July 2019.  Nephropathy: urine microalbuminuria negative in 8/2019. Neuropathy: none.  Feet: good pulses, no  ulcers and normal monofilamentous exam today.  Taking ASA: yes.  Lipids: LDL 72 in Aug 2018.    Pt is taking Simvastatin.    ROS  Please see under HPI.    Allergies  Allergies   Allergen Reactions     Ampicillin Sodium Rash       Medications  Current Outpatient Medications   Medication Sig Dispense Refill     aspirin 81 MG chewable tablet Take 81 mg by mouth every other day  108 tablet 3     blood glucose (ERICA CONTOUR NEXT) test strip Use to test blood sugar 4-6 times daily or as directed. 200 strip 11     glucose ([facility] GLUCOSE) 40 % GEL Take 15 g by mouth as needed       glucose blood VI test strips strip Test up to ten times per day as directed 5 Box 8     insulin aspart (NOVOLOG VIAL) 100 UNITS/ML vial Use with pump  Approx  60 units daily 60 mL 3     simvastatin (ZOCOR) 80 MG tablet Take 1 tablet (80 mg) by mouth At Bedtime 90 tablet 1     glucagon (GLUCAGON EMERGENCY) 1 MG injection Inject 1 mg into the muscle once for 1 dose Use as directed 1 mg 2       Family History  family history includes Alzheimer Disease in her father; Autoimmune Disease in her mother; Cancer in her father; Diabetes in her father; Glaucoma in her father and mother; Hypertension in her mother.    Social History  Smoke: none.  ETOH: rare.   with children.  Pt works full time in a lab.    Past Medical History  Past Medical History:   Diagnosis Date     Diabetes mellitus (H)     Type 1     Elevated cholesterol      Glaucoma     Glaucoma suspect     Glaucoma suspect      Kidney stones      Nonsenile cataract      Past Surgical History:   Procedure Laterality Date     APPENDECTOMY OPEN  1981     BIOPSY OF SKIN LESION  12/30/2011    returned January 2012 for additional removal     c sections  99,97,08     EXTRACORPOREAL SHOCK WAVE LITHOTRIPSY, CYSTOSCOPY, INSERT STENT URETER(S), COMBINED  2004     LASER HOLMIUM LITHOTRIPSY URETER(S), INSERT STENT, COMBINED  12/12/2013    Procedure: COMBINED CYSTOSCOPY, URETEROSCOPY, LASER HOLMIUM  LITHOTRIPSY URETER(S), INSERT STENT;  Right Ureteroscopy Holmium Laser Lithotripsy Stent Placement;  Surgeon: Kirill Marlow MD;  Location: [facility] OR     S/P right shoulder surgery in Dec 2012.    Physical Exam  /79   Pulse 114   Wt 62.6 kg (138 lb 1.6 oz)   BMI 27.43 kg/m     Body mass index is 27.43 kg/m .  HEENT:  PERRLA; fundi not examined.  NECK: Thyroid nontender.  LUNGS: Clear b/l.  CARDIAC:  RRR.  ABDOMEN:Nontender. Some areas of lipohypertrophy.  EXTREMITIES: No edema. Equal strength in LE's b/l.  FEET: Warm, no ulcers and normal monofilamentous exam.    RESULTS  Creatinine   Date Value Ref Range Status   08/20/2019 0.63 0.52 - 1.04 mg/dL Final     GFR Estimate   Date Value Ref Range Status   08/20/2019 >90 >60 mL/min/[1.73_m2] Final     Comment:     Non  GFR Calc  Starting 12/18/2018, serum creatinine based estimated GFR (eGFR) will be   calculated using the Chronic Kidney Disease Epidemiology Collaboration   (CKD-EPI) equation.       Hemoglobin A1C   Date Value Ref Range Status   07/16/2013 7.9 (A) 4.3 - 6.0 % Final     Potassium   Date Value Ref Range Status   08/20/2019 4.4 3.4 - 5.3 mmol/L Final     ALT   Date Value Ref Range Status   02/14/2017 17 0 - 50 U/L Final     AST   Date Value Ref Range Status   02/14/2017 13 0 - 45 U/L Final     TSH   Date Value Ref Range Status   08/20/2019 1.65 0.40 - 4.00 mU/L Final     T4 Free   Date Value Ref Range Status   02/14/2017 0.84 0.76 - 1.46 ng/dL Final       Cholesterol   Date Value Ref Range Status   08/14/2018 152 <200 mg/dL Final   03/24/2017 154 <200 mg/dL Final     HDL Cholesterol   Date Value Ref Range Status   08/14/2018 61 >49 mg/dL Final   03/24/2017 73 >49 mg/dL Final     LDL Cholesterol Calculated   Date Value Ref Range Status   08/14/2018 72 <100 mg/dL Final     Comment:     Desirable:       <100 mg/dl   03/24/2017 69 <100 mg/dL Final     Comment:     Desirable:       <100 mg/dl     Triglycerides   Date Value Ref Range  Status   08/14/2018 94 <150 mg/dL Final   03/24/2017 59 <150 mg/dL Final     Cholesterol/HDL Ratio   Date Value Ref Range Status   08/18/2014 2.7 0.0 - 5.0 Final   09/17/2013 2.3 0.0 - 5.0 Final     A1C      7.4  11/20/2019  A1C      7.5   8/20/2019  A1C      7.2   5/22/2019  A1C      7.4   2/12/2019  A1C      7.9   11/14/2018    A1C      7.6   8/14/2018  A1C      7.4   3/15/2018  A1C      7.4   12/1/2017  A1C      7.9   9/13/2017  A1C      7.7   6/1/2017  A1C      8.3   2/14/2017  A1C      7.5   8/18/2016  A1C      7.6   5/5/2016  A1C      7.8    2/10/2016  A1C      7.9    9/2/2015  A1C      7.7    5/2015  A1C      8.6    1/28/2015  A1C      8.8    9/26/2014  A1C      8.0    7/2/2014  A1C      8.4    2/28/2014    ASSESSMENT/PLAN:    1.  TYPE 1 DIABETES MELLITUS:Type 1 diabetes mellitus complicated by retinopathy and hypoglycemia unawareness.  Kiera's A1C is 7.4 % today.  She has been using correction insulin too frequent and having more hypoglycemia with rebound hyperglycemia.  I asked her to avoid frequent insulin correction ( stacking of insulin ) and to take her insulin bolus 10-15 minutes prior to eating.  She is to meet with our CDE soon to discuss insulin pump upgrade.  No insulin rate changes today.  She remains normotensive.   Feet in good condition with normal monofilamentous exam today.  Pt's TSH is normal in August 2018.  Flu vaccine given today.    2.  MILD/MODERATE NPDR: Last seen by Oph here in July 2019.    Will schedule pt to see Oph in follow up.    3.  HX OF + MICROALBUMINURIA:  Pt's microalbuminuria negative in Aug 2018.  Her creat/GFR are stable.    4. HYPERLIPIDEMIA:  LDL 72 in 8/2019.   Pt remains on high dose Simvastatin.  Since she has been on the high dose Simvastatin for years and tolerating this dose.    5.  ADHD/STRESS: Pt to see her psych staff in follow up.    6. HYPOGLYCEMIA UNAWARENESS: Continue to wear Dexcom daily.  I will also arrange for her to meet with our CDE to discuss use of  the Tandem basal IQ insulin pump.    7. Return to Endocrine Clinic to see Dr. Chahal in 2/2020.    Jo Jean PA-C

## 2019-12-10 ENCOUNTER — MEDICAL CORRESPONDENCE (OUTPATIENT)
Dept: HEALTH INFORMATION MANAGEMENT | Facility: CLINIC | Age: 54
End: 2019-12-10

## 2020-02-04 ENCOUNTER — TELEPHONE (OUTPATIENT)
Dept: EDUCATION SERVICES | Facility: CLINIC | Age: 55
End: 2020-02-04

## 2020-02-04 NOTE — TELEPHONE ENCOUNTER
Kiera is calling saying she has not heard back from [employer].  Message sent to Avelina Juarez to follow up. Arlin Calles RN,CDE

## 2020-02-06 ENCOUNTER — TELEPHONE (OUTPATIENT)
Dept: ENDOCRINOLOGY | Facility: CLINIC | Age: 55
End: 2020-02-06

## 2020-02-06 ENCOUNTER — TELEPHONE (OUTPATIENT)
Dept: EDUCATION SERVICES | Facility: CLINIC | Age: 55
End: 2020-02-06

## 2020-02-06 DIAGNOSIS — E10.9 TYPE 1 DIABETES MELLITUS WITHOUT COMPLICATION (H): Primary | ICD-10-CM

## 2020-02-06 RX ORDER — PROCHLORPERAZINE 25 MG/1
1 SUPPOSITORY RECTAL SEE ADMIN INSTRUCTIONS
Qty: 1 DEVICE | Refills: 0 | Status: SHIPPED | OUTPATIENT
Start: 2020-02-06 | End: 2020-02-25

## 2020-02-06 RX ORDER — INFUSION SET FOR INSULIN PUMP
1 INFUSION SETS-PARAPHERNALIA MISCELLANEOUS SEE ADMIN INSTRUCTIONS
Qty: 45 EACH | Refills: 3 | Status: SHIPPED | OUTPATIENT
Start: 2020-02-06 | End: 2020-07-20

## 2020-02-06 RX ORDER — PROCHLORPERAZINE 25 MG/1
1 SUPPOSITORY RECTAL SEE ADMIN INSTRUCTIONS
Qty: 1 EACH | Refills: 3 | Status: SHIPPED | OUTPATIENT
Start: 2020-02-06 | End: 2023-05-16

## 2020-02-06 RX ORDER — PROCHLORPERAZINE 25 MG/1
1 SUPPOSITORY RECTAL SEE ADMIN INSTRUCTIONS
Qty: 9 EACH | Refills: 3 | Status: SHIPPED | OUTPATIENT
Start: 2020-02-06 | End: 2020-02-25

## 2020-02-06 RX ORDER — SUBCUTANEOUS INSULIN PUMP
EACH MISCELLANEOUS
Qty: 1 EACH | Refills: 0 | Status: SHIPPED | OUTPATIENT
Start: 2020-02-06 | End: 2020-07-20

## 2020-02-06 NOTE — TELEPHONE ENCOUNTER
"  ----- Message -----  From: Paola Chahal MD  Sent: 2/6/2020  11:54 AM CST  To: [facility]    Hello -    Can you please help her renew her pump supplies.    Dear TEOFILO MENDEZ,    This is a courtesy message advising you we are currently working with Dr. Chahal;s office to obtain the following documents: â   Prescription. We highly advise you to call your doctor to expedite the request.     As per your insurance guidelines, beginning January 2020, your insurance requires your supplies to be run under pharmacy benefits prior to medical benefits. Please send me a reply letting me know if you want this order processed under your medical, or pharmacy benefits (there is no co-pay if you decide to obtain this order under your insurance medical benefits).    We will continue to keep you updated on your order as it is being processed.    Contact us regarding your order at [phone], Option 2 (Monday â \" Friday 8 AM - 6 PM CT). Or, you may respond to me directly by replying to this email or calling my extension below.         Always by your side;    Susy Vazquez     II    [employer]    [facility]    [address] [address]    Office [phone] EXT [phone]  Fax [phone]    [email or web address]    LinkedIn    Twitter    YouTube         LETâ  S TAKE HEALTHCARE        "

## 2020-02-14 ENCOUNTER — MYC REFILL (OUTPATIENT)
Dept: ENDOCRINOLOGY | Facility: CLINIC | Age: 55
End: 2020-02-14

## 2020-02-14 DIAGNOSIS — E10.9 TYPE 1 DIABETES MELLITUS WITHOUT COMPLICATION (H): Primary | ICD-10-CM

## 2020-02-14 NOTE — TELEPHONE ENCOUNTER
blood glucose (ERICA CONTOUR NEXT) test strip      Last Written Prescription Date:  2-12-19  Last Fill Quantity: 200 strips,   # refills: 11  Last Office Visit : 11-20-19  Future Office visit:  2-25-20    Routing refill request to provider for review/approval because:  Last note dose not indicate frequency pt is checking blood sugar. Please refill for appropriate amount.

## 2020-02-18 ENCOUNTER — TELEPHONE (OUTPATIENT)
Dept: ENDOCRINOLOGY | Facility: CLINIC | Age: 55
End: 2020-02-18

## 2020-02-18 ENCOUNTER — MEDICAL CORRESPONDENCE (OUTPATIENT)
Dept: HEALTH INFORMATION MANAGEMENT | Facility: CLINIC | Age: 55
End: 2020-02-18

## 2020-02-18 NOTE — TELEPHONE ENCOUNTER
[facility] Call Center    Phone Message    May a detailed message be left on voicemail: yes     Reason for Call: Other: Request for pump supplies was sent on Feb 5th - [employer] is touching base, we need this signed and faxed back to [phone] - for questions or concerns please call 1 [phone] EX: [phone]     Action Taken: Message routed to:  [facility] ([facility]): Diabetes Ed    Travel Screening: Not Applicable

## 2020-02-25 ENCOUNTER — MEDICAL CORRESPONDENCE (OUTPATIENT)
Dept: HEALTH INFORMATION MANAGEMENT | Facility: CLINIC | Age: 55
End: 2020-02-25

## 2020-02-25 ENCOUNTER — OFFICE VISIT (OUTPATIENT)
Dept: ENDOCRINOLOGY | Facility: CLINIC | Age: 55
End: 2020-02-25
Payer: COMMERCIAL

## 2020-02-25 VITALS
DIASTOLIC BLOOD PRESSURE: 75 MMHG | BODY MASS INDEX: 26.69 KG/M2 | WEIGHT: 134.4 LBS | HEART RATE: 90 BPM | SYSTOLIC BLOOD PRESSURE: 121 MMHG

## 2020-02-25 DIAGNOSIS — E10.9 TYPE 1 DIABETES MELLITUS WITHOUT COMPLICATION (H): Primary | ICD-10-CM

## 2020-02-25 LAB — HBA1C MFR BLD: 7.4 % (ref 4.3–6)

## 2020-02-25 RX ORDER — PROCHLORPERAZINE 25 MG/1
1 SUPPOSITORY RECTAL SEE ADMIN INSTRUCTIONS
Qty: 9 EACH | Refills: 3 | Status: SHIPPED | OUTPATIENT
Start: 2020-02-25 | End: 2023-05-16

## 2020-02-25 RX ORDER — PROCHLORPERAZINE 25 MG/1
1 SUPPOSITORY RECTAL SEE ADMIN INSTRUCTIONS
Qty: 1 DEVICE | Refills: 0 | Status: SHIPPED | OUTPATIENT
Start: 2020-02-25 | End: 2023-05-16

## 2020-02-25 ASSESSMENT — PAIN SCALES - GENERAL: PAINLEVEL: NO PAIN (0)

## 2020-02-25 NOTE — PROGRESS NOTES
This 54 year old woman returns for f/u of her type 1 diabetes that she has had for 45+ years.  She is co-managed with sarah Jean    She is using a  530g pump with Dexcom sensor but has been unable to get her sensors for a few weeks. Her payer no longer has a relationship with the medical supply company she has used in the past and she hasn't figured out how to get them.  Her pump is out of warranty and she is planning to get a tandem control IQ.  The paperwork has been submitted but it is taking a long time to be approved.     Her current pump settings are  Midnight 0.65  2:00 am 0.5  10 am 0.65  10:30 pm 0.6    Her carb ratio is 12    correction  70 with target of 100/150 midnight to 7 am and 21:00 to midnight; 100/120 during day.    Active insulin is 3 hours.    During the last month, she checked her BG 3+ times a day. The average BG is 211 and 60% are above target.  Reviewing the individual days along with her pump settings shows that she boluses 4.2  times a day on average with most being done with the bolus wizard. Her am sugars range from 124 to 287. She comes to target with a correction.  We don't have enough post meal values to be sure the carb ratio is correct.  She doesn't recognize her lows well and usually has to be in the 40s to feel them.  She hasn't had severe HG recently, but is concerned she will because she doesn't have sensors.  She is also concerned about insulin absorption.  She has lumps on her abd and has been trying to insert more laterally.    She has seen her eye MD and things are OK.  However she has developed more floaters since she saw him last.  She denies feet concerns.  She is feeling very stressed at work and at home.        aspirin 81 MG chewable tablet, Take 81 mg by mouth every other day   blood glucose (ERICA CONTOUR NEXT) test strip, Use to test blood sugar 4-6 times daily or as directed.  Continuous Blood Gluc Transmit (DEXCOM G6 TRANSMITTER) MISC, 1 each See Admin Instructions  "Change every 90 days.  glucose ([facility]S GLUCOSE) 40 % GEL, Take 15 g by mouth as needed  glucose blood VI test strips strip, Test up to ten times per day as directed  insulin aspart (NOVOLOG VIAL) 100 UNITS/ML vial, Use with pump  Approx  60 units daily  insulin infusion pump (MINIMED 530G) device, Insulin pump to be used for the administration of insulin as directed, per 's instructions  Insulin Infusion Pump Supplies (MINIMED PRO-SET INFUSION 24\") MISC, 1 each See Admin Instructions Change every 2-3 days, for use with Medtronic 530G.  simvastatin (ZOCOR) 80 MG tablet, Take 1 tablet (80 mg) by mouth At Bedtime  glucagon (GLUCAGON EMERGENCY) 1 MG injection, Inject 1 mg into the muscle once for 1 dose Use as directed    No current facility-administered medications on file prior to visit.       ROS: 10 point ROS neg other than the symptoms noted above in the HPI.    So Hx - , has three children.  Middle son is suffering from social anxiety    Vital signs:   /75   Pulse 90   Wt 61 kg (134 lb 6.4 oz)   BMI 26.69 kg/m    Estimated body mass index is 26.69 kg/m  as calculated from the following:    Height as of 2/12/19: 1.511 m (4' 11.5\").    Weight as of this encounter: 61 kg (134 lb 6.4 oz).    NAD  Eyes - no periorbital edema, conjunctival injection, scleral icterus  CV - RRR.  Normal pulses in feet.  No edema  Neuro - sensation intact to monofilament on soles of feet.  DTR 2/4 biceps  Skin - normal texture   Feet - no ulcers   Recent Labs   Lab Test 02/25/20 08/20/19  1208 08/20/19  1206 08/20/19 08/14/18  1322 08/14/18  1320  03/24/17  0906  02/14/17  1149  02/10/16  0949  07/16/13  1635 04/03/13  1516   A1C  --   --   --   --   --   --   --   --   --   --   --   --   --   --  7.9* 7.9*   HEMOGLOBINA1 7.4*  --   --  7.5*   < >  --   --    < >  --   --   --    < >  --    < >  --   --    TSH  --   --  1.65  --   --   --  1.93  --   --   --  1.57  --  1.13   < >  --   --    T4  --   --   --   -- "   --   --   --   --   --   --  0.84  --  0.84   < >  --   --    LDL  --   --   --   --   --   --  72  --  69  --   --   --  101*   < >  --   --    HDL  --   --   --   --   --   --  61  --  73  --   --   --  78   < >  --   --    TRIG  --   --   --   --   --   --  94  --  59  --   --   --  58   < >  --   --    CR  --   --  0.63  --   --   --  0.62  --   --   --  0.62   < > 0.66   < >  --   --    MICROL  --  <5  --   --   --  <5  --   --   --    < >  --   --  8   < >  --   --     < > = values in this interval not displayed.       Assessment and plan:    1.  Diabetes control.  Her A1c is in target.  She has impaired awareness of hypoglycemia.  I sent rx for her sensors to  Her pharmacy on the hope they can provide them for her.  She needs to wear the sensor all the time.  She will be getting the tandem pump soon and will work with the nurse educators to get started on it.  No changes in dosing today. Asked her to avoid putting insertion set in abd and encouraged her to move more laterally, onto hips and buttocks.    2.  Diabetes complications. Asked her to call her eye MD and ask to be seen since she is having more floaters.  Kidneys and feet are OK.    3. CVD risk. BP is at target. She is on statin.    F/u with Jazmyne Jean in 3 mo and f/u with me in 6 mo\\    Paola Chahal MD

## 2020-02-25 NOTE — LETTER
2/25/2020       RE: Kiera Dobson  [address]  [facility] [address]     Dear Colleague,    Thank you for referring your patient, Kiera Dobson, to the [facility] ENDOCRINOLOGY at [facility]. Please see a copy of my visit note below.    This 54 year old woman returns for f/u of her type 1 diabetes that she has had for 45+ years.  She is co-managed with sarah Jean    She is using a  530g pump with Dexcom sensor but has been unable to get her sensors for a few weeks. Her payer no longer has a relationship with the medical supply company she has used in the past and she hasn't figured out how to get them.  Her pump is out of warranty and she is planning to get a tandem control IQ.  The paperwork has been submitted but it is taking a long time to be approved.     Her current pump settings are  Midnight 0.65  2:00 am 0.5  10 am 0.65  10:30 pm 0.6    Her carb ratio is 12    correction  70 with target of 100/150 midnight to 7 am and 21:00 to midnight; 100/120 during day.    Active insulin is 3 hours.    During the last month, she checked her BG 3+ times a day. The average BG is 211 and 60% are above target.  Reviewing the individual days along with her pump settings shows that she boluses 4.2  times a day on average with most being done with the bolus wizard. Her am sugars range from 124 to 287. She comes to target with a correction.  We don't have enough post meal values to be sure the carb ratio is correct.  She doesn't recognize her lows well and usually has to be in the 40s to feel them.  She hasn't had severe HG recently, but is concerned she will because she doesn't have sensors.  She is also concerned about insulin absorption.  She has lumps on her abd and has been trying to insert more laterally.    She has seen her eye MD and things are OK.  However she has developed more floaters since she saw him last.  She denies feet concerns.  She is feeling very stressed at work  "and at home.        aspirin 81 MG chewable tablet, Take 81 mg by mouth every other day   blood glucose (ERICA CONTOUR NEXT) test strip, Use to test blood sugar 4-6 times daily or as directed.  Continuous Blood Gluc Transmit (DEXCOM G6 TRANSMITTER) MISC, 1 each See Admin Instructions Change every 90 days.  glucose ([employer] GLUCOSE) 40 % GEL, Take 15 g by mouth as needed  glucose blood VI test strips strip, Test up to ten times per day as directed  insulin aspart (NOVOLOG VIAL) 100 UNITS/ML vial, Use with pump  Approx  60 units daily  insulin infusion pump (MINIMED 530G) device, Insulin pump to be used for the administration of insulin as directed, per 's instructions  Insulin Infusion Pump Supplies (MINIMED PRO-SET INFUSION 24\") MISC, 1 each See Admin Instructions Change every 2-3 days, for use with [employer] 530G.  simvastatin (ZOCOR) 80 MG tablet, Take 1 tablet (80 mg) by mouth At Bedtime  glucagon (GLUCAGON EMERGENCY) 1 MG injection, Inject 1 mg into the muscle once for 1 dose Use as directed    No current facility-administered medications on file prior to visit.       ROS: 10 point ROS neg other than the symptoms noted above in the HPI.    So Hx - , has three children.  Middle son is suffering from social anxiety    Vital signs:   /75   Pulse 90   Wt 61 kg (134 lb 6.4 oz)   BMI 26.69 kg/m     Estimated body mass index is 26.69 kg/m  as calculated from the following:    Height as of 2/12/19: 1.511 m (4' 11.5\").    Weight as of this encounter: 61 kg (134 lb 6.4 oz).    NAD  Eyes - no periorbital edema, conjunctival injection, scleral icterus  CV - RRR.  Normal pulses in feet.  No edema  Neuro - sensation intact to monofilament on soles of feet.  DTR 2/4 biceps  Skin - normal texture   Feet - no ulcers   Recent Labs   Lab Test 02/25/20 08/20/19  1208 08/20/19  1206 08/20/19 08/14/18  1322 08/14/18  1320  03/24/17  0906  02/14/17  1149  02/10/16  0949  07/16/13  1635 04/03/13  1516   A1C  --  "  --   --   --   --   --   --   --   --   --   --   --   --   --  7.9* 7.9*   HEMOGLOBINA1 7.4*  --   --  7.5*   < >  --   --    < >  --   --   --    < >  --    < >  --   --    TSH  --   --  1.65  --   --   --  1.93  --   --   --  1.57  --  1.13   < >  --   --    T4  --   --   --   --   --   --   --   --   --   --  0.84  --  0.84   < >  --   --    LDL  --   --   --   --   --   --  72  --  69  --   --   --  101*   < >  --   --    HDL  --   --   --   --   --   --  61  --  73  --   --   --  78   < >  --   --    TRIG  --   --   --   --   --   --  94  --  59  --   --   --  58   < >  --   --    CR  --   --  0.63  --   --   --  0.62  --   --   --  0.62   < > 0.66   < >  --   --    MICROL  --  <5  --   --   --  <5  --   --   --    < >  --   --  8   < >  --   --     < > = values in this interval not displayed.       Assessment and plan:    1.  Diabetes control.  Her A1c is in target.  She has impaired awareness of hypoglycemia.  I sent rx for her sensors to  Her pharmacy on the hope they can provide them for her.  She needs to wear the sensor all the time.  She will be getting the tandem pump soon and will work with the nurse educators to get started on it.  No changes in dosing today. Asked her to avoid putting insertion set in abd and encouraged her to move more laterally, onto hips and buttocks.    2.  Diabetes complications. Asked her to call her eye MD and ask to be seen since she is having more floaters.  Kidneys and feet are OK.    3. CVD risk. BP is at target. She is on statin.    F/u with Jazmyne Jean in 3 mo and f/u with me in 6 mo\\    Paola Chahal MD

## 2020-02-26 ENCOUNTER — TELEPHONE (OUTPATIENT)
Dept: ENDOCRINOLOGY | Facility: CLINIC | Age: 55
End: 2020-02-26

## 2020-02-26 DIAGNOSIS — N20.0 CALCULUS OF KIDNEY: ICD-10-CM

## 2020-02-26 NOTE — TELEPHONE ENCOUNTER
[facility] Call Center    Phone Message    May a detailed message be left on voicemail: yes     Reason for Call: Other: Pt called stating she is completely out of her insulin. She stated medtronic needs orders to get this refilled. Please follow up with pt for questions and concerns     Action Taken: Message routed to:  [facility] ([facility] Endo    Travel Screening: Not Applicable

## 2020-02-26 NOTE — TELEPHONE ENCOUNTER
Novolog vials have been sent to [facility] in [place]. Kiera is no longer using medtronic . Inocencia Resendiz RN on 2/26/2020 at 2:42 PM

## 2020-03-09 DIAGNOSIS — N20.0 CALCULUS OF KIDNEY: ICD-10-CM

## 2020-03-09 RX ORDER — SIMVASTATIN 80 MG
80 TABLET ORAL AT BEDTIME
Qty: 90 TABLET | Refills: 1 | Status: SHIPPED | OUTPATIENT
Start: 2020-03-09 | End: 2020-09-10

## 2020-03-09 NOTE — TELEPHONE ENCOUNTER
simvastatin (ZOCOR) 80 MG tablet       Last Written Prescription Date:  9/12/19  Last Fill Quantity: 90,   # refills: 1  Last Office Visit : 2/25/20  Future Office visit:  5/26/20    Routing refill request to provider for review/approval because:  Failed protocol: lab past due- LDL.

## 2020-03-11 ENCOUNTER — ALLIED HEALTH/NURSE VISIT (OUTPATIENT)
Dept: EDUCATION SERVICES | Facility: CLINIC | Age: 55
End: 2020-03-11
Payer: COMMERCIAL

## 2020-03-11 DIAGNOSIS — E10.65 TYPE 1 DIABETES MELLITUS WITH HYPERGLYCEMIA (H): Primary | ICD-10-CM

## 2020-03-11 RX ORDER — PROCHLORPERAZINE 25 MG/1
1 SUPPOSITORY RECTAL
Qty: 1 EACH | Refills: 3 | Status: SHIPPED | OUTPATIENT
Start: 2020-03-11

## 2020-03-11 RX ORDER — PROCHLORPERAZINE 25 MG/1
1 SUPPOSITORY RECTAL
Qty: 9 EACH | Refills: 3 | Status: SHIPPED | OUTPATIENT
Start: 2020-03-11 | End: 2023-08-24

## 2020-03-11 RX ORDER — PROCHLORPERAZINE 25 MG/1
1 SUPPOSITORY RECTAL CONTINUOUS
Qty: 1 DEVICE | Refills: 0 | Status: SHIPPED | OUTPATIENT
Start: 2020-03-11

## 2020-03-12 NOTE — PROGRESS NOTES
"Diabetes Self-Management Education & Support    Presents for:  Insulin Pump Start    SUBJECTIVE/OBJECTIVE:     Cultural Influences/Ethnic Background:  American  Type 1 diabetes diagnosed at age 3  Kiera Mckay comes in today to start her new Tandem Control IQ pump, but she doesn't have a sensor.  Her G6 sensor supplies have not arrived from [facility].  She has been without a sensor for about the past three weeks.       Diabetes Symptoms & Complications:    Patient Problem List and Family Medical History reviewed for relevant medical history, current medical status, and diabetes risk factors.    Vitals:  There were no vitals taken for this visit.  Estimated body mass index is 26.69 kg/m  as calculated from the following:    Height as of 2/12/19: 1.511 m (4' 11.5\").    Weight as of 2/25/20: 61 kg (134 lb 6.4 oz).   Last 3 BP:   BP Readings from Last 3 Encounters:   02/25/20 121/75   11/20/19 133/79   08/20/19 109/70       History   Smoking Status     Never Smoker   Smokeless Tobacco     Never Used       Labs:  Lab Results   Component Value Date    A1C 7.9 07/16/2013     Lab Results   Component Value Date     03/01/2016     Lab Results   Component Value Date    LDL 72 08/14/2018     HDL Cholesterol   Date Value Ref Range Status   08/14/2018 61 >49 mg/dL Final   ]  GFR Estimate   Date Value Ref Range Status   08/20/2019 >90 >60 mL/min/[1.73_m2] Final     Comment:     Non  GFR Calc  Starting 12/18/2018, serum creatinine based estimated GFR (eGFR) will be   calculated using the Chronic Kidney Disease Epidemiology Collaboration   (CKD-EPI) equation.       GFR Estimate If Black   Date Value Ref Range Status   08/20/2019 >90 >60 mL/min/[1.73_m2] Final     Comment:      GFR Calc  Starting 12/18/2018, serum creatinine based estimated GFR (eGFR) will be   calculated using the Chronic Kidney Disease Epidemiology Collaboration   (CKD-EPI) equation.       Lab Results "   Component Value Date    CR 0.63 08/20/2019     Taking Medications:  Diabetes Medication(s)     Diabetic Other       glucagon (GLUCAGON EMERGENCY) 1 MG injection    Inject 1 mg into the muscle once for 1 dose Use as directed     glucose (CVS GLUCOSE) 40 % GEL    Take 15 g by mouth as needed    Insulin       insulin aspart (NOVOLOG VIAL) 100 UNITS/ML vial    Use with pump  Approx  60 units daily         Patient Activation Measure Survey Score:  No flowsheet data found.    Diabetes knowledge and skills assessment:   INTERVENTIONS:    Education provided today on:  AADE Self-Care Behaviors:  Insulin Pump Training:   T:slim X2 Insulin Delivery System    BG at beginning of appointment: 451 mg/dL  BG at end of appointment: 315 mg/dL    Kiera Mckay was given a Dexcom G4 CGM to wear until her Dexcom G6 is shipped to her.  This will not communicate with her insulin pump, however it will provide her with some warning of hypoglycemia as she has absolute unawareness.      Pump overview:  touch screen and general navigation, rechargeable battery    Home screen/ menu:  Status, battery life, CGM icons, units remaining, time/date, IOB (insulin on board),   Options - profiles, settings, suspend/resume insulin, history, temp basal, alerts   Bolus - food and correction calculator, extended bolus, reverse correction    Personal profile:  Timed settings: basal rate, correction factor, I:C ratio, BG target, edit, duplicate or review  Bolus settings: duration of insulin action, max bolus  Enter carbs into bolus calculator:  Yes     Dexcom CGM:  Entering transmitter ID and sensor code  Urgent Low alert: Fixed at 55 mg/dl  Control IQ  Feature not turned ON as she does not have a Dexcom G6 sensor.  Shown how to turn the feature on and reviewed exactly how it works.      Infusion set:  Loading cartridge- minimum and maximum amounts, site selection and prep, tubing and canula fills, change set every 2-3 days    Safety:  troubleshooting, low  reservoir, alerts and alarms, importance of back-up plan, hypoglycemia, sick day management, hyperglycemia and DKA prevention    T:Connect: uploading pump:  Yes.  Encouraged her to upload in one week.       Additional topics: 24/7 [facility] helpline [phone], removal for xray, CT, and MRI, back up plan, when and how to order pump supplies, when to call a healthcare provider.     Education materials provided to patient:  T:Slim Handouts on Control IQ.     ASSESSMENT:  Insulin pump was programmed according to her current pump settings.     Pt verbalized understanding of concepts discussed and recommendations provided today. Patient was able to start insulin pump without difficulty. Yes         PLAN  See Patient Instructions for co-developed, patient-stated behavior change goals.  AVS printed and provided to patient today. See Follow-Up section for recommended follow-up.    Time Spent: 90 minutes  Encounter Type: Individual    Any diabetes medication dose changes were made via the CDE Protocol and Collaborative Practice Agreement with the patient's referring provider. A copy of this encounter was shared with the provider.

## 2020-03-31 ENCOUNTER — DOCUMENTATION ONLY (OUTPATIENT)
Dept: CARE COORDINATION | Facility: CLINIC | Age: 55
End: 2020-03-31

## 2020-04-22 ENCOUNTER — DOCUMENTATION ONLY (OUTPATIENT)
Dept: ENDOCRINOLOGY | Facility: CLINIC | Age: 55
End: 2020-04-22

## 2020-05-21 NOTE — PROGRESS NOTES
"Kiera oDbson is a 54 year old female who is being evaluated via a billable telephone visit.      The patient has been notified of following:     \"This telephone visit will be conducted via a call between you and your physician/provider. We have found that certain health care needs can be provided without the need for an in-person physical exam.  This service lets us provide the care you need with a telephone conversation.  If a prescription is necessary we can send it directly to your pharmacy.  If lab work is needed we can place an order for that and you can then stop by our lab to have the test done at a later time.    Telephone visits are billed at different rates depending on your insurance coverage.  Please reach out to your insurance provider with any questions.    If during the course of the call the physician/provider feels a telephone visit is not appropriate, you will not be charged for this service.\"    Patient has given verbal consent for telephone visit? Yes    How would you like to obtain your AVS? Marry Gallardo MA    Due to the COVID 19 pandemic this visit was converted to a telephone visit in order to help prevent spread of infection in this patient and the general population.    Time of start: 8:30 am  Time of end: 8:52 am  Total duration of telephone visit: 22 minutes.    This visit would have been billed as a 71128 visit today.    HPI  Kiera Dobson is a 54 year old female with type 1 diabetes mellitus. Telephone visit today for diabetes follow up.  Kiera has had type 1 diabetes for 45 + years.  She has mild/moderate nonproliferative diabetic retinopathy. No peripheral neuropathy or nephropathy.  Her hx is also significant for hypoglycemia unawareness.  Kiera started using a new insulin pump- Tandem Control IQ pump in early March of this year. She also has a DexcomG6 sensor.  She tells me she was without sensors for several weeks- insurance issues.  She also states she has had " some difficulty with the Dexcom sensor connecting to her insulin pump.  She was unable to provide us with an insulin pump or Dexcom download today.  Her basal insulin rates are set at :  Midnight = 0.65 units/hr.  2 am = 0.5 units/hr.  10 am = 0.65 units/hr.  10:30 pm = 0.6 units/hr.  Her I/C ratio is 1:12.  Her most recent A1C was 7.4 % on 2/25/2020.  She states her blood sugars have improved.  She still has some hypoglycemia.  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  Dry cough. No SOB at rest, fever or chills.  Increase sneezing- allergies per patient.  Some blurred vision.  No numbness in her feet or feet.  Pt denies SOB or cough.  Pt denies abd pain, diarrhea, dysuria or hematuria.  Kiera denies foot ulcers at this time.    DIABETES CARE:  Retinopathy:mild/moderate nonproliferative diabetic retinopathy.  She was last seen here by Oph in July 2019.  Nephropathy: urine microalbuminuria negative in 8/2019. Neuropathy: none.  Feet: no exam today.  Taking ASA: yes.  Lipids: LDL 72 in Aug 2018.    Pt is taking Simvastatin.  Insulin: Tandem control IQ insulin pump.  Testing: DexcomG6 sensor.    ROS  Please see under HPI.    Allergies  Allergies   Allergen Reactions     Ampicillin Sodium Rash       Medications  Current Outpatient Medications   Medication Sig Dispense Refill     aspirin 81 MG chewable tablet Take 81 mg by mouth every other day  108 tablet 3     blood glucose (ERICA CONTOUR NEXT) test strip Use to test blood sugar 4-6 times daily or as directed. 720 strip 3     Continuous Blood Gluc  (DEXCOM G6 ) MELE 1 Device continuous 1 Device 0     Continuous Blood Gluc  (DEXCOM G6 ) MELE 1 each See Admin Instructions Use per 's instructions, to monitor glucose continuously. 1 Device 0     Continuous Blood Gluc Sensor (DEXCOM G6 SENSOR) MISC 1 each every 10 days 9 each 3     Continuous Blood Gluc Sensor (DEXCOM G6 SENSOR) MISC 1 each See Admin  "Instructions Change every 10 days. 9 each 3     Continuous Blood Gluc Transmit (DEXCOM G6 TRANSMITTER) MISC 1 each every 3 months 1 each 3     Continuous Blood Gluc Transmit (DEXCOM G6 TRANSMITTER) MISC 1 each See Admin Instructions Change every 90 days. 1 each 3     glucose (CVS GLUCOSE) 40 % GEL Take 15 g by mouth as needed       glucose blood VI test strips strip Test up to ten times per day as directed 5 Box 8     insulin aspart (NOVOLOG VIAL) 100 UNITS/ML vial Use with pump  Approx  60 units daily 60 mL 3     simvastatin (ZOCOR) 80 MG tablet Take 1 tablet (80 mg) by mouth At Bedtime 90 tablet 1     glucagon (GLUCAGON EMERGENCY) 1 MG injection Inject 1 mg into the muscle once for 1 dose Use as directed 1 mg 2     insulin infusion pump (MINIMED 530G) device Insulin pump to be used for the administration of insulin as directed, per 's instructions (Patient not taking: Reported on 5/22/2020) 1 each 0     Insulin Infusion Pump Supplies (MINIMED PRO-SET INFUSION 24\") MISC 1 each See Admin Instructions Change every 2-3 days, for use with [employer] 530G. (Patient not taking: Reported on 5/22/2020) 45 each 3       Family History  family history includes Alzheimer Disease in her father; Autoimmune Disease in her mother; Cancer in her father; Diabetes in her father; Glaucoma in her father and mother; Hypertension in her mother.    Social History  Smoke: none.  ETOH: rare.   with children.  Pt works full time in a lab.    Past Medical History  Past Medical History:   Diagnosis Date     Diabetes mellitus (H)     Type 1     Elevated cholesterol      Glaucoma     Glaucoma suspect     Glaucoma suspect      Kidney stones      Nonsenile cataract      Past Surgical History:   Procedure Laterality Date     APPENDECTOMY OPEN  1981     BIOPSY OF SKIN LESION  12/30/2011    returned January 2012 for additional removal     c sections  99,97,08     EXTRACORPOREAL SHOCK WAVE LITHOTRIPSY, CYSTOSCOPY, INSERT STENT " URETER(S), COMBINED  2004     LASER HOLMIUM LITHOTRIPSY URETER(S), INSERT STENT, COMBINED  12/12/2013    Procedure: COMBINED CYSTOSCOPY, URETEROSCOPY, LASER HOLMIUM LITHOTRIPSY URETER(S), INSERT STENT;  Right Ureteroscopy Holmium Laser Lithotripsy Stent Placement;  Surgeon: Kirill Marlow MD;  Location: [facility] OR     S/P right shoulder surgery in Dec 2012.    Physical Exam    No exam today.    RESULTS  Creatinine   Date Value Ref Range Status   08/20/2019 0.63 0.52 - 1.04 mg/dL Final     GFR Estimate   Date Value Ref Range Status   08/20/2019 >90 >60 mL/min/[1.73_m2] Final     Comment:     Non  GFR Calc  Starting 12/18/2018, serum creatinine based estimated GFR (eGFR) will be   calculated using the Chronic Kidney Disease Epidemiology Collaboration   (CKD-EPI) equation.       Hemoglobin A1C   Date Value Ref Range Status   07/16/2013 7.9 (A) 4.3 - 6.0 % Final     Potassium   Date Value Ref Range Status   08/20/2019 4.4 3.4 - 5.3 mmol/L Final     ALT   Date Value Ref Range Status   02/14/2017 17 0 - 50 U/L Final     AST   Date Value Ref Range Status   02/14/2017 13 0 - 45 U/L Final     TSH   Date Value Ref Range Status   08/20/2019 1.65 0.40 - 4.00 mU/L Final     T4 Free   Date Value Ref Range Status   02/14/2017 0.84 0.76 - 1.46 ng/dL Final       Cholesterol   Date Value Ref Range Status   08/14/2018 152 <200 mg/dL Final   03/24/2017 154 <200 mg/dL Final     HDL Cholesterol   Date Value Ref Range Status   08/14/2018 61 >49 mg/dL Final   03/24/2017 73 >49 mg/dL Final     LDL Cholesterol Calculated   Date Value Ref Range Status   08/14/2018 72 <100 mg/dL Final     Comment:     Desirable:       <100 mg/dl   03/24/2017 69 <100 mg/dL Final     Comment:     Desirable:       <100 mg/dl     Triglycerides   Date Value Ref Range Status   08/14/2018 94 <150 mg/dL Final   03/24/2017 59 <150 mg/dL Final     Cholesterol/HDL Ratio   Date Value Ref Range Status   08/18/2014 2.7 0.0 - 5.0 Final   09/17/2013 2.3 0.0  - 5.0 Final     A1C      7.4   2/25/2020  A1C      7.4  11/20/2019  A1C      7.5   8/20/2019  A1C      7.2   5/22/2019  A1C      7.4   2/12/2019  A1C      7.9   11/14/2018    A1C      7.6   8/14/2018  A1C      7.4   3/15/2018  A1C      7.4   12/1/2017  A1C      7.9   9/13/2017  A1C      7.7   6/1/2017  A1C      8.3   2/14/2017  A1C      7.5   8/18/2016  A1C      7.6   5/5/2016  A1C      7.8    2/10/2016  A1C      7.9    9/2/2015  A1C      7.7    5/2015  A1C      8.6    1/28/2015  A1C      8.8    9/26/2014  A1C      8.0    7/2/2014  A1C      8.4    2/28/2014    ASSESSMENT/PLAN:    1.  TYPE 1 DIABETES MELLITUS:Type 1 diabetes mellitus complicated by retinopathy and hypoglycemia unawareness.  I will have our diabetes educator call pt today to assist her with issues she has been having with her Tandem control IQ insulin pump and DexcomG6 sensor.  I asked Kiera to send me pump/Dexcom data to review in 1 month.  Reminded her to take her insulin bolus 10-15 minutes prior to eating.  No insulin rate changes today.  Pt's urine microalbuminuria was negative in 8/2019 with a normal creat/GFR.  Kiera denies foot ulcers at this time.    2.  MILD/MODERATE NPDR: Last seen by Oph here in July 2019.    Will schedule pt to see Oph in follow up once COVID19 pandemic has resolved.    3.  HYPERLIPIDEMIA:  LDL 72 in 8/2019.   Pt remains on high dose Simvastatin.  Since she has been on the high dose Simvastatin for years and tolerating this dose.    4.  ADHD/STRESS: Pt to see her psych staff in follow up.    5. HYPOGLYCEMIA UNAWARENESS: I will also arrange for her to have a virtual visit with our CDE to discuss issues she has been having with her Dexcom sensor.    6. FOLLOW UP: with me in 1 month with pump and Dexcom data.

## 2020-05-26 ENCOUNTER — CARE COORDINATION (OUTPATIENT)
Dept: EDUCATION SERVICES | Facility: CLINIC | Age: 55
End: 2020-05-26

## 2020-05-26 ENCOUNTER — VIRTUAL VISIT (OUTPATIENT)
Dept: ENDOCRINOLOGY | Facility: CLINIC | Age: 55
End: 2020-05-26
Payer: COMMERCIAL

## 2020-05-26 DIAGNOSIS — E10.65 TYPE 1 DIABETES MELLITUS WITH HYPERGLYCEMIA (H): Primary | ICD-10-CM

## 2020-05-26 NOTE — PROGRESS NOTES
Kiera Mckay having some difficulty getting her Dexcom to pair with her pump.  Spoke over the phone and sent her instructions to do this via [employer].      BHARAGV MendozaN, RN, [facility], CDTC  Certified Diabetes Care and   [facility] Endocrinology and Diabetes  [facility]  Clinic [id number]  Phone [phone] (voicemail)  Email:  [email or web address]

## 2020-05-26 NOTE — LETTER
"5/26/2020       RE: Kiera Dobson  [address]  [facility] [address]     Dear Colleague,    Thank you for referring your patient, Kiera Dobson, to the [facility] ENDOCRINOLOGY at [facility]. Please see a copy of my visit note below.    Kiera Dobson is a 54 year old female who is being evaluated via a billable telephone visit.      The patient has been notified of following:     \"This telephone visit will be conducted via a call between you and your physician/provider. We have found that certain health care needs can be provided without the need for an in-person physical exam.  This service lets us provide the care you need with a telephone conversation.  If a prescription is necessary we can send it directly to your pharmacy.  If lab work is needed we can place an order for that and you can then stop by our lab to have the test done at a later time.    Telephone visits are billed at different rates depending on your insurance coverage.  Please reach out to your insurance provider with any questions.    If during the course of the call the physician/provider feels a telephone visit is not appropriate, you will not be charged for this service.\"    Patient has given verbal consent for telephone visit? Yes    How would you like to obtain your AVS? Marry Gallardo MA    Due to the COVID 19 pandemic this visit was converted to a telephone visit in order to help prevent spread of infection in this patient and the general population.    Time of start: 8:30 am  Time of end: 8:52 am  Total duration of telephone visit: 22 minutes.    This visit would have been billed as a 29893 visit today.    HPI  Kiera Dobson is a 54 year old female with type 1 diabetes mellitus. Telephone visit today for diabetes follow up.  Kiera has had type 1 diabetes for 45 + years.  She has mild/moderate nonproliferative diabetic retinopathy. No peripheral neuropathy or nephropathy.  Her " hx is also significant for hypoglycemia unawareness.  Kiera started using a new insulin pump- Tandem Control IQ pump in early March of this year. She also has a DexcomG6 sensor.  She tells me she was without sensors for several weeks- insurance issues.  She also states she has had some difficulty with the Dexcom sensor connecting to her insulin pump.  She was unable to provide us with an insulin pump or Dexcom download today.  Her basal insulin rates are set at :  Midnight = 0.65 units/hr.  2 am = 0.5 units/hr.  10 am = 0.65 units/hr.  10:30 pm = 0.6 units/hr.  Her I/C ratio is 1:12.  Her most recent A1C was 7.4 % on 2/25/2020.  She states her blood sugars have improved.  She still has some hypoglycemia.  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  Dry cough. No SOB at rest, fever or chills.  Increase sneezing- allergies per patient.  Some blurred vision.  No numbness in her feet or feet.  Pt denies SOB or cough.  Pt denies abd pain, diarrhea, dysuria or hematuria.  Kiera denies foot ulcers at this time.    DIABETES CARE:  Retinopathy:mild/moderate nonproliferative diabetic retinopathy.  She was last seen here by Oph in July 2019.  Nephropathy: urine microalbuminuria negative in 8/2019. Neuropathy: none.  Feet: no exam today.  Taking ASA: yes.  Lipids: LDL 72 in Aug 2018.    Pt is taking Simvastatin.  Insulin: Tandem control IQ insulin pump.  Testing: DexcomG6 sensor.    ROS  Please see under HPI.    Allergies  Allergies   Allergen Reactions     Ampicillin Sodium Rash       Medications  Current Outpatient Medications   Medication Sig Dispense Refill     aspirin 81 MG chewable tablet Take 81 mg by mouth every other day  108 tablet 3     blood glucose (ERICA CONTOUR NEXT) test strip Use to test blood sugar 4-6 times daily or as directed. 720 strip 3     Continuous Blood Gluc  (DEXCOM G6 ) MELE 1 Device continuous 1 Device 0     Continuous Blood Gluc  (DEXCOM G6 ) MELE 1  "each See Admin Instructions Use per 's instructions, to monitor glucose continuously. 1 Device 0     Continuous Blood Gluc Sensor (DEXCOM G6 SENSOR) MISC 1 each every 10 days 9 each 3     Continuous Blood Gluc Sensor (DEXCOM G6 SENSOR) MISC 1 each See Admin Instructions Change every 10 days. 9 each 3     Continuous Blood Gluc Transmit (DEXCOM G6 TRANSMITTER) MISC 1 each every 3 months 1 each 3     Continuous Blood Gluc Transmit (DEXCOM G6 TRANSMITTER) MISC 1 each See Admin Instructions Change every 90 days. 1 each 3     glucose ([facility] GLUCOSE) 40 % GEL Take 15 g by mouth as needed       glucose blood VI test strips strip Test up to ten times per day as directed 5 Box 8     insulin aspart (NOVOLOG VIAL) 100 UNITS/ML vial Use with pump  Approx  60 units daily 60 mL 3     simvastatin (ZOCOR) 80 MG tablet Take 1 tablet (80 mg) by mouth At Bedtime 90 tablet 1     glucagon (GLUCAGON EMERGENCY) 1 MG injection Inject 1 mg into the muscle once for 1 dose Use as directed 1 mg 2     insulin infusion pump (MINIMED 530G) device Insulin pump to be used for the administration of insulin as directed, per 's instructions (Patient not taking: Reported on 5/22/2020) 1 each 0     Insulin Infusion Pump Supplies (MINIMED PRO-SET INFUSION 24\") MISC 1 each See Admin Instructions Change every 2-3 days, for use with [employer]onic 530G. (Patient not taking: Reported on 5/22/2020) 45 each 3       Family History  family history includes Alzheimer Disease in her father; Autoimmune Disease in her mother; Cancer in her father; Diabetes in her father; Glaucoma in her father and mother; Hypertension in her mother.    Social History  Smoke: none.  ETOH: rare.   with children.  Pt works full time in a lab.    Past Medical History  Past Medical History:   Diagnosis Date     Diabetes mellitus (H)     Type 1     Elevated cholesterol      Glaucoma     Glaucoma suspect     Glaucoma suspect      Kidney stones      Nonsenile cataract "      Past Surgical History:   Procedure Laterality Date     APPENDECTOMY OPEN  1981     BIOPSY OF SKIN LESION  12/30/2011    returned January 2012 for additional removal     c sections  99,97,08     EXTRACORPOREAL SHOCK WAVE LITHOTRIPSY, CYSTOSCOPY, INSERT STENT URETER(S), COMBINED  2004     LASER HOLMIUM LITHOTRIPSY URETER(S), INSERT STENT, COMBINED  12/12/2013    Procedure: COMBINED CYSTOSCOPY, URETEROSCOPY, LASER HOLMIUM LITHOTRIPSY URETER(S), INSERT STENT;  Right Ureteroscopy Holmium Laser Lithotripsy Stent Placement;  Surgeon: Kirill Marlow MD;  Location: [facility] OR     S/P right shoulder surgery in Dec 2012.    Physical Exam    No exam today.    RESULTS  Creatinine   Date Value Ref Range Status   08/20/2019 0.63 0.52 - 1.04 mg/dL Final     GFR Estimate   Date Value Ref Range Status   08/20/2019 >90 >60 mL/min/[1.73_m2] Final     Comment:     Non  GFR Calc  Starting 12/18/2018, serum creatinine based estimated GFR (eGFR) will be   calculated using the Chronic Kidney Disease Epidemiology Collaboration   (CKD-EPI) equation.       Hemoglobin A1C   Date Value Ref Range Status   07/16/2013 7.9 (A) 4.3 - 6.0 % Final     Potassium   Date Value Ref Range Status   08/20/2019 4.4 3.4 - 5.3 mmol/L Final     ALT   Date Value Ref Range Status   02/14/2017 17 0 - 50 U/L Final     AST   Date Value Ref Range Status   02/14/2017 13 0 - 45 U/L Final     TSH   Date Value Ref Range Status   08/20/2019 1.65 0.40 - 4.00 mU/L Final     T4 Free   Date Value Ref Range Status   02/14/2017 0.84 0.76 - 1.46 ng/dL Final       Cholesterol   Date Value Ref Range Status   08/14/2018 152 <200 mg/dL Final   03/24/2017 154 <200 mg/dL Final     HDL Cholesterol   Date Value Ref Range Status   08/14/2018 61 >49 mg/dL Final   03/24/2017 73 >49 mg/dL Final     LDL Cholesterol Calculated   Date Value Ref Range Status   08/14/2018 72 <100 mg/dL Final     Comment:     Desirable:       <100 mg/dl   03/24/2017 69 <100 mg/dL Final      Comment:     Desirable:       <100 mg/dl     Triglycerides   Date Value Ref Range Status   08/14/2018 94 <150 mg/dL Final   03/24/2017 59 <150 mg/dL Final     Cholesterol/HDL Ratio   Date Value Ref Range Status   08/18/2014 2.7 0.0 - 5.0 Final   09/17/2013 2.3 0.0 - 5.0 Final     A1C      7.4   2/25/2020  A1C      7.4  11/20/2019  A1C      7.5   8/20/2019  A1C      7.2   5/22/2019  A1C      7.4   2/12/2019  A1C      7.9   11/14/2018    A1C      7.6   8/14/2018  A1C      7.4   3/15/2018  A1C      7.4   12/1/2017  A1C      7.9   9/13/2017  A1C      7.7   6/1/2017  A1C      8.3   2/14/2017  A1C      7.5   8/18/2016  A1C      7.6   5/5/2016  A1C      7.8    2/10/2016  A1C      7.9    9/2/2015  A1C      7.7    5/2015  A1C      8.6    1/28/2015  A1C      8.8    9/26/2014  A1C      8.0    7/2/2014  A1C      8.4    2/28/2014    ASSESSMENT/PLAN:    1.  TYPE 1 DIABETES MELLITUS:Type 1 diabetes mellitus complicated by retinopathy and hypoglycemia unawareness.  I will have our diabetes educator call pt today to assist her with issues she has been having with her Tandem control IQ insulin pump and DexcomG6 sensor.  I asked Kiera to send me pump/Dexcom data to review in 1 month.  Reminded her to take her insulin bolus 10-15 minutes prior to eating.  No insulin rate changes today.  Pt's urine microalbuminuria was negative in 8/2019 with a normal creat/GFR.  Kiera denies foot ulcers at this time.    2.  MILD/MODERATE NPDR: Last seen by Oph here in July 2019.    Will schedule pt to see Oph in follow up once COVID19 pandemic has resolved.    3.  HYPERLIPIDEMIA:  LDL 72 in 8/2019.   Pt remains on high dose Simvastatin.  Since she has been on the high dose Simvastatin for years and tolerating this dose.    4.  ADHD/STRESS: Pt to see her psych staff in follow up.    5. HYPOGLYCEMIA UNAWARENESS: I will also arrange for her to have a virtual visit with our CDE to discuss issues she has been having with her Dexcom sensor.    6. FOLLOW UP: with  me in 1 month with pump and Dexcom data.          Again, thank you for allowing me to participate in the care of your patient.      Sincerely,    Jo Jean PA-C

## 2020-06-30 DIAGNOSIS — E10.3393 MODERATE NONPROLIFERATIVE DIABETIC RETINOPATHY OF BOTH EYES WITHOUT MACULAR EDEMA ASSOCIATED WITH TYPE 1 DIABETES MELLITUS (H): Primary | ICD-10-CM

## 2020-07-20 NOTE — PROGRESS NOTES
"Kiera Dobson is a 54 year old female who is being evaluated via a billable telephone visit.      The patient has been notified of following:     \"This telephone visit will be conducted via a call between you and your physician/provider. We have found that certain health care needs can be provided without the need for an in-person physical exam.  This service lets us provide the care you need with a telephone conversation.  If a prescription is necessary we can send it directly to your pharmacy.  If lab work is needed we can place an order for that and you can then stop by our lab to have the test done at a later time.    Telephone visits are billed at different rates depending on your insurance coverage.  Please reach out to your insurance provider with any questions.    If during the course of the call the physician/provider feels a telephone visit is not appropriate, you will not be charged for this service.\"    Patient has given verbal consent for telephone visit? Yes    How would you like to obtain your AVS? Marry Gallardo MA    Due to the COVID 19 pandemic this visit was converted to a telephone visit in order to help prevent spread of infection in this patient and the general population.    Time of start: 7:30 am  Time of end: 7:59 am  Total duration of telephone visit: 29 minutes.    HPI:  Kiera Dobson is a 54 year old female with type 1 diabetes mellitus. Telephone visit today for diabetes follow up.  Kiera was dx with type 1 diabetes 45 + years ago.  She has mild/moderate nonproliferative diabetic retinopathy. No peripheral neuropathy or nephropathy.  Her hx is also significant for hypoglycemia unawareness.  Kiera started using a new insulin pump- Tandem Control IQ pump in early March of this year. She also has a DexcomG6 sensor.  She was unable to upload her insulin pump or Dexcom data today.  Her basal insulin rates are set at :  Midnight = 0.65 units/hr.  2 am = 0.5 units/hr.  10 am = 0.65 " units/hr.  10:30 pm = 0.6 units/hr.  Her I/C ratio is 1:12.  Kiera tells me she purchased an A1C kit from [employer] and her A1C is 6.8 %.  Her previous  A1C was 7.4 % on 2/25/2020.  She continues to work hard on avoiding areas with scar tissue when inserting her infusion sets.  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  She tells me coworkers have tested + for COVID.  She denies SOB,cough, fever or chills at this time.  Some blurred vision.  Intermittent chest pain- same as in the past. No radiation of pain in shoulder/arms, no SOB, nausea or diaphoresis.  Sometimes chest wall is painful to touch, but not always.  No numbness in her feet or feet.  Kiera denies foot ulcers at this time.  Pt denies abd pain, diarrhea, dysuria or hematuria.    DIABETES CARE:  Retinopathy:mild/moderate nonproliferative diabetic retinopathy.  She was last seen here by Oph in July 2019.  She will be seeing Oph on 7/25/2020.  Nephropathy: urine microalbuminuria negative in 8/2019.   Neuropathy: none.  Feet: no exam today.  Taking ASA: yes.  Lipids: LDL 72 in Aug 2018.    Pt is taking Simvastatin.  CAD: no. Normal stress echo in 9/2010.  Mental health:hx of ADHD.  Insulin: Tandem insulin pump- control IQ.  Testing: DexcomG6 sensor.    ROS  Please see under HPI.    Allergies  Allergies   Allergen Reactions     Ampicillin Sodium Rash       Medications  Current Outpatient Medications   Medication Sig Dispense Refill     aspirin 81 MG chewable tablet Take 81 mg by mouth every other day  108 tablet 3     blood glucose (ERICA CONTOUR NEXT) test strip Use to test blood sugar 4-6 times daily or as directed. 720 strip 3     Continuous Blood Gluc  (DEXCOM G6 ) MELE 1 Device continuous 1 Device 0     Continuous Blood Gluc  (DEXCOM G6 ) MELE 1 each See Admin Instructions Use per 's instructions, to monitor glucose continuously. 1 Device 0     Continuous Blood Gluc Sensor (DEXCOM G6 SENSOR)  MISC 1 each every 10 days 9 each 3     Continuous Blood Gluc Sensor (DEXCOM G6 SENSOR) MISC 1 each See Admin Instructions Change every 10 days. 9 each 3     Continuous Blood Gluc Transmit (DEXCOM G6 TRANSMITTER) MISC 1 each every 3 months 1 each 3     Continuous Blood Gluc Transmit (DEXCOM G6 TRANSMITTER) MISC 1 each See Admin Instructions Change every 90 days. 1 each 3     glucose ([facility]S GLUCOSE) 40 % GEL Take 15 g by mouth as needed       glucose blood VI test strips strip Test up to ten times per day as directed 5 Box 8     insulin aspart (NOVOLOG VIAL) 100 UNITS/ML vial Use with pump  Approx  60 units daily 60 mL 3     simvastatin (ZOCOR) 80 MG tablet Take 1 tablet (80 mg) by mouth At Bedtime 90 tablet 1     glucagon (GLUCAGON EMERGENCY) 1 MG injection Inject 1 mg into the muscle once for 1 dose Use as directed 1 mg 2       Family History  family history includes Alzheimer Disease in her father; Autoimmune Disease in her mother; Cancer in her father; Diabetes in her father; Glaucoma in her father and mother; Hypertension in her mother.    Social History  Smoke: none.  ETOH: rare.   with children.  Pt works full time in a lab.    Past Medical History  Past Medical History:   Diagnosis Date     Diabetes mellitus (H)     Type 1     Elevated cholesterol      Glaucoma     Glaucoma suspect     Glaucoma suspect      Kidney stones      Nonsenile cataract      Past Surgical History:   Procedure Laterality Date     APPENDECTOMY OPEN  1981     BIOPSY OF SKIN LESION  12/30/2011    returned January 2012 for additional removal     c sections  99,97,08     EXTRACORPOREAL SHOCK WAVE LITHOTRIPSY, CYSTOSCOPY, INSERT STENT URETER(S), COMBINED  2004     LASER HOLMIUM LITHOTRIPSY URETER(S), INSERT STENT, COMBINED  12/12/2013    Procedure: COMBINED CYSTOSCOPY, URETEROSCOPY, LASER HOLMIUM LITHOTRIPSY URETER(S), INSERT STENT;  Right Ureteroscopy Holmium Laser Lithotripsy Stent Placement;  Surgeon: Kirill Marlow MD;   Location: [facility] OR     S/P right shoulder surgery in Dec 2012.    Physical Exam    No exam today.    RESULTS  Creatinine   Date Value Ref Range Status   08/20/2019 0.63 0.52 - 1.04 mg/dL Final     GFR Estimate   Date Value Ref Range Status   08/20/2019 >90 >60 mL/min/[1.73_m2] Final     Comment:     Non  GFR Calc  Starting 12/18/2018, serum creatinine based estimated GFR (eGFR) will be   calculated using the Chronic Kidney Disease Epidemiology Collaboration   (CKD-EPI) equation.       Hemoglobin A1C   Date Value Ref Range Status   07/16/2013 7.9 (A) 4.3 - 6.0 % Final     Potassium   Date Value Ref Range Status   08/20/2019 4.4 3.4 - 5.3 mmol/L Final     ALT   Date Value Ref Range Status   02/14/2017 17 0 - 50 U/L Final     AST   Date Value Ref Range Status   02/14/2017 13 0 - 45 U/L Final     TSH   Date Value Ref Range Status   08/20/2019 1.65 0.40 - 4.00 mU/L Final     T4 Free   Date Value Ref Range Status   02/14/2017 0.84 0.76 - 1.46 ng/dL Final       Cholesterol   Date Value Ref Range Status   08/14/2018 152 <200 mg/dL Final   03/24/2017 154 <200 mg/dL Final     HDL Cholesterol   Date Value Ref Range Status   08/14/2018 61 >49 mg/dL Final   03/24/2017 73 >49 mg/dL Final     LDL Cholesterol Calculated   Date Value Ref Range Status   08/14/2018 72 <100 mg/dL Final     Comment:     Desirable:       <100 mg/dl   03/24/2017 69 <100 mg/dL Final     Comment:     Desirable:       <100 mg/dl     Triglycerides   Date Value Ref Range Status   08/14/2018 94 <150 mg/dL Final   03/24/2017 59 <150 mg/dL Final     Cholesterol/HDL Ratio   Date Value Ref Range Status   08/18/2014 2.7 0.0 - 5.0 Final   09/17/2013 2.3 0.0 - 5.0 Final     A1C      7.4   2/25/2020  A1C      7.4  11/20/2019  A1C      7.5   8/20/2019  A1C      7.2   5/22/2019  A1C      7.4   2/12/2019  A1C      7.9   11/14/2018    A1C      7.6   8/14/2018  A1C      7.4   3/15/2018  A1C      7.4   12/1/2017  A1C      7.9   9/13/2017  A1C      7.7    6/1/2017  A1C      8.3   2/14/2017  A1C      7.5   8/18/2016  A1C      7.6   5/5/2016  A1C      7.8    2/10/2016  A1C      7.9    9/2/2015  A1C      7.7    5/2015  A1C      8.6    1/28/2015  A1C      8.8    9/26/2014  A1C      8.0    7/2/2014  A1C      8.4    2/28/2014    ASSESSMENT/PLAN:    1.  TYPE 1 DIABETES MELLITUS:Type 1 diabetes mellitus complicated by retinopathy and hypoglycemia unawareness.  Kiera will stop by our clinic this Friday and have her Tandem insulin pump and Dexcom data download which I will review.  She will also have an A1C and annual fasting labs done this Friday.  No insulin rate changes at this time.  Pt's urine microalbuminuria was negative in 8/2019 with a normal creat/GFR.  Kiera denies foot ulcers at this time.    2.  MILD/MODERATE NPDR: Last seen by Oph here in July 2019.    Will schedule pt to see Oph this Friday 7/25/2020.    3.  HYPERLIPIDEMIA:  LDL 72 in 8/2019.   Pt remains on high dose Simvastatin.  Since she has been on the high dose Simvastatin for years and tolerating this dose.    4.  ADHD/STRESS: She has not seen her therapist lately.    5. HYPOGLYCEMIA UNAWARENESS: She is to wear her DexcomG6 sensor full time.    6.  CHEST PAIN: Intermittent chest pain. She has had this in the past- not new.  Pt had normal stress echo in 2010.  Will check with Dr. Chahal to see if pt should have another stress test in the near future.    7. FOLLOW UP with Dr. Chahal on 8/25/2020.

## 2020-07-21 ENCOUNTER — VIRTUAL VISIT (OUTPATIENT)
Dept: ENDOCRINOLOGY | Facility: CLINIC | Age: 55
End: 2020-07-21
Payer: COMMERCIAL

## 2020-07-21 DIAGNOSIS — E10.65 TYPE 1 DIABETES MELLITUS WITH HYPERGLYCEMIA (H): Primary | ICD-10-CM

## 2020-07-21 NOTE — LETTER
"7/21/2020       RE: Kiera Dobson  [address]  [facility] [address]     Dear Colleague,    Thank you for referring your patient, Kiera Dobson, to the [facility] END[facility]CR[facility]. Please see a copy of my visit note below.    Kiera Dobson is a 54 year old female who is being evaluated via a billable telephone visit.      The patient has been notified of following:     \"This telephone visit will be conducted via a call between you and your physician/provider. We have found that certain health care needs can be provided without the need for an in-person physical exam.  This service lets us provide the care you need with a telephone conversation.  If a prescription is necessary we can send it directly to your pharmacy.  If lab work is needed we can place an order for that and you can then stop by our lab to have the test done at a later time.    Telephone visits are billed at different rates depending on your insurance coverage.  Please reach out to your insurance provider with any questions.    If during the course of the call the physician/provider feels a telephone visit is not appropriate, you will not be charged for this service.\"    Patient has given verbal consent for telephone visit? Yes    How would you like to obtain your AVS? Marry Gallardo MA    Due to the COVID 19 pandemic this visit was converted to a telephone visit in order to help prevent spread of infection in this patient and the general population.    Time of start: 7:30 am  Time of end: 7:59 am  Total duration of telephone visit: 29 minutes.    HPI:  Kiera Dobson is a 54 year old female with type 1 diabetes mellitus. Telephone visit today for diabetes follow up.  Kiera was dx with type 1 diabetes 45 + years ago.  She has mild/moderate nonproliferative diabetic retinopathy. No peripheral neuropathy or nephropathy.  Her hx is also significant for hypoglycemia unawareness.  Kiera " started using a new insulin pump- Tandem Control IQ pump in early March of this year. She also has a DexcomG6 sensor.  She was unable to upload her insulin pump or Dexcom data today.  Her basal insulin rates are set at :  Midnight = 0.65 units/hr.  2 am = 0.5 units/hr.  10 am = 0.65 units/hr.  10:30 pm = 0.6 units/hr.  Her I/C ratio is 1:12.  Kiera tells me she purchased an A1C kit from [employer] and her A1C is 6.8 %.  Her previous  A1C was 7.4 % on 2/25/2020.  She continues to work hard on avoiding areas with scar tissue when inserting her infusion sets.  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  She tells me coworkers have tested + for COVID.  She denies SOB,cough, fever or chills at this time.  Some blurred vision.  Intermittent chest pain- same as in the past. No radiation of pain in shoulder/arms, no SOB, nausea or diaphoresis.  Sometimes chest wall is painful to touch, but not always.  No numbness in her feet or feet.  Kiera denies foot ulcers at this time.  Pt denies abd pain, diarrhea, dysuria or hematuria.    DIABETES CARE:  Retinopathy:mild/moderate nonproliferative diabetic retinopathy.  She was last seen here by Oph in July 2019.  She will be seeing Oph on 7/25/2020.  Nephropathy: urine microalbuminuria negative in 8/2019.   Neuropathy: none.  Feet: no exam today.  Taking ASA: yes.  Lipids: LDL 72 in Aug 2018.    Pt is taking Simvastatin.  CAD: no. Normal stress echo in 9/2010.  Mental health:hx of ADHD.  Insulin: Tandem insulin pump- control IQ.  Testing: DexcomG6 sensor.    ROS  Please see under HPI.    Allergies  Allergies   Allergen Reactions     Ampicillin Sodium Rash       Medications  Current Outpatient Medications   Medication Sig Dispense Refill     aspirin 81 MG chewable tablet Take 81 mg by mouth every other day  108 tablet 3     blood glucose (ERICA CONTOUR NEXT) test strip Use to test blood sugar 4-6 times daily or as directed. 720 strip 3     Continuous Blood Gluc   (DEXCOM G6 ) MELE 1 Device continuous 1 Device 0     Continuous Blood Gluc  (DEXCOM G6 ) MELE 1 each See Admin Instructions Use per 's instructions, to monitor glucose continuously. 1 Device 0     Continuous Blood Gluc Sensor (DEXCOM G6 SENSOR) MISC 1 each every 10 days 9 each 3     Continuous Blood Gluc Sensor (DEXCOM G6 SENSOR) MISC 1 each See Admin Instructions Change every 10 days. 9 each 3     Continuous Blood Gluc Transmit (DEXCOM G6 TRANSMITTER) MISC 1 each every 3 months 1 each 3     Continuous Blood Gluc Transmit (DEXCOM G6 TRANSMITTER) MISC 1 each See Admin Instructions Change every 90 days. 1 each 3     glucose ([employer] GLUCOSE) 40 % GEL Take 15 g by mouth as needed       glucose blood VI test strips strip Test up to ten times per day as directed 5 Box 8     insulin aspart (NOVOLOG VIAL) 100 UNITS/ML vial Use with pump  Approx  60 units daily 60 mL 3     simvastatin (ZOCOR) 80 MG tablet Take 1 tablet (80 mg) by mouth At Bedtime 90 tablet 1     glucagon (GLUCAGON EMERGENCY) 1 MG injection Inject 1 mg into the muscle once for 1 dose Use as directed 1 mg 2       Family History  family history includes Alzheimer Disease in her father; Autoimmune Disease in her mother; Cancer in her father; Diabetes in her father; Glaucoma in her father and mother; Hypertension in her mother.    Social History  Smoke: none.  ETOH: rare.   with children.  Pt works full time in a lab.    Past Medical History  Past Medical History:   Diagnosis Date     Diabetes mellitus (H)     Type 1     Elevated cholesterol      Glaucoma     Glaucoma suspect     Glaucoma suspect      Kidney stones      Nonsenile cataract      Past Surgical History:   Procedure Laterality Date     APPENDECTOMY OPEN  1981     BIOPSY OF SKIN LESION  12/30/2011    returned January 2012 for additional removal     c sections  99,97,08     EXTRACORPOREAL SHOCK WAVE LITHOTRIPSY, CYSTOSCOPY, INSERT STENT URETER(S),  COMBINED  2004     LASER HOLMIUM LITHOTRIPSY URETER(S), INSERT STENT, COMBINED  12/12/2013    Procedure: COMBINED CYSTOSCOPY, URETEROSCOPY, LASER HOLMIUM LITHOTRIPSY URETER(S), INSERT STENT;  Right Ureteroscopy Holmium Laser Lithotripsy Stent Placement;  Surgeon: Kirill Marlow MD;  Location: [facility] OR     S/P right shoulder surgery in Dec 2012.    Physical Exam    No exam today.    RESULTS  Creatinine   Date Value Ref Range Status   08/20/2019 0.63 0.52 - 1.04 mg/dL Final     GFR Estimate   Date Value Ref Range Status   08/20/2019 >90 >60 mL/min/[1.73_m2] Final     Comment:     Non  GFR Calc  Starting 12/18/2018, serum creatinine based estimated GFR (eGFR) will be   calculated using the Chronic Kidney Disease Epidemiology Collaboration   (CKD-EPI) equation.       Hemoglobin A1C   Date Value Ref Range Status   07/16/2013 7.9 (A) 4.3 - 6.0 % Final     Potassium   Date Value Ref Range Status   08/20/2019 4.4 3.4 - 5.3 mmol/L Final     ALT   Date Value Ref Range Status   02/14/2017 17 0 - 50 U/L Final     AST   Date Value Ref Range Status   02/14/2017 13 0 - 45 U/L Final     TSH   Date Value Ref Range Status   08/20/2019 1.65 0.40 - 4.00 mU/L Final     T4 Free   Date Value Ref Range Status   02/14/2017 0.84 0.76 - 1.46 ng/dL Final       Cholesterol   Date Value Ref Range Status   08/14/2018 152 <200 mg/dL Final   03/24/2017 154 <200 mg/dL Final     HDL Cholesterol   Date Value Ref Range Status   08/14/2018 61 >49 mg/dL Final   03/24/2017 73 >49 mg/dL Final     LDL Cholesterol Calculated   Date Value Ref Range Status   08/14/2018 72 <100 mg/dL Final     Comment:     Desirable:       <100 mg/dl   03/24/2017 69 <100 mg/dL Final     Comment:     Desirable:       <100 mg/dl     Triglycerides   Date Value Ref Range Status   08/14/2018 94 <150 mg/dL Final   03/24/2017 59 <150 mg/dL Final     Cholesterol/HDL Ratio   Date Value Ref Range Status   08/18/2014 2.7 0.0 - 5.0 Final   09/17/2013 2.3 0.0 - 5.0 Final      A1C      7.4   2/25/2020  A1C      7.4  11/20/2019  A1C      7.5   8/20/2019  A1C      7.2   5/22/2019  A1C      7.4   2/12/2019  A1C      7.9   11/14/2018    A1C      7.6   8/14/2018  A1C      7.4   3/15/2018  A1C      7.4   12/1/2017  A1C      7.9   9/13/2017  A1C      7.7   6/1/2017  A1C      8.3   2/14/2017  A1C      7.5   8/18/2016  A1C      7.6   5/5/2016  A1C      7.8    2/10/2016  A1C      7.9    9/2/2015  A1C      7.7    5/2015  A1C      8.6    1/28/2015  A1C      8.8    9/26/2014  A1C      8.0    7/2/2014  A1C      8.4    2/28/2014    ASSESSMENT/PLAN:    1.  TYPE 1 DIABETES MELLITUS:Type 1 diabetes mellitus complicated by retinopathy and hypoglycemia unawareness.  Kiera will stop by our clinic this Friday and have her Tandem insulin pump and Dexcom data download which I will review.  She will also have an A1C and annual fasting labs done this Friday.  No insulin rate changes at this time.  Pt's urine microalbuminuria was negative in 8/2019 with a normal creat/GFR.  Kiera denies foot ulcers at this time.    2.  MILD/MODERATE NPDR: Last seen by Oph here in July 2019.    Will schedule pt to see Oph this Friday 7/25/2020.    3.  HYPERLIPIDEMIA:  LDL 72 in 8/2019.   Pt remains on high dose Simvastatin.  Since she has been on the high dose Simvastatin for years and tolerating this dose.    4.  ADHD/STRESS: She has not seen her therapist lately.    5. HYPOGLYCEMIA UNAWARENESS: She is to wear her DexcomG6 sensor full time.    6.  CHEST PAIN: Intermittent chest pain. She has had this in the past- not new.  Pt had normal stress echo in 2010.  Will check with Dr. Chahal to see if pt should have another stress test in the near future.    7. FOLLOW UP with Dr. Chahal on 8/25/2020.      Again, thank you for allowing me to participate in the care of your patient.      Sincerely,    Jo Jean PA-C

## 2020-07-24 ENCOUNTER — TELEPHONE (OUTPATIENT)
Dept: ENDOCRINOLOGY | Facility: CLINIC | Age: 55
End: 2020-07-24

## 2020-07-24 ENCOUNTER — OFFICE VISIT (OUTPATIENT)
Dept: OPHTHALMOLOGY | Facility: CLINIC | Age: 55
End: 2020-07-24
Attending: OPHTHALMOLOGY
Payer: COMMERCIAL

## 2020-07-24 DIAGNOSIS — E10.65 TYPE 1 DIABETES MELLITUS WITH HYPERGLYCEMIA (H): ICD-10-CM

## 2020-07-24 DIAGNOSIS — E10.3393 MODERATE NONPROLIFERATIVE DIABETIC RETINOPATHY OF BOTH EYES WITHOUT MACULAR EDEMA ASSOCIATED WITH TYPE 1 DIABETES MELLITUS (H): Primary | ICD-10-CM

## 2020-07-24 LAB
ALT SERPL W P-5'-P-CCNC: 18 U/L (ref 0–50)
AST SERPL W P-5'-P-CCNC: 15 U/L (ref 0–45)
CREAT SERPL-MCNC: 0.64 MG/DL (ref 0.52–1.04)
CREAT UR-MCNC: 89 MG/DL
GFR SERPL CREATININE-BSD FRML MDRD: >90 ML/MIN/{1.73_M2}
HBA1C MFR BLD: 7 % (ref 0–5.6)
MICROALBUMIN UR-MCNC: 6 MG/L
MICROALBUMIN/CREAT UR: 6.57 MG/G CR (ref 0–25)
TSH SERPL DL<=0.005 MIU/L-ACNC: 1.91 MU/L (ref 0.4–4)

## 2020-07-24 PROCEDURE — 92134 CPTRZ OPH DX IMG PST SGM RTA: CPT | Mod: ZF | Performed by: OPHTHALMOLOGY

## 2020-07-24 PROCEDURE — 92235 FLUORESCEIN ANGRPH MLTIFRAME: CPT | Mod: ZF | Performed by: OPHTHALMOLOGY

## 2020-07-24 PROCEDURE — G0463 HOSPITAL OUTPT CLINIC VISIT: HCPCS | Mod: ZF

## 2020-07-24 PROCEDURE — 92250 FUNDUS PHOTOGRAPHY W/I&R: CPT | Mod: ZF | Performed by: OPHTHALMOLOGY

## 2020-07-24 ASSESSMENT — SLIT LAMP EXAM - LIDS
COMMENTS: NORMAL
COMMENTS: NORMAL

## 2020-07-24 ASSESSMENT — CONF VISUAL FIELD
OD_NORMAL: 1
METHOD: COUNTING FINGERS
OS_NORMAL: 1

## 2020-07-24 ASSESSMENT — CUP TO DISC RATIO
OS_RATIO: 0.7
OD_RATIO: 0.7

## 2020-07-24 ASSESSMENT — VISUAL ACUITY
OD_PH_SC+: -2
METHOD_MR: DEFERS REFRACTION TODAY
METHOD: SNELLEN - LINEAR
OS_SC+: -2
OD_SC+: +2
OS_PH_SC+: +2
OS_PH_SC: 20/25
OD_SC: 20/40
OD_PH_SC: 20/20
OS_SC: 20/30

## 2020-07-24 ASSESSMENT — TONOMETRY
IOP_METHOD: TONOPEN
OS_IOP_MMHG: 14
OD_IOP_MMHG: 15

## 2020-07-24 ASSESSMENT — EXTERNAL EXAM - LEFT EYE: OS_EXAM: NORMAL

## 2020-07-24 ASSESSMENT — EXTERNAL EXAM - RIGHT EYE: OD_EXAM: NORMAL

## 2020-07-24 NOTE — TELEPHONE ENCOUNTER
7/24/2020  Called pt and left message on her phone to let her know her A1C done today is 7.0 % and that her creat/GFR, ALT/AST and TSH are normal.  Urine microalbuminuria negative.  Jo Jean PA-C

## 2020-07-24 NOTE — NURSING NOTE
Chief Complaints and History of Present Illnesses   Patient presents with     Diabetic Eye Exam Follow Up     Chief Complaint(s) and History of Present Illness(es)     Diabetic Eye Exam Follow Up     Associated symptoms: headaches and floaters (some nw).  Negative for flashes    Pain scale: 0/10              Comments     Kiera is here for her annual follow up of Moderate nonproliferative diabetic retinopathy of both eyes without macular edema associated with type 1 diabetes mellitus (H). She says RE does not see quite as well as last visit and she feels cataract is increased    hbA1C was 7.0 today  BS today around 120    Chadd Flowers COT 1:46 PM July 24, 2020

## 2020-07-24 NOTE — PROGRESS NOTES
CC - Diabetes mellitus eval    INTERVAL HISTORY -    VA stable, more floaters OD > OS  Last A1c 7.0 on 7/2020    HPI  -  Diabetes mellitus I sees Tirso    RETINAL IMAGING  OCT 7/2/19  OD - retina normal, no fluid, PHF attached  OS - retina normal, no fluid, PHF attached      FA 7-24-20  OD - (transit) normal filling, normal macula, 1+ peripheral MAs and ischemia, no NV  OS - normal filling, normal macula, 1+ peripheral MAs and ischemia, no NV    ASSESSMENT:    1.  Mild/moderate NPDR (based on FA) OU with DM I no DME   - last FA 7/2020 showed mild  retinopathy, not visible on exam   - BP/BG control   - RTC 1 year     2.  NS OU   - mild/moderate   - starting to be visually significant   - sees Igor      3. OAG suspect based on CDR   - has seen Igor    - advise f/u next few months    4.  Asteroid hyalosis OD, syneresis OU   - advised S/Sx RD 7/2020      return to clinic 1 year retina, OCT OU    ATTESTATION     Attending Physician Attestation:      Complete documentation of historical and exam elements from today's encounter can be found in the full encounter summary report (not reduplicated in this progress note).  I personally obtained the chief complaint(s) and history of present illness.  I confirmed and edited as necessary the review of systems, past medical/surgical history, family history, social history, and examination findings as documented by others; and I examined the patient myself.  I personally reviewed the relevant tests, images, and reports as documented above.  I formulated and edited as necessary the assessment and plan and discussed the findings and management plan with the patient and family    Devika Dorsey MD, PhD  , Vitreoretinal Surgery  Department of Ophthalmology  [facility]

## 2020-08-20 ENCOUNTER — OFFICE VISIT (OUTPATIENT)
Dept: OPHTHALMOLOGY | Facility: CLINIC | Age: 55
End: 2020-08-20
Attending: OPHTHALMOLOGY
Payer: COMMERCIAL

## 2020-08-20 DIAGNOSIS — H52.13 MYOPIA OF BOTH EYES: ICD-10-CM

## 2020-08-20 DIAGNOSIS — E10.3393 MODERATE NONPROLIFERATIVE DIABETIC RETINOPATHY OF BOTH EYES WITHOUT MACULAR EDEMA ASSOCIATED WITH TYPE 1 DIABETES MELLITUS (H): ICD-10-CM

## 2020-08-20 DIAGNOSIS — H25.013 CORTICAL AGE-RELATED CATARACT OF BOTH EYES: ICD-10-CM

## 2020-08-20 DIAGNOSIS — H40.003 GLAUCOMA SUSPECT, BOTH EYES: Primary | ICD-10-CM

## 2020-08-20 PROCEDURE — G0463 HOSPITAL OUTPT CLINIC VISIT: HCPCS | Mod: ZF

## 2020-08-20 PROCEDURE — 92133 CPTRZD OPH DX IMG PST SGM ON: CPT | Mod: ZF | Performed by: OPHTHALMOLOGY

## 2020-08-20 PROCEDURE — 92083 EXTENDED VISUAL FIELD XM: CPT | Mod: ZF | Performed by: OPHTHALMOLOGY

## 2020-08-20 PROCEDURE — 92015 DETERMINE REFRACTIVE STATE: CPT | Mod: ZF

## 2020-08-20 ASSESSMENT — REFRACTION_MANIFEST
OD_ADD: +2.00
OD_AXIS: 030
OS_AXIS: 150
OS_SPHERE: PLANO
OD_SPHERE: -1.00
OS_ADD: +2.00
OS_CYLINDER: +0.50
OD_CYLINDER: +1.00

## 2020-08-20 ASSESSMENT — REFRACTION_WEARINGRX
OS_AXIS: 153
OS_SPHERE: -0.25
OD_AXIS: 025
OD_CYLINDER: +0.75
OS_CYLINDER: +0.25
OD_SPHERE: -1.00

## 2020-08-20 ASSESSMENT — TONOMETRY
OS_IOP_MMHG: 14
IOP_METHOD: APPLANATION
IOP_METHOD: TONOPEN
OD_IOP_MMHG: 18
OS_IOP_MMHG: 14
OD_IOP_MMHG: 18

## 2020-08-20 ASSESSMENT — CONF VISUAL FIELD
OD_NORMAL: 1
METHOD: COUNTING FINGERS
OS_NORMAL: 1

## 2020-08-20 ASSESSMENT — SLIT LAMP EXAM - LIDS
COMMENTS: NORMAL
COMMENTS: NORMAL

## 2020-08-20 ASSESSMENT — CUP TO DISC RATIO
OS_RATIO: 0.7
OD_RATIO: 0.7

## 2020-08-20 ASSESSMENT — VISUAL ACUITY
OD_SC: 20/30
OS_SC: 20/30
METHOD: SNELLEN - LINEAR

## 2020-08-20 ASSESSMENT — EXTERNAL EXAM - RIGHT EYE: OD_EXAM: NORMAL

## 2020-08-20 ASSESSMENT — EXTERNAL EXAM - LEFT EYE: OS_EXAM: NORMAL

## 2020-08-20 NOTE — PROGRESS NOTES
Chief Complaint(s) and History of Present Illness(es)     Glaucoma     Laterality: both eyes (States va is the same since last visit  )           Comments     No red watery or dry    Lab Results       Component                Value               Date                       A1C                      7.0                 07/24/2020                 A1C                      7.9                 07/16/2013                 A1C                      7.9                 04/03/2013                 A1C                      8.4                 02/07/2013                 A1C                      8.2                 10/10/2012            Lola Chery COT 3:09 PM August 20, 2020      Review of systems for the eyes was negative other than the pertinent positives/negatives listed in the HPI.      Assessment & Plan      Kiera Dobson is a 54 year old female with the following diagnoses:   1. Glaucoma suspect, both eyes    2. Moderate nonproliferative diabetic retinopathy of both eyes without macular edema associated with type 1 diabetes mellitus (H)    3. Myopia of both eyes    4. Cortical age-related cataract of both eyes       #Glaucoma suspect both eyes  Based on C/D and FH  Maximum intraocular pressure upper teens both eyes  Family history: positive in mother and possibly father  Trauma history: negative   Gonio narrow but open to ant TM both eyes today (8/20/2020)     Intraocular pressure good today both eyes   Nerve appearance unchanged from previous      Today's testing:  Visual field: Fluctuates right eye; left eye inferior nasal loss (corresponds to capillary dropout on FA; suspect retinal disease)  OCT Nerve fiber layer: Large disc area, stable nerve fiber layer thickness both eyes     Continue monitoring annually with rNFL and Gtop; gonio annually      #DM1 with mild-moderate NPDR  Continue following with Dr. Dorsey  Annual screening      #Myopia  Explained and dispensed MRx      #Cataracts both eyes  Early  visually significant cortical cataract  Discussed CEIOL, patient prefers to monitor      Patient disposition:   Return in about 1 year (around 8/20/2021) for Vision, Pressure, Refraction, Gtop, OCT RFNL, gonio.       Ford Israel, PGY3  Ophthalmology Resident    Attending Physician Attestation:  Complete documentation of historical and exam elements from today's encounter can be found in the full encounter summary report (not reduplicated in this progress note).  I personally obtained the chief complaint(s) and history of present illness.  I confirmed and edited as necessary the review of systems, past medical/surgical history, family history, social history, and examination findings as documented by others; and I examined the patient myself.  I personally reviewed the relevant tests, images, and reports as documented above.  I formulated and edited as necessary the assessment and plan and discussed the findings and management plan with the patient and family. Attending Physician Image/Tesing Attestation: I personally reviewed the ophthalmic test(s) associated with this encounter, agree with the interpretation(s) as documented by the resident/fellow, and have edited the corresponding report(s) as necessary.  . - Curtis Costa MD

## 2020-08-20 NOTE — NURSING NOTE
Chief Complaints and History of Present Illnesses   Patient presents with     Glaucoma     Chief Complaint(s) and History of Present Illness(es)     Glaucoma     Laterality: both eyes (States va is the same since last visit  )              Comments     No red watery or dry    Lab Results       Component                Value               Date                       A1C                      7.0                 07/24/2020                 A1C                      7.9                 07/16/2013                 A1C                      7.9                 04/03/2013                 A1C                      8.4                 02/07/2013                 A1C                      8.2                 10/10/2012            Lola Chery COT 3:09 PM August 20, 2020

## 2020-08-25 ENCOUNTER — VIRTUAL VISIT (OUTPATIENT)
Dept: ENDOCRINOLOGY | Facility: CLINIC | Age: 55
End: 2020-08-25
Payer: COMMERCIAL

## 2020-08-25 DIAGNOSIS — E10.3293 CONTROLLED TYPE 1 DIABETES MELLITUS WITH BOTH EYES AFFECTED BY MILD NONPROLIFERATIVE RETINOPATHY WITHOUT MACULAR EDEMA (H): Primary | ICD-10-CM

## 2020-08-25 NOTE — LETTER
"8/25/2020       RE: Kiera Dobson  [address]  [facility] [address]     Dear Colleague,    Thank you for referring your patient, Kiera Dobson, to the [facility] ENDOCRINOLOGY at [facility]. Please see a copy of my visit note below.    Kiera Dobson is a 54 year old female who is being evaluated via a billable video visit.      The patient has been notified of following:     \"This video visit will be conducted via a call between you and your physician/provider. We have found that certain health care needs can be provided without the need for an in-person physical exam.  This service lets us provide the care you need with a video conversation.  If a prescription is necessary we can send it directly to your pharmacy.  If lab work is needed we can place an order for that and you can then stop by our lab to have the test done at a later time.    Video visits are billed at different rates depending on your insurance coverage.  Please reach out to your insurance provider with any questions.    If during the course of the call the physician/provider feels a video visit is not appropriate, you will not be charged for this service.\"    Patient has given verbal consent for Video visit? Yes  How would you like to obtain your AVS? Mail a copy  If you are dropped from the video visit, the video invite should be resent to: Send to e-mail at: [email or web address]  Will anyone else be joining your video visit? No        Video-Visit Details    Type of service:  Video Visit    Video Start Time: 3:00  Video End Time: 3:40    Originating Location (pt. Location): Other work    Distant Location (provider location):  [facility] ENDOCRINOLOGY     Platform used for Video Visit: Bharati Chahal MD    This 54-year-old woman was seen for follow-up of her longstanding type 1 diabetes.  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  Kiera recently started using a control IQ tandem " pump.  She reports that it has dramatically changed her diabetes management.  The following data was downloaded from the [employer] site where she had uploaded her data.    Kiera Stage  YOB: 1965  Date of diagnosis: Mar 30, 1971  Exported from [facility] Device Settings: Aug 25, 2020    Mypump  Start time  Basal Rates U/hr  Target BG mg/dL  Carb Ratio g/U  Correction Factor mg/dL/U  12:00 am    0.650             110              12              70  2:00 am     0.500             110              12              70  7:00 am     0.500             110              12              70  10:00 am    0.650             110              12              70  10:30 pm    0.600             110              12              70  Total       14.325      Kiera Stage  YOB: 1965  Date of diagnosis: Mar 30, 1969  Exported from [facility] Basics: Aug 25, 2020    Reporting Period: Aug 10 - Aug 24, 2020    Avg. Daily Time In Range (mg/dL)  >250     3%  180-250  24%     70%  54-70    1%  <54      1%    Avg. Glucose (CGM): 152 mg/dL    Sensor Usage: 99%    Avg. Daily Total Insulin  Basal  14.1U  Bolus  11.6U    Avg. Daily Carbs: 32g    Avg. Daily Insulin: 25.8U    GMI (CGM): 7.0%    Avg BG readings / day  0  Meter                  0  Manual                 6  Calibrations           1  Below 54 mg/dL         0  Above 250 mg/dL        0    Avg boluses / day  12  Calculator         95  Correction         72  Extended           0  Interrupted        0  Override           28  Underride          0    Total basal events  2040  Temp Basals         2028  Suspends            12    Kiera says that despite the positive effects the device has had on her glucose control, she does find the numerous alarms to be very troubling to her.  She also is using the Dexcom data to anticipate what the pump will do and change her behavior to either bring her sugar up or down before the device kicks in.  Specifically, she continues to go to  work and sip on regular Pepsi throughout the morning because she is worried she will get low if she does not.  When I look at her Dexcom on such a day, her blood sugar remains above 150 and her basal rate either is at its setting or above.  As she gets higher, she will get a correction.  She then will see this rapid decline and eat something to prevent herself from going low.  She is aware that she is getting a lot of corrections because she keeps getting questions about whether she will accept an AutoCorrect.  She sets the sleep mode manually each day because she does not go to bed at the same time each night.  She really has not used the exercise mode.  She has been giving a higher bolus dose than suggested by the pump on some occasions when she is been running too high during the day.  She really hates to see blood sugars above 200 and reports she feels ill when they are above 300.  She is quite concerned that the hyperglycemia will accelerate the progression of her cataracts and retinopathy.    She did see the eye doctor recently and was told her cataract was not progressed enough to require surgery.  She has no foot concerns today.  She has no new issues to discuss with me.  .   Current Outpatient Medications   Medication     aspirin 81 MG chewable tablet     blood glucose (ERICA CONTOUR NEXT) test strip     Continuous Blood Gluc  (DEXCOM G6 ) MELE     Continuous Blood Gluc  (DEXCOM G6 ) MELE     Continuous Blood Gluc Sensor (DEXCOM G6 SENSOR) MISC     Continuous Blood Gluc Sensor (DEXCOM G6 SENSOR) MISC     Continuous Blood Gluc Transmit (DEXCOM G6 TRANSMITTER) MISC     Continuous Blood Gluc Transmit (DEXCOM G6 TRANSMITTER) MISC     glucagon (GLUCAGON EMERGENCY) 1 MG injection     glucose (CVS GLUCOSE) 40 % GEL     glucose blood VI test strips strip     insulin aspart (NOVOLOG VIAL) 100 UNITS/ML vial     simvastatin (ZOCOR) 80 MG tablet     No current facility-administered  medications for this visit.      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    On exam she is in no acute distress.  Cranial nerves are grossly intact.  Her mood is neutral.  She is without periorbital edema.    Recent Labs   Lab Test 07/24/20  1221 07/24/20  1212 02/25/20 08/20/19  1208 08/20/19  1206 08/20/19 08/14/18  1320  03/24/17  0906  02/14/17  1149  02/10/16  0949  07/16/13  1635   A1C  --  7.0*  --   --   --   --   --   --   --   --   --   --   --   --   --  7.9*   HEMOGLOBINA1  --   --  7.4*  --   --  7.5*   < >  --    < >  --   --   --    < >  --    < >  --    TSH  --  1.91  --   --  1.65  --   --  1.93  --   --   --  1.57  --  1.13   < >  --    T4  --   --   --   --   --   --   --   --   --   --   --  0.84  --  0.84   < >  --    LDL  --   --   --   --   --   --   --  72  --  69  --   --   --  101*   < >  --    HDL  --   --   --   --   --   --   --  61  --  73  --   --   --  78   < >  --    TRIG  --   --   --   --   --   --   --  94  --  59  --   --   --  58   < >  --    CR  --  0.64  --   --  0.63  --   --  0.62  --   --   --  0.62   < > 0.66   < >  --    MICROL 6  --   --  <5  --   --    < >  --   --   --    < >  --   --  8   < >  --     < > = values in this interval not displayed.     Assessment and plan:    1.  Diabetes control.  Kiera's A1c is at target and she is not having hypoglycemia.  However, I think she is working too hard to try and control her blood sugars.  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  She also is very concerned about the hyperglycemia and thus will act to prevent that.  I think she may need a higher basal setting during the day, but really cannot be sure if she continues to drink Pepsi all day to keep her sugars up.  I suggested she stop doing this and just let the pump work for the next couple of days.  She can then upload  the data to [employer] and I can make pump adjustments.  When I talk with her in a couple of days, I will suggest that she consider sitting down with our nurse educator to review all of the alarms that are on in her pump.  I do not understand why she has to give the pump permission to do an auto correction.  I will asked the nurse educator about this.    2.  Diabetes complications.  She has retinopathy and is up-to-date with her eye visits.  Her renal function is normal.  She has no neuropathy.    3.CVD risk.  She is on a statin and her blood pressure has been fine in the past.    Follow-up with Jo Jean in 3 months and me in 6 months.    Paola Chahal MD

## 2020-08-25 NOTE — PROGRESS NOTES
"Kiera Dobson is a 54 year old female who is being evaluated via a billable video visit.      The patient has been notified of following:     \"This video visit will be conducted via a call between you and your physician/provider. We have found that certain health care needs can be provided without the need for an in-person physical exam.  This service lets us provide the care you need with a video conversation.  If a prescription is necessary we can send it directly to your pharmacy.  If lab work is needed we can place an order for that and you can then stop by our lab to have the test done at a later time.    Video visits are billed at different rates depending on your insurance coverage.  Please reach out to your insurance provider with any questions.    If during the course of the call the physician/provider feels a video visit is not appropriate, you will not be charged for this service.\"    Patient has given verbal consent for Video visit? Yes  How would you like to obtain your AVS? Mail a copy  If you are dropped from the video visit, the video invite should be resent to: Send to e-mail at: [email or web address]  Will anyone else be joining your video visit? No        Video-Visit Details    Type of service:  Video Visit    Video Start Time: 3:00  Video End Time: 3:40    Originating Location (pt. Location): Other work    Distant Location (provider location):  [facility] ENDOCRINOLOGY     Platform used for Video Visit: Bharati Chahal MD    This 54-year-old woman was seen for follow-up of her longstanding type 1 diabetes.  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  Kiera recently started using a control IQ tandem pump.  She reports that it has dramatically changed her diabetes management.  The following data was downloaded from the [employer] site where she had uploaded her data.    Kiera Dobson  YOB: 1965  Date of diagnosis: Mar 30, 1969  Exported from "[employer] Device Settings: " Aug 25, 2020    Mypump  Start time  Basal Rates U/hr  Target BG mg/dL  Carb Ratio g/U  Correction Factor mg/dL/U  12:00 am    0.650             110              12              70  2:00 am     0.500             110              12              70  7:00 am     0.500             110              12              70  10:00 am    0.650             110              12              70  10:30 pm    0.600             110              12              70  Total       14.325      Kiera Stage  YOB: 1965  Date of diagnosis: Mar 30, 1969  Exported from [facility] Basics: Aug 25, 2020    Reporting Period: Aug 10 - Aug 24, 2020    Avg. Daily Time In Range (mg/dL)  >250     3%  180-250  24%     70%  54-70    1%  <54      1%    Avg. Glucose (CGM): 152 mg/dL    Sensor Usage: 99%    Avg. Daily Total Insulin  Basal  14.1U  Bolus  11.6U    Avg. Daily Carbs: 32g    Avg. Daily Insulin: 25.8U    GMI (CGM): 7.0%    Avg BG readings / day  0  Meter                  0  Manual                 6  Calibrations           1  Below 54 mg/dL         0  Above 250 mg/dL        0    Avg boluses / day  12  Calculator         95  Correction         72  Extended           0  Interrupted        0  Override           28  Underride          0    Total basal events  2040  Temp Basals         2028  Suspends            12    Kiera says that despite the positive effects the device has had on her glucose control, she does find the numerous alarms to be very troubling to her.  She also is using the Dexcom data to anticipate what the pump will do and change her behavior to either bring her sugar up or down before the device kicks in.  Specifically, she continues to go to work and sip on regular Pepsi throughout the morning because she is worried she will get low if she does not.  When I look at her Dexcom on such a day, her blood sugar remains above 150 and her basal rate either is at its setting or above.  As she gets higher, she will get a  correction.  She then will see this rapid decline and eat something to prevent herself from going low.  She is aware that she is getting a lot of corrections because she keeps getting questions about whether she will accept an AutoCorrect.  She sets the sleep mode manually each day because she does not go to bed at the same time each night.  She really has not used the exercise mode.  She has been giving a higher bolus dose than suggested by the pump on some occasions when she is been running too high during the day.  She really hates to see blood sugars above 200 and reports she feels ill when they are above 300.  She is quite concerned that the hyperglycemia will accelerate the progression of her cataracts and retinopathy.    She did see the eye doctor recently and was told her cataract was not progressed enough to require surgery.  She has no foot concerns today.  She has no new issues to discuss with me.  .   Current Outpatient Medications   Medication     aspirin 81 MG chewable tablet     blood glucose (ERICA CONTOUR NEXT) test strip     Continuous Blood Gluc  (DEXCOM G6 ) MELE     Continuous Blood Gluc  (DEXCOM G6 ) MELE     Continuous Blood Gluc Sensor (DEXCOM G6 SENSOR) MISC     Continuous Blood Gluc Sensor (DEXCOM G6 SENSOR) MISC     Continuous Blood Gluc Transmit (DEXCOM G6 TRANSMITTER) MISC     Continuous Blood Gluc Transmit (DEXCOM G6 TRANSMITTER) MISC     glucagon (GLUCAGON EMERGENCY) 1 MG injection     glucose (CVS GLUCOSE) 40 % GEL     glucose blood VI test strips strip     insulin aspart (NOVOLOG VIAL) 100 UNITS/ML vial     simvastatin (ZOCOR) 80 MG tablet     No current facility-administered medications for this visit.      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    On exam she is in no acute distress.  Cranial nerves are grossly intact.  Her mood is neutral.  She is without periorbital edema.      Recent Labs   Lab  Test 07/24/20  1221 07/24/20  1212 02/25/20 08/20/19  1208 08/20/19  1206 08/20/19 08/14/18  1320  03/24/17  0906  02/14/17  1149  02/10/16  0949  07/16/13  1635   A1C  --  7.0*  --   --   --   --   --   --   --   --   --   --   --   --   --  7.9*   HEMOGLOBINA1  --   --  7.4*  --   --  7.5*   < >  --    < >  --   --   --    < >  --    < >  --    TSH  --  1.91  --   --  1.65  --   --  1.93  --   --   --  1.57  --  1.13   < >  --    T4  --   --   --   --   --   --   --   --   --   --   --  0.84  --  0.84   < >  --    LDL  --   --   --   --   --   --   --  72  --  69  --   --   --  101*   < >  --    HDL  --   --   --   --   --   --   --  61  --  73  --   --   --  78   < >  --    TRIG  --   --   --   --   --   --   --  94  --  59  --   --   --  58   < >  --    CR  --  0.64  --   --  0.63  --   --  0.62  --   --   --  0.62   < > 0.66   < >  --    MICROL 6  --   --  <5  --   --    < >  --   --   --    < >  --   --  8   < >  --     < > = values in this interval not displayed.     Assessment and plan:    1.  Diabetes control.  Kiera's A1c is at target and she is not having hypoglycemia.  However, I think she is working too hard to try and control her blood sugars.  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  She also is very concerned about the hyperglycemia and thus will act to prevent that.  I think she may need a higher basal setting during the day, but really cannot be sure if she continues to drink Pepsi all day to keep her sugars up.  I suggested she stop doing this and just let the pump work for the next couple of days.  She can then upload the data to [employer] and I can make pump adjustments.  When I talk with her in a couple of days, I will suggest that she consider sitting down with our nurse educator to review all of the alarms that are on in her pump.  I do not understand why she has to give the pump  permission to do an auto correction.  I will asked the nurse educator about this.    2.  Diabetes complications.  She has retinopathy and is up-to-date with her eye visits.  Her renal function is normal.  She has no neuropathy.    3.CVD risk.  She is on a statin and her blood pressure has been fine in the past.    Follow-up with Jo Jean in 3 months and me in 6 months.    Paola Chahal MD

## 2020-09-06 DIAGNOSIS — N20.0 CALCULUS OF KIDNEY: ICD-10-CM

## 2020-09-09 ENCOUNTER — MYC REFILL (OUTPATIENT)
Dept: ENDOCRINOLOGY | Facility: CLINIC | Age: 55
End: 2020-09-09

## 2020-09-09 DIAGNOSIS — N20.0 CALCULUS OF KIDNEY: ICD-10-CM

## 2020-09-09 RX ORDER — SIMVASTATIN 80 MG
80 TABLET ORAL AT BEDTIME
Qty: 90 TABLET | Refills: 1 | Status: CANCELLED | OUTPATIENT
Start: 2020-09-09

## 2020-09-10 ENCOUNTER — MYC REFILL (OUTPATIENT)
Dept: ENDOCRINOLOGY | Facility: CLINIC | Age: 55
End: 2020-09-10

## 2020-09-10 DIAGNOSIS — E10.9 TYPE 1 DIABETES MELLITUS WITHOUT COMPLICATION (H): Primary | ICD-10-CM

## 2020-09-10 DIAGNOSIS — N20.0 CALCULUS OF KIDNEY: ICD-10-CM

## 2020-09-11 RX ORDER — SIMVASTATIN 80 MG
TABLET ORAL
Qty: 90 TABLET | Refills: 3 | Status: SHIPPED | OUTPATIENT
Start: 2020-09-11 | End: 2021-06-17 | Stop reason: DRUGHIGH

## 2020-09-11 RX ORDER — SIMVASTATIN 80 MG
80 TABLET ORAL AT BEDTIME
Qty: 90 TABLET | Refills: 1 | Status: SHIPPED | OUTPATIENT
Start: 2020-09-11 | End: 2021-03-08

## 2020-09-11 NOTE — TELEPHONE ENCOUNTER
Simvastatin Oral Tablet 80 MG       Last Written Prescription Date:  3-9-20  Last Fill Quantity: 90,   # refills: 1  Last Office Visit : 8-25-20 ( [place])  Future Office visit:  none    Routing refill request to provider for review/approval because:  Overdue lab: LDL

## 2020-09-11 NOTE — TELEPHONE ENCOUNTER
Statins Protocol Xhejza85/10/2020 08:31 PM   LDL on file in past 12 months     Lab order placed. Pt notified.

## 2020-10-12 ENCOUNTER — MYC REFILL (OUTPATIENT)
Dept: ENDOCRINOLOGY | Facility: CLINIC | Age: 55
End: 2020-10-12

## 2020-10-12 DIAGNOSIS — N20.0 CALCULUS OF KIDNEY: ICD-10-CM

## 2021-01-15 ENCOUNTER — HEALTH MAINTENANCE LETTER (OUTPATIENT)
Age: 56
End: 2021-01-15

## 2021-01-23 ENCOUNTER — HEALTH MAINTENANCE LETTER (OUTPATIENT)
Age: 56
End: 2021-01-23

## 2021-02-16 NOTE — PROGRESS NOTES
Kiera is a 55 year old who is being evaluated via a billable video visit.      How would you like to obtain your AVS?     Will anyone else be joining your video visit?     Naheed Gallardo MA    Outcome for 02/16/21 10:48 AM :Left Voicemail for patient to call back    Outcome for 02/16/21 1:38 PM :Left Voicemail for patient to call back

## 2021-02-17 ENCOUNTER — VIRTUAL VISIT (OUTPATIENT)
Dept: ENDOCRINOLOGY | Facility: CLINIC | Age: 56
End: 2021-02-17
Payer: COMMERCIAL

## 2021-02-17 DIAGNOSIS — E10.65 TYPE 1 DIABETES MELLITUS WITH HYPERGLYCEMIA (H): Primary | ICD-10-CM

## 2021-02-17 PROCEDURE — 99207 PR NO BILLABLE SERVICE THIS VISIT: CPT | Performed by: PHYSICIAN ASSISTANT

## 2021-02-17 NOTE — LETTER
Date:February 19, 2021      Provider requested that no letter be sent. Do not send.       [facility]

## 2021-02-17 NOTE — LETTER
2/17/2021       RE: Kiera Dobson  [address]  [place] [facility] [address]     Dear Colleague,    Thank you for referring your patient, Kiera Dobson, to the [facility] ENDOCRINOLOGY CLINIC [place] at [facility]. Please see a copy of my visit note below.    Kiera is a 55 year old who is being evaluated via a billable video visit.      How would you like to obtain your AVS?     Will anyone else be joining your video visit?     Naheed Gallardo MA    Outcome for 02/16/21 10:48 AM :Left Voicemail for patient to call back    Outcome for 02/16/21 1:38 PM :Left Voicemail for patient to call back      NO SHOW FOR VIRTUAL VISIT TODAY.  NO CHARGE.  Jo Jean PA-C      Again, thank you for allowing me to participate in the care of your patient.      Sincerely,    Jo Jean PA-C

## 2021-03-06 DIAGNOSIS — N20.0 CALCULUS OF KIDNEY: ICD-10-CM

## 2021-03-08 NOTE — TELEPHONE ENCOUNTER
simvastatin (ZOCOR) 80 MG tablet      Last Written Prescription Date:  9/11/20  Last Fill Quantity: 90,   # refills: 1  Last Office Visit : 2/17/21 (no show)  Future Office visit:  3/17/21    Routing refill request to provider for review/approval because:  Deferred to Provider. Overdue LDL.  Order in que.

## 2021-03-09 RX ORDER — SIMVASTATIN 80 MG
80 TABLET ORAL AT BEDTIME
Qty: 90 TABLET | Refills: 0 | Status: SHIPPED | OUTPATIENT
Start: 2021-03-09 | End: 2021-04-08

## 2021-03-16 NOTE — PATIENT INSTRUCTIONS
We appreciate your assistance in coordinating your healthcare.     Please upload your insulin pump, blood sugar meter and/or continuous glucose monitor at home 1-2 days before your next diabetes-related appointment.   This will allow your provider to review your  data before your scheduled virtual visit.    To ask a question to your Endocrine care team, please send them a [email or web address] message, or reach them by phone at [phone]     To expedite your medication refill(s), please contact your pharmacy and have them   fax a refill request to: [phone].  *Please allow 3 business days for routine medication refills.  *Please allow 5 business days for controlled substance medication refills.    For after-hours urgent Endocrine issues, that do not require 351, please dial [phone], and ask to speak with the Endocrinologist On-Call

## 2021-03-16 NOTE — PROGRESS NOTES
Kiera is a 55 year old who is being evaluated via a billable video visit.      How would you like to obtain your AVS? [employer]    E-mail video visit link to: [email or web address]    Will anyone else be joining your video visit? No    Naheed Gallardo MA    Outcome for 03/16/21 12:42 PM :Glucose sent via Email      Pt had difficulty with Amwell and Doxm[employer] today.  I was finally able to visit with her via [employer].   Her appt was for 10 am and I did not connect with her via [employer] until 10:20 am.  She is rescheduled for a face to face visit with me in clinic on 4/8/2021.  She has been having increase sx of neuropathy and pain in her feet.  The [employer] data I had to review today was from 2/3/-2/16/2021. She has not uploaded any data yet after 2/16/20201.  Her average glucose is 159 with estimated A1C 7.1 %.  BS > 260  = 6 %  BS  180-250 = 24 %  BS  = 67 %  BS 54-70 = 2 %  BS < 54 = 1 %.  She tells me she still need to work on using bolus insulin for all food intake and bolus BEFORE meals.  I will see her in clinic face to face on 4/8/2021.  No charge today.  Jo Jean PA-C

## 2021-03-17 ENCOUNTER — VIRTUAL VISIT (OUTPATIENT)
Dept: ENDOCRINOLOGY | Facility: CLINIC | Age: 56
End: 2021-03-17
Payer: COMMERCIAL

## 2021-03-17 DIAGNOSIS — E10.3293 CONTROLLED TYPE 1 DIABETES MELLITUS WITH BOTH EYES AFFECTED BY MILD NONPROLIFERATIVE RETINOPATHY WITHOUT MACULAR EDEMA (H): Primary | ICD-10-CM

## 2021-03-17 PROCEDURE — 99207 PR NO BILLABLE SERVICE THIS VISIT: CPT | Performed by: PHYSICIAN ASSISTANT

## 2021-03-17 NOTE — LETTER
3/17/2021       RE: Kiera Dobson  [address]  [place] [address]     Dear Colleague,    Thank you for referring your patient, Kiera Dobson, to the [facility] ENDOCRINOLOGY CLINIC [place] at [facility]. Please see a copy of my visit note below.    Kiera is a 55 year old who is being evaluated via a billable video visit.      How would you like to obtain your AVS? [employer]    E-mail video visit link to: [email or web address]    Will anyone else be joining your video visit? No    Naheed Gallardo MA    Outcome for 03/16/21 12:42 PM :Glucose sent via Email      Pt had difficulty with Amwell and Consult A [employer] today.  I was finally able to visit with her via [employer].   Her appt was for 10 am and I did not connect with her via [employer] until 10:20 am.  She is rescheduled for a face to face visit with me in clinic on 4/8/2021.  She has been having increase sx of neuropathy and pain in her feet.  The [employer] data I had to review today was from 2/3/-2/16/2021. She has not uploaded any data yet after 2/16/20201.  Her average glucose is 159 with estimated A1C 7.1 %.  BS > 260  = 6 %  BS  180-250 = 24 %  BS  = 67 %  BS 54-70 = 2 %  BS < 54 = 1 %.  She tells me she still need to work on using bolus insulin for all food intake and bolus BEFORE meals.  I will see her in clinic face to face on 4/8/2021.  No charge today.  Jo Jean PA-C

## 2021-03-20 ENCOUNTER — HEALTH MAINTENANCE LETTER (OUTPATIENT)
Age: 56
End: 2021-03-20

## 2021-03-24 ENCOUNTER — DOCUMENTATION ONLY (OUTPATIENT)
Dept: ENDOCRINOLOGY | Facility: CLINIC | Age: 56
End: 2021-03-24

## 2021-03-24 ENCOUNTER — MEDICAL CORRESPONDENCE (OUTPATIENT)
Dept: HEALTH INFORMATION MANAGEMENT | Facility: CLINIC | Age: 56
End: 2021-03-24

## 2021-04-07 ENCOUNTER — IMMUNIZATION (OUTPATIENT)
Dept: NURSING | Facility: CLINIC | Age: 56
End: 2021-04-07
Payer: COMMERCIAL

## 2021-04-07 PROCEDURE — 0001A PR COVID VAC PFIZER DIL RECON 30 MCG/0.3 ML IM: CPT

## 2021-04-07 PROCEDURE — 91300 PR COVID VAC PFIZER DIL RECON 30 MCG/0.3 ML IM: CPT

## 2021-04-07 ASSESSMENT — ENCOUNTER SYMPTOMS
FEVER: 0
EYE IRRITATION: 0
TREMORS: 0
INCREASED ENERGY: 0
FATIGUE: 1
NECK PAIN: 0
WEIGHT LOSS: 0
WEIGHT GAIN: 1
DIZZINESS: 0
JOINT SWELLING: 0
POLYPHAGIA: 0
CHILLS: 0
ARTHRALGIAS: 1
HALLUCINATIONS: 0
ALTERED TEMPERATURE REGULATION: 1
MUSCLE CRAMPS: 0
TINGLING: 0
DECREASED LIBIDO: 1
SPEECH CHANGE: 0
LOSS OF CONSCIOUSNESS: 0
NUMBNESS: 0
PARALYSIS: 0
EYE REDNESS: 0
BACK PAIN: 1
DECREASED APPETITE: 0
EYE PAIN: 1
EYE WATERING: 0
POLYDIPSIA: 0
MUSCLE WEAKNESS: 1
NIGHT SWEATS: 0
HOT FLASHES: 0
SEIZURES: 0
MYALGIAS: 1

## 2021-04-08 ENCOUNTER — OFFICE VISIT (OUTPATIENT)
Dept: ENDOCRINOLOGY | Facility: CLINIC | Age: 56
End: 2021-04-08
Payer: COMMERCIAL

## 2021-04-08 VITALS
SYSTOLIC BLOOD PRESSURE: 119 MMHG | DIASTOLIC BLOOD PRESSURE: 75 MMHG | BODY MASS INDEX: 27.05 KG/M2 | WEIGHT: 137.8 LBS | HEIGHT: 60 IN

## 2021-04-08 DIAGNOSIS — E10.3293 CONTROLLED TYPE 1 DIABETES MELLITUS WITH BOTH EYES AFFECTED BY MILD NONPROLIFERATIVE RETINOPATHY WITHOUT MACULAR EDEMA (H): Primary | ICD-10-CM

## 2021-04-08 LAB — HBA1C MFR BLD: 7.3 % (ref 4.3–6)

## 2021-04-08 PROCEDURE — 99215 OFFICE O/P EST HI 40 MIN: CPT | Performed by: PHYSICIAN ASSISTANT

## 2021-04-08 PROCEDURE — 83036 HEMOGLOBIN GLYCOSYLATED A1C: CPT | Performed by: PHYSICIAN ASSISTANT

## 2021-04-08 ASSESSMENT — MIFFLIN-ST. JEOR: SCORE: 1147.59

## 2021-04-08 NOTE — LETTER
4/8/2021       RE: Kiera Dobson  [address]  [facility] [address]     Dear Colleague,    Thank you for referring your patient, Kiera Dobson, to the [facility] [place] at [facility]. Please see a copy of my visit note below.    PATIENT SEEN FACE TO FACE IN CLINIC TODAY - 4/8/2021    HPI:  Kiera Dobson is a 55 year old female with type 1 diabetes mellitus.   Pt seen in clinic today for diabetes follow up.  Kiera was dx with type 1 diabetes 45 + years ago.  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 No peripheral neuropathy or nephropathy.  Her hx is also significant for hypoglycemia unawareness.  Kiera continues to use a Tandem insulin pump-control IQ and DexcomG6 sensor.  We were unable to obtain her insulin pump download today due to computer problems.  I reviewed her pump settings manually in clinic today.  Her basal insulin rates are set at :  Midnight = 0.65 units/hr.  2 am = 0.5 units/hr.  7 am = 0.5 units/hr.  10 am = 0.65 units/hr.  10:30 pm = 0.6 units/hr.  Her I/C ratio is 1:12. Sensitivity is 70.  Pt's A1C is 7.3 % in clinic today. Previous A1C was 7.0 % in 7/2020.  I was able to review her Tidepool data from 3/24-4/6/2021:  Blood sugar > 250 = 3 %  Blood sugar 180-250 = 24 %  Blood sugar  = 73 %.  Blood sugar 54-70 = 0 %.  Her DexcomG6 sensor download from 2/18-3/18/2021 ( she has not upload her data for 1 month ) showed:  Average glucose 141 with SD 52.  Blood sugar was in target 63 % of the time, above target 30 % of the time an below target 7 % of the time.  She continues to have issues with scar tissue and insulin absorption. She tries to avoid scar tissue sites for her infusion sets.  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  Main complaint today is bilateral hip pain with sx of left  sciatica x 2-3 months.  Sitting too long and rolling on her side in bed aggravates the pain.  Moving sometimes helps.  No fall or trauma.  She continues to have intermittent chest pain-able to reproduce pain with palpation. No radiation of pain, SOB, n/v or diaphoresis with this pain.  She denies SOB,cough, fever or chills at this time.  Some blurred vision.  Pt denies abd pain, diarrhea, dysuria or hematuria.    DIABETES CARE:  Retinopathy:yes; moderate nonproliferative diabetic retinopathy both eyes without macular edema Seen here in Aug 2020.  Nephropathy: urine microalbuminuria negative in 8/2019.   Neuropathy: none.  Feet: no exam today.  Taking ASA: yes.  Lipids: LDL 72 in 7/2020.    Pt is taking Simvastatin.  CAD: no. Normal stress echo in 9/2010.  Mental health:hx of ADHD.  Insulin: Tandem insulin pump- control IQ.  Testing: DexcomG6 sensor.    ROS  Please see under HPI.    Allergies  Allergies   Allergen Reactions     Ampicillin Sodium Rash       Medications  Current Outpatient Medications   Medication Sig Dispense Refill     aspirin 81 MG chewable tablet Take 81 mg by mouth every other day  108 tablet 3     Continuous Blood Gluc  (DEXCOM G6 ) MELE 1 each See Admin Instructions Use per 's instructions, to monitor glucose continuously. 1 Device 0     insulin aspart (NOVOLOG VIAL) 100 UNITS/ML vial Use with pump  Approx  60 units daily 60 mL 3     simvastatin (ZOCOR) 80 MG tablet Take one tablet by mouth daily at bedtime 90 tablet 3     blood glucose (ERICA CONTOUR NEXT) test strip Use to test blood sugar 4-6 times daily or as directed. 720 strip 3     Continuous Blood Gluc  (DEXCOM G6 ) MELE 1 Device continuous 1 Device 0     Continuous Blood Gluc Sensor (DEXCOM G6 SENSOR) MISC 1 each every 10 days 9 each 3     Continuous Blood Gluc Sensor (DEXCOM G6 SENSOR) MISC 1 each See Admin Instructions Change every 10 days. 9 each 3     Continuous Blood Gluc Transmit (DEXCOM  "G6 TRANSMITTER) MISC 1 each every 3 months 1 each 3     Continuous Blood Gluc Transmit (DEXCOM G6 TRANSMITTER) MISC 1 each See Admin Instructions Change every 90 days. 1 each 3     glucagon (GLUCAGON EMERGENCY) 1 MG injection Inject 1 mg into the muscle once for 1 dose Use as directed 1 mg 2     glucose ([employer] GLUCOSE) 40 % GEL Take 15 g by mouth as needed       glucose blood VI test strips strip Test up to ten times per day as directed 5 Box 8       Family History  family history includes Alzheimer Disease in her father; Autoimmune Disease in her mother; Cancer in her father; Diabetes in her father; Glaucoma in her father and mother; Hypertension in her mother.    Social History  Smoke: none.  ETOH: rare.   with children.  Pt works full time in a lab.    Past Medical History  Past Medical History:   Diagnosis Date     Diabetes mellitus (H)     Type 1     Elevated cholesterol      Glaucoma     Glaucoma suspect     Glaucoma suspect      Kidney stones      Nonsenile cataract      Past Surgical History:   Procedure Laterality Date     APPENDECTOMY OPEN  1981     BIOPSY OF SKIN LESION  12/30/2011    returned January 2012 for additional removal     c sections  99,97,08     EXTRACORPOREAL SHOCK WAVE LITHOTRIPSY, CYSTOSCOPY, INSERT STENT URETER(S), COMBINED  2004     LASER HOLMIUM LITHOTRIPSY URETER(S), INSERT STENT, COMBINED  12/12/2013    Procedure: COMBINED CYSTOSCOPY, URETEROSCOPY, LASER HOLMIUM LITHOTRIPSY URETER(S), INSERT STENT;  Right Ureteroscopy Holmium Laser Lithotripsy Stent Placement;  Surgeon: Kirill Marlow MD;  Location: [facility]     S/P right shoulder surgery in Dec 2012.    Physical Exam  /75 (BP Location: Right arm, Patient Position: Sitting, Cuff Size: Adult Regular)   Ht 1.518 m (4' 11.75\")   Wt 62.5 kg (137 lb 12.8 oz)   BMI 27.14 kg/m    GENERAL: Pt in no distress today.  SKIN: Dry.  HEENT: PERRLA; fundi not examined.  LUNGS: clear b/l.  CARDIAC: RRR.  ABDOMEN: Areas of scar " tissue.  EXTREMITY: Bilateral hip pain. No pretibial edema.  FEET: Soft and no ulcers; normal monofilamentous exam today.    RESULTS  Creatinine   Date Value Ref Range Status   07/24/2020 0.64 0.52 - 1.04 mg/dL Final     GFR Estimate   Date Value Ref Range Status   07/24/2020 >90 >60 mL/min/[1.73_m2] Final     Comment:     Non  GFR Calc  Starting 12/18/2018, serum creatinine based estimated GFR (eGFR) will be   calculated using the Chronic Kidney Disease Epidemiology Collaboration   (CKD-EPI) equation.       Hemoglobin A1C   Date Value Ref Range Status   07/24/2020 7.0 (H) 0 - 5.6 % Final     Comment:     Normal <5.7% Prediabetes 5.7-6.4%  Diabetes 6.5% or higher - adopted from ADA   consensus guidelines.       Potassium   Date Value Ref Range Status   08/20/2019 4.4 3.4 - 5.3 mmol/L Final     ALT   Date Value Ref Range Status   07/24/2020 18 0 - 50 U/L Final     AST   Date Value Ref Range Status   07/24/2020 15 0 - 45 U/L Final     TSH   Date Value Ref Range Status   07/24/2020 1.91 0.40 - 4.00 mU/L Final     T4 Free   Date Value Ref Range Status   02/14/2017 0.84 0.76 - 1.46 ng/dL Final       Cholesterol   Date Value Ref Range Status   08/14/2018 152 <200 mg/dL Final   03/24/2017 154 <200 mg/dL Final     HDL Cholesterol   Date Value Ref Range Status   08/14/2018 61 >49 mg/dL Final   03/24/2017 73 >49 mg/dL Final     LDL Cholesterol Calculated   Date Value Ref Range Status   08/14/2018 72 <100 mg/dL Final     Comment:     Desirable:       <100 mg/dl   03/24/2017 69 <100 mg/dL Final     Comment:     Desirable:       <100 mg/dl     Triglycerides   Date Value Ref Range Status   08/14/2018 94 <150 mg/dL Final   03/24/2017 59 <150 mg/dL Final     Cholesterol/HDL Ratio   Date Value Ref Range Status   08/18/2014 2.7 0.0 - 5.0 Final   09/17/2013 2.3 0.0 - 5.0 Final     A1C      7.3  4/8/2021  A1C      7.0  7/2020  A1C      7.4   2/25/2020  A1C      7.4  11/20/2019  A1C      7.5   8/20/2019  A1C      7.2    5/22/2019  A1C      7.4   2/12/2019  A1C      7.9   11/14/2018    A1C      7.6   8/14/2018  A1C      7.4   3/15/2018  A1C      7.4   12/1/2017  A1C      7.9   9/13/2017  A1C      7.7   6/1/2017  A1C      8.3   2/14/2017  A1C      7.5   8/18/2016  A1C      7.6   5/5/2016  A1C      7.8    2/10/2016  A1C      7.9    9/2/2015  A1C      7.7    5/2015  A1C      8.6    1/28/2015  A1C      8.8    9/26/2014  A1C      8.0    7/2/2014  A1C      8.4    2/28/2014    ASSESSMENT/PLAN:    1.  TYPE 1 DIABETES MELLITUS:Type 1 diabetes mellitus complicated by moderate NPDR both eyes without macular edema and hypoglycemia unawareness.  Kiera will try using her breast tissue for an infusion site to see if she gets better insulin absorption.  I did not make an insulin pump setting changes today.  She is to send me a [employer] message in 1 week to give me an update on her blood sugar readings.  Reminded her to bolus before eating.  Pt's urine microalbuminuria was negative in July 2020 with a normal creat/GFR.  No foot ulcers and normal monofilamentous exam today.  Kiera has received 1 COVID19 vaccine on 4/7/2021 ( [employer]).    2. MODERATE NPDR: Last seen by Oph here in Aug 2020 with moderate NPDR both eyes without macular edema.      3.  HYPERLIPIDEMIA:  LDL 72 in 8/2019.   Pt remains on high dose Simvastatin.  Since she has been on the high dose Simvastatin for years.    4.  ADHD/STRESS: She has not seen her therapist lately.    5. HYPOGLYCEMIA UNAWARENESS: She is to wear her DexcomG6 sensor full time.  No hypoglycemia on Tidepool data 3/24-4/6/2021.     6.  CHEST PAIN: Intermittent chest pain. She has had this in the past- not new.  Pt had normal stress echo in 2010.    7.  HIP PAIN: Main complaint today is bilateral hip pain and sx of left sciatica.  Will refer to [facility] here for evaluation.    8.  FOLLOW UP: with me in clinic in 2 months and Dr. Chahal in 5 months.  Kiera is to send me an update via [employer] next week.    Total time spent  reviewing chart, labs, Tidepool bs data, DexcomG6 sensor data today = 12 minutes.  Total time for clinic visit today = 30 minutes.  Total time for documentation today = 15 minutes.    TOTAL TIME FOR VISIT TODAY ( 4/8/2021 )= 57 minutes.

## 2021-04-10 NOTE — PROGRESS NOTES
PATIENT SEEN FACE TO FACE IN CLINIC TODAY - 4/8/2021    HPI:  Kiera Dobson is a 55 year old female with type 1 diabetes mellitus.   Pt seen in clinic today for diabetes follow up.  Kiera was dx with type 1 diabetes 45 + years ago.  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 No peripheral neuropathy or nephropathy.  Her hx is also significant for hypoglycemia unawareness.  Kiera continues to use a Tandem insulin pump-control IQ and DexcomG6 sensor.  We were unable to obtain her insulin pump download today due to computer problems.  I reviewed her pump settings manually in clinic today.  Her basal insulin rates are set at :  Midnight = 0.65 units/hr.  2 am = 0.5 units/hr.  7 am = 0.5 units/hr.  10 am = 0.65 units/hr.  10:30 pm = 0.6 units/hr.  Her I/C ratio is 1:12. Sensitivity is 70.  Pt's A1C is 7.3 % in clinic today. Previous A1C was 7.0 % in 7/2020.  I was able to review her Tidepool data from 3/24-4/6/2021:  Blood sugar > 250 = 3 %  Blood sugar 180-250 = 24 %  Blood sugar  = 73 %.  Blood sugar 54-70 = 0 %.  Her DexcomG6 sensor download from 2/18-3/18/2021 ( she has not upload her data for 1 month ) showed:  Average glucose 141 with SD 52.  Blood sugar was in target 63 % of the time, above target 30 % of the time an below target 7 % of the time.  She continues to have issues with scar tissue and insulin absorption. She tries to avoid scar tissue sites for her infusion sets.  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  Main complaint today is bilateral hip pain with sx of left sciatica x 2-3 months.  Sitting too long and rolling on her side in bed aggravates the pain.  Moving sometimes helps.  No fall or trauma.  She continues to have intermittent chest pain-able to reproduce pain with palpation. No radiation of pain, SOB, n/v or diaphoresis with this pain.  She denies SOB,cough, fever or  chills at this time.  Some blurred vision.  Pt denies abd pain, diarrhea, dysuria or hematuria.    DIABETES CARE:  Retinopathy:yes; moderate nonproliferative diabetic retinopathy both eyes without macular edema Seen here in Aug 2020.  Nephropathy: urine microalbuminuria negative in 8/2019.   Neuropathy: none.  Feet: no exam today.  Taking ASA: yes.  Lipids: LDL 72 in 7/2020.    Pt is taking Simvastatin.  CAD: no. Normal stress echo in 9/2010.  Mental health:hx of ADHD.  Insulin: Tandem insulin pump- control IQ.  Testing: DexcomG6 sensor.    ROS  Please see under HPI.    Allergies  Allergies   Allergen Reactions     Ampicillin Sodium Rash       Medications  Current Outpatient Medications   Medication Sig Dispense Refill     aspirin 81 MG chewable tablet Take 81 mg by mouth every other day  108 tablet 3     Continuous Blood Gluc  (DEXCOM G6 ) MELE 1 each See Admin Instructions Use per 's instructions, to monitor glucose continuously. 1 Device 0     insulin aspart (NOVOLOG VIAL) 100 UNITS/ML vial Use with pump  Approx  60 units daily 60 mL 3     simvastatin (ZOCOR) 80 MG tablet Take one tablet by mouth daily at bedtime 90 tablet 3     blood glucose (ERICA CONTOUR NEXT) test strip Use to test blood sugar 4-6 times daily or as directed. 720 strip 3     Continuous Blood Gluc  (DEXCOM G6 ) MELE 1 Device continuous 1 Device 0     Continuous Blood Gluc Sensor (DEXCOM G6 SENSOR) MISC 1 each every 10 days 9 each 3     Continuous Blood Gluc Sensor (DEXCOM G6 SENSOR) MISC 1 each See Admin Instructions Change every 10 days. 9 each 3     Continuous Blood Gluc Transmit (DEXCOM G6 TRANSMITTER) MISC 1 each every 3 months 1 each 3     Continuous Blood Gluc Transmit (DEXCOM G6 TRANSMITTER) MISC 1 each See Admin Instructions Change every 90 days. 1 each 3     glucagon (GLUCAGON EMERGENCY) 1 MG injection Inject 1 mg into the muscle once for 1 dose Use as directed 1 mg 2     glucose (CVS  "GLUCOSE) 40 % GEL Take 15 g by mouth as needed       glucose blood VI test strips strip Test up to ten times per day as directed 5 Box 8       Family History  family history includes Alzheimer Disease in her father; Autoimmune Disease in her mother; Cancer in her father; Diabetes in her father; Glaucoma in her father and mother; Hypertension in her mother.    Social History  Smoke: none.  ETOH: rare.   with children.  Pt works full time in a lab.    Past Medical History  Past Medical History:   Diagnosis Date     Diabetes mellitus (H)     Type 1     Elevated cholesterol      Glaucoma     Glaucoma suspect     Glaucoma suspect      Kidney stones      Nonsenile cataract      Past Surgical History:   Procedure Laterality Date     APPENDECTOMY OPEN  1981     BIOPSY OF SKIN LESION  12/30/2011    returned January 2012 for additional removal     c sections  99,97,08     EXTRACORPOREAL SHOCK WAVE LITHOTRIPSY, CYSTOSCOPY, INSERT STENT URETER(S), COMBINED  2004     LASER HOLMIUM LITHOTRIPSY URETER(S), INSERT STENT, COMBINED  12/12/2013    Procedure: COMBINED CYSTOSCOPY, URETEROSCOPY, LASER HOLMIUM LITHOTRIPSY URETER(S), INSERT STENT;  Right Ureteroscopy Holmium Laser Lithotripsy Stent Placement;  Surgeon: Kirill Marlow MD;  Location: [facility]     S/P right shoulder surgery in Dec 2012.    Physical Exam  /75 (BP Location: Right arm, Patient Position: Sitting, Cuff Size: Adult Regular)   Ht 1.518 m (4' 11.75\")   Wt 62.5 kg (137 lb 12.8 oz)   BMI 27.14 kg/m    GENERAL: Pt in no distress today.  SKIN: Dry.  HEENT: PERRLA; fundi not examined.  LUNGS: clear b/l.  CARDIAC: RRR.  ABDOMEN: Areas of scar tissue.  EXTREMITY: Bilateral hip pain. No pretibial edema.  FEET: Soft and no ulcers; normal monofilamentous exam today.    RESULTS  Creatinine   Date Value Ref Range Status   07/24/2020 0.64 0.52 - 1.04 mg/dL Final     GFR Estimate   Date Value Ref Range Status   07/24/2020 >90 >60 mL/min/[1.73_m2] Final     " Comment:     Non  GFR Calc  Starting 12/18/2018, serum creatinine based estimated GFR (eGFR) will be   calculated using the Chronic Kidney Disease Epidemiology Collaboration   (CKD-EPI) equation.       Hemoglobin A1C   Date Value Ref Range Status   07/24/2020 7.0 (H) 0 - 5.6 % Final     Comment:     Normal <5.7% Prediabetes 5.7-6.4%  Diabetes 6.5% or higher - adopted from ADA   consensus guidelines.       Potassium   Date Value Ref Range Status   08/20/2019 4.4 3.4 - 5.3 mmol/L Final     ALT   Date Value Ref Range Status   07/24/2020 18 0 - 50 U/L Final     AST   Date Value Ref Range Status   07/24/2020 15 0 - 45 U/L Final     TSH   Date Value Ref Range Status   07/24/2020 1.91 0.40 - 4.00 mU/L Final     T4 Free   Date Value Ref Range Status   02/14/2017 0.84 0.76 - 1.46 ng/dL Final       Cholesterol   Date Value Ref Range Status   08/14/2018 152 <200 mg/dL Final   03/24/2017 154 <200 mg/dL Final     HDL Cholesterol   Date Value Ref Range Status   08/14/2018 61 >49 mg/dL Final   03/24/2017 73 >49 mg/dL Final     LDL Cholesterol Calculated   Date Value Ref Range Status   08/14/2018 72 <100 mg/dL Final     Comment:     Desirable:       <100 mg/dl   03/24/2017 69 <100 mg/dL Final     Comment:     Desirable:       <100 mg/dl     Triglycerides   Date Value Ref Range Status   08/14/2018 94 <150 mg/dL Final   03/24/2017 59 <150 mg/dL Final     Cholesterol/HDL Ratio   Date Value Ref Range Status   08/18/2014 2.7 0.0 - 5.0 Final   09/17/2013 2.3 0.0 - 5.0 Final     A1C      7.3  4/8/2021  A1C      7.0  7/2020  A1C      7.4   2/25/2020  A1C      7.4  11/20/2019  A1C      7.5   8/20/2019  A1C      7.2   5/22/2019  A1C      7.4   2/12/2019  A1C      7.9   11/14/2018    A1C      7.6   8/14/2018  A1C      7.4   3/15/2018  A1C      7.4   12/1/2017  A1C      7.9   9/13/2017  A1C      7.7   6/1/2017  A1C      8.3   2/14/2017  A1C      7.5   8/18/2016  A1C      7.6   5/5/2016  A1C      7.8    2/10/2016  A1C      7.9     9/2/2015  A1C      7.7    5/2015  A1C      8.6    1/28/2015  A1C      8.8    9/26/2014  A1C      8.0    7/2/2014  A1C      8.4    2/28/2014    ASSESSMENT/PLAN:    1.  TYPE 1 DIABETES MELLITUS:Type 1 diabetes mellitus complicated by moderate NPDR both eyes without macular edema and hypoglycemia unawareness.  Kiera will try using her breast tissue for an infusion site to see if she gets better insulin absorption.  I did not make an insulin pump setting changes today.  She is to send me a [employer] message in 1 week to give me an update on her blood sugar readings.  Reminded her to bolus before eating.  Pt's urine microalbuminuria was negative in July 2020 with a normal creat/GFR.  No foot ulcers and normal monofilamentous exam today.  Kiera has received 1 COVID19 vaccine on 4/7/2021 ( [employer]).    2. MODERATE NPDR: Last seen by Oph here in Aug 2020 with moderate NPDR both eyes without macular edema.      3.  HYPERLIPIDEMIA:  LDL 72 in 8/2019.   Pt remains on high dose Simvastatin.  Since she has been on the high dose Simvastatin for years.    4.  ADHD/STRESS: She has not seen her therapist lately.    5. HYPOGLYCEMIA UNAWARENESS: She is to wear her DexcomG6 sensor full time.  No hypoglycemia on Tidepool data 3/24-4/6/2021.     6.  CHEST PAIN: Intermittent chest pain. She has had this in the past- not new.  Pt had normal stress echo in 2010.    7.  HIP PAIN: Main complaint today is bilateral hip pain and sx of left sciatica.  Will refer to [facility] here for evaluation.    8.  FOLLOW UP: with me in clinic in 2 months and Dr. Chahal in 5 months.  Kiera is to send me an update via [employer] next week.    Total time spent reviewing chart, labs, Tidepool bs data, DexcomG6 sensor data today = 12 minutes.  Total time for clinic visit today = 30 minutes.  Total time for documentation today = 15 minutes.    TOTAL TIME FOR VISIT TODAY ( 4/8/2021 )= 57 minutes.

## 2021-04-28 ENCOUNTER — IMMUNIZATION (OUTPATIENT)
Dept: NURSING | Facility: CLINIC | Age: 56
End: 2021-04-28
Payer: COMMERCIAL

## 2021-04-28 PROCEDURE — 0002A PR COVID VAC PFIZER DIL RECON 30 MCG/0.3 ML IM: CPT

## 2021-04-28 PROCEDURE — 91300 PR COVID VAC PFIZER DIL RECON 30 MCG/0.3 ML IM: CPT

## 2021-05-03 ENCOUNTER — OFFICE VISIT (OUTPATIENT)
Dept: FAMILY MEDICINE | Facility: CLINIC | Age: 56
End: 2021-05-03
Payer: COMMERCIAL

## 2021-05-03 VITALS
BODY MASS INDEX: 26.98 KG/M2 | DIASTOLIC BLOOD PRESSURE: 74 MMHG | OXYGEN SATURATION: 99 % | HEART RATE: 80 BPM | WEIGHT: 137 LBS | TEMPERATURE: 98.2 F | SYSTOLIC BLOOD PRESSURE: 114 MMHG

## 2021-05-03 DIAGNOSIS — M79.605 BILATERAL LEG PAIN: Primary | ICD-10-CM

## 2021-05-03 DIAGNOSIS — M79.604 BILATERAL LEG PAIN: Primary | ICD-10-CM

## 2021-05-03 DIAGNOSIS — M54.50 CHRONIC MIDLINE LOW BACK PAIN WITHOUT SCIATICA: ICD-10-CM

## 2021-05-03 DIAGNOSIS — G89.29 CHRONIC MIDLINE LOW BACK PAIN WITHOUT SCIATICA: ICD-10-CM

## 2021-05-03 PROCEDURE — 99203 OFFICE O/P NEW LOW 30 MIN: CPT | Performed by: NURSE PRACTITIONER

## 2021-05-03 ASSESSMENT — ENCOUNTER SYMPTOMS
BACK PAIN: 1
FATIGUE: 0
ARTHRALGIAS: 1
CHILLS: 0
FEVER: 0

## 2021-05-03 ASSESSMENT — PAIN SCALES - GENERAL: PAINLEVEL: MODERATE PAIN (5)

## 2021-05-03 NOTE — PATIENT INSTRUCTIONS
First, schedule you x rays with coordinator. Next, go to 4th floor and schedule appt with walk in orthopedics at [phone]. Next, see michelle in 2 weeks for follow up on status snd plan development. [phone].  [facility] Medication Refill Request Information:  * Please contact your pharmacy regarding ANY request for medication refills.  ** NP Clinic Prescription Fax = [phone]  * Please allow 3 business days for routine medication refills.  * Please allow 5 business days for controlled substance medication refills.     [facility] Test Result notification information:  *You will be notified with in 7-10 days of your appointment day regarding the results of your test.  If you are on MyChart you will be notified as soon as the provider has reviewed the results and signed off on them.    [facility]: [phone]     If you have questions regarding Covid-19 and the Covid-19 vaccine, please visit this website.    [email or web address]

## 2021-05-03 NOTE — PROGRESS NOTES
HPI       Kiera Dobson is a 55 year old woman who presents as a new patient with bilateral leg pain.   Past medical history: Type 1 DM at 4 years of age, hyperlipidemia.  Chief Complaint   Patient presents with     Pain     Discuss pain on both legs   Bilateral leg pain: Started about 3 months ago. No known injury. The pain starts in her bilateral hips and shoots down to her toes. She has sharp in pricks in her feet.   Chronic midline low back pain: she has had sciatic pain recently, this has always been there, however it is worse as of late.     Problem, Medication and Allergy Lists were reviewed and updated if needed.    Patient is a new patient to this clinic and so  I reviewed/updated the Past Medical History, the Family History and the Social History .           Review of Systems:   Review of Systems     Constitutional:  Negative for fever, chills and fatigue.   Musculoskeletal:  Positive for back pain and arthralgias.               Physical Exam:     Vitals:    05/03/21 1230   BP: 114/74   Pulse: 80   Temp: 98.2  F (36.8  C)   SpO2: 99%   Weight: 62.1 kg (137 lb)     Body mass index is 26.98 kg/m .  Vitals were reviewed and were normal.     Physical Exam  Vitals signs reviewed.   Constitutional:       Appearance: Normal appearance.   HENT:      Head: Normocephalic.   Musculoskeletal:      Right hip: She exhibits decreased range of motion and bony tenderness.      Left hip: She exhibits decreased range of motion and bony tenderness.      Lumbar back: She exhibits decreased range of motion and pain.   Skin:     General: Skin is warm and dry.   Neurological:      General: No focal deficit present.      Mental Status: She is alert and oriented to person, place, and time.   Psychiatric:         Mood and Affect: Mood normal.         Behavior: Behavior normal.         Results:     Assessment and Plan     1. Bilateral leg pain    - XR Hip Left 2-3 Views; Future  - XR Hip Right 2-3 Views; Future    2. Chronic  midline low back pain without sciatica    - XR Lumbar Spine 2-3 Views; Future    First, schedule you x rays with coordinator. Next, go to 4th floor and schedule appt with walk in orthopedics at [phone]. Next, see michelle in 2 weeks for follow up on status and plan development. Would consider other etiology (Neurological) next if x rays are wnl. [phone].Options for treatment and follow-up care were reviewed with the patient. Kiera Dobson  engaged in the decision making process and verbalized understanding of the options discussed and agreed with the final plan.  RAULITO Vincent, CNP  05/06/2021 1229:My chart message sent for follow up on pain and ortho appt.   RAULITO Vincent, CNP

## 2021-05-03 NOTE — NURSING NOTE
55 year old  Chief Complaint   Patient presents with     Pain     Discuss pain on both legs       Blood pressure 114/74, pulse 80, temperature 98.2  F (36.8  C), weight 62.1 kg (137 lb), SpO2 99 %, not currently breastfeeding. Body mass index is 26.98 kg/m .  BP completed using cuff size:      Naheed Bhatia, JOHN  May 3, 2021 12:33 PM

## 2021-05-04 ENCOUNTER — ANCILLARY PROCEDURE (OUTPATIENT)
Dept: GENERAL RADIOLOGY | Facility: CLINIC | Age: 56
End: 2021-05-04
Attending: NURSE PRACTITIONER
Payer: COMMERCIAL

## 2021-05-04 DIAGNOSIS — G89.29 CHRONIC MIDLINE LOW BACK PAIN WITHOUT SCIATICA: ICD-10-CM

## 2021-05-04 DIAGNOSIS — M79.604 BILATERAL LEG PAIN: ICD-10-CM

## 2021-05-04 DIAGNOSIS — M54.50 CHRONIC MIDLINE LOW BACK PAIN WITHOUT SCIATICA: ICD-10-CM

## 2021-05-04 DIAGNOSIS — M79.605 BILATERAL LEG PAIN: ICD-10-CM

## 2021-05-04 PROCEDURE — 72100 X-RAY EXAM L-S SPINE 2/3 VWS: CPT | Performed by: RADIOLOGY

## 2021-05-04 PROCEDURE — 73502 X-RAY EXAM HIP UNI 2-3 VIEWS: CPT | Mod: LT | Performed by: RADIOLOGY

## 2021-05-04 PROCEDURE — 73502 X-RAY EXAM HIP UNI 2-3 VIEWS: CPT | Mod: RT | Performed by: RADIOLOGY

## 2021-05-04 NOTE — TELEPHONE ENCOUNTER
DIAGNOSIS: Hip and leg pain. Seen in [facility] clinic. [place] [facility] [place]    APPOINTMENT DATE: 5.6.21   NOTES STATUS DETAILS   OFFICE NOTE from referring provider Internal 5.3.21 JOSEMANUEL Vincent   OFFICE NOTE from other specialist N/A    DISCHARGE SUMMARY from hospital N/A    DISCHARGE REPORT from the ER N/A    OPERATIVE REPORT N/A    EMG report N/A    MEDICATION LIST Internal    MRI N/A    DEXA (osteoporosis/bone health) N/A    CT SCAN N/A    XRAYS (IMAGES & REPORTS) Internal 5.4.21 L hip, R hip, lumbar spine

## 2021-05-05 NOTE — PROGRESS NOTES
"Sports Medicine Clinic Visit    PCP: Jose Davenport    Kiera Dobson is a 55 year old female who is seen  in consultation at the request of Deja Otto NP presenting with bilateral hip pain. She reports no tingling and numbing sensations, just pain over the greater trochanter and inguinal area.  Uncomfortable to roll over onto side of hip in bed.  Seems improved recently after walking on a treadmill.  45% of job is seated at desk.     H/O chronic, recurrent  midline low back pain in past years     Injury: unknown    Location of Pain: bilateral lateral and inguinal hip  Duration of Pain: 3 month(s)  Rating of Pain: 8/10  Pain is better with: Ibuprofen, Rest and Walking  Pain is worse with: sitting for an extended amount of time.   Additional Features: has difficulty sleeping at night. She has Type 1 Diabetic.  Treatment so far consists of: Heat, Ibuprofen, Rest and Walking  Prior History of related problems: None    Ht 1.518 m (4' 11.75\")   Wt 62.1 kg (137 lb)   BMI 26.98 kg/m        PMH:  Past Medical History:   Diagnosis Date     Diabetes mellitus (H)     Type 1     Elevated cholesterol      Glaucoma     Glaucoma suspect     Glaucoma suspect      Kidney stones      Nonsenile cataract      C/s x 3    Active problem list:  Patient Active Problem List   Diagnosis     Pain in shoulder     Type 1 diabetes mellitus without complication (HCC)     Adhesive capsulitis of shoulder     Superior glenoid labrum lesion     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)     Cortical, lamellar, or zonular cataract, nonsenile     Glaucoma suspect, both eyes     Myopia       FH:  Family History   Problem Relation Age of Onset     Diabetes Father      Alzheimer Disease Father      Cancer Father         lung     Glaucoma Father      Hypertension Mother      Glaucoma Mother      Autoimmune Disease Mother         Myasthenia gravis     Melanoma No family " hx of      Skin Cancer No family hx of    Mother -RA, MG, poly nephritis, heart condition  Father-lung CA and metastatic brain tumor, DM type 1, dementia,  at the age of 79, OCD  4 siblings    SH:  Social History     Socioeconomic History     Marital status:      Spouse name: Not on file     Number of children: Not on file     Years of education: Not on file     Highest education level: Not on file   Occupational History     Not on file   Social Needs     Financial resource strain: Not on file     Food insecurity     Worry: Not on file     Inability: Not on file     Transportation needs     Medical: Not on file     Non-medical: Not on file   Tobacco Use     Smoking status: Never Smoker     Smokeless tobacco: Never Used   Substance and Sexual Activity     Alcohol use: Yes     Comment: occasionally 1-2 beers     Drug use: No     Sexual activity: Yes     Partners: Male   Lifestyle     Physical activity     Days per week: Not on file     Minutes per session: Not on file     Stress: Not on file   Relationships     Social connections     Talks on phone: Not on file     Gets together: Not on file     Attends Gnosticism service: Not on file     Active member of club or organization: Not on file     Attends meetings of clubs or organizations: Not on file     Relationship status: Not on file     Intimate partner violence     Fear of current or ex partner: Not on file     Emotionally abused: Not on file     Physically abused: Not on file     Forced sexual activity: Not on file   Other Topics Concern     Parent/sibling w/ CABG, MI or angioplasty before 65F 55M? Not Asked   Social History Narrative     Not on file       MEDS:  See EMR, reviewed  ALL:  See EMR, reviewed    REVIEW OF SYSTEMS:  CONSTITUTIONAL:NEGATIVE for fever, chills, change in weight  INTEGUMENTARY/SKIN: NEGATIVE for worrisome rashes, moles or lesions  EYES: NEGATIVE for vision changes or irritation  ENT/MOUTH: NEGATIVE for ear, mouth and throat  problems  RESP:NEGATIVE for significant cough or SOB  BREAST: NEGATIVE for masses, tenderness or discharge  CV: NEGATIVE for chest pain, palpitations or peripheral edema  GI: NEGATIVE for nausea, abdominal pain, heartburn, or change in bowel habits  :NEGATIVE for frequency, dysuria, or hematuria  :NEGATIVE for frequency, dysuria, or hematuria  NEURO: NEGATIVE for weakness, dizziness or paresthesias  ENDOCRINE: NEGATIVE for temperature intolerance, skin/hair changes  HEME/ALLERGY/IMMUNE: NEGATIVE for bleeding problems  PSYCHIATRIC: NEGATIVE for changes in mood or affect      3 views lumbar spine radiographs 5/4/2021 1:27 PM     History: Chronic midline low back pain without sciatica; Chronic  midline low back pain without sciatica     Additional History from EMR:     Comparison:     Findings:     Standing  views of the lumbar spine were obtained.     5  lumbar type vertebral bodies are assumed for the purpose of this  dictation. Transitional morphology at L5 with partial sacralization.  Short ribs on T12.     There is no acute osseous abnormality.       There are mild multilevel degenerative changes of the lumbar spine,  with endplate remodeling and mild disc space narrowing at L5-S1. There  is also lower lumbar predominant facet arthropathy.     Vascular calcifications. The visualized bowel gas pattern is  non-obstructive.                                                                         Impression:  1.  No acute osseous abnormality.  2.  Mild degenerative changes of the lumbar spine most pronounced at  L5-S1.     JUTTA ELLERMANN, MD            2 views right hip radiographs 5/4/2021 1:35 PM     History: Bilateral leg pain; Bilateral leg pain     Comparison: Pelvis 3/24/2017     Findings:     AP, frog leg lateral views of the right hip were obtained.      No acute osseous abnormality.       5 subchondral sclerosis of the acetabulum. Preserved joint space. Mild  degenerative changes of the SI  joint.     Pelvic phlebolith.                                                                       Impression:  1. No acute osseous abnormality.  2. Mild subchondral sclerosis of the acetabulum. Preserved femoral  acetabular joint space.     JUTTA ELLERMANN, MD        2 views left hip radiographs 5/4/2021 1:32 PM     History: Bilateral leg pain; Bilateral leg pain     Comparison: 3/24/2017     Findings:     AP, frog leg lateral views of the left hip were obtained.      No acute osseous abnormality.       Mild subchondral sclerosis of the acetabulum. Preserved joint space.  Mild degenerative changes of the left SI joint     Others:     Enthesopathic changes of pelvis and trochanters. Degenerative changes  of the visualized lumbar spine.     Pelvic phlebolith.                                                                       Impression:  1. No acute osseous abnormality.  2. Mild subchondral sclerosis of the left acetabulum, preserved joint  space.     JUTTA ELLERMANN, MD          Objective: She points to the bilateral lateral hips as well as the bilateral anterior hips as areas of discomfort.  She is acutely tender to touch an area limited to the greater trochanter, and she is symmetrically tender over the greater trochanter to the bilateral hips.  Nontender over the distal IT bands bilaterally.  Abbi test is negative.  She tolerates internal and external rotation of the bilateral hips but has discomfort at the extremes of external rotation.  She is nontender over the sacroiliac joints.  Straight leg raise is negative.  Lower extremity strength to flexion extension at hip, knee, ankle including toe strength and foot evertor strength are 5 out of 5 symmetrically bilaterally.  Posterior tibial pulses strong and symmetrical.  She denies numbness and tingling in the lower extremities.  Appropriate in conversation and affect.    We went over x-rays of her bilateral hips and her bilateral SI joints, and her lumbar  spine.  She has mild degenerative change at the bilateral hips, with overall good maintenance of the cartilaginous joint space but some small peripheral spurs noted at the acetabulum.  On the right there is a slight flattening to the femoral head that could be consistent with some femoral acetabular impingement to mild degree.  The lumbar spine shows some degenerative change with mild disc space narrowing at L5-S1.  She has some mild SI joint degenerative change.    Assessment: Gluteus medius tendinitis bilaterally.  Mild hip DJD.  Lumbar spine degenerative disc disease.  Mild SI joint DJD.    Plan: We went over options for improving discomfort.  She declines injections.  We discussed both cortisone injections, which she would like to avoid, and PRP injections.  We discussed the ergonomics of the seated position and improvements that be made.  We discussed physical therapy options which were declined for now.  She is seeing improvement recently with walking on a treadmill and she plans on doing some localized deep tissue massage with the help of a ball placed against a wall, for the area of the trochanter.  She continues to avoid nonsteroidal anti-inflammatories in the setting of her diabetes.  If she changes her mind about physical therapy she knows she can notify me for referral.  She had no further questions and will follow-up as needed.

## 2021-05-06 ENCOUNTER — OFFICE VISIT (OUTPATIENT)
Dept: ORTHOPEDICS | Facility: CLINIC | Age: 56
End: 2021-05-06
Payer: COMMERCIAL

## 2021-05-06 ENCOUNTER — PRE VISIT (OUTPATIENT)
Dept: ORTHOPEDICS | Facility: CLINIC | Age: 56
End: 2021-05-06

## 2021-05-06 VITALS — BODY MASS INDEX: 26.9 KG/M2 | WEIGHT: 137 LBS | HEIGHT: 60 IN

## 2021-05-06 DIAGNOSIS — M67.952 TENDINOPATHY OF LEFT GLUTEUS MEDIUS: Primary | ICD-10-CM

## 2021-05-06 DIAGNOSIS — M16.0 PRIMARY OSTEOARTHRITIS OF BOTH HIPS: ICD-10-CM

## 2021-05-06 DIAGNOSIS — E10.3293 CONTROLLED TYPE 1 DIABETES MELLITUS WITH BOTH EYES AFFECTED BY MILD NONPROLIFERATIVE RETINOPATHY WITHOUT MACULAR EDEMA (H): ICD-10-CM

## 2021-05-06 DIAGNOSIS — M67.951 TENDINOPATHY OF RIGHT GLUTEUS MEDIUS: ICD-10-CM

## 2021-05-06 DIAGNOSIS — M46.1 DEGENERATIVE JOINT DISEASE OF SACROILIAC JOINT (H): ICD-10-CM

## 2021-05-06 DIAGNOSIS — M51.369 LUMBAR DEGENERATIVE DISC DISEASE: ICD-10-CM

## 2021-05-06 PROCEDURE — 99203 OFFICE O/P NEW LOW 30 MIN: CPT | Performed by: FAMILY MEDICINE

## 2021-05-06 ASSESSMENT — MIFFLIN-ST. JEOR: SCORE: 1143.96

## 2021-05-06 NOTE — LETTER
"  5/6/2021      RE: Kiera Dobson  [address]  [facility] [id number]       Sports Medicine Clinic Visit    PCP: Jose Davenport    Kiera Dobson is a 55 year old female who is seen  in consultation at the request of Deja Otto NP presenting with bilateral hip pain. She reports no tingling and numbing sensations, just pain over the greater trochanter and inguinal area.  Uncomfortable to roll over onto side of hip in bed.  Seems improved recently after walking on a treadmill.  45% of job is seated at desk.     H/O chronic, recurrent  midline low back pain in past years     Injury: unknown    Location of Pain: bilateral lateral and inguinal hip  Duration of Pain: 3 month(s)  Rating of Pain: 8/10  Pain is better with: Ibuprofen, Rest and Walking  Pain is worse with: sitting for an extended amount of time.   Additional Features: has difficulty sleeping at night. She has Type 1 Diabetic.  Treatment so far consists of: Heat, Ibuprofen, Rest and Walking  Prior History of related problems: None    Ht 1.518 m (4' 11.75\")   Wt 62.1 kg (137 lb)   BMI 26.98 kg/m        PMH:  Past Medical History:   Diagnosis Date     Diabetes mellitus (H)     Type 1     Elevated cholesterol      Glaucoma     Glaucoma suspect     Glaucoma suspect      Kidney stones      Nonsenile cataract      C/s x 3    Active problem list:  Patient Active Problem List   Diagnosis     Pain in shoulder     Type 1 diabetes mellitus without complication (HCC)     Adhesive capsulitis of shoulder     Superior glenoid labrum lesion     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)     Cortical, lamellar, or zonular cataract, nonsenile     Glaucoma suspect, both eyes     Myopia       FH:  Family History   Problem Relation Age of Onset     Diabetes Father      Alzheimer Disease Father      Cancer Father         lung     Glaucoma Father      Hypertension Mother      Glaucoma " Mother      Autoimmune Disease Mother         Myasthenia gravis     Melanoma No family hx of      Skin Cancer No family hx of    Mother -RA, MG, poly nephritis, heart condition  Father-lung CA and metastatic brain tumor, DM type 1, dementia,  at the age of 79, OCD  4 siblings    SH:  Social History     Socioeconomic History     Marital status:      Spouse name: Not on file     Number of children: Not on file     Years of education: Not on file     Highest education level: Not on file   Occupational History     Not on file   Social Needs     Financial resource strain: Not on file     Food insecurity     Worry: Not on file     Inability: Not on file     Transportation needs     Medical: Not on file     Non-medical: Not on file   Tobacco Use     Smoking status: Never Smoker     Smokeless tobacco: Never Used   Substance and Sexual Activity     Alcohol use: Yes     Comment: occasionally 1-2 beers     Drug use: No     Sexual activity: Yes     Partners: Male   Lifestyle     Physical activity     Days per week: Not on file     Minutes per session: Not on file     Stress: Not on file   Relationships     Social connections     Talks on phone: Not on file     Gets together: Not on file     Attends Taoism service: Not on file     Active member of club or organization: Not on file     Attends meetings of clubs or organizations: Not on file     Relationship status: Not on file     Intimate partner violence     Fear of current or ex partner: Not on file     Emotionally abused: Not on file     Physically abused: Not on file     Forced sexual activity: Not on file   Other Topics Concern     Parent/sibling w/ CABG, MI or angioplasty before 65F 55M? Not Asked   Social History Narrative     Not on file       MEDS:  See EMR, reviewed  ALL:  See EMR, reviewed    REVIEW OF SYSTEMS:  CONSTITUTIONAL:NEGATIVE for fever, chills, change in weight  INTEGUMENTARY/SKIN: NEGATIVE for worrisome rashes, moles or lesions  EYES: NEGATIVE  for vision changes or irritation  ENT/MOUTH: NEGATIVE for ear, mouth and throat problems  RESP:NEGATIVE for significant cough or SOB  BREAST: NEGATIVE for masses, tenderness or discharge  CV: NEGATIVE for chest pain, palpitations or peripheral edema  GI: NEGATIVE for nausea, abdominal pain, heartburn, or change in bowel habits  :NEGATIVE for frequency, dysuria, or hematuria  :NEGATIVE for frequency, dysuria, or hematuria  NEURO: NEGATIVE for weakness, dizziness or paresthesias  ENDOCRINE: NEGATIVE for temperature intolerance, skin/hair changes  HEME/ALLERGY/IMMUNE: NEGATIVE for bleeding problems  PSYCHIATRIC: NEGATIVE for changes in mood or affect      3 views lumbar spine radiographs 5/4/2021 1:27 PM     History: Chronic midline low back pain without sciatica; Chronic  midline low back pain without sciatica     Additional History from EMR:     Comparison:     Findings:     Standing  views of the lumbar spine were obtained.     5  lumbar type vertebral bodies are assumed for the purpose of this  dictation. Transitional morphology at L5 with partial sacralization.  Short ribs on T12.     There is no acute osseous abnormality.       There are mild multilevel degenerative changes of the lumbar spine,  with endplate remodeling and mild disc space narrowing at L5-S1. There  is also lower lumbar predominant facet arthropathy.     Vascular calcifications. The visualized bowel gas pattern is  non-obstructive.                                                                         Impression:  1.  No acute osseous abnormality.  2.  Mild degenerative changes of the lumbar spine most pronounced at  L5-S1.     JUTTA ELLERMANN, MD            2 views right hip radiographs 5/4/2021 1:35 PM     History: Bilateral leg pain; Bilateral leg pain     Comparison: Pelvis 3/24/2017     Findings:     AP, frog leg lateral views of the right hip were obtained.      No acute osseous abnormality.       5 subchondral sclerosis of the  acetabulum. Preserved joint space. Mild  degenerative changes of the SI joint.     Pelvic phlebolith.                                                                       Impression:  1. No acute osseous abnormality.  2. Mild subchondral sclerosis of the acetabulum. Preserved femoral  acetabular joint space.     JUTTA ELLERMANN, MD        2 views left hip radiographs 5/4/2021 1:32 PM     History: Bilateral leg pain; Bilateral leg pain     Comparison: 3/24/2017     Findings:     AP, frog leg lateral views of the left hip were obtained.      No acute osseous abnormality.       Mild subchondral sclerosis of the acetabulum. Preserved joint space.  Mild degenerative changes of the left SI joint     Others:     Enthesopathic changes of pelvis and trochanters. Degenerative changes  of the visualized lumbar spine.     Pelvic phlebolith.                                                                       Impression:  1. No acute osseous abnormality.  2. Mild subchondral sclerosis of the left acetabulum, preserved joint  space.     JUTTA ELLERMANN, MD          Objective: She points to the bilateral lateral hips as well as the bilateral anterior hips as areas of discomfort.  She is acutely tender to touch an area limited to the greater trochanter, and she is symmetrically tender over the greater trochanter to the bilateral hips.  Nontender over the distal IT bands bilaterally.  Abbi test is negative.  She tolerates internal and external rotation of the bilateral hips but has discomfort at the extremes of external rotation.  She is nontender over the sacroiliac joints.  Straight leg raise is negative.  Lower extremity strength to flexion extension at hip, knee, ankle including toe strength and foot evertor strength are 5 out of 5 symmetrically bilaterally.  Posterior tibial pulses strong and symmetrical.  She denies numbness and tingling in the lower extremities.  Appropriate in conversation and affect.    We went over  x-rays of her bilateral hips and her bilateral SI joints, and her lumbar spine.  She has mild degenerative change at the bilateral hips, with overall good maintenance of the cartilaginous joint space but some small peripheral spurs noted at the acetabulum.  On the right there is a slight flattening to the femoral head that could be consistent with some femoral acetabular impingement to mild degree.  The lumbar spine shows some degenerative change with mild disc space narrowing at L5-S1.  She has some mild SI joint degenerative change.    Assessment: Gluteus medius tendinitis bilaterally.  Mild hip DJD.  Lumbar spine degenerative disc disease.  Mild SI joint DJD.    Plan: We went over options for improving discomfort.  She declines injections.  We discussed both cortisone injections, which she would like to avoid, and PRP injections.  We discussed the ergonomics of the seated position and improvements that be made.  We discussed physical therapy options which were declined for now.  She is seeing improvement recently with walking on a treadmill and she plans on doing some localized deep tissue massage with the help of a ball placed against a wall, for the area of the trochanter.  She continues to avoid nonsteroidal anti-inflammatories in the setting of her diabetes.  If she changes her mind about physical therapy she knows she can notify me for referral.  She had no further questions and will follow-up as needed.        Gustavo May MD

## 2021-05-24 ENCOUNTER — OFFICE VISIT (OUTPATIENT)
Dept: FAMILY MEDICINE | Facility: CLINIC | Age: 56
End: 2021-05-24
Payer: COMMERCIAL

## 2021-05-24 VITALS
BODY MASS INDEX: 26.98 KG/M2 | SYSTOLIC BLOOD PRESSURE: 122 MMHG | OXYGEN SATURATION: 96 % | WEIGHT: 137 LBS | HEART RATE: 89 BPM | TEMPERATURE: 98 F | DIASTOLIC BLOOD PRESSURE: 76 MMHG

## 2021-05-24 DIAGNOSIS — M79.605 BILATERAL LEG PAIN: ICD-10-CM

## 2021-05-24 DIAGNOSIS — M79.604 BILATERAL LEG PAIN: ICD-10-CM

## 2021-05-24 DIAGNOSIS — M25.551 BILATERAL HIP PAIN: Primary | ICD-10-CM

## 2021-05-24 DIAGNOSIS — M25.552 BILATERAL HIP PAIN: Primary | ICD-10-CM

## 2021-05-24 PROCEDURE — 99213 OFFICE O/P EST LOW 20 MIN: CPT | Performed by: NURSE PRACTITIONER

## 2021-05-24 ASSESSMENT — ENCOUNTER SYMPTOMS
CHILLS: 0
FEVER: 0
BACK PAIN: 1
FATIGUE: 0
ARTHRALGIAS: 1

## 2021-05-24 ASSESSMENT — PAIN SCALES - GENERAL: PAINLEVEL: MODERATE PAIN (5)

## 2021-05-24 NOTE — PROGRESS NOTES
HPI       Kiera Dobson is a 55 year old woman who presents for follow up on her bilateral hip/leg and low back pain.   Chief Complaint   Patient presents with     Recheck Medication     Follow up on pain. Symptoms are the same.   Bilateral hip and leg pain: Kiera is doing fair, not worse, not really better. Will talk about the statin she is taking with Endocrinology to assess whether she can come off from this. X rays of low back and hips show minimal findings, they are reviewed in detail with Kiera.     Problem, Medication and Allergy Lists were reviewed and updated if needed.    Patient is an established patient of this clinic.         Review of Systems:   Review of Systems     Constitutional:  Negative for fever, chills and fatigue.   Musculoskeletal:  Positive for back pain and arthralgias.               Physical Exam:     Vitals:    05/24/21 0949   BP: 122/76   Pulse: 89   Temp: 98  F (36.7  C)   SpO2: 96%   Weight: 62.1 kg (137 lb)     Body mass index is 26.98 kg/m .  Vitals were reviewed and were normal.     Physical Exam  Vitals signs reviewed.   Constitutional:       Appearance: Normal appearance.   HENT:      Head: Normocephalic.   Musculoskeletal:      Left hip: She exhibits bony tenderness. She exhibits normal range of motion.      Lumbar back: She exhibits pain. She exhibits normal range of motion.        Legs:    Skin:     General: Skin is warm and dry.   Neurological:      General: No focal deficit present.      Mental Status: She is alert and oriented to person, place, and time.   Psychiatric:         Mood and Affect: Mood normal.         Behavior: Behavior normal.            Results:   Impression x ray lumbar spine:  1.  No acute osseous abnormality.  2.  Mild degenerative changes of the lumbar spine most pronounced at  L5-S1.  X ray right hip:   Impression:  1. No acute osseous abnormality.  2. Mild subchondral sclerosis of the acetabulum. Preserved femoral  acetabular joint space.  X ray  left hip:   Impression:  1. No acute osseous abnormality.  2. Mild subchondral sclerosis of the left acetabulum, preserved joint  space.  Assessment and Plan     1. Bilateral hip pain      2. Bilateral leg pain    First, Kiera knows that she has options for interventions with Ortho including Steroid vs PRP injection therapy. She does not wish to pursue the steroid option for sure due to her diabetes. She also is aware that she could attend formal PT; however she has a strong HEP that is helping and she will continue that. Next, Kiera knows she can see Neurology for an assessment of her left>right bilateral leg pain including an EMG as appropriate. For now, she will continue to monitor this and message me if needed. Options for treatment and follow-up care were reviewed with the patient. She is also aware that she can follow up with Ortho on her low back as needed. [facility] engaged in the decision making process and verbalized understanding of the options discussed and agreed with the final plan.  Deja Otto, APRN, CNP

## 2021-05-24 NOTE — NURSING NOTE
55 year old  Chief Complaint   Patient presents with     Recheck Medication     Follow up on pain. Symptoms are the same.       Blood pressure 122/76, pulse 89, temperature 98  F (36.7  C), weight 62.1 kg (137 lb), SpO2 96 %, not currently breastfeeding. Body mass index is 26.98 kg/m .  BP completed using cuff size:      Naheed Bhatia, [facility]  May 24, 2021 9:53 AM

## 2021-05-24 NOTE — PATIENT INSTRUCTIONS
[facility] Medication Refill Request Information:  * Please contact your pharmacy regarding ANY request for medication refills.  ** [facility] Prescription Fax = [phone]  * Please allow 3 business days for routine medication refills.  * Please allow 5 business days for controlled substance medication refills.     [facility] Test Result notification information:  *You will be notified with in 7-10 days of your appointment day regarding the results of your test.  If you are on MyChart you will be notified as soon as the provider has reviewed the results and signed off on them.    [facility]: [phone]     If you have questions regarding Covid-19 and the Covid-19 vaccine, please visit this website.    [email or web address]

## 2021-06-10 ENCOUNTER — OFFICE VISIT (OUTPATIENT)
Dept: ENDOCRINOLOGY | Facility: CLINIC | Age: 56
End: 2021-06-10
Payer: COMMERCIAL

## 2021-06-10 VITALS
HEART RATE: 96 BPM | DIASTOLIC BLOOD PRESSURE: 80 MMHG | WEIGHT: 139.5 LBS | BODY MASS INDEX: 27.47 KG/M2 | SYSTOLIC BLOOD PRESSURE: 124 MMHG

## 2021-06-10 DIAGNOSIS — E10.65 TYPE 1 DIABETES MELLITUS WITH HYPERGLYCEMIA (H): Primary | ICD-10-CM

## 2021-06-10 PROCEDURE — 99215 OFFICE O/P EST HI 40 MIN: CPT | Performed by: PHYSICIAN ASSISTANT

## 2021-06-10 ASSESSMENT — PAIN SCALES - GENERAL: PAINLEVEL: NO PAIN (0)

## 2021-06-10 NOTE — LETTER
6/10/2021       RE: Kiera Dobson  [address]  [facility] [address]     Dear Colleague,    Thank you for referring your patient, Kiera Dobson, to the [facility] [place] at [facility]. Please see a copy of my visit note below.    Chief Complaint   Patient presents with     RECHECK     Type 1 Diabetes     Naheed Gallardo MA      HPI:  Kiera Dobson is a 55 year old female with type 1 diabetes mellitus.  Pt seen in clinic today for diabetes follow up.  Kiera was dx with type 1 diabetes 45 + years ago.  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 No peripheral neuropathy or nephropathy.  Her hx is also significant for hypoglycemia unawareness.  Kiera continues to use a Tandem insulin pump-control IQ and DexcomG6 sensor.  Her basal insulin rates are set at :  Midnight = 0.65 units/hr.  2 am = 0.5 units/hr.  7 am = 0.5 units/hr.  10 am = 0.65 units/hr.  10:30 pm = 0.65units/hr.  Her I/C ratio is 1:12. Sensitivity is 70.  Pt's A1C was 7.3 % on 4/8/2021.  Previous A1C was 7.0 % in 7/2020.  Her Tandem pump download shows an average glucose 152 with SD 46.   Her blood sugar is in target 76 % of the time,above target 22 % of the time and below target 2 % of the time.  No severe hypoglycemia.  She continues to have issues with scar tissue and insulin absorption. She tries to avoid scar tissue sites for her infusion sets.   Kiera has been using breast tissue and upper back for infusion sites.  On ROS today, main complaint today is bilateral hip pain and leg pain for the past 5 + months and she has been seen for this problem.   She continues to have intermittent chest pain-able to reproduce pain with palpation. No radiation of pain, SOB, n/v or diaphoresis with this pain.  She denies SOB,cough, fever or chills at this time.  Some blurred vision.  Pt denies abd pain, diarrhea, dysuria or  hematuria.    DIABETES CARE:  Retinopathy:yes; moderate nonproliferative diabetic retinopathy both eyes without macular edema Seen here in Aug 2020.  Nephropathy: urine microalbuminuria negative in 7/2020.  Neuropathy: none.  Feet: no foot ulcers and normal monofilamentous exam today.  Taking ASA: yes.  Lipids: LDL 72 in 7/2020.    Pt is taking Simvastatin.  CAD: no. Normal stress echo in 9/2010.  Mental health:hx of ADHD.  Insulin: Tandem insulin pump- control IQ.  Testing: DexcomG6 sensor.    ROS  Please see under HPI.    Allergies  Allergies   Allergen Reactions     Ampicillin Sodium Rash       Medications  Current Outpatient Medications   Medication Sig Dispense Refill     aspirin 81 MG chewable tablet Take 81 mg by mouth every other day  108 tablet 3     blood glucose (ERICA CONTOUR NEXT) test strip Use to test blood sugar 4-6 times daily or as directed. 720 strip 3     Continuous Blood Gluc  (DEXCOM G6 ) MELE 1 Device continuous 1 Device 0     Continuous Blood Gluc  (DEXCOM G6 ) MELE 1 each See Admin Instructions Use per 's instructions, to monitor glucose continuously. 1 Device 0     Continuous Blood Gluc Sensor (DEXCOM G6 SENSOR) MISC 1 each every 10 days 9 each 3     Continuous Blood Gluc Sensor (DEXCOM G6 SENSOR) MISC 1 each See Admin Instructions Change every 10 days. 9 each 3     Continuous Blood Gluc Transmit (DEXCOM G6 TRANSMITTER) MISC 1 each every 3 months 1 each 3     Continuous Blood Gluc Transmit (DEXCOM G6 TRANSMITTER) MISC 1 each See Admin Instructions Change every 90 days. 1 each 3     glucose ([facility] GLUCOSE) 40 % GEL Take 15 g by mouth as needed       glucose blood VI test strips strip Test up to ten times per day as directed 5 Box 8     insulin aspart (NOVOLOG VIAL) 100 UNITS/ML vial Use with pump  Approx  60 units daily 60 mL 3     simvastatin (ZOCOR) 80 MG tablet Take one tablet by mouth daily at bedtime 90 tablet 3     glucagon (GLUCAGON EMERGENCY)  1 MG injection Inject 1 mg into the muscle once for 1 dose Use as directed 1 mg 2       Family History  family history includes Alzheimer Disease in her father; Autoimmune Disease in her mother; Cancer in her father; Diabetes in her father; Glaucoma in her father and mother; Hypertension in her mother.    Social History  Smoke: none.  ETOH: rare.   with children.  Pt works full time in a lab.    Past Medical History  Past Medical History:   Diagnosis Date     Diabetes mellitus (H)     Type 1     Elevated cholesterol      Glaucoma     Glaucoma suspect     Glaucoma suspect      Kidney stones      Nonsenile cataract      Past Surgical History:   Procedure Laterality Date     APPENDECTOMY OPEN  1981     BIOPSY OF SKIN LESION  12/30/2011    returned January 2012 for additional removal     c sections  99,97,08     EXTRACORPOREAL SHOCK WAVE LITHOTRIPSY, CYSTOSCOPY, INSERT STENT URETER(S), COMBINED  2004     LASER HOLMIUM LITHOTRIPSY URETER(S), INSERT STENT, COMBINED  12/12/2013    Procedure: COMBINED CYSTOSCOPY, URETEROSCOPY, LASER HOLMIUM LITHOTRIPSY URETER(S), INSERT STENT;  Right Ureteroscopy Holmium Laser Lithotripsy Stent Placement;  Surgeon: Kirill Marlow MD;  Location: [facility] OR     S/P right shoulder surgery in Dec 2012.    Physical Exam  /80   Pulse 96   Wt 63.3 kg (139 lb 8 oz)   BMI 27.47 kg/m    GENERAL: Pt in no distress today.  SKIN: Dry.  HEENT: PERRLA; fundi not examined.  LUNGS: clear b/l.  CARDIAC: RRR.  ABDOMEN: Areas of scar tissue.  EXTREMITY: Bilateral hip pain. No pretibial edema.  FEET: Soft and no ulcers; normal monofilamentous exam today.    RESULTS  Creatinine   Date Value Ref Range Status   07/24/2020 0.64 0.52 - 1.04 mg/dL Final     GFR Estimate   Date Value Ref Range Status   07/24/2020 >90 >60 mL/min/[1.73_m2] Final     Comment:     Non  GFR Calc  Starting 12/18/2018, serum creatinine based estimated GFR (eGFR) will be   calculated using the Chronic Kidney  Disease Epidemiology Collaboration   (CKD-EPI) equation.       Hemoglobin A1C   Date Value Ref Range Status   07/24/2020 7.0 (H) 0 - 5.6 % Final     Comment:     Normal <5.7% Prediabetes 5.7-6.4%  Diabetes 6.5% or higher - adopted from ADA   consensus guidelines.       Potassium   Date Value Ref Range Status   08/20/2019 4.4 3.4 - 5.3 mmol/L Final     ALT   Date Value Ref Range Status   07/24/2020 18 0 - 50 U/L Final     AST   Date Value Ref Range Status   07/24/2020 15 0 - 45 U/L Final     TSH   Date Value Ref Range Status   07/24/2020 1.91 0.40 - 4.00 mU/L Final     T4 Free   Date Value Ref Range Status   02/14/2017 0.84 0.76 - 1.46 ng/dL Final       Cholesterol   Date Value Ref Range Status   08/14/2018 152 <200 mg/dL Final   03/24/2017 154 <200 mg/dL Final     HDL Cholesterol   Date Value Ref Range Status   08/14/2018 61 >49 mg/dL Final   03/24/2017 73 >49 mg/dL Final     LDL Cholesterol Calculated   Date Value Ref Range Status   08/14/2018 72 <100 mg/dL Final     Comment:     Desirable:       <100 mg/dl   03/24/2017 69 <100 mg/dL Final     Comment:     Desirable:       <100 mg/dl     Triglycerides   Date Value Ref Range Status   08/14/2018 94 <150 mg/dL Final   03/24/2017 59 <150 mg/dL Final     Cholesterol/HDL Ratio   Date Value Ref Range Status   08/18/2014 2.7 0.0 - 5.0 Final   09/17/2013 2.3 0.0 - 5.0 Final     A1C      7.3  4/8/2021  A1C      7.0  7/2020  A1C      7.4   2/25/2020  A1C      7.4  11/20/2019  A1C      7.5   8/20/2019  A1C      7.2   5/22/2019  A1C      7.4   2/12/2019  A1C      7.9   11/14/2018    A1C      7.6   8/14/2018  A1C      7.4   3/15/2018  A1C      7.4   12/1/2017  A1C      7.9   9/13/2017  A1C      7.7   6/1/2017  A1C      8.3   2/14/2017  A1C      7.5   8/18/2016  A1C      7.6   5/5/2016  A1C      7.8    2/10/2016  A1C      7.9    9/2/2015  A1C      7.7    5/2015  A1C      8.6    1/28/2015  A1C      8.8    9/26/2014  A1C      8.0    7/2/2014  A1C      8.4     2/28/2014    ASSESSMENT/PLAN:    1.  TYPE 1 DIABETES MELLITUS:Type 1 diabetes mellitus complicated by moderate NPDR both eyes without macular edema and hypoglycemia unawareness.  Kiera will try continues to use her upper back/sides and breast tissue for an infusion sites for better insulin absorption.  I did not make an insulin pump setting changes today.  Reminded her to bolus before eating.  Pt's urine microalbuminuria was negative in July 2020 with a normal creat/GFR.  No foot ulcers and normal monofilamentous exam today.  Kiera has received the COVID19 vaccine ( Pfizer).    2. MODERATE NPDR: Last seen by Oph here in Aug 2020 with moderate NPDR both eyes without macular edema.    Referral for Oph placed today.    3.  HYPERLIPIDEMIA:  LDL 72 in 8/2019.   Pt remains on high dose Simvastatin.  Since she has been on the high dose Simvastatin for years.  Checking fasting lipid panel.  May consider reducing her statin dose given her sx of hip/leg pain.    4.  ADHD/STRESS: She has not seen her therapist lately.    5. HYPOGLYCEMIA UNAWARENESS: She is to wear her DexcomG6 sensor full time.  No frequent hypoglycemia.    6.  CHEST PAIN: Intermittent chest pain. She has had this in the past- not new.  Pt had normal stress echo in 2010.    7.  FOLLOW UP: with me in clinic in 3 months and Dr. Chahal in 6 months.    Total time spent reviewing chart, labs, Tandem insulin pump and DexcomG6 sensor data today = 10 minutes.  Total time for clinic visit today = 30 minutes.  Total time for documentation today = 15 minutes.    TOTAL TIME FOR VISIT TODAY = 45  minutes.

## 2021-06-10 NOTE — PROGRESS NOTES
HPI:  Kiera Dobson is a 55 year old female with type 1 diabetes mellitus.  Pt seen in clinic today for diabetes follow up.  Kiera was dx with type 1 diabetes 45 + years ago.  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 No peripheral neuropathy or nephropathy.  Her hx is also significant for hypoglycemia unawareness.  Kiera continues to use a Tandem insulin pump-control IQ and DexcomG6 sensor.  Her basal insulin rates are set at :  Midnight = 0.65 units/hr.  2 am = 0.5 units/hr.  7 am = 0.5 units/hr.  10 am = 0.65 units/hr.  10:30 pm = 0.65units/hr.  Her I/C ratio is 1:12. Sensitivity is 70.  Pt's A1C was 7.3 % on 4/8/2021.  Previous A1C was 7.0 % in 7/2020.  Her Tandem pump download shows an average glucose 152 with SD 46.   Her blood sugar is in target 76 % of the time,above target 22 % of the time and below target 2 % of the time.  No severe hypoglycemia.  She continues to have issues with scar tissue and insulin absorption. She tries to avoid scar tissue sites for her infusion sets.   Kiera has been using breast tissue and upper back for infusion sites.  On ROS today, main complaint today is bilateral hip pain and leg pain for the past 5 + months and she has been seen for this problem.   She continues to have intermittent chest pain-able to reproduce pain with palpation. No radiation of pain, SOB, n/v or diaphoresis with this pain.  She denies SOB,cough, fever or chills at this time.  Some blurred vision.  Pt denies abd pain, diarrhea, dysuria or hematuria.    DIABETES CARE:  Retinopathy:yes; moderate nonproliferative diabetic retinopathy both eyes without macular edema Seen here in Aug 2020.  Nephropathy: urine microalbuminuria negative in 7/2020.  Neuropathy: none.  Feet: no foot ulcers and normal monofilamentous exam today.  Taking ASA: yes.  Lipids: LDL 72 in 7/2020.    Pt is taking Simvastatin.  CAD: no. Normal stress echo in 9/2010.  Mental health:hx of ADHD.  Insulin:  Tandem insulin pump- control IQ.  Testing: DexcomG6 sensor.    ROS  Please see under HPI.    Allergies  Allergies   Allergen Reactions     Ampicillin Sodium Rash       Medications  Current Outpatient Medications   Medication Sig Dispense Refill     aspirin 81 MG chewable tablet Take 81 mg by mouth every other day  108 tablet 3     blood glucose (ERICA CONTOUR NEXT) test strip Use to test blood sugar 4-6 times daily or as directed. 720 strip 3     Continuous Blood Gluc  (DEXCOM G6 ) MELE 1 Device continuous 1 Device 0     Continuous Blood Gluc  (DEXCOM G6 ) MELE 1 each See Admin Instructions Use per 's instructions, to monitor glucose continuously. 1 Device 0     Continuous Blood Gluc Sensor (DEXCOM G6 SENSOR) MISC 1 each every 10 days 9 each 3     Continuous Blood Gluc Sensor (DEXCOM G6 SENSOR) MISC 1 each See Admin Instructions Change every 10 days. 9 each 3     Continuous Blood Gluc Transmit (DEXCOM G6 TRANSMITTER) MISC 1 each every 3 months 1 each 3     Continuous Blood Gluc Transmit (DEXCOM G6 TRANSMITTER) MISC 1 each See Admin Instructions Change every 90 days. 1 each 3     glucose ([facility]S GLUCOSE) 40 % GEL Take 15 g by mouth as needed       glucose blood VI test strips strip Test up to ten times per day as directed 5 Box 8     insulin aspart (NOVOLOG VIAL) 100 UNITS/ML vial Use with pump  Approx  60 units daily 60 mL 3     simvastatin (ZOCOR) 80 MG tablet Take one tablet by mouth daily at bedtime 90 tablet 3     glucagon (GLUCAGON EMERGENCY) 1 MG injection Inject 1 mg into the muscle once for 1 dose Use as directed 1 mg 2       Family History  family history includes Alzheimer Disease in her father; Autoimmune Disease in her mother; Cancer in her father; Diabetes in her father; Glaucoma in her father and mother; Hypertension in her mother.    Social History  Smoke: none.  ETOH: rare.   with children.  Pt works full time in a lab.    Past Medical History  Past  Medical History:   Diagnosis Date     Diabetes mellitus (H)     Type 1     Elevated cholesterol      Glaucoma     Glaucoma suspect     Glaucoma suspect      Kidney stones      Nonsenile cataract      Past Surgical History:   Procedure Laterality Date     APPENDECTOMY OPEN  1981     BIOPSY OF SKIN LESION  12/30/2011    returned January 2012 for additional removal     c sections  99,97,08     EXTRACORPOREAL SHOCK WAVE LITHOTRIPSY, CYSTOSCOPY, INSERT STENT URETER(S), COMBINED  2004     LASER HOLMIUM LITHOTRIPSY URETER(S), INSERT STENT, COMBINED  12/12/2013    Procedure: COMBINED CYSTOSCOPY, URETEROSCOPY, LASER HOLMIUM LITHOTRIPSY URETER(S), INSERT STENT;  Right Ureteroscopy Holmium Laser Lithotripsy Stent Placement;  Surgeon: Kirill Marlow MD;  Location: [facility] OR     S/P right shoulder surgery in Dec 2012.    Physical Exam  /80   Pulse 96   Wt 63.3 kg (139 lb 8 oz)   BMI 27.47 kg/m    GENERAL: Pt in no distress today.  SKIN: Dry.  HEENT: PERRLA; fundi not examined.  LUNGS: clear b/l.  CARDIAC: RRR.  ABDOMEN: Areas of scar tissue.  EXTREMITY: Bilateral hip pain. No pretibial edema.  FEET: Soft and no ulcers; normal monofilamentous exam today.    RESULTS  Creatinine   Date Value Ref Range Status   07/24/2020 0.64 0.52 - 1.04 mg/dL Final     GFR Estimate   Date Value Ref Range Status   07/24/2020 >90 >60 mL/min/[1.73_m2] Final     Comment:     Non  GFR Calc  Starting 12/18/2018, serum creatinine based estimated GFR (eGFR) will be   calculated using the Chronic Kidney Disease Epidemiology Collaboration   (CKD-EPI) equation.       Hemoglobin A1C   Date Value Ref Range Status   07/24/2020 7.0 (H) 0 - 5.6 % Final     Comment:     Normal <5.7% Prediabetes 5.7-6.4%  Diabetes 6.5% or higher - adopted from ADA   consensus guidelines.       Potassium   Date Value Ref Range Status   08/20/2019 4.4 3.4 - 5.3 mmol/L Final     ALT   Date Value Ref Range Status   07/24/2020 18 0 - 50 U/L Final     AST    Date Value Ref Range Status   07/24/2020 15 0 - 45 U/L Final     TSH   Date Value Ref Range Status   07/24/2020 1.91 0.40 - 4.00 mU/L Final     T4 Free   Date Value Ref Range Status   02/14/2017 0.84 0.76 - 1.46 ng/dL Final       Cholesterol   Date Value Ref Range Status   08/14/2018 152 <200 mg/dL Final   03/24/2017 154 <200 mg/dL Final     HDL Cholesterol   Date Value Ref Range Status   08/14/2018 61 >49 mg/dL Final   03/24/2017 73 >49 mg/dL Final     LDL Cholesterol Calculated   Date Value Ref Range Status   08/14/2018 72 <100 mg/dL Final     Comment:     Desirable:       <100 mg/dl   03/24/2017 69 <100 mg/dL Final     Comment:     Desirable:       <100 mg/dl     Triglycerides   Date Value Ref Range Status   08/14/2018 94 <150 mg/dL Final   03/24/2017 59 <150 mg/dL Final     Cholesterol/HDL Ratio   Date Value Ref Range Status   08/18/2014 2.7 0.0 - 5.0 Final   09/17/2013 2.3 0.0 - 5.0 Final     A1C      7.3  4/8/2021  A1C      7.0  7/2020  A1C      7.4   2/25/2020  A1C      7.4  11/20/2019  A1C      7.5   8/20/2019  A1C      7.2   5/22/2019  A1C      7.4   2/12/2019  A1C      7.9   11/14/2018    A1C      7.6   8/14/2018  A1C      7.4   3/15/2018  A1C      7.4   12/1/2017  A1C      7.9   9/13/2017  A1C      7.7   6/1/2017  A1C      8.3   2/14/2017  A1C      7.5   8/18/2016  A1C      7.6   5/5/2016  A1C      7.8    2/10/2016  A1C      7.9    9/2/2015  A1C      7.7    5/2015  A1C      8.6    1/28/2015  A1C      8.8    9/26/2014  A1C      8.0    7/2/2014  A1C      8.4    2/28/2014    ASSESSMENT/PLAN:    1.  TYPE 1 DIABETES MELLITUS:Type 1 diabetes mellitus complicated by moderate NPDR both eyes without macular edema and hypoglycemia unawareness.  Kiera will try continues to use her upper back/sides and breast tissue for an infusion sites for better insulin absorption.  I did not make an insulin pump setting changes today.  Reminded her to bolus before eating.  Pt's urine microalbuminuria was negative in July 2020  with a normal creat/GFR.  No foot ulcers and normal monofilamentous exam today.  Kiera has received the COVID19 vaccine ( Pfizer).    2. MODERATE NPDR: Last seen by Oph here in Aug 2020 with moderate NPDR both eyes without macular edema.    Referral for Oph placed today.    3.  HYPERLIPIDEMIA:  LDL 72 in 8/2019.   Pt remains on high dose Simvastatin.  Since she has been on the high dose Simvastatin for years.  Checking fasting lipid panel.  May consider reducing her statin dose given her sx of hip/leg pain.    4.  ADHD/STRESS: She has not seen her therapist lately.    5. HYPOGLYCEMIA UNAWARENESS: She is to wear her DexcomG6 sensor full time.  No frequent hypoglycemia.    6.  CHEST PAIN: Intermittent chest pain. She has had this in the past- not new.  Pt had normal stress echo in 2010.    7.  FOLLOW UP: with me in clinic in 3 months and Dr. Chahal in 6 months.    Total time spent reviewing chart, labs, Tandem insulin pump and DexcomG6 sensor data today = 10 minutes.  Total time for clinic visit today = 30 minutes.  Total time for documentation today = 15 minutes.    TOTAL TIME FOR VISIT TODAY = 45  minutes.

## 2021-06-11 ENCOUNTER — OFFICE VISIT (OUTPATIENT)
Dept: OPHTHALMOLOGY | Facility: CLINIC | Age: 56
End: 2021-06-11
Attending: OPHTHALMOLOGY
Payer: COMMERCIAL

## 2021-06-11 DIAGNOSIS — E10.3393 MODERATE NONPROLIFERATIVE DIABETIC RETINOPATHY OF BOTH EYES WITHOUT MACULAR EDEMA ASSOCIATED WITH TYPE 1 DIABETES MELLITUS (H): ICD-10-CM

## 2021-06-11 PROCEDURE — 92014 COMPRE OPH EXAM EST PT 1/>: CPT | Mod: GC | Performed by: OPHTHALMOLOGY

## 2021-06-11 PROCEDURE — G0463 HOSPITAL OUTPT CLINIC VISIT: HCPCS

## 2021-06-11 PROCEDURE — 92134 CPTRZ OPH DX IMG PST SGM RTA: CPT | Performed by: OPHTHALMOLOGY

## 2021-06-11 ASSESSMENT — EXTERNAL EXAM - LEFT EYE: OS_EXAM: NORMAL

## 2021-06-11 ASSESSMENT — TONOMETRY
OS_IOP_MMHG: 12
IOP_METHOD: TONOPEN
OD_IOP_MMHG: 13

## 2021-06-11 ASSESSMENT — SLIT LAMP EXAM - LIDS
COMMENTS: NORMAL
COMMENTS: NORMAL

## 2021-06-11 ASSESSMENT — VISUAL ACUITY
OD_SC+: +1
OS_SC: 20/25
METHOD_MR: PATIENT DECLINES REFRACTION
OD_SC: 20/40
OD_PH_SC: 20/25
OD_PH_SC+: -2
OS_SC+: -2
METHOD: SNELLEN - LINEAR

## 2021-06-11 ASSESSMENT — CONF VISUAL FIELD
OS_NORMAL: 1
OD_NORMAL: 1
METHOD: COUNTING FINGERS

## 2021-06-11 ASSESSMENT — CUP TO DISC RATIO
OD_RATIO: 0.7
OS_RATIO: 0.7

## 2021-06-11 ASSESSMENT — EXTERNAL EXAM - RIGHT EYE: OD_EXAM: NORMAL

## 2021-06-11 NOTE — NURSING NOTE
Chief Complaints and History of Present Illnesses   Patient presents with     Follow Up     Moderate nonproliferative diabetic retinopathy of both eyes without macular edema associated with type 1 diabetes mellitus     Chief Complaint(s) and History of Present Illness(es)     Follow Up     Laterality: both eyes    Course: gradually worsening    Associated symptoms: floaters.  Negative for flashes, eye pain and dryness    Treatments tried: no treatments    Pain scale: 0/10    Comments: Moderate nonproliferative diabetic retinopathy of both eyes without macular edema associated with type 1 diabetes mellitus              Comments     She states that her floaters are worsening in the right eye.  She sees lots of little dots floating around.   Her BS has been fairly stable, she has another A1c scheduled for next week.  Her Last A1c was 7.3, 3 months ago, per patient.    Lab Results       Component                Value               Date                       A1C                      7.0                 07/24/2020                 A1C                      7.9                 07/16/2013                 A1C                      7.9                 04/03/2013                 A1C                      8.4                 02/07/2013                 A1C                      8.2                 10/10/2012         BETH Rubi 8:45 AM  June 11, 2021

## 2021-06-11 NOTE — PROGRESS NOTES
CC -Mild/Mod NPDR OU    INTERVAL HISTORY -    VA stable, continues to have more floaters OD > OS  Last A1c 7.3 on 3/2021    PMH  -  Diabetes mellitus I sees Tirso    RETINAL IMAGING  OCT 7/2/19  OD - retina normal, no fluid, PHF attached  OS - retina normal, no fluid, PHF attached      FA 7-24-20  OD - (transit) normal filling, normal macula, 1+ peripheral MAs and ischemia, no NV  OS - normal filling, normal macula, 1+ peripheral MAs and ischemia, no NV    ASSESSMENT:    #.  Mild/moderate NPDR (based on FA) OU with DM I no DME   - last FA 7/2020 showed mild  retinopathy, not visible on exam   - BP/BG control   - RTC 1 year     #.  NS each eye + PSC    - mild/moderate   - starting to be visually significant   - sees Igor      #. OAG suspect based on CDR   - has seen Igor    - advise f/u next few months    #.  Asteroid hyalosis OD, syneresis OU   - trace asteroid OD   - advised S/Sx RD 6/2021      return to clinic 1 year retina, OCT each eye    George Sepulveda MD  Vitreoretinal Surgery Fellow  [facility]     ATTESTATION     Attending Physician Attestation:      Complete documentation of historical and exam elements from today's encounter can be found in the full encounter summary report (not reduplicated in this progress note).  I personally obtained the chief complaint(s) and history of present illness.  I confirmed and edited as necessary the review of systems, past medical/surgical history, family history, social history, and examination findings as documented by others; and I examined the patient myself.  I personally reviewed the relevant tests, images, and reports as documented above.  I personally reviewed the ophthalmic test(s) associated with this encounter, agree with the interpretation(s) as documented by the resident/fellow, and have edited the corresponding report(s) as necessary.   I formulated and edited as necessary the assessment and plan and discussed the findings and management  plan with the patient and family    Devika Dorsey MD, PhD  , Vitreoretinal Surgery  Department of Ophthalmology  [facility]

## 2021-06-15 DIAGNOSIS — N20.0 CALCULUS OF KIDNEY: ICD-10-CM

## 2021-06-16 DIAGNOSIS — E10.65 TYPE 1 DIABETES MELLITUS WITH HYPERGLYCEMIA (H): ICD-10-CM

## 2021-06-16 LAB
ALT SERPL W P-5'-P-CCNC: 15 U/L (ref 0–50)
AST SERPL W P-5'-P-CCNC: 15 U/L (ref 0–45)
CHOLEST SERPL-MCNC: 170 MG/DL
CREAT SERPL-MCNC: 0.7 MG/DL (ref 0.52–1.04)
CREAT UR-MCNC: 58 MG/DL
GFR SERPL CREATININE-BSD FRML MDRD: >90 ML/MIN/{1.73_M2}
HBA1C MFR BLD: 6.9 % (ref 0–5.6)
HDLC SERPL-MCNC: 69 MG/DL
LDLC SERPL CALC-MCNC: 90 MG/DL
MICROALBUMIN UR-MCNC: 55 MG/L
MICROALBUMIN/CREAT UR: 95.66 MG/G CR (ref 0–25)
NONHDLC SERPL-MCNC: 101 MG/DL
TRIGL SERPL-MCNC: 56 MG/DL
TSH SERPL DL<=0.005 MIU/L-ACNC: 3 MU/L (ref 0.4–4)

## 2021-06-16 PROCEDURE — 82565 ASSAY OF CREATININE: CPT | Performed by: PATHOLOGY

## 2021-06-16 PROCEDURE — 80061 LIPID PANEL: CPT | Performed by: PATHOLOGY

## 2021-06-16 PROCEDURE — 84450 TRANSFERASE (AST) (SGOT): CPT | Performed by: PATHOLOGY

## 2021-06-16 PROCEDURE — 82043 UR ALBUMIN QUANTITATIVE: CPT | Performed by: PATHOLOGY

## 2021-06-16 PROCEDURE — 84460 ALANINE AMINO (ALT) (SGPT): CPT | Performed by: PATHOLOGY

## 2021-06-16 PROCEDURE — 84443 ASSAY THYROID STIM HORMONE: CPT | Performed by: PATHOLOGY

## 2021-06-16 PROCEDURE — 36415 COLL VENOUS BLD VENIPUNCTURE: CPT | Performed by: PATHOLOGY

## 2021-06-16 PROCEDURE — 83036 HEMOGLOBIN GLYCOSYLATED A1C: CPT | Performed by: PATHOLOGY

## 2021-06-17 ENCOUNTER — TELEPHONE (OUTPATIENT)
Dept: ENDOCRINOLOGY | Facility: CLINIC | Age: 56
End: 2021-06-17

## 2021-06-17 DIAGNOSIS — E10.65 TYPE 1 DIABETES MELLITUS WITH HYPERGLYCEMIA (H): Primary | ICD-10-CM

## 2021-06-17 RX ORDER — SIMVASTATIN 80 MG
TABLET ORAL
Qty: 90 TABLET | Refills: 0 | OUTPATIENT
Start: 2021-06-17

## 2021-06-17 RX ORDER — SIMVASTATIN 40 MG
40 TABLET ORAL AT BEDTIME
Qty: 90 TABLET | Refills: 3 | Status: SHIPPED | OUTPATIENT
Start: 2021-06-17 | End: 2022-06-06

## 2021-06-17 NOTE — TELEPHONE ENCOUNTER
Spoke w/ Pt: Lab scheduling information provided to Pt.   Aileen Schulte RN on 6/17/2021 at 11:41 AM

## 2021-06-17 NOTE — TELEPHONE ENCOUNTER
6/17/2021  Left message for patient to reduce her Simvastatin dose 40 mg daily (was taking 80 mg daily ) in view of recent bilateral hip/leg pain.  New RX for Simvastatin 40 mg daily sent to pt's pharmacy.  Jo Jean PA-C

## 2021-06-17 NOTE — TELEPHONE ENCOUNTER
Jo Jean PA-C    Caller: Unspecified (Today,  8:37 AM)             I called pt to discuss her lab results.   She is concerned about her urine microalbuminuria being +. This has been negative in the past.   Her creat is good at 0.7 with GFR > 90 mL/min and good BP in clinic last week.   She did not exercise the day she gave her urine sample and bs control is good.   Pt no longer has menses.   No dysuria or hematuria.   I told her the first step is to repeat the urine microalbuminuria and that I would call her with results.   Lab ordered and to be done tomorrow.   Jo Jean PA-C          Provider notified for lab request, review and recommendation.   Aileen Schulte RN on 6/17/2021 at 9:54 AM       Phone Message    May a detailed message be left on voicemail: yes     Reason for Call: Other: . Per Patient states received the test results for Albumin Urine and they were not good. Patient is wanting to get a call back in regards to knowing what are the next steps. Patient states her numbers were never this high before and a little concerned. Please advise.     Action Taken: Message routed to:  [facility] ([facility]): Endo    Travel Screening: Not Applicable     AND:     [facility]     Phone Message     May a detailed message be left on voicemail: yes      Reason for Call: Order(s): Other:      Reason for requested: Per Patient is wanting to retest for the Albumin Urine / Creatinine Urine and wanting to get a call back when this has been done     Date needed: asap     Provider name: Ted        Action Taken: Message routed to:  [facility] ([facility]): Endo     Travel Screening: Not Applicable                                                                                                                                                                       RE    Albumin Random Urine Quantitative with Creat Ratio  Order: [id number]  Status:  Final result   Visible to patient:   Yes (MyChart) Dx:  Type 1 diabetes mellitus with hypergl...    Ref Range & Units 1d ago    Creatinine Urine mg/dL 58     Albumin Urine mg/L mg/L 55     Albumin Urine mg/g Cr 0 - 25 mg/g Cr 95.66High

## 2021-06-17 NOTE — TELEPHONE ENCOUNTER
----- Message from Jo Jean PA-C sent at 6/17/2021 11:04 AM CDT -----  Christiano Gallagher:  Can you assist Kiera Bronson in getting a lab appt for repeat urine microalbuminuria for tomorrow 6/18/2021.  I have placed the ordered.  Thanks  Jazmyne

## 2021-06-18 DIAGNOSIS — E10.65 TYPE 1 DIABETES MELLITUS WITH HYPERGLYCEMIA (H): ICD-10-CM

## 2021-06-18 LAB
CREAT UR-MCNC: 60 MG/DL
MICROALBUMIN UR-MCNC: 6 MG/L
MICROALBUMIN/CREAT UR: 10.56 MG/G CR (ref 0–25)

## 2021-06-18 PROCEDURE — 82043 UR ALBUMIN QUANTITATIVE: CPT | Performed by: PATHOLOGY

## 2021-08-25 ENCOUNTER — OFFICE VISIT (OUTPATIENT)
Dept: OPHTHALMOLOGY | Facility: CLINIC | Age: 56
End: 2021-08-25
Attending: OPHTHALMOLOGY
Payer: COMMERCIAL

## 2021-08-25 DIAGNOSIS — H35.81 MACULAR RETINAL EDEMA: ICD-10-CM

## 2021-08-25 DIAGNOSIS — H40.003 GLAUCOMA SUSPECT, BOTH EYES: Primary | ICD-10-CM

## 2021-08-25 DIAGNOSIS — H25.013 CORTICAL AGE-RELATED CATARACT OF BOTH EYES: ICD-10-CM

## 2021-08-25 DIAGNOSIS — H40.033 ANATOMICAL NARROW ANGLE BORDERLINE GLAUCOMA OF BOTH EYES: ICD-10-CM

## 2021-08-25 PROCEDURE — 92015 DETERMINE REFRACTIVE STATE: CPT

## 2021-08-25 PROCEDURE — 92133 CPTRZD OPH DX IMG PST SGM ON: CPT | Performed by: OPHTHALMOLOGY

## 2021-08-25 PROCEDURE — 92083 EXTENDED VISUAL FIELD XM: CPT | Performed by: OPHTHALMOLOGY

## 2021-08-25 PROCEDURE — 99214 OFFICE O/P EST MOD 30 MIN: CPT | Mod: GC | Performed by: OPHTHALMOLOGY

## 2021-08-25 PROCEDURE — G0463 HOSPITAL OUTPT CLINIC VISIT: HCPCS

## 2021-08-25 ASSESSMENT — REFRACTION_MANIFEST
OD_CYLINDER: +1.00
OS_ADD: +2.25
OD_SPHERE: -0.75
OD_ADD: +2.25
OS_SPHERE: +0.25
OD_AXIS: 180
OS_CYLINDER: +0.75
OS_AXIS: 165

## 2021-08-25 ASSESSMENT — SLIT LAMP EXAM - LIDS
COMMENTS: NORMAL
COMMENTS: NORMAL

## 2021-08-25 ASSESSMENT — TONOMETRY
IOP_METHOD: APPLANATION
OS_IOP_MMHG: 11
OD_IOP_MMHG: 15

## 2021-08-25 ASSESSMENT — VISUAL ACUITY
OS_SC: 20/40
OD_SC: 20/40-
CORRECTION_TYPE: GLASSES
METHOD: SNELLEN - LINEAR

## 2021-08-25 ASSESSMENT — REFRACTION_WEARINGRX
OS_SPHERE: +2.00
OD_CYLINDER: SPHERE
OS_CYLINDER: SPHERE
SPECS_TYPE: SVL
OD_SPHERE: +2.00

## 2021-08-25 ASSESSMENT — EXTERNAL EXAM - LEFT EYE: OS_EXAM: NORMAL

## 2021-08-25 ASSESSMENT — EXTERNAL EXAM - RIGHT EYE: OD_EXAM: NORMAL

## 2021-08-25 NOTE — PROGRESS NOTES
Chief Complaint(s) and History of Present Illness(es)     Glaucoma Follow-Up     Laterality: right eye    Quality: with a spot    Associated symptoms: floaters.  Negative for flashes    Pain scale: 0/10         Comments     Pt here for follow up glaucoma  She states she is noticing an increase in floaters since she saw Dr. Dorsey two months ago.  She notes her right eye had a painful twinge   today where it felt like something was being torn.  Her left eye is also   feeling sore, she thinks it fluctuates with blood sugars and wearing   glasses/removing them.     Lab Results       Component                Value               Date                       A1C                      6.9                 06/16/2021                 A1C                      7.0                 07/24/2020                 A1C                      7.9                 07/16/2013                 A1C                      7.9                 04/03/2013                 A1C                      8.4                 02/07/2013              BETH Manning 3:48 PM 08/25/2021     Patient is following up for an annual eye examination in the setting of glaucoma suspect of both eyes, based on C/D appearance and FHx. Last evaluation 8/2020 with VF correlating to retinal disease although stability was suspected. Patient last saw Dr. Dorsey in the setting of T1DM without DME - with mild retinopathy on examinanation.     Patient reports that she has more floaters in the right eye. Reports some difficulty with driving at night time with glare like symptoms.     Review of systems for the eyes was negative other than the pertinent positives/negatives listed in the HPI.      Assessment & Plan      Kiera Dobson is a 54 year old female with the following diagnoses:   1. Glaucoma suspect, both eyes    2. Cortical age-related cataract of both eyes    3. Macular retinal edema - Right Eye    4. Anatomical narrow angle borderline glaucoma of both eyes        - Primary angle closure suspect, both eyes   Based on angle anatomy, C/D and FH  Maximum intraocular pressure upper teens both eyes  Family history: positive in mother and possibly father  Trauma history: negative   Gonio narrow but open to PTM in both eyes     Intraocular pressure good today both eyes   Nerve appearance unchanged from previous      Today's testing:  IOP: 15/11 mmHg    OCT Mac:  - right eye: Parafoveal IRF present - new c/t 8/20/2020. CRT = 243   - left eye: No IRF, CRT = 210.     OCT RNFL:   OD: G =83, borderline temporal thinning   OS: G = 79, temporal thinning     G-Top VF   OD: FP = 11%, paracentral VF deficits, scattered. MD = -3.6, PSD = 2.2.  OS: Reliable. Paracentral and inferonasal VF deficits. MD = -5.0, PSD = 2.8.    Gonioscopy:   right eye: Open to PTM x 4 quadrants  - opens to scleral spur w/ indentation   left eye: Open to PTM barely x 4 quadrants, opens to scleral spur w/ indentation. Superiorly angle structures not visible - opens to PTM w/ indentation.     - Given ongoing concern for narrow angles and angle anatomy, in conjuncture with patient's symptoms of worsening vision - we recommend undergoing CE IOL for both eyes, starting with the left eye.    # DM1 with mild-moderate NPDR  # CME right eye   - Given worsening CME today, will have patient evaluated with Dr. Dorsey within the next 2 months for c/f IRF on OCT mac    # Myopia  Explained and dispensed MRx    # Cataracts both eyes    VISUALLY SIGNIFICANT cortical cataract both eyes   Discussed RBA to CE IOL, consent obtained to schedule    Will need Follow-up with IOL calcs   Dilates to: Not dilated today  Alpha blockers/Flomax: None  Trauma/Pseudoxfoliation: None  Fuchs dystrophy/guttae: None    Diabetes: Yes - T1DM  Anticoagulation: 81 mg ASN daily       Kingston Baltazar MD - PGY3   Department of Ophthalmology  Pager: [phone]    Attending Physician Attestation:  Complete documentation of historical and exam elements  from today's encounter can be found in the full encounter summary report (not reduplicated in this progress note).  I personally obtained the chief complaint(s) and history of present illness.  I confirmed and edited as necessary the review of systems, past medical/surgical history, family history, social history, and examination findings as documented by others; and I examined the patient myself.  I personally reviewed the relevant tests, images, and reports as documented above.  I formulated and edited as necessary the assessment and plan and discussed the findings and management plan with the patient and family. Attending Physician Image/Tesing Attestation: I personally reviewed the ophthalmic test(s) associated with this encounter, agree with the interpretation(s) as documented by the resident/fellow, and have edited the corresponding report(s) as necessary.  . Today with Kiera Dobson, I reviewed the indications, risks, benefits, and alternatives of the proposed surgical procedure including, but not limited to, failure obtain the desired result  and need for additional surgery, bleeding, infection, loss of vision, loss of the eye, and the remote possibility of permanent damage to any organ system or death with the use of anesthesia.  I provided multiple opportunities for the questions, answered all questions to the best of my ability, and confirmed that my answers and my discussion were understood.      - Curtis Costa MD

## 2021-08-31 ENCOUNTER — OFFICE VISIT (OUTPATIENT)
Dept: OPHTHALMOLOGY | Facility: CLINIC | Age: 56
End: 2021-08-31
Attending: OPHTHALMOLOGY
Payer: COMMERCIAL

## 2021-08-31 DIAGNOSIS — E10.3393 MODERATE NONPROLIFERATIVE DIABETIC RETINOPATHY OF BOTH EYES WITHOUT MACULAR EDEMA ASSOCIATED WITH TYPE 1 DIABETES MELLITUS (H): ICD-10-CM

## 2021-08-31 DIAGNOSIS — E10.3313 MODERATE NONPROLIFERATIVE DIABETIC RETINOPATHY OF BOTH EYES WITH MACULAR EDEMA ASSOCIATED WITH TYPE 1 DIABETES MELLITUS (H): Primary | ICD-10-CM

## 2021-08-31 PROCEDURE — 92134 CPTRZ OPH DX IMG PST SGM RTA: CPT | Performed by: OPHTHALMOLOGY

## 2021-08-31 PROCEDURE — 99213 OFFICE O/P EST LOW 20 MIN: CPT | Performed by: OPHTHALMOLOGY

## 2021-08-31 PROCEDURE — G0463 HOSPITAL OUTPT CLINIC VISIT: HCPCS

## 2021-08-31 ASSESSMENT — VISUAL ACUITY
OS_CC+: +2
OD_CC: 20/60
OS_PH_CC: 20/25
OD_PH_CC: 20/25
METHOD: SNELLEN - LINEAR
OD_CC+: +1
OD_PH_CC+: -2
OS_CC: 20/40

## 2021-08-31 ASSESSMENT — TONOMETRY
OD_IOP_MMHG: 17
OS_IOP_MMHG: 14
IOP_METHOD: TONOPEN

## 2021-08-31 ASSESSMENT — REFRACTION_WEARINGRX
OS_SPHERE: +2.00
SPECS_TYPE: SVL
OS_CYLINDER: SPHERE
OD_SPHERE: +2.00
OD_CYLINDER: SPHERE

## 2021-08-31 ASSESSMENT — CONF VISUAL FIELD
OS_NORMAL: 1
OD_NORMAL: 1
METHOD: COUNTING FINGERS

## 2021-08-31 ASSESSMENT — SLIT LAMP EXAM - LIDS
COMMENTS: NORMAL
COMMENTS: NORMAL

## 2021-08-31 ASSESSMENT — EXTERNAL EXAM - LEFT EYE: OS_EXAM: NORMAL

## 2021-08-31 ASSESSMENT — EXTERNAL EXAM - RIGHT EYE: OD_EXAM: NORMAL

## 2021-08-31 ASSESSMENT — CUP TO DISC RATIO
OS_RATIO: 0.7
OD_RATIO: 0.7

## 2021-08-31 NOTE — PROGRESS NOTES
CC -Mild/Mod NPDR OU    INTERVAL HISTORY -    VA stable, continues to have more floaters OD > OS  Last A1c 7.3 on 3/2021    PMH  -  Diabetes mellitus I sees Tirso    RETINAL IMAGING  OCT 8-31-21  OD - retina normal, milDME PHF attached,   OS - retina normal, tr DME PHF attached,       FA 7-24-20  OD - (transit) normal filling, normal macula, 1+ peripheral MAs and ischemia, no NV  OS - normal filling, normal macula, 1+ peripheral MAs and ischemia, no NV    ASSESSMENT:    #.  Mild/moderate NPDR (based on FA) OU with DM I and DME OU   - last FA 7/2020 showed mild  retinopathy, not visible on exam   - BP/BG control      # DME OD > OS   - mild, suspect VA d/t NS   - observe closely   - recheck 4 months      #.  NS each eye + PSC    - mild/moderate   - starting to be visually significant   - sees Igor plans CE/IOL      #. OAG suspect based on CDR   - sees Igor    #.  Asteroid hyalosis OD, syneresis OU   - trace asteroid OD   - advised S/Sx RD 6/2021      - bothered, may consider surgery in future    - reassess after CE/IOL            return to clinic  4 months, OCT OU, DFE OU        ATTESTATION     Attending Physician Attestation:      Complete documentation of historical and exam elements from today's encounter can be found in the full encounter summary report (not reduplicated in this progress note).  I personally obtained the chief complaint(s) and history of present illness.  I confirmed and edited as necessary the review of systems, past medical/surgical history, family history, social history, and examination findings as documented by others; and I examined the patient myself.  I personally reviewed the relevant tests, images, and reports as documented above.  I formulated and edited as necessary the assessment and plan and discussed the findings and management plan with the patient and family    Devika Dorsey MD, PhD  , Vitreoretinal Surgery  Department of  Ophthalmology  [facility]

## 2021-08-31 NOTE — NURSING NOTE
Chief Complaints and History of Present Illnesses   Patient presents with     Retinal Evaluation     CME right eye      Chief Complaint(s) and History of Present Illness(es)     Retinal Evaluation     Laterality: right eye    Associated symptoms: floaters.  Negative for flashes and eye pain    Comments: CME right eye               Comments     Pt states floaters in right eye have increased since last visit,   No flashes eye pain or headaches    Diana Costa COT 1:38 PM August 31, 2021

## 2021-09-04 ENCOUNTER — HEALTH MAINTENANCE LETTER (OUTPATIENT)
Age: 56
End: 2021-09-04

## 2021-09-09 ENCOUNTER — TELEPHONE (OUTPATIENT)
Dept: OPHTHALMOLOGY | Facility: CLINIC | Age: 56
End: 2021-09-09

## 2021-09-09 NOTE — TELEPHONE ENCOUNTER
Called patient to schedule procedure with Dr. Costa, there was no answer.  Left message with my direct line [phone].

## 2021-09-14 ENCOUNTER — OFFICE VISIT (OUTPATIENT)
Dept: ENDOCRINOLOGY | Facility: CLINIC | Age: 56
End: 2021-09-14
Payer: COMMERCIAL

## 2021-09-14 VITALS
DIASTOLIC BLOOD PRESSURE: 62 MMHG | HEART RATE: 89 BPM | SYSTOLIC BLOOD PRESSURE: 122 MMHG | WEIGHT: 141.1 LBS | HEIGHT: 59 IN | BODY MASS INDEX: 28.44 KG/M2

## 2021-09-14 DIAGNOSIS — E10.9 TYPE 1 DIABETES MELLITUS WITHOUT COMPLICATION (H): Primary | ICD-10-CM

## 2021-09-14 LAB — HBA1C MFR BLD: 6.9 % (ref 4.3–?)

## 2021-09-14 PROCEDURE — 99215 OFFICE O/P EST HI 40 MIN: CPT | Performed by: PHYSICIAN ASSISTANT

## 2021-09-14 PROCEDURE — 83036 HEMOGLOBIN GLYCOSYLATED A1C: CPT | Performed by: PHYSICIAN ASSISTANT

## 2021-09-14 RX ORDER — IBUPROFEN 600 MG/1
TABLET ORAL
Qty: 1 KIT | Refills: 1 | Status: SHIPPED | OUTPATIENT
Start: 2021-09-14

## 2021-09-14 ASSESSMENT — PAIN SCALES - GENERAL: PAINLEVEL: MODERATE PAIN (4)

## 2021-09-14 ASSESSMENT — MIFFLIN-ST. JEOR: SCORE: 1150.66

## 2021-09-14 NOTE — PATIENT INSTRUCTIONS
We appreciate your assistance in coordinating your healthcare.     Please upload your insulin pump, blood sugar meter and/or continuous glucose monitor at home 1-2 days before your next diabetes-related appointment.   This will allow your provider to review your  data before your scheduled virtual visit.    To ask a question to your Endocrine care team, please send them a nDreams message, or reach them by phone at [phone]     To expedite your medication refill(s), please contact your pharmacy and have them   fax a refill request to: [phone].  *Please allow 3 business days for routine medication refills.  *Please allow 5 business days for controlled substance medication refills.    For after-hours urgent Endocrine issues, that do not require 531, please dial [phone], and ask to speak with the Endocrinologist On-Call

## 2021-09-14 NOTE — TELEPHONE ENCOUNTER
Called patient to schedule procedure with Dr. Costa, there was no answer.  Left message with my direct line [phone].

## 2021-09-14 NOTE — PROGRESS NOTES
PATIENT SEEN FACE TO FACE IN CLINIC TODAY.    HPI:  Kiera Dobson is a 55 year old female with type 1 diabetes mellitus.  Pt seen in clinic today for diabetes follow up.  Kiera was dx with type 1 diabetes 45 + years ago.  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 No peripheral neuropathy or nephropathy.  Her hx is also significant for hypoglycemia unawareness.  Kiera continues to use a Tandem insulin pump-control IQ and DexcomG6 sensor.  Her basal insulin rates are set at :  Midnight = 0.65 units/hr.  2 am = 0.5 units/hr.  7 am = 0.5 units/hr.  10 am = 0.65 units/hr.  10:30 pm = 0.6 units/hr.  Her I/C ratio is 1:12. Sensitivity is 70.  Pt's A1C is 6.9 % today.  Previous A1C was 6.9 % on 6/16/2021.  Her A1C was 7.3 % on 4/8/2021.    Her Glooko download today shows her blood sugar is in target 77 % of the time, above target 22 % of the time and below target 1 % of the time.  Her blood sugar control has improved since I asked her to start using breast tissue for her infusion sites.  She has several areas of scar tissue on her abdomen- poor insulin absorption.  On ROS today, main complaint today is bilateral hip pain and leg pain. She has been seen for this pain.  She continues to have intermittent chest pain which she fells is from anxiety.  No radiation of pain, SOB, n/v or diaphoresis with this pain.  Arthritic pain.  She will be having cataract surgery soon.  Blurred vision with floaters.  Leg cramps.  She denies SOB,cough, fever or chills at this time.  Some blurred vision.  Pt denies abd pain, diarrhea, dysuria or hematuria.  Kiera received the COVID19 vaccines ([employer] )    DIABETES CARE:  Retinopathy:yes; moderate nonproliferative diabetic retinopathy both eyes without macular edema Seen here in Aug 2021.  Nephropathy: urine microalbuminuria negative on 6/18/2021.  Neuropathy: none.  Feet: no foot ulcers and normal monofilamentous exam today.  Taking ASA: yes.  Lipids: LDL 90  in 6/2021.    Pt is taking Simvastatin.  CAD: no. Normal stress echo in 9/2010.  Mental health:hx of ADHD.  Insulin: Tandem insulin pump- control IQ.  Testing: DexcomG6 sensor.  Hypoglycemia treatment: Updated glucagon kit sent to pharmacy today.  Insulin back up: Updated RX for Lantus sent to pharmacy ( 9/2021 ) in case of insulin pump failure.      ROS  Please see under HPI.    Allergies  Allergies   Allergen Reactions     Ampicillin Sodium Rash       Medications  Current Outpatient Medications   Medication Sig Dispense Refill     aspirin 81 MG chewable tablet Take 81 mg by mouth every other day  108 tablet 3     blood glucose (ERICA CONTOUR NEXT) test strip Use to test blood sugar 4-6 times daily or as directed. 720 strip 3     Continuous Blood Gluc  (DEXCOM G6 ) MELE 1 Device continuous 1 Device 0     Continuous Blood Gluc  (DEXCOM G6 ) MELE 1 each See Admin Instructions Use per 's instructions, to monitor glucose continuously. 1 Device 0     Continuous Blood Gluc Sensor (DEXCOM G6 SENSOR) MISC 1 each every 10 days 9 each 3     Continuous Blood Gluc Sensor (DEXCOM G6 SENSOR) MISC 1 each See Admin Instructions Change every 10 days. 9 each 3     Continuous Blood Gluc Transmit (DEXCOM G6 TRANSMITTER) MISC 1 each every 3 months 1 each 3     Continuous Blood Gluc Transmit (DEXCOM G6 TRANSMITTER) MISC 1 each See Admin Instructions Change every 90 days. 1 each 3     Glucagon, rDNA, (GLUCAGON EMERGENCY) 1 MG KIT Inject 1 mg IM for severe hypoglycemia. 1 kit 1     glucose ([employer] GLUCOSE) 40 % GEL Take 15 g by mouth as needed       glucose blood VI test strips strip Test up to ten times per day as directed 5 Box 8     insulin aspart (NOVOLOG VIAL) 100 UNITS/ML vial Use with pump  Approx  60 units daily 60 mL 3     insulin glargine (LANTUS SOLOSTAR) 100 UNIT/ML pen Inject 15 units subcutaneous daily ONLY IF INSULIN PUMP FAILS. 15 mL 3     simvastatin (ZOCOR) 40 MG tablet Take 1  "tablet (40 mg) by mouth At Bedtime 90 tablet 3     glucagon (GLUCAGON EMERGENCY) 1 MG injection Inject 1 mg into the muscle once for 1 dose Use as directed 1 mg 2       Family History  family history includes Alzheimer Disease in her father; Autoimmune Disease in her mother; Cancer in her father; Diabetes in her father and another family member; Glaucoma in her father and mother; Hypertension in her mother.    Social History  Smoke: none.  ETOH: rare.   with children.  Pt works full time in a lab.    Past Medical History  Past Medical History:   Diagnosis Date     Diabetes mellitus (H)     Type 1     Elevated cholesterol      Glaucoma     Glaucoma suspect     Glaucoma suspect      Kidney stones      Nonsenile cataract      Past Surgical History:   Procedure Laterality Date     APPENDECTOMY OPEN  1981     BIOPSY OF SKIN LESION  12/30/2011    returned January 2012 for additional removal     c sections  99,97,08     EXTRACORPOREAL SHOCK WAVE LITHOTRIPSY, CYSTOSCOPY, INSERT STENT URETER(S), COMBINED  2004     LASER HOLMIUM LITHOTRIPSY URETER(S), INSERT STENT, COMBINED  12/12/2013    Procedure: COMBINED CYSTOSCOPY, URETEROSCOPY, LASER HOLMIUM LITHOTRIPSY URETER(S), INSERT STENT;  Right Ureteroscopy Holmium Laser Lithotripsy Stent Placement;  Surgeon: Kirill Marlow MD;  Location: [facility] OR     S/P right shoulder surgery in Dec 2012.    Physical Exam  /62   Pulse 89   Ht 1.499 m (4' 11\")   Wt 64 kg (141 lb 1.6 oz)   BMI 28.50 kg/m    GENERAL: Pt in no distress today.  SKIN: Dry.  HEENT: PERRLA; fundi not examined.  LUNGS: clear b/l.  CARDIAC: RRR.  ABDOMEN: Areas of scar tissue.  EXTREMITY: Bilateral hip pain. No pretibial edema.  FEET: Soft and no ulcers; normal monofilamentous exam today.    RESULTS  Creatinine   Date Value Ref Range Status   06/16/2021 0.70 0.52 - 1.04 mg/dL Final     GFR Estimate   Date Value Ref Range Status   06/16/2021 >90 >60 mL/min/[1.73_m2] Final     Comment:     Non  " American GFR Calc  Starting 12/18/2018, serum creatinine based estimated GFR (eGFR) will be   calculated using the Chronic Kidney Disease Epidemiology Collaboration   (CKD-EPI) equation.       Hemoglobin A1C   Date Value Ref Range Status   06/16/2021 6.9 (H) 0 - 5.6 % Final     Comment:     Normal <5.7% Prediabetes 5.7-6.4%  Diabetes 6.5% or higher - adopted from ADA   consensus guidelines.       Potassium   Date Value Ref Range Status   08/20/2019 4.4 3.4 - 5.3 mmol/L Final     ALT   Date Value Ref Range Status   06/16/2021 15 0 - 50 U/L Final     AST   Date Value Ref Range Status   06/16/2021 15 0 - 45 U/L Final     TSH   Date Value Ref Range Status   06/16/2021 3.00 0.40 - 4.00 mU/L Final     T4 Free   Date Value Ref Range Status   02/14/2017 0.84 0.76 - 1.46 ng/dL Final       Cholesterol   Date Value Ref Range Status   06/16/2021 170 <200 mg/dL Final   08/14/2018 152 <200 mg/dL Final     HDL Cholesterol   Date Value Ref Range Status   06/16/2021 69 >49 mg/dL Final   08/14/2018 61 >49 mg/dL Final     LDL Cholesterol Calculated   Date Value Ref Range Status   06/16/2021 90 <100 mg/dL Final     Comment:     Desirable:       <100 mg/dl   08/14/2018 72 <100 mg/dL Final     Comment:     Desirable:       <100 mg/dl     Triglycerides   Date Value Ref Range Status   06/16/2021 56 <150 mg/dL Final   08/14/2018 94 <150 mg/dL Final     Cholesterol/HDL Ratio   Date Value Ref Range Status   08/18/2014 2.7 0.0 - 5.0 Final   09/17/2013 2.3 0.0 - 5.0 Final     A1C      6.9  9/14/2021  A1C      6.9  6/16/2021  A1C      7.3  4/8/2021  A1C      7.0  7/2020  A1C      7.4   2/25/2020  A1C      7.4  11/20/2019  A1C      7.5   8/20/2019  A1C      7.2   5/22/2019  A1C      7.4   2/12/2019  A1C      7.9   11/14/2018    A1C      7.6   8/14/2018  A1C      7.4   3/15/2018  A1C      7.4   12/1/2017  A1C      7.9   9/13/2017  A1C      7.7   6/1/2017  A1C      8.3   2/14/2017  A1C      7.5   8/18/2016  A1C      7.6   5/5/2016  A1C      7.8     2/10/2016  A1C      7.9    9/2/2015  A1C      7.7    5/2015  A1C      8.6    1/28/2015  A1C      8.8    9/26/2014  A1C      8.0    7/2/2014  A1C      8.4    2/28/2014    ASSESSMENT/PLAN:    1.  TYPE 1 DIABETES MELLITUS:Type 1 diabetes mellitus complicated by moderate NPDR both eyes without macular edema and hypoglycemia unawareness.  Kiera will continue to use her upper back/sides and breast tissue for infusion sites for better insulin absorption.  Her blood sugar control has improved since she started using her breast tissue for infusion sites.  I did not make an insulin pump setting changes today.  Reminded her to bolus before eating.  Pt's urine microalbuminuria was negative on 6/18/2021 with a normal creat/GFR.  No foot ulcers and normal monofilamentous exam today.  Kiera has received the COVID19 vaccine ( [employer]).  Reminded Kiera to get the flu vaccine this Fall.    2. MODERATE NPDR: Last seen by Oph here in Aug 2021 with moderate NPDR both eyes without macular edema.    She will be having cataract surgery.    3.  HYPERLIPIDEMIA:  LDL 90 in 6/2021.  Simvastatin dose was reduced to 40 mg daily given her hx of hip/leg and muscle pain.  Fasting lipid panel ordered.    4.  ADHD/STRESS: She has not seen her therapist lately.    5. HYPOGLYCEMIA UNAWARENESS: She is to wear her DexcomG6 sensor full time.  No frequent hypoglycemia.  Glucagon emergency kit updated today.    6.  CHEST PAIN: Intermittent chest pain. She has had this in the past- not new.  Pt had normal stress echo in 2010.    7.  FOLLOW UP: with Dr. Chahal in Dec 2021.    Total time spent reviewing chart, labs, Tandem insulin pump and DexcomG6 sensor data today = 10 minutes.  Total time for clinic visit today = 30 minutes.  Total time for documentation today = 15 minutes.    TOTAL TIME FOR VISIT TODAY =  55  minutes.

## 2021-09-14 NOTE — LETTER
9/14/2021     RE: Kiera Dobson  [address]  [facility] [address]     Dear Colleague,    Thank you for referring your patient, Kiera Dobson, to the [facility] ENDOCRINOLOG[facility]IC [place] [facility]. Please see a copy of my visit note below.    Ryne Miller CMA      PATIENT SEEN FACE TO FACE IN CLINIC TODAY.    HPI:  Kiera Dobson is a 55 year old female with type 1 diabetes mellitus.  Pt seen in clinic today for diabetes follow up.  Kiera was dx with type 1 diabetes 45 + years ago.  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 No peripheral neuropathy or nephropathy.  Her hx is also significant for hypoglycemia unawareness.  Kiera continues to use a Tandem insulin pump-control IQ and DexcomG6 sensor.  Her basal insulin rates are set at :  Midnight = 0.65 units/hr.  2 am = 0.5 units/hr.  7 am = 0.5 units/hr.  10 am = 0.65 units/hr.  10:30 pm = 0.6 units/hr.  Her I/C ratio is 1:12. Sensitivity is 70.  Pt's A1C is 6.9 % today.  Previous A1C was 6.9 % on 6/16/2021.  Her A1C was 7.3 % on 4/8/2021.    Her Glooko download today shows her blood sugar is in target 77 % of the time, above target 22 % of the time and below target 1 % of the time.  Her blood sugar control has improved since I asked her to start using breast tissue for her infusion sites.  She has several areas of scar tissue on her abdomen- poor insulin absorption.  On ROS today, main complaint today is bilateral hip pain and leg pain. She has been seen for this pain.  She continues to have intermittent chest pain which she fells is from anxiety.  No radiation of pain, SOB, n/v or diaphoresis with this pain.  Arthritic pain.  She will be having cataract surgery soon.  Blurred vision with floaters.  Leg cramps.  She denies SOB,cough, fever or chills at this time.  Some blurred vision.  Pt denies abd pain, diarrhea, dysuria or hematuria.  Kiera received  the COVID19 vaccines ([employer] )    DIABETES CARE:  Retinopathy:yes; moderate nonproliferative diabetic retinopathy both eyes without macular edema Seen here in Aug 2021.  Nephropathy: urine microalbuminuria negative on 6/18/2021.  Neuropathy: none.  Feet: no foot ulcers and normal monofilamentous exam today.  Taking ASA: yes.  Lipids: LDL 90 in 6/2021.    Pt is taking Simvastatin.  CAD: no. Normal stress echo in 9/2010.  Mental health:hx of ADHD.  Insulin: Tandem insulin pump- control IQ.  Testing: DexcomG6 sensor.  Hypoglycemia treatment: Updated glucagon kit sent to pharmacy today.  Insulin back up: Updated RX for Lantus sent to pharmacy ( 9/2021 ) in case of insulin pump failure.      ROS  Please see under HPI.    Allergies  Allergies   Allergen Reactions     Ampicillin Sodium Rash       Medications  Current Outpatient Medications   Medication Sig Dispense Refill     aspirin 81 MG chewable tablet Take 81 mg by mouth every other day  108 tablet 3     blood glucose (ERICA CONTOUR NEXT) test strip Use to test blood sugar 4-6 times daily or as directed. 720 strip 3     Continuous Blood Gluc  (DEXCOM G6 ) MELE 1 Device continuous 1 Device 0     Continuous Blood Gluc  (DEXCOM G6 ) MELE 1 each See Admin Instructions Use per 's instructions, to monitor glucose continuously. 1 Device 0     Continuous Blood Gluc Sensor (DEXCOM G6 SENSOR) MISC 1 each every 10 days 9 each 3     Continuous Blood Gluc Sensor (DEXCOM G6 SENSOR) MISC 1 each See Admin Instructions Change every 10 days. 9 each 3     Continuous Blood Gluc Transmit (DEXCOM G6 TRANSMITTER) MISC 1 each every 3 months 1 each 3     Continuous Blood Gluc Transmit (DEXCOM G6 TRANSMITTER) MISC 1 each See Admin Instructions Change every 90 days. 1 each 3     Glucagon, rDNA, (GLUCAGON EMERGENCY) 1 MG KIT Inject 1 mg IM for severe hypoglycemia. 1 kit 1     glucose (CVS GLUCOSE) 40 % GEL Take 15 g by mouth as needed       glucose  "blood VI test strips strip Test up to ten times per day as directed 5 Box 8     insulin aspart (NOVOLOG VIAL) 100 UNITS/ML vial Use with pump  Approx  60 units daily 60 mL 3     insulin glargine (LANTUS SOLOSTAR) 100 UNIT/ML pen Inject 15 units subcutaneous daily ONLY IF INSULIN PUMP FAILS. 15 mL 3     simvastatin (ZOCOR) 40 MG tablet Take 1 tablet (40 mg) by mouth At Bedtime 90 tablet 3     glucagon (GLUCAGON EMERGENCY) 1 MG injection Inject 1 mg into the muscle once for 1 dose Use as directed 1 mg 2       Family History  family history includes Alzheimer Disease in her father; Autoimmune Disease in her mother; Cancer in her father; Diabetes in her father and another family member; Glaucoma in her father and mother; Hypertension in her mother.    Social History  Smoke: none.  ETOH: rare.   with children.  Pt works full time in a lab.    Past Medical History  Past Medical History:   Diagnosis Date     Diabetes mellitus (H)     Type 1     Elevated cholesterol      Glaucoma     Glaucoma suspect     Glaucoma suspect      Kidney stones      Nonsenile cataract      Past Surgical History:   Procedure Laterality Date     APPENDECTOMY OPEN  1981     BIOPSY OF SKIN LESION  12/30/2011    returned January 2012 for additional removal     c sections  99,97,08     EXTRACORPOREAL SHOCK WAVE LITHOTRIPSY, CYSTOSCOPY, INSERT STENT URETER(S), COMBINED  2004     LASER HOLMIUM LITHOTRIPSY URETER(S), INSERT STENT, COMBINED  12/12/2013    Procedure: COMBINED CYSTOSCOPY, URETEROSCOPY, LASER HOLMIUM LITHOTRIPSY URETER(S), INSERT STENT;  Right Ureteroscopy Holmium Laser Lithotripsy Stent Placement;  Surgeon: Kirill Marlow MD;  Location: [facility]     S/P right shoulder surgery in Dec 2012.    Physical Exam  /62   Pulse 89   Ht 1.499 m (4' 11\")   Wt 64 kg (141 lb 1.6 oz)   BMI 28.50 kg/m    GENERAL: Pt in no distress today.  SKIN: Dry.  HEENT: PERRLA; fundi not examined.  LUNGS: clear b/l.  CARDIAC: RRR.  ABDOMEN: Areas " of scar tissue.  EXTREMITY: Bilateral hip pain. No pretibial edema.  FEET: Soft and no ulcers; normal monofilamentous exam today.    RESULTS  Creatinine   Date Value Ref Range Status   06/16/2021 0.70 0.52 - 1.04 mg/dL Final     GFR Estimate   Date Value Ref Range Status   06/16/2021 >90 >60 mL/min/[1.73_m2] Final     Comment:     Non  GFR Calc  Starting 12/18/2018, serum creatinine based estimated GFR (eGFR) will be   calculated using the Chronic Kidney Disease Epidemiology Collaboration   (CKD-EPI) equation.       Hemoglobin A1C   Date Value Ref Range Status   06/16/2021 6.9 (H) 0 - 5.6 % Final     Comment:     Normal <5.7% Prediabetes 5.7-6.4%  Diabetes 6.5% or higher - adopted from ADA   consensus guidelines.       Potassium   Date Value Ref Range Status   08/20/2019 4.4 3.4 - 5.3 mmol/L Final     ALT   Date Value Ref Range Status   06/16/2021 15 0 - 50 U/L Final     AST   Date Value Ref Range Status   06/16/2021 15 0 - 45 U/L Final     TSH   Date Value Ref Range Status   06/16/2021 3.00 0.40 - 4.00 mU/L Final     T4 Free   Date Value Ref Range Status   02/14/2017 0.84 0.76 - 1.46 ng/dL Final       Cholesterol   Date Value Ref Range Status   06/16/2021 170 <200 mg/dL Final   08/14/2018 152 <200 mg/dL Final     HDL Cholesterol   Date Value Ref Range Status   06/16/2021 69 >49 mg/dL Final   08/14/2018 61 >49 mg/dL Final     LDL Cholesterol Calculated   Date Value Ref Range Status   06/16/2021 90 <100 mg/dL Final     Comment:     Desirable:       <100 mg/dl   08/14/2018 72 <100 mg/dL Final     Comment:     Desirable:       <100 mg/dl     Triglycerides   Date Value Ref Range Status   06/16/2021 56 <150 mg/dL Final   08/14/2018 94 <150 mg/dL Final     Cholesterol/HDL Ratio   Date Value Ref Range Status   08/18/2014 2.7 0.0 - 5.0 Final   09/17/2013 2.3 0.0 - 5.0 Final     A1C      6.9  9/14/2021  A1C      6.9  6/16/2021  A1C      7.3  4/8/2021  A1C      7.0  7/2020  A1C      7.4   2/25/2020  A1C       7.4  11/20/2019  A1C      7.5   8/20/2019  A1C      7.2   5/22/2019  A1C      7.4   2/12/2019  A1C      7.9   11/14/2018    A1C      7.6   8/14/2018  A1C      7.4   3/15/2018  A1C      7.4   12/1/2017  A1C      7.9   9/13/2017  A1C      7.7   6/1/2017  A1C      8.3   2/14/2017  A1C      7.5   8/18/2016  A1C      7.6   5/5/2016  A1C      7.8    2/10/2016  A1C      7.9    9/2/2015  A1C      7.7    5/2015  A1C      8.6    1/28/2015  A1C      8.8    9/26/2014  A1C      8.0    7/2/2014  A1C      8.4    2/28/2014    ASSESSMENT/PLAN:    1.  TYPE 1 DIABETES MELLITUS:Type 1 diabetes mellitus complicated by moderate NPDR both eyes without macular edema and hypoglycemia unawareness.  Kiera will continue to use her upper back/sides and breast tissue for infusion sites for better insulin absorption.  Her blood sugar control has improved since she started using her breast tissue for infusion sites.  I did not make an insulin pump setting changes today.  Reminded her to bolus before eating.  Pt's urine microalbuminuria was negative on 6/18/2021 with a normal creat/GFR.  No foot ulcers and normal monofilamentous exam today.  Kiera has received the COVID19 vaccine ( [employer]).  Reminded Kiera to get the flu vaccine this Fall.    2. MODERATE NPDR: Last seen by Oph here in Aug 2021 with moderate NPDR both eyes without macular edema.    She will be having cataract surgery.    3.  HYPERLIPIDEMIA:  LDL 90 in 6/2021.  Simvastatin dose was reduced to 40 mg daily given her hx of hip/leg and muscle pain.  Fasting lipid panel ordered.    4.  ADHD/STRESS: She has not seen her therapist lately.    5. HYPOGLYCEMIA UNAWARENESS: She is to wear her DexcomG6 sensor full time.  No frequent hypoglycemia.  Glucagon emergency kit updated today.    6.  CHEST PAIN: Intermittent chest pain. She has had this in the past- not new.  Pt had normal stress echo in 2010.    7.  FOLLOW UP: with Dr. Chahal in Dec 2021.    Total time spent reviewing chart, labs, Tandem  insulin pump and DexcomG6 sensor data today = 10 minutes.  Total time for clinic visit today = 30 minutes.  Total time for documentation today = 15 minutes.  TOTAL TIME FOR VISIT TODAY =  55  minutes.

## 2021-09-15 ENCOUNTER — OFFICE VISIT (OUTPATIENT)
Dept: OPHTHALMOLOGY | Facility: CLINIC | Age: 56
End: 2021-09-15
Attending: OPHTHALMOLOGY
Payer: COMMERCIAL

## 2021-09-15 ENCOUNTER — TELEPHONE (OUTPATIENT)
Dept: OPHTHALMOLOGY | Facility: CLINIC | Age: 56
End: 2021-09-15

## 2021-09-15 DIAGNOSIS — H25.013 CORTICAL AGE-RELATED CATARACT OF BOTH EYES: Primary | ICD-10-CM

## 2021-09-15 DIAGNOSIS — Z11.59 ENCOUNTER FOR SCREENING FOR OTHER VIRAL DISEASES: ICD-10-CM

## 2021-09-15 PROBLEM — H40.033 ANATOMICAL NARROW ANGLE BORDERLINE GLAUCOMA OF BOTH EYES: Status: ACTIVE | Noted: 2021-09-15

## 2021-09-15 PROCEDURE — 76519 ECHO EXAM OF EYE: CPT | Performed by: OPHTHALMOLOGY

## 2021-09-15 PROCEDURE — G0463 HOSPITAL OUTPT CLINIC VISIT: HCPCS

## 2021-09-15 PROCEDURE — 999N000103 HC STATISTIC NO CHARGE FACILITY FEE

## 2021-09-15 ASSESSMENT — CONF VISUAL FIELD
OS_NORMAL: 1
OD_NORMAL: 1

## 2021-09-15 ASSESSMENT — VISUAL ACUITY
METHOD: SNELLEN - LINEAR
OD_SC+: +1
OD_SC: 20/40
OS_SC+: -2
OS_SC: 20/30

## 2021-09-15 ASSESSMENT — TONOMETRY
OS_IOP_MMHG: 15
IOP_METHOD: TONOPEN
OD_IOP_MMHG: 18

## 2021-09-15 NOTE — NURSING NOTE
Chief Complaints and History of Present Illnesses   Patient presents with     Cataract Follow Up     Chief Complaint(s) and History of Present Illness(es)     Cataract Follow Up     Laterality: both eyes    Duration: 3 weeks              Comments     Pt. States that she is here to schedule surgery. No change in VA BE. No pain BE. No new floaters BE.   Brie Jules COT 1:40 PM September 15, 2021

## 2021-09-15 NOTE — PROGRESS NOTES
Tech visit for IOLs only, not seen by physician.    Will schedule with Maryjane Costa MD  , Comprehensive Ophthalmology  Department of Ophthalmology and Visual Neurosciences  [facility]

## 2021-09-15 NOTE — TELEPHONE ENCOUNTER
Patient is scheduled for surgery with Dr. Curtis Costa     Spoke with: Kiera     Date of Surgery: 10/04 and 10/25     Location: [facility]:  [address] [place] [address]     Informed patient they will need an adult : Yes     H&P will be completed at: [facility]     COVID testing: 10/01 and 10/21    Post Op scheduled on 10/21, and 11/16     Surgery packet was given in clinic     Additional comments: Advised RN will call 1 - 2 business days prior with arrival time and instructions.

## 2021-09-28 ENCOUNTER — TELEPHONE (OUTPATIENT)
Dept: OPHTHALMOLOGY | Facility: CLINIC | Age: 56
End: 2021-09-28

## 2021-09-28 NOTE — TELEPHONE ENCOUNTER
Spoke with patient to reschedule right eye surgery with Dr. Costa    Surgery was rescheduled to 11/8 at [facility]  Patient will have H&P at [facility]     DUE TO PATIENT TESTING POSITIVE FOR COVID ON 9/28. PATIENT WILL NOT NEED TO BE RE-TESTED FOR COVID FOR 90 DAYS       Post-Op visit was rescheduled to 11/23  Patient was advised a / is needed day of surgery. As well as, for 24 hours after their surgery procedure.  Surgery packet was mailed 9/28, patient has my direct contact information for any further questions [phone].

## 2021-09-28 NOTE — TELEPHONE ENCOUNTER
Spoke with patient to reschedule left eye surgery with Dr. Costa    Surgery was rescheduled on 10/25 at [facility]  Patient will have H&P at [facility]     DUE TO PATIENT TESTING POSITIVE FOR COVID ON 9/28. PATIENT WILL NOT NEED TO BE RE-TESTED FOR COVID FOR 90 DAYS     Post-Op visit was rescheduled for 11/8  Patient was advised a / is needed day of surgery. As well as, for 24 hours after their surgery procedure.  Surgery packet was mailed 9/28, patient has my direct contact information for any further questions [phone].

## 2021-10-07 DIAGNOSIS — Z11.59 ENCOUNTER FOR SCREENING FOR OTHER VIRAL DISEASES: ICD-10-CM

## 2021-10-22 ENCOUNTER — LAB (OUTPATIENT)
Dept: LAB | Facility: CLINIC | Age: 56
End: 2021-10-22

## 2021-10-22 ENCOUNTER — OFFICE VISIT (OUTPATIENT)
Dept: FAMILY MEDICINE | Facility: CLINIC | Age: 56
End: 2021-10-22
Payer: COMMERCIAL

## 2021-10-22 ENCOUNTER — LAB (OUTPATIENT)
Dept: LAB | Facility: CLINIC | Age: 56
End: 2021-10-22
Payer: COMMERCIAL

## 2021-10-22 VITALS
DIASTOLIC BLOOD PRESSURE: 74 MMHG | OXYGEN SATURATION: 98 % | HEART RATE: 78 BPM | WEIGHT: 142.1 LBS | SYSTOLIC BLOOD PRESSURE: 119 MMHG | BODY MASS INDEX: 28.65 KG/M2 | HEIGHT: 59 IN

## 2021-10-22 DIAGNOSIS — H25.013 CORTICAL AGE-RELATED CATARACT OF BOTH EYES: ICD-10-CM

## 2021-10-22 DIAGNOSIS — Z01.818 PRE-OP EXAM: ICD-10-CM

## 2021-10-22 DIAGNOSIS — Z01.818 PRE-OP EXAM: Primary | ICD-10-CM

## 2021-10-22 DIAGNOSIS — Z11.59 ENCOUNTER FOR SCREENING FOR OTHER VIRAL DISEASES: ICD-10-CM

## 2021-10-22 LAB
B-HCG SERPL-ACNC: 4 IU/L (ref 0–5)
HGB BLD-MCNC: 12.8 G/DL (ref 11.7–15.7)
POTASSIUM BLD-SCNC: 4.3 MMOL/L (ref 3.4–5.3)

## 2021-10-22 PROCEDURE — 85018 HEMOGLOBIN: CPT | Performed by: PATHOLOGY

## 2021-10-22 PROCEDURE — U0005 INFEC AGEN DETEC AMPLI PROBE: HCPCS | Mod: 90 | Performed by: PATHOLOGY

## 2021-10-22 PROCEDURE — 84702 CHORIONIC GONADOTROPIN TEST: CPT | Performed by: PATHOLOGY

## 2021-10-22 PROCEDURE — 99214 OFFICE O/P EST MOD 30 MIN: CPT | Performed by: NURSE PRACTITIONER

## 2021-10-22 PROCEDURE — U0003 INFECTIOUS AGENT DETECTION BY NUCLEIC ACID (DNA OR RNA); SEVERE ACUTE RESPIRATORY SYNDROME CORONAVIRUS 2 (SARS-COV-2) (CORONAVIRUS DISEASE [COVID-19]), AMPLIFIED PROBE TECHNIQUE, MAKING USE OF HIGH THROUGHPUT TECHNOLOGIES AS DESCRIBED BY CMS-2020-01-R: HCPCS | Mod: 90 | Performed by: PATHOLOGY

## 2021-10-22 PROCEDURE — 36415 COLL VENOUS BLD VENIPUNCTURE: CPT | Performed by: PATHOLOGY

## 2021-10-22 PROCEDURE — 84132 ASSAY OF SERUM POTASSIUM: CPT | Performed by: PATHOLOGY

## 2021-10-22 ASSESSMENT — MIFFLIN-ST. JEOR: SCORE: 1162.31

## 2021-10-22 NOTE — H&P (VIEW-ONLY)
[facility] [address]  [address] [facility] [address]  Phone: [phone]  Fax: [phone]  Primary Provider: Jose Davenport  Pre-op Performing Provider: CHRISTINA BOLAND R   :[id number]}  PREOPERATIVE EVALUATION:  Today's date: 10/22/2021    Kiera Dobson is a 55 year old female who presents for a preoperative evaluation.    Surgical Information:  Surgery/Procedure: RIGHT EYE PHACOEMULSIFICATION, CATARACT, WITH INTRAOCULAR LENS IMPLANT  [place] [facility] [place]  Surgeon: Curtis Costa MD  Surgery Date: 11/8/2021  Time of Surgery: 8:00 AM  Where patient plans to recover: At home with family  Fax number for surgical facility: Note does not need to be faxed, will be available electronically in Epic.    Type of Anesthesia Anticipated: Combined MAC with Topical    Assessment & Plan     The proposed surgical procedure is considered LOW risk.    Problem List Items Addressed This Visit     None               Risks and Recommendations:  The patient has the following additional risks and recommendations for perioperative complications:  Diabetes:  - Patient is on insulin therapy; diabetic NPO guidelines provided and discussed.OK to eat until 4 AM. Ok to drink clear liquids until 8 AM. Ok to take Statin medication the PM before then hold all other medications.     Medication Instructions:   - Continuous Glucose Monitor (CGM): Patient was made aware on the day of surgery, they should be prepared to remove the Continuous Glucose Monitor (CGM) prior to the operation in order to avoid damage to the equipment during the procedure. The CGM will not be the source of glucose monitoring during the operation.    RECOMMENDATION:  APPROVAL GIVEN to proceed with proposed procedure, without further diagnostic evaluation.      30 minutes spent on the date of the encounter doing chart review, history and exam, documentation and further activities per the  note        Subjective     HPI related to upcoming procedure:RIGHT EYE PHACOEMULSIFICATION, CATARACT, WITH INTRAOCULAR LENS IMPLANT      Preop Questions 10/22/2021   1. Have you ever had a heart attack or stroke? No   2. Have you ever had surgery on your heart or blood vessels, such as a stent placement, a coronary artery bypass, or surgery on an artery in your head, neck, heart, or legs? No   3. Do you have chest pain with activity? UNKNOWN -    4. Do you have a history of  heart failure? No   5. Do you currently have a cold, bronchitis or symptoms of other infection? UNKNOWN -    6. Do you have a cough, shortness of breath, or wheezing? No   7. Do you or anyone in your family have previous history of blood clots? YES - mother    8. Do you or does anyone in your family have a serious bleeding problem such as prolonged bleeding following surgeries or cuts? No   9. Have you ever had problems with anemia or been told to take iron pills? No   10. Have you had any abnormal blood loss such as black, tarry or bloody stools, or abnormal vaginal bleeding? No   11. Have you ever had a blood transfusion? No   12. Are you willing to have a blood transfusion if it is medically needed before, during, or after your surgery? Yes   13. Have you or any of your relatives ever had problems with anesthesia? No   14. Do you have sleep apnea, excessive snoring or daytime drowsiness? No   15. Do you have any artifical heart valves or other implanted medical devices like a pacemaker, defibrillator, or continuous glucose monitor? No   16. Do you have artificial joints? No   17. Are you allergic to latex? YES:    18. Is there any chance that you may be pregnant? No       Health Care Directive:  Patient does not have a Health Care Directive or Living Will: Discussed advance care planning with patient; however, patient declined at this time.    Preoperative Review of :   reviewed - no record of controlled substances  prescribed.      Status of Chronic Conditions:  DIABETES - Patient has a longstanding history of DiabetesType Type I . Patient is being treated with insulin pump and denies significant side effects. Control has been good. Complicating factors include but are not limited to: hyperlipidemia.     HYPERLIPIDEMIA - Patient has a long history of significant Hyperlipidemia requiring medication for treatment with recent good control. Patient reports no problems or side effects with the medication.       Review of Systems  CONSTITUTIONAL: NEGATIVE for fever, chills, change in weight  ENT/MOUTH: NEGATIVE for ear, mouth and throat problems  RESP: NEGATIVE for significant cough or SOB  CV: NEGATIVE for chest pain, palpitations or peripheral edema  GI: NEGATIVE for nausea, abdominal pain, heartburn, or change in bowel habits    Patient Active Problem List    Diagnosis Date Noted     Cortical age-related cataract of both eyes 09/15/2021     Priority: Medium     Added automatically from request for surgery [id number]       Anatomical narrow angle borderline glaucoma of both eyes 09/15/2021     Priority: Medium     Added automatically from request for surgery [id number]       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 Provider to review       Cortical, lamellar, or zonular cataract, nonsenile 12/23/2013     Priority: Medium     Glaucoma suspect, both eyes 12/23/2013     Priority: Medium     Myopia 12/23/2013     Priority: Medium     Adhesive capsulitis of shoulder 03/20/2013     Priority: Medium     Superior glenoid labrum lesion 03/20/2013     Priority: Medium     Type 1 diabetes mellitus without complication (HCC) 07/20/2011     Priority: Medium     Class: Chronic     Pain in shoulder 05/24/2011     Priority: Medium      Past Medical History:   Diagnosis Date     Diabetes  mellitus (H)     Type 1     Elevated cholesterol      Glaucoma     Glaucoma suspect     Glaucoma suspect      Kidney stones      Nonsenile cataract      Past Surgical History:   Procedure Laterality Date     APPENDECTOMY OPEN  1981     BIOPSY OF SKIN LESION  12/30/2011    returned January 2012 for additional removal     c sections  99,97,08     EXTRACORPOREAL SHOCK WAVE LITHOTRIPSY, CYSTOSCOPY, INSERT STENT URETER(S), COMBINED  2004     LASER HOLMIUM LITHOTRIPSY URETER(S), INSERT STENT, COMBINED  12/12/2013    Procedure: COMBINED CYSTOSCOPY, URETEROSCOPY, LASER HOLMIUM LITHOTRIPSY URETER(S), INSERT STENT;  Right Ureteroscopy Holmium Laser Lithotripsy Stent Placement;  Surgeon: Kirill Marlow MD;  Location: [facility] OR     Current Outpatient Medications   Medication Sig Dispense Refill     aspirin 81 MG chewable tablet Take 81 mg by mouth every other day  108 tablet 3     blood glucose (ERICA CONTOUR NEXT) test strip Use to test blood sugar 4-6 times daily or as directed. 720 strip 3     Continuous Blood Gluc  (DEXCOM G6 ) MELE 1 Device continuous 1 Device 0     Continuous Blood Gluc  (DEXCOM G6 ) MELE 1 each See Admin Instructions Use per 's instructions, to monitor glucose continuously. 1 Device 0     Continuous Blood Gluc Sensor (DEXCOM G6 SENSOR) MISC 1 each every 10 days 9 each 3     Continuous Blood Gluc Sensor (DEXCOM G6 SENSOR) MISC 1 each See Admin Instructions Change every 10 days. 9 each 3     Continuous Blood Gluc Transmit (DEXCOM G6 TRANSMITTER) MISC 1 each every 3 months 1 each 3     Continuous Blood Gluc Transmit (DEXCOM G6 TRANSMITTER) MISC 1 each See Admin Instructions Change every 90 days. 1 each 3     Glucagon, rDNA, (GLUCAGON EMERGENCY) 1 MG KIT Inject 1 mg IM for severe hypoglycemia. 1 kit 1     glucose (CVS GLUCOSE) 40 % GEL Take 15 g by mouth as needed       glucose blood VI test strips strip Test up to ten times per day as directed 5 Box 8      "insulin aspart (NOVOLOG VIAL) 100 UNITS/ML vial Use with pump  Approx  60 units daily 60 mL 3     insulin glargine (LANTUS SOLOSTAR) 100 UNIT/ML pen Inject 15 units subcutaneous daily ONLY IF INSULIN PUMP FAILS. 15 mL 3     simvastatin (ZOCOR) 40 MG tablet Take 1 tablet (40 mg) by mouth At Bedtime 90 tablet 3     glucagon (GLUCAGON EMERGENCY) 1 MG injection Inject 1 mg into the muscle once for 1 dose Use as directed 1 mg 2       Allergies   Allergen Reactions     Ampicillin Sodium Rash        Social History     Tobacco Use     Smoking status: Never Smoker     Smokeless tobacco: Never Used   Substance Use Topics     Alcohol use: Yes     Comment: occasionally 1-2 beers     Family History   Problem Relation Age of Onset     Diabetes Father      Alzheimer Disease Father      Cancer Father         lung     Glaucoma Father      Hypertension Mother      Glaucoma Mother      Autoimmune Disease Mother         Myasthenia gravis     Diabetes Other      Melanoma No family hx of      Skin Cancer No family hx of      Macular Degeneration No family hx of      History   Drug Use No         Objective     /74   Pulse 78   Ht 1.51 m (4' 11.45\")   Wt 64.5 kg (142 lb 1.6 oz)   SpO2 98%   BMI 28.27 kg/m      Physical Exam    GENERAL APPEARANCE: healthy, alert and no distress     EYES: EOMI, PERRL     HENT: ear canals and TM's normal and nose and mouth without ulcers or lesions     NECK: no adenopathy, no asymmetry, masses, or scars and thyroid normal to palpation     RESP: lungs clear to auscultation - no rales, rhonchi or wheezes     CV: regular rates and rhythm, normal S1 S2, no S3 or S4 and no murmur, click or rub     ABDOMEN:  soft, nontender, no HSM or masses and bowel sounds normal     MS: extremities normal- no gross deformities noted, no evidence of inflammation in joints, FROM in all extremities.     SKIN: no suspicious lesions or rashes     NEURO: Normal strength and tone, sensory exam grossly normal, mentation " intact and speech normal     PSYCH: mentation appears normal. and affect normal/bright     LYMPHATICS: No cervical adenopathy    Recent Labs   Lab Test 06/16/21  0837 07/24/20  1212   CR 0.70 0.64   A1C 6.9* 7.0*        Diagnostics:  Labs pending at this time.  Results will be reviewed when available.   No EKG required for low risk surgery (cataract, skin procedure, breast biopsy, etc).    Revised Cardiac Risk Index (RCRI):  The patient has the following serious cardiovascular risks for perioperative complications:   - Diabetes Mellitus (on Insulin) = 1 point     RCRI Interpretation: 1 point: Class II (low risk - 0.9% complication rate)           Signed Electronically by: RAULITO White CNP  Copy of this evaluation report is provided to requesting physician.

## 2021-10-22 NOTE — PROGRESS NOTES
[facility] [address]  [address] [facility] [address]  Phone: [phone]  Fax: [phone]  Primary Provider: Jose Davenport  Pre-op Performing Provider: CHRISTINA BOLAND R   :[id number]}  PREOPERATIVE EVALUATION:  Today's date: 10/22/2021    Kiera Dobson is a 55 year old female who presents for a preoperative evaluation.    Surgical Information:  Surgery/Procedure: RIGHT EYE PHACOEMULSIFICATION, CATARACT, WITH INTRAOCULAR LENS IMPLANT  Surgery Location: [facility] [place] OR  Surgeon: Curtis Costa MD  Surgery Date: 11/8/2021  Time of Surgery: 8:00 AM  Where patient plans to recover: At home with family  Fax number for surgical facility: Note does not need to be faxed, will be available electronically in Epic.    Type of Anesthesia Anticipated: Combined MAC with Topical    Assessment & Plan     The proposed surgical procedure is considered LOW risk.    Problem List Items Addressed This Visit     None               Risks and Recommendations:  The patient has the following additional risks and recommendations for perioperative complications:  Diabetes:  - Patient is on insulin therapy; diabetic NPO guidelines provided and discussed.OK to eat until 4 AM. Ok to drink clear liquids until 8 AM. Ok to take Statin medication the PM before then hold all other medications.     Medication Instructions:   - Continuous Glucose Monitor (CGM): Patient was made aware on the day of surgery, they should be prepared to remove the Continuous Glucose Monitor (CGM) prior to the operation in order to avoid damage to the equipment during the procedure. The CGM will not be the source of glucose monitoring during the operation.    RECOMMENDATION:  APPROVAL GIVEN to proceed with proposed procedure, without further diagnostic evaluation.      30 minutes spent on the date of the encounter doing chart review, history and exam, documentation and further activities per the  note        Subjective     HPI related to upcoming procedure:RIGHT EYE PHACOEMULSIFICATION, CATARACT, WITH INTRAOCULAR LENS IMPLANT      Preop Questions 10/22/2021   1. Have you ever had a heart attack or stroke? No   2. Have you ever had surgery on your heart or blood vessels, such as a stent placement, a coronary artery bypass, or surgery on an artery in your head, neck, heart, or legs? No   3. Do you have chest pain with activity? UNKNOWN -    4. Do you have a history of  heart failure? No   5. Do you currently have a cold, bronchitis or symptoms of other infection? UNKNOWN -    6. Do you have a cough, shortness of breath, or wheezing? No   7. Do you or anyone in your family have previous history of blood clots? YES - mother    8. Do you or does anyone in your family have a serious bleeding problem such as prolonged bleeding following surgeries or cuts? No   9. Have you ever had problems with anemia or been told to take iron pills? No   10. Have you had any abnormal blood loss such as black, tarry or bloody stools, or abnormal vaginal bleeding? No   11. Have you ever had a blood transfusion? No   12. Are you willing to have a blood transfusion if it is medically needed before, during, or after your surgery? Yes   13. Have you or any of your relatives ever had problems with anesthesia? No   14. Do you have sleep apnea, excessive snoring or daytime drowsiness? No   15. Do you have any artifical heart valves or other implanted medical devices like a pacemaker, defibrillator, or continuous glucose monitor? No   16. Do you have artificial joints? No   17. Are you allergic to latex? YES:    18. Is there any chance that you may be pregnant? No       Health Care Directive:  Patient does not have a Health Care Directive or Living Will: Discussed advance care planning with patient; however, patient declined at this time.    Preoperative Review of :   reviewed - no record of controlled substances  prescribed.      Status of Chronic Conditions:  DIABETES - Patient has a longstanding history of DiabetesType Type I . Patient is being treated with insulin pump and denies significant side effects. Control has been good. Complicating factors include but are not limited to: hyperlipidemia.     HYPERLIPIDEMIA - Patient has a long history of significant Hyperlipidemia requiring medication for treatment with recent good control. Patient reports no problems or side effects with the medication.       Review of Systems  CONSTITUTIONAL: NEGATIVE for fever, chills, change in weight  ENT/MOUTH: NEGATIVE for ear, mouth and throat problems  RESP: NEGATIVE for significant cough or SOB  CV: NEGATIVE for chest pain, palpitations or peripheral edema  GI: NEGATIVE for nausea, abdominal pain, heartburn, or change in bowel habits    Patient Active Problem List    Diagnosis Date Noted     Cortical age-related cataract of both eyes 09/15/2021     Priority: Medium     Added automatically from request for surgery [id number]       Anatomical narrow angle borderline glaucoma of both eyes 09/15/2021     Priority: Medium     Added automatically from request for surgery [id number]       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 Provider to review       Cortical, lamellar, or zonular cataract, nonsenile 12/23/2013     Priority: Medium     Glaucoma suspect, both eyes 12/23/2013     Priority: Medium     Myopia 12/23/2013     Priority: Medium     Adhesive capsulitis of shoulder 03/20/2013     Priority: Medium     Superior glenoid labrum lesion 03/20/2013     Priority: Medium     Type 1 diabetes mellitus without complication (HCC) 07/20/2011     Priority: Medium     Class: Chronic     Pain in shoulder 05/24/2011     Priority: Medium      Past Medical History:   Diagnosis Date     Diabetes  mellitus (H)     Type 1     Elevated cholesterol      Glaucoma     Glaucoma suspect     Glaucoma suspect      Kidney stones      Nonsenile cataract      Past Surgical History:   Procedure Laterality Date     APPENDECTOMY OPEN  1981     BIOPSY OF SKIN LESION  12/30/2011    returned January 2012 for additional removal     c sections  99,97,08     EXTRACORPOREAL SHOCK WAVE LITHOTRIPSY, CYSTOSCOPY, INSERT STENT URETER(S), COMBINED  2004     LASER HOLMIUM LITHOTRIPSY URETER(S), INSERT STENT, COMBINED  12/12/2013    Procedure: COMBINED CYSTOSCOPY, URETEROSCOPY, LASER HOLMIUM LITHOTRIPSY URETER(S), INSERT STENT;  Right Ureteroscopy Holmium Laser Lithotripsy Stent Placement;  Surgeon: Kirill Marlow MD;  Location: [facility] OR     Current Outpatient Medications   Medication Sig Dispense Refill     aspirin 81 MG chewable tablet Take 81 mg by mouth every other day  108 tablet 3     blood glucose (ERICA CONTOUR NEXT) test strip Use to test blood sugar 4-6 times daily or as directed. 720 strip 3     Continuous Blood Gluc  (DEXCOM G6 ) MELE 1 Device continuous 1 Device 0     Continuous Blood Gluc  (DEXCOM G6 ) MELE 1 each See Admin Instructions Use per 's instructions, to monitor glucose continuously. 1 Device 0     Continuous Blood Gluc Sensor (DEXCOM G6 SENSOR) MISC 1 each every 10 days 9 each 3     Continuous Blood Gluc Sensor (DEXCOM G6 SENSOR) MISC 1 each See Admin Instructions Change every 10 days. 9 each 3     Continuous Blood Gluc Transmit (DEXCOM G6 TRANSMITTER) MISC 1 each every 3 months 1 each 3     Continuous Blood Gluc Transmit (DEXCOM G6 TRANSMITTER) MISC 1 each See Admin Instructions Change every 90 days. 1 each 3     Glucagon, rDNA, (GLUCAGON EMERGENCY) 1 MG KIT Inject 1 mg IM for severe hypoglycemia. 1 kit 1     glucose (CVS GLUCOSE) 40 % GEL Take 15 g by mouth as needed       glucose blood VI test strips strip Test up to ten times per day as directed 5 Box 8      "insulin aspart (NOVOLOG VIAL) 100 UNITS/ML vial Use with pump  Approx  60 units daily 60 mL 3     insulin glargine (LANTUS SOLOSTAR) 100 UNIT/ML pen Inject 15 units subcutaneous daily ONLY IF INSULIN PUMP FAILS. 15 mL 3     simvastatin (ZOCOR) 40 MG tablet Take 1 tablet (40 mg) by mouth At Bedtime 90 tablet 3     glucagon (GLUCAGON EMERGENCY) 1 MG injection Inject 1 mg into the muscle once for 1 dose Use as directed 1 mg 2       Allergies   Allergen Reactions     Ampicillin Sodium Rash        Social History     Tobacco Use     Smoking status: Never Smoker     Smokeless tobacco: Never Used   Substance Use Topics     Alcohol use: Yes     Comment: occasionally 1-2 beers     Family History   Problem Relation Age of Onset     Diabetes Father      Alzheimer Disease Father      Cancer Father         lung     Glaucoma Father      Hypertension Mother      Glaucoma Mother      Autoimmune Disease Mother         Myasthenia gravis     Diabetes Other      Melanoma No family hx of      Skin Cancer No family hx of      Macular Degeneration No family hx of      History   Drug Use No         Objective     /74   Pulse 78   Ht 1.51 m (4' 11.45\")   Wt 64.5 kg (142 lb 1.6 oz)   SpO2 98%   BMI 28.27 kg/m      Physical Exam    GENERAL APPEARANCE: healthy, alert and no distress     EYES: EOMI, PERRL     HENT: ear canals and TM's normal and nose and mouth without ulcers or lesions     NECK: no adenopathy, no asymmetry, masses, or scars and thyroid normal to palpation     RESP: lungs clear to auscultation - no rales, rhonchi or wheezes     CV: regular rates and rhythm, normal S1 S2, no S3 or S4 and no murmur, click or rub     ABDOMEN:  soft, nontender, no HSM or masses and bowel sounds normal     MS: extremities normal- no gross deformities noted, no evidence of inflammation in joints, FROM in all extremities.     SKIN: no suspicious lesions or rashes     NEURO: Normal strength and tone, sensory exam grossly normal, mentation " intact and speech normal     PSYCH: mentation appears normal. and affect normal/bright     LYMPHATICS: No cervical adenopathy    Recent Labs   Lab Test 06/16/21  0837 07/24/20  1212   CR 0.70 0.64   A1C 6.9* 7.0*        Diagnostics:  Labs pending at this time.  Results will be reviewed when available.   No EKG required for low risk surgery (cataract, skin procedure, breast biopsy, etc).    Revised Cardiac Risk Index (RCRI):  The patient has the following serious cardiovascular risks for perioperative complications:   - Diabetes Mellitus (on Insulin) = 1 point     RCRI Interpretation: 1 point: Class II (low risk - 0.9% complication rate)           Signed Electronically by: RAULITO White CNP  Copy of this evaluation report is provided to requesting physician.

## 2021-10-22 NOTE — H&P (VIEW-ONLY)
[facility] [address]  [address] [facility] [address]  Phone: [phone]  Fax: [phone]  Primary Provider: Jose Davenport  Pre-op Performing Provider: CHRISTINA BOLAND R   :[id number]}  PREOPERATIVE EVALUATION:  Today's date: 10/22/2021    Kiera Dobson is a 55 year old female who presents for a preoperative evaluation.    Surgical Information:  Surgery/Procedure: RIGHT EYE PHACOEMULSIFICATION, CATARACT, WITH INTRAOCULAR LENS IMPLANT  Surgery Location: [facility] [place] [facility]  Surgeon: Curtis Costa MD  Surgery Date: 11/8/2021  Time of Surgery: 8:00 AM  Where patient plans to recover: At home with family  Fax number for surgical facility: Note does not need to be faxed, will be available electronically in Epic.    Type of Anesthesia Anticipated: Combined MAC with Topical    Assessment & Plan     The proposed surgical procedure is considered LOW risk.    Problem List Items Addressed This Visit     None               Risks and Recommendations:  The patient has the following additional risks and recommendations for perioperative complications:  Diabetes:  - Patient is on insulin therapy; diabetic NPO guidelines provided and discussed.OK to eat until 4 AM. Ok to drink clear liquids until 8 AM. Ok to take Statin medication the PM before then hold all other medications.     Medication Instructions:   - Continuous Glucose Monitor (CGM): Patient was made aware on the day of surgery, they should be prepared to remove the Continuous Glucose Monitor (CGM) prior to the operation in order to avoid damage to the equipment during the procedure. The CGM will not be the source of glucose monitoring during the operation.    RECOMMENDATION:  APPROVAL GIVEN to proceed with proposed procedure, without further diagnostic evaluation.      30 minutes spent on the date of the encounter doing chart review, history and exam, documentation and further activities per the  note        Subjective     HPI related to upcoming procedure:RIGHT EYE PHACOEMULSIFICATION, CATARACT, WITH INTRAOCULAR LENS IMPLANT      Preop Questions 10/22/2021   1. Have you ever had a heart attack or stroke? No   2. Have you ever had surgery on your heart or blood vessels, such as a stent placement, a coronary artery bypass, or surgery on an artery in your head, neck, heart, or legs? No   3. Do you have chest pain with activity? UNKNOWN -    4. Do you have a history of  heart failure? No   5. Do you currently have a cold, bronchitis or symptoms of other infection? UNKNOWN -    6. Do you have a cough, shortness of breath, or wheezing? No   7. Do you or anyone in your family have previous history of blood clots? YES - mother    8. Do you or does anyone in your family have a serious bleeding problem such as prolonged bleeding following surgeries or cuts? No   9. Have you ever had problems with anemia or been told to take iron pills? No   10. Have you had any abnormal blood loss such as black, tarry or bloody stools, or abnormal vaginal bleeding? No   11. Have you ever had a blood transfusion? No   12. Are you willing to have a blood transfusion if it is medically needed before, during, or after your surgery? Yes   13. Have you or any of your relatives ever had problems with anesthesia? No   14. Do you have sleep apnea, excessive snoring or daytime drowsiness? No   15. Do you have any artifical heart valves or other implanted medical devices like a pacemaker, defibrillator, or continuous glucose monitor? No   16. Do you have artificial joints? No   17. Are you allergic to latex? YES:    18. Is there any chance that you may be pregnant? No       Health Care Directive:  Patient does not have a Health Care Directive or Living Will: Discussed advance care planning with patient; however, patient declined at this time.    Preoperative Review of :   reviewed - no record of controlled substances  prescribed.      Status of Chronic Conditions:  DIABETES - Patient has a longstanding history of DiabetesType Type I . Patient is being treated with insulin pump and denies significant side effects. Control has been good. Complicating factors include but are not limited to: hyperlipidemia.     HYPERLIPIDEMIA - Patient has a long history of significant Hyperlipidemia requiring medication for treatment with recent good control. Patient reports no problems or side effects with the medication.       Review of Systems  CONSTITUTIONAL: NEGATIVE for fever, chills, change in weight  ENT/MOUTH: NEGATIVE for ear, mouth and throat problems  RESP: NEGATIVE for significant cough or SOB  CV: NEGATIVE for chest pain, palpitations or peripheral edema  GI: NEGATIVE for nausea, abdominal pain, heartburn, or change in bowel habits    Patient Active Problem List    Diagnosis Date Noted     Cortical age-related cataract of both eyes 09/15/2021     Priority: Medium     Added automatically from request for surgery [id number]       Anatomical narrow angle borderline glaucoma of both eyes 09/15/2021     Priority: Medium     Added automatically from request for surgery [id number]       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 Provider to review       Cortical, lamellar, or zonular cataract, nonsenile 12/23/2013     Priority: Medium     Glaucoma suspect, both eyes 12/23/2013     Priority: Medium     Myopia 12/23/2013     Priority: Medium     Adhesive capsulitis of shoulder 03/20/2013     Priority: Medium     Superior glenoid labrum lesion 03/20/2013     Priority: Medium     Type 1 diabetes mellitus without complication (HCC) 07/20/2011     Priority: Medium     Class: Chronic     Pain in shoulder 05/24/2011     Priority: Medium      Past Medical History:   Diagnosis Date     Diabetes  mellitus (H)     Type 1     Elevated cholesterol      Glaucoma     Glaucoma suspect     Glaucoma suspect      Kidney stones      Nonsenile cataract      Past Surgical History:   Procedure Laterality Date     APPENDECTOMY OPEN  1981     BIOPSY OF SKIN LESION  12/30/2011    returned January 2012 for additional removal     c sections  99,97,08     EXTRACORPOREAL SHOCK WAVE LITHOTRIPSY, CYSTOSCOPY, INSERT STENT URETER(S), COMBINED  2004     LASER HOLMIUM LITHOTRIPSY URETER(S), INSERT STENT, COMBINED  12/12/2013    Procedure: COMBINED CYSTOSCOPY, URETEROSCOPY, LASER HOLMIUM LITHOTRIPSY URETER(S), INSERT STENT;  Right Ureteroscopy Holmium Laser Lithotripsy Stent Placement;  Surgeon: Kirill Marlow MD;  Location: [facility] OR     Current Outpatient Medications   Medication Sig Dispense Refill     aspirin 81 MG chewable tablet Take 81 mg by mouth every other day  108 tablet 3     blood glucose (ERICA CONTOUR NEXT) test strip Use to test blood sugar 4-6 times daily or as directed. 720 strip 3     Continuous Blood Gluc  (DEXCOM G6 ) MELE 1 Device continuous 1 Device 0     Continuous Blood Gluc  (DEXCOM G6 ) MELE 1 each See Admin Instructions Use per 's instructions, to monitor glucose continuously. 1 Device 0     Continuous Blood Gluc Sensor (DEXCOM G6 SENSOR) MISC 1 each every 10 days 9 each 3     Continuous Blood Gluc Sensor (DEXCOM G6 SENSOR) MISC 1 each See Admin Instructions Change every 10 days. 9 each 3     Continuous Blood Gluc Transmit (DEXCOM G6 TRANSMITTER) MISC 1 each every 3 months 1 each 3     Continuous Blood Gluc Transmit (DEXCOM G6 TRANSMITTER) MISC 1 each See Admin Instructions Change every 90 days. 1 each 3     Glucagon, rDNA, (GLUCAGON EMERGENCY) 1 MG KIT Inject 1 mg IM for severe hypoglycemia. 1 kit 1     glucose (CVS GLUCOSE) 40 % GEL Take 15 g by mouth as needed       glucose blood VI test strips strip Test up to ten times per day as directed 5 Box 8      "insulin aspart (NOVOLOG VIAL) 100 UNITS/ML vial Use with pump  Approx  60 units daily 60 mL 3     insulin glargine (LANTUS SOLOSTAR) 100 UNIT/ML pen Inject 15 units subcutaneous daily ONLY IF INSULIN PUMP FAILS. 15 mL 3     simvastatin (ZOCOR) 40 MG tablet Take 1 tablet (40 mg) by mouth At Bedtime 90 tablet 3     glucagon (GLUCAGON EMERGENCY) 1 MG injection Inject 1 mg into the muscle once for 1 dose Use as directed 1 mg 2       Allergies   Allergen Reactions     Ampicillin Sodium Rash        Social History     Tobacco Use     Smoking status: Never Smoker     Smokeless tobacco: Never Used   Substance Use Topics     Alcohol use: Yes     Comment: occasionally 1-2 beers     Family History   Problem Relation Age of Onset     Diabetes Father      Alzheimer Disease Father      Cancer Father         lung     Glaucoma Father      Hypertension Mother      Glaucoma Mother      Autoimmune Disease Mother         Myasthenia gravis     Diabetes Other      Melanoma No family hx of      Skin Cancer No family hx of      Macular Degeneration No family hx of      History   Drug Use No         Objective     /74   Pulse 78   Ht 1.51 m (4' 11.45\")   Wt 64.5 kg (142 lb 1.6 oz)   SpO2 98%   BMI 28.27 kg/m      Physical Exam    GENERAL APPEARANCE: healthy, alert and no distress     EYES: EOMI, PERRL     HENT: ear canals and TM's normal and nose and mouth without ulcers or lesions     NECK: no adenopathy, no asymmetry, masses, or scars and thyroid normal to palpation     RESP: lungs clear to auscultation - no rales, rhonchi or wheezes     CV: regular rates and rhythm, normal S1 S2, no S3 or S4 and no murmur, click or rub     ABDOMEN:  soft, nontender, no HSM or masses and bowel sounds normal     MS: extremities normal- no gross deformities noted, no evidence of inflammation in joints, FROM in all extremities.     SKIN: no suspicious lesions or rashes     NEURO: Normal strength and tone, sensory exam grossly normal, mentation " intact and speech normal     PSYCH: mentation appears normal. and affect normal/bright     LYMPHATICS: No cervical adenopathy    Recent Labs   Lab Test 06/16/21  0837 07/24/20  1212   CR 0.70 0.64   A1C 6.9* 7.0*        Diagnostics:  Labs pending at this time.  Results will be reviewed when available.   No EKG required for low risk surgery (cataract, skin procedure, breast biopsy, etc).    Revised Cardiac Risk Index (RCRI):  The patient has the following serious cardiovascular risks for perioperative complications:   - Diabetes Mellitus (on Insulin) = 1 point     RCRI Interpretation: 1 point: Class II (low risk - 0.9% complication rate)           Signed Electronically by: RAULITO White CNP  Copy of this evaluation report is provided to requesting physician.

## 2021-10-23 LAB — SARS-COV-2 RNA RESP QL NAA+PROBE: NEGATIVE

## 2021-10-24 ENCOUNTER — ANESTHESIA EVENT (OUTPATIENT)
Dept: SURGERY | Facility: AMBULATORY SURGERY CENTER | Age: 56
End: 2021-10-24
Payer: COMMERCIAL

## 2021-10-25 ENCOUNTER — OFFICE VISIT (OUTPATIENT)
Dept: OPHTHALMOLOGY | Facility: CLINIC | Age: 56
End: 2021-10-25

## 2021-10-25 ENCOUNTER — HOSPITAL ENCOUNTER (OUTPATIENT)
Facility: AMBULATORY SURGERY CENTER | Age: 56
End: 2021-10-25
Attending: OPHTHALMOLOGY
Payer: COMMERCIAL

## 2021-10-25 ENCOUNTER — ANESTHESIA (OUTPATIENT)
Dept: SURGERY | Facility: AMBULATORY SURGERY CENTER | Age: 56
End: 2021-10-25
Payer: COMMERCIAL

## 2021-10-25 VITALS
RESPIRATION RATE: 16 BRPM | BODY MASS INDEX: 27.88 KG/M2 | TEMPERATURE: 97 F | WEIGHT: 142 LBS | HEIGHT: 60 IN | OXYGEN SATURATION: 97 % | DIASTOLIC BLOOD PRESSURE: 63 MMHG | SYSTOLIC BLOOD PRESSURE: 109 MMHG | HEART RATE: 80 BPM

## 2021-10-25 DIAGNOSIS — Z96.1 PSEUDOPHAKIA, LEFT EYE: ICD-10-CM

## 2021-10-25 DIAGNOSIS — H40.033 ANATOMICAL NARROW ANGLE BORDERLINE GLAUCOMA OF BOTH EYES: Primary | ICD-10-CM

## 2021-10-25 DIAGNOSIS — Z98.890 POSTOPERATIVE EYE STATE: Primary | ICD-10-CM

## 2021-10-25 DIAGNOSIS — H25.013 CORTICAL AGE-RELATED CATARACT OF BOTH EYES: ICD-10-CM

## 2021-10-25 PROCEDURE — 66982 XCAPSL CTRC RMVL CPLX WO ECP: CPT | Mod: LT | Performed by: OPHTHALMOLOGY

## 2021-10-25 PROCEDURE — 66982 XCAPSL CTRC RMVL CPLX WO ECP: CPT | Mod: LT

## 2021-10-25 PROCEDURE — 99207 PR SERVICE NOT STAFFED W/SUPERV PROV: CPT | Mod: GC | Performed by: STUDENT IN AN ORGANIZED HEALTH CARE EDUCATION/TRAINING PROGRAM

## 2021-10-25 DEVICE — EYE IMP IOL AMO PCL TECNIS ZCB00 26.5: Type: IMPLANTABLE DEVICE | Site: EYE | Status: FUNCTIONAL

## 2021-10-25 RX ORDER — MOXIFLOXACIN IN NACL,ISO-OS/PF 0.3MG/0.3
SYRINGE (ML) INTRAOCULAR PRN
Status: DISCONTINUED | OUTPATIENT
Start: 2021-10-25 | End: 2021-10-25 | Stop reason: HOSPADM

## 2021-10-25 RX ORDER — FENTANYL CITRATE 50 UG/ML
INJECTION, SOLUTION INTRAMUSCULAR; INTRAVENOUS PRN
Status: DISCONTINUED | OUTPATIENT
Start: 2021-10-25 | End: 2021-10-25

## 2021-10-25 RX ORDER — LIDOCAINE HYDROCHLORIDE 10 MG/ML
INJECTION, SOLUTION EPIDURAL; INFILTRATION; INTRACAUDAL; PERINEURAL PRN
Status: DISCONTINUED | OUTPATIENT
Start: 2021-10-25 | End: 2021-10-25 | Stop reason: HOSPADM

## 2021-10-25 RX ORDER — ONDANSETRON 2 MG/ML
4 INJECTION INTRAMUSCULAR; INTRAVENOUS EVERY 30 MIN PRN
Status: DISCONTINUED | OUTPATIENT
Start: 2021-10-25 | End: 2021-10-27 | Stop reason: HOSPADM

## 2021-10-25 RX ORDER — LIDOCAINE 40 MG/G
CREAM TOPICAL
Status: DISCONTINUED | OUTPATIENT
Start: 2021-10-25 | End: 2021-10-25 | Stop reason: HOSPADM

## 2021-10-25 RX ORDER — PROPARACAINE HYDROCHLORIDE 5 MG/ML
1 SOLUTION/ DROPS OPHTHALMIC ONCE
Status: COMPLETED | OUTPATIENT
Start: 2021-10-25 | End: 2021-10-25

## 2021-10-25 RX ORDER — TIMOLOL 5 MG/ML
SOLUTION/ DROPS OPHTHALMIC PRN
Status: DISCONTINUED | OUTPATIENT
Start: 2021-10-25 | End: 2021-10-25 | Stop reason: HOSPADM

## 2021-10-25 RX ORDER — ALBUTEROL SULFATE 0.83 MG/ML
2.5 SOLUTION RESPIRATORY (INHALATION) EVERY 4 HOURS PRN
Status: DISCONTINUED | OUTPATIENT
Start: 2021-10-25 | End: 2021-10-25 | Stop reason: HOSPADM

## 2021-10-25 RX ORDER — MEPERIDINE HYDROCHLORIDE 25 MG/ML
12.5 INJECTION INTRAMUSCULAR; INTRAVENOUS; SUBCUTANEOUS
Status: DISCONTINUED | OUTPATIENT
Start: 2021-10-25 | End: 2021-10-27 | Stop reason: HOSPADM

## 2021-10-25 RX ORDER — MOXIFLOXACIN 5 MG/ML
1 SOLUTION/ DROPS OPHTHALMIC 3 TIMES DAILY
Qty: 3 ML | Refills: 1 | Status: SHIPPED | OUTPATIENT
Start: 2021-10-25 | End: 2022-01-21

## 2021-10-25 RX ORDER — TETRACAINE HYDROCHLORIDE 5 MG/ML
SOLUTION OPHTHALMIC PRN
Status: DISCONTINUED | OUTPATIENT
Start: 2021-10-25 | End: 2021-10-25 | Stop reason: HOSPADM

## 2021-10-25 RX ORDER — BALANCED SALT SOLUTION 6.4; .75; .48; .3; 3.9; 1.7 MG/ML; MG/ML; MG/ML; MG/ML; MG/ML; MG/ML
SOLUTION OPHTHALMIC PRN
Status: DISCONTINUED | OUTPATIENT
Start: 2021-10-25 | End: 2021-10-25 | Stop reason: HOSPADM

## 2021-10-25 RX ORDER — ACETAMINOPHEN 325 MG/1
975 TABLET ORAL ONCE
Status: DISCONTINUED | OUTPATIENT
Start: 2021-10-25 | End: 2021-10-25 | Stop reason: HOSPADM

## 2021-10-25 RX ORDER — PREDNISOLONE ACETATE 10 MG/ML
1 SUSPENSION/ DROPS OPHTHALMIC 4 TIMES DAILY
Qty: 10 ML | Refills: 1 | Status: SHIPPED | OUTPATIENT
Start: 2021-10-25 | End: 2022-01-21

## 2021-10-25 RX ORDER — CYCLOPENTOLAT/TROPIC/PHENYLEPH 1%-1%-2.5%
1 DROPS (EA) OPHTHALMIC (EYE)
Status: COMPLETED | OUTPATIENT
Start: 2021-10-25 | End: 2021-10-25

## 2021-10-25 RX ORDER — SODIUM CHLORIDE, SODIUM LACTATE, POTASSIUM CHLORIDE, CALCIUM CHLORIDE 600; 310; 30; 20 MG/100ML; MG/100ML; MG/100ML; MG/100ML
INJECTION, SOLUTION INTRAVENOUS CONTINUOUS
Status: DISCONTINUED | OUTPATIENT
Start: 2021-10-25 | End: 2021-10-27 | Stop reason: HOSPADM

## 2021-10-25 RX ORDER — ONDANSETRON 4 MG/1
4 TABLET, ORALLY DISINTEGRATING ORAL EVERY 30 MIN PRN
Status: DISCONTINUED | OUTPATIENT
Start: 2021-10-25 | End: 2021-10-27 | Stop reason: HOSPADM

## 2021-10-25 RX ORDER — KETOROLAC TROMETHAMINE 4 MG/ML
1 SOLUTION/ DROPS OPHTHALMIC 4 TIMES DAILY
Qty: 5 ML | Refills: 1 | Status: SHIPPED | OUTPATIENT
Start: 2021-10-25 | End: 2022-01-21

## 2021-10-25 RX ORDER — SODIUM CHLORIDE, SODIUM LACTATE, POTASSIUM CHLORIDE, CALCIUM CHLORIDE 600; 310; 30; 20 MG/100ML; MG/100ML; MG/100ML; MG/100ML
INJECTION, SOLUTION INTRAVENOUS CONTINUOUS
Status: DISCONTINUED | OUTPATIENT
Start: 2021-10-25 | End: 2021-10-25 | Stop reason: HOSPADM

## 2021-10-25 RX ADMIN — Medication 1 DROP: at 11:02

## 2021-10-25 RX ADMIN — FENTANYL CITRATE 25 MCG: 50 INJECTION, SOLUTION INTRAMUSCULAR; INTRAVENOUS at 12:05

## 2021-10-25 RX ADMIN — Medication 1 DROP: at 11:00

## 2021-10-25 RX ADMIN — FENTANYL CITRATE 50 MCG: 50 INJECTION, SOLUTION INTRAMUSCULAR; INTRAVENOUS at 11:53

## 2021-10-25 RX ADMIN — PROPARACAINE HYDROCHLORIDE 1 DROP: 5 SOLUTION/ DROPS OPHTHALMIC at 10:51

## 2021-10-25 RX ADMIN — Medication 1 DROP: at 10:51

## 2021-10-25 RX ADMIN — FENTANYL CITRATE 25 MCG: 50 INJECTION, SOLUTION INTRAMUSCULAR; INTRAVENOUS at 12:03

## 2021-10-25 RX ADMIN — SODIUM CHLORIDE, SODIUM LACTATE, POTASSIUM CHLORIDE, CALCIUM CHLORIDE: 600; 310; 30; 20 INJECTION, SOLUTION INTRAVENOUS at 11:50

## 2021-10-25 ASSESSMENT — VISUAL ACUITY: METHOD: SNELLEN - LINEAR

## 2021-10-25 ASSESSMENT — MIFFLIN-ST. JEOR: SCORE: 1162.67

## 2021-10-25 ASSESSMENT — TONOMETRY
IOP_METHOD: TONOPEN
OS_IOP_MMHG: 22

## 2021-10-25 ASSESSMENT — SLIT LAMP EXAM - LIDS: COMMENTS: PROTECTIVE PTOSIS

## 2021-10-25 NOTE — PROGRESS NOTES
POD#0, status post cataract surgery, Left eye    No complaints. Has a little bit of eye pain currently 3/10.     Va 20/20 @ 2 ft    IOP 22 mm Hg by applanation    Impression/Plan:  Pseudophakia, OS: POD0, good post-operative appearance. IOP reasonable.    Start pred forte taper     Start moxifloxacin TID for 7 days    Start ketorolac QID for 14 days    Eye protection at all times and eye shield at night for 1 week.    Limited activities with no exercise or heavy lifting for 1 week.    Instructed patient to contact us for decreasing vision, eye pain, new floaters or flashes of light or other concerning symptoms.    Written instructions given  Return to clinic 11/23/21    Sarah Sierra MD  Ophthalmology Resident, PGY-4    Not seen by staff during this visit, available should need have arisen.  Plan appropriate as above.    Curtis Costa MD  , Comprehensive Ophthalmology  Department of Ophthalmology and Visual Neurosciences  [facility]

## 2021-10-25 NOTE — DISCHARGE INSTRUCTIONS
"[facility]  Home Care Following Anesthesia  For 24 hours after surgery:  1. Get plenty of rest.  A responsible adult must stay with you for at least 24 hours after you leave the surgery center.  2. Do not drive or use heavy equipment.  If you have weakness or tingling, don't drive or use heavy equipment until this feeling goes away.   3. Do not drink alcohol.   4. Avoid strenuous or risky activities.  Ask for help when climbing stairs.  5. You may feel lightheaded.  IF so, sit for a few minutes before standing.  Have someone help you get up.   6. If you have nausea (feel sick to your stomach): Drink only clear liquids such as apple juice, ginger ale, broth or 7-Up.  Rest may also help.  Be sure to drink enough fluids.  Move to a regular diet as you feel able.   7. You may have a slight fever.  Call the doctor if your fever is over 100 F (37.7 C) (taken under the tongue) or lasts longer than 24 hours.  8. You may have a dry mouth, a sore throat, muscle aches or trouble sleeping. These should go away after 24 hours.  9. Do not make important or legal decisions.   10. It is recommended to avoid smoking.        Today you received a Marcaine or bupivacaine block to numb the nerves near your surgery site.  This is a block using local anesthetic or \"numbing\" medication injected around the nerves to anesthetize or \"numb\" the area supplied by those nerves.  This block is injected into the muscle layer near your surgical site.  The medication may numb the location where you had surgery for 6-18 hours, but may last up to 24 hours.  If your surgical site is an arm or leg you should be careful with your affected limb, since it is possible to injure your limb without being aware of it due to the numbing.  Until full feeling returns, you should guard against bumping or hitting your limb, and avoid extreme hot or cold temperatures on the skin.  As the block wears off, the feeling will return as " a tingling or prickly sensation near your surgical site.  You will experience more discomfort from your incision as the feeling returns.  You may want to take a pain pill (a narcotic or Tylenol if this was prescribed by your surgeon) when you start to experience mild pain before the pain beccomes more severe.  If your pain medications do not control your pain you should notifiy your surgeon.    Tips for taking pain medications  To get the best pain relief possible, remember these points:    Take pain medications as directed, before pain becomes severe.    Pain medication can upset your stomach: taking it with food may help.    Constipation is a common side effect of pain medication. Drink plenty of  fluids.    Eat foods high in fiber. Take a stool softener if recommended by your doctor or pharmacist.    Do not drink alcohol, drive or operate machinery while taking pain medications.    Ask about other ways to control pain, such as with heat, ice or relaxation.    Tylenol/Acetaminophen Consumption  To help encourage the safe use of acetaminophen, the makers of TYLENOL  have lowered the maximum daily dose for single-ingredient Extra Strength TYLENOL  (acetaminophen) products sold in the [place] from 8 pills per day (4,000 mg) to 6 pills per day (3,000 mg). The dosing interval has also changed from 2 pills every 4-6 hours to 2 pills every 6 hours.    If you feel your pain relief is insufficient, you may take Tylenol/Acetaminophen in addition to your narcotic pain medication.     Be careful not to exceed 3,000 mg of Tylenol/Acetaminophen in a 24 hour period from all sources.    If you are taking extra strength Tylenol/acetaminophen (500 mg), the maximum dose is 6 tablets in 24 hours.    If you are taking regular strength acetaminophen (325 mg), the maximum dose is 9 tablets in 24 hours.    Call a doctor for any of the followin. Signs of infection (fever, growing tenderness at the surgery site, a large amount of  drainage or bleeding, severe pain, foul-smelling drainage, redness, swelling).  2. It has been over 8 to 10 hours since surgery and you are still not able to urinate (pass water).  3. Headache for over 24 hours.  4. Signs of Covid-19 infection (temperature over 100 degrees, shortness of breath, cough, loss of taste/smell, generalized body aches, persistent headache, chills, sore throat, nausea/vomiting/diarrhea)  Your doctor is:       Dr. Curtis Costa, Ophthalmology: [phone]               Or dial [phone] and ask for the resident on call for:  Ophthalmology  For emergency care, call the:  [place] [facility]:  [phone] (TTY for hearing impaired: [phone])

## 2021-10-25 NOTE — ANESTHESIA PREPROCEDURE EVALUATION
Anesthesia Pre-Procedure Evaluation    Patient: Kiera Dobson   MRN: [id number] : 1965        Preoperative Diagnosis: Cortical age-related cataract of both eyes [H25.013]  Anatomical narrow angle borderline glaucoma of both eyes [H40.033]    Procedure : Procedure(s):  LEFT EYE PHACOEMULSIFICATION, CATARACT, WITH INTRAOCULAR LENS IMPLANT COMPLEX          Past Medical History:   Diagnosis Date     Diabetes mellitus (H)     Type 1     Elevated cholesterol      Glaucoma     Glaucoma suspect     Glaucoma suspect      Kidney stones      Nonsenile cataract       Past Surgical History:   Procedure Laterality Date     APPENDECTOMY OPEN  1981     BIOPSY OF SKIN LESION  2011    returned 2012 for additional removal     c sections  99,97,08     EXTRACORPOREAL SHOCK WAVE LITHOTRIPSY, CYSTOSCOPY, INSERT STENT URETER(S), COMBINED       LASER HOLMIUM LITHOTRIPSY URETER(S), INSERT STENT, COMBINED  2013    Procedure: COMBINED CYSTOSCOPY, URETEROSCOPY, LASER HOLMIUM LITHOTRIPSY URETER(S), INSERT STENT;  Right Ureteroscopy Holmium Laser Lithotripsy Stent Placement;  Surgeon: Kirill Marlow MD;  Location: [facility] OR      Allergies   Allergen Reactions     Ampicillin Sodium Rash      Social History     Tobacco Use     Smoking status: Never Smoker     Smokeless tobacco: Never Used   Substance Use Topics     Alcohol use: Yes     Comment: occasionally 1-2 beers      Wt Readings from Last 1 Encounters:   10/25/21 64.4 kg (142 lb)        Anesthesia Evaluation   Pt has had prior anesthetic.     No history of anesthetic complications       ROS/MED HX  ENT/Pulmonary:       Neurologic:       Cardiovascular:     (+) Dyslipidemia -----    METS/Exercise Tolerance: >4 METS    Hematologic:       Musculoskeletal:       GI/Hepatic:       Renal/Genitourinary:     (+) Nephrolithiasis ,     Endo:     (+) type I DM, - using insulin pump.     Psychiatric/Substance Use:       Infectious Disease:       Malignancy:        Other:            Physical Exam    Airway  airway exam normal           Respiratory Devices and Support         Dental  no notable dental history         Cardiovascular   cardiovascular exam normal          Pulmonary   pulmonary exam normal                OUTSIDE LABS:  CBC:   Lab Results   Component Value Date    WBC 11.6 (H) 03/01/2016    WBC 15.4 (H) 12/21/2015    HGB 12.8 10/22/2021    HGB 11.1 (L) 03/01/2016    HCT 34.8 (L) 03/01/2016    HCT 32.0 (L) 12/21/2015     03/01/2016     12/21/2015     BMP:   Lab Results   Component Value Date     08/20/2019     03/01/2016    POTASSIUM 4.3 10/22/2021    POTASSIUM 4.4 08/20/2019    CHLORIDE 106 08/20/2019    CHLORIDE 106 03/01/2016    CO2 30 08/20/2019    CO2 29 03/01/2016    BUN 13 08/20/2019    BUN 18 03/01/2016    CR 0.70 06/16/2021    CR 0.64 07/24/2020     (H) 03/01/2016    GLC 40 (LL) 12/21/2015     COAGS: No results found for: PTT, INR, FIBR  POC:   Lab Results   Component Value Date     (H) 03/01/2016    HCG Negative 12/12/2013    HCGS Negative 03/01/2016     HEPATIC:   Lab Results   Component Value Date    ALBUMIN 3.8 02/14/2017    PROTTOTAL 7.4 02/14/2017    ALT 15 06/16/2021    AST 15 06/16/2021    ALKPHOS 72 02/14/2017    BILITOTAL 0.4 02/14/2017     OTHER:   Lab Results   Component Value Date    A1C 6.9 (H) 06/16/2021    ROS 7.9 (L) 03/01/2016    PHOS 3.3 06/21/2011    TSH 3.00 06/16/2021    T4 0.84 02/14/2017    CRP 6.0 06/08/2009    SED 16 05/04/2009       Anesthesia Plan    ASA Status:  2   NPO Status:  NPO Appropriate    Anesthesia Type: MAC.     - Reason for MAC: straight local not clinically adequate   Induction: N/a.   Maintenance: N/A.        Consents    Anesthesia Plan(s) and associated risks, benefits, and realistic alternatives discussed. Questions answered and patient/representative(s) expressed understanding.     - Discussed with:  Patient         Postoperative Care    Pain management: Oral pain  medications, Multi-modal analgesia.   PONV prophylaxis: Ondansetron (or other 5HT-3)     Comments:                Nick Farah MD

## 2021-10-25 NOTE — ANESTHESIA CARE TRANSFER NOTE
Patient: Comfort Stage    Procedure: Procedure(s):  LEFT EYE PHACOEMULSIFICATION, CATARACT, WITH INTRAOCULAR LENS IMPLANT COMPLEX       Diagnosis: Cortical age-related cataract of both eyes [H25.013]  Anatomical narrow angle borderline glaucoma of both eyes [H40.033]  Diagnosis Additional Information: No value filed.    Anesthesia Type:   No value filed.     Note:    Oropharynx: oropharynx clear of all foreign objects and spontaneously breathing  Level of Consciousness: awake  Oxygen Supplementation: room air    Independent Airway: airway patency satisfactory and stable  Dentition: dentition unchanged  Vital Signs Stable: post-procedure vital signs reviewed and stable  Report to RN Given: handoff report given  Patient transferred to: Phase II    Handoff Report: Identifed the Patient, Identified the Reponsible Provider, Reviewed the pertinent medical history, Discussed the surgical course, Reviewed Intra-OP anesthesia mangement and issues during anesthesia, Set expectations for post-procedure period and Allowed opportunity for questions and acknowledgement of understanding      Vitals:  Vitals Value Taken Time   /59 10/25/21 1222   Temp 35.9  C (96.6  F) 10/25/21 1222   Pulse 85 10/25/21 1222   Resp 16 10/25/21 1222   SpO2 96 % 10/25/21 1222       Electronically Signed By: RAULITO Arthur CRNA  October 25, 2021  12:24 PM

## 2021-10-25 NOTE — ANESTHESIA POSTPROCEDURE EVALUATION
Patient: Comfort Stage    Procedure: Procedure(s):  LEFT EYE PHACOEMULSIFICATION, CATARACT, WITH INTRAOCULAR LENS IMPLANT COMPLEX       Diagnosis:Cortical age-related cataract of both eyes [H25.013]  Anatomical narrow angle borderline glaucoma of both eyes [H40.033]  Diagnosis Additional Information: No value filed.    Anesthesia Type:  MAC    Note:  Disposition: Outpatient   Postop Pain Control: Uneventful            Sign Out: Well controlled pain   PONV: No   Neuro/Psych: Uneventful            Sign Out: Acceptable/Baseline neuro status   Airway/Respiratory: Uneventful            Sign Out: Acceptable/Baseline resp. status   CV/Hemodynamics: Uneventful            Sign Out: Acceptable CV status; No obvious hypovolemia; No obvious fluid overload   Other NRE: NONE   DID A NON-ROUTINE EVENT OCCUR? No           Last vitals:  Vitals Value Taken Time   /63 10/25/21 1252   Temp 36.1  C (97  F) 10/25/21 1252   Pulse 80 10/25/21 1252   Resp 16 10/25/21 1252   SpO2 97 % 10/25/21 1252       Electronically Signed By: Nick Farah MD  October 25, 2021  2:19 PM

## 2021-10-25 NOTE — OP NOTE
PREOPERATIVE DIAGNOSIS:   1. Anatomical narrow angle borderline glaucoma of both eyes    2. Cortical age-related cataract of both eyes      POSTOPERATIVE DIAGNOSIS: Same   PROCEDURES:   1. Complex cataract extraction with intraocular lens implant Left eye.  SURGEON: Curtis Costa M.D.  Assistant: Kingston Baltazar MD               INDICATIONS: The patient Kiera Dobson presented to the eye clinic with decreased vision secondary to cataract in the Left eye. The risks, benefits and alternatives to cataract extraction were discussed. The patient elected to proceed. All questions were answered to the patient's satisfaction.   DESCRIPTION OF PROCEDURE:   Prior to the procedure, appropriate cardiac and respiratory monitors were applied to the patient.  In the pre-operative holding area, a drop of topical tetracaine followed by lidocaine gel followed by povidone iodine.  The patient was brought to the operating room where a surgical pause was carried out to identify with all members of the surgical team the correct surgical site.  With adequate anesthesia, the Left eye was prepped and draped in the usual sterile fashion. A lid speculum was placed, and the operating microscope was rotated into position. A paracentesis was created.  Trypan blue stain was injected under air to stain the anterior lens capsule.  Trypan blue stain was elected due to a poor red reflex in the setting of dense cataract.  Through this limbal paracentesis, the anterior chamber was filled with preservative-free lidocaine followed by viscoelastic.  A temporal wound was created at the limbus using a 2.6 mm blade. A capsulorrhexis was initiated using a bent 25-gauge needle and was completed in continuous and circular fashion using the capsulorrhexis forceps. The lens nucleus was hydrodissected using balanced salt solution.  The lens nucleus was rotated and removed using phacoemulsification in a stop and chop technique.  Residual cortical material was  removed using irrigation-aspiration.  The capsular bag was reinflated to its maximal extent with cohesive viscoelastic.  A 26.5 diopter ZCBOO inserted into the capsular bag.  The lens power selected was reviewed using the intraocular lens power measurements that were obtained preoperatively to confirm that the correct lens was selected for the desired post-operative refractive state. The residual viscoelastic was removed in its entirety, the wound were hydrated and found to be self-sealing.  Intracameral moxifloxacin was administered. Tactile pressure was confirmed to be in a normal range.  Subconjunctival Dexamethasone (2 mg) was injected..  The lid speculum was removed and a patch and shield were applied.  The patient tolerated the procedure well, and there were no complications.    PLAN: The patient will be discharged to home and will follow up tomorrow morning in the eye clinic.  EBL:  None  Complications:  None  Implant Name Type Inv. Item Serial No.  Lot No. LRB No. Used Action   EYE IMP IOL DESTIN PCL TECNIS [id number] 26.5 - [id number] Lens/Eye Implant EYE IMP IOL DESTIN PCL TECNIS ZCB00 26.5 [id number] [employer]  Left 1 Implanted          Attending Physician Procedure Attestation: I was present for the entire procedure       Curtis Costa MD  , Comprehensive Ophthalmology  Department of Ophthalmology and Visual Neurosciences  [facility]

## 2021-11-05 ENCOUNTER — LAB (OUTPATIENT)
Dept: LAB | Facility: CLINIC | Age: 56
End: 2021-11-05
Payer: COMMERCIAL

## 2021-11-05 ENCOUNTER — ANESTHESIA EVENT (OUTPATIENT)
Dept: SURGERY | Facility: AMBULATORY SURGERY CENTER | Age: 56
End: 2021-11-05
Payer: COMMERCIAL

## 2021-11-05 DIAGNOSIS — Z11.59 ENCOUNTER FOR SCREENING FOR OTHER VIRAL DISEASES: ICD-10-CM

## 2021-11-05 PROCEDURE — U0005 INFEC AGEN DETEC AMPLI PROBE: HCPCS

## 2021-11-06 LAB — SARS-COV-2 RNA RESP QL NAA+PROBE: NEGATIVE

## 2021-11-08 ENCOUNTER — ANESTHESIA (OUTPATIENT)
Dept: SURGERY | Facility: AMBULATORY SURGERY CENTER | Age: 56
End: 2021-11-08
Payer: COMMERCIAL

## 2021-11-08 ENCOUNTER — HOSPITAL ENCOUNTER (OUTPATIENT)
Facility: AMBULATORY SURGERY CENTER | Age: 56
End: 2021-11-08
Attending: OPHTHALMOLOGY
Payer: COMMERCIAL

## 2021-11-08 ENCOUNTER — OFFICE VISIT (OUTPATIENT)
Dept: OPHTHALMOLOGY | Facility: CLINIC | Age: 56
End: 2021-11-08

## 2021-11-08 VITALS
DIASTOLIC BLOOD PRESSURE: 57 MMHG | HEIGHT: 60 IN | BODY MASS INDEX: 27.88 KG/M2 | HEART RATE: 85 BPM | OXYGEN SATURATION: 96 % | RESPIRATION RATE: 16 BRPM | WEIGHT: 142 LBS | SYSTOLIC BLOOD PRESSURE: 104 MMHG | TEMPERATURE: 97.8 F

## 2021-11-08 DIAGNOSIS — H40.033 ANATOMICAL NARROW ANGLE BORDERLINE GLAUCOMA OF BOTH EYES: ICD-10-CM

## 2021-11-08 DIAGNOSIS — H25.013 CORTICAL AGE-RELATED CATARACT OF BOTH EYES: ICD-10-CM

## 2021-11-08 DIAGNOSIS — Z96.1 PSEUDOPHAKIA OF BOTH EYES: ICD-10-CM

## 2021-11-08 DIAGNOSIS — Z98.890 POSTOPERATIVE EYE STATE: Primary | ICD-10-CM

## 2021-11-08 LAB
GLUCOSE BLDC GLUCOMTR-MCNC: 88 MG/DL (ref 70–99)
GLUCOSE BLDC GLUCOMTR-MCNC: 93 MG/DL (ref 70–99)

## 2021-11-08 PROCEDURE — 82962 GLUCOSE BLOOD TEST: CPT | Performed by: PATHOLOGY

## 2021-11-08 PROCEDURE — 66982 XCAPSL CTRC RMVL CPLX WO ECP: CPT | Mod: RT

## 2021-11-08 PROCEDURE — 99024 POSTOP FOLLOW-UP VISIT: CPT | Mod: GC | Performed by: OPHTHALMOLOGY

## 2021-11-08 PROCEDURE — 66982 XCAPSL CTRC RMVL CPLX WO ECP: CPT | Mod: 79 | Performed by: OPHTHALMOLOGY

## 2021-11-08 DEVICE — EYE IMP IOL AMO PCL TECNIS ZCB00 26.0: Type: IMPLANTABLE DEVICE | Site: EYE | Status: FUNCTIONAL

## 2021-11-08 RX ORDER — SODIUM CHLORIDE, SODIUM LACTATE, POTASSIUM CHLORIDE, CALCIUM CHLORIDE 600; 310; 30; 20 MG/100ML; MG/100ML; MG/100ML; MG/100ML
INJECTION, SOLUTION INTRAVENOUS CONTINUOUS
Status: DISCONTINUED | OUTPATIENT
Start: 2021-11-08 | End: 2021-11-09 | Stop reason: HOSPADM

## 2021-11-08 RX ORDER — TIMOLOL 5 MG/ML
SOLUTION/ DROPS OPHTHALMIC PRN
Status: DISCONTINUED | OUTPATIENT
Start: 2021-11-08 | End: 2021-11-08 | Stop reason: HOSPADM

## 2021-11-08 RX ORDER — MEPERIDINE HYDROCHLORIDE 25 MG/ML
12.5 INJECTION INTRAMUSCULAR; INTRAVENOUS; SUBCUTANEOUS
Status: DISCONTINUED | OUTPATIENT
Start: 2021-11-08 | End: 2021-11-09 | Stop reason: HOSPADM

## 2021-11-08 RX ORDER — MOXIFLOXACIN IN NACL,ISO-OS/PF 0.3MG/0.3
SYRINGE (ML) INTRAOCULAR PRN
Status: DISCONTINUED | OUTPATIENT
Start: 2021-11-08 | End: 2021-11-08 | Stop reason: HOSPADM

## 2021-11-08 RX ORDER — PROPARACAINE HYDROCHLORIDE 5 MG/ML
1 SOLUTION/ DROPS OPHTHALMIC ONCE
Status: COMPLETED | OUTPATIENT
Start: 2021-11-08 | End: 2021-11-08

## 2021-11-08 RX ORDER — OXYCODONE HYDROCHLORIDE 5 MG/1
5 TABLET ORAL EVERY 4 HOURS PRN
Status: DISCONTINUED | OUTPATIENT
Start: 2021-11-08 | End: 2021-11-09 | Stop reason: HOSPADM

## 2021-11-08 RX ORDER — SODIUM CHLORIDE, SODIUM LACTATE, POTASSIUM CHLORIDE, CALCIUM CHLORIDE 600; 310; 30; 20 MG/100ML; MG/100ML; MG/100ML; MG/100ML
500 INJECTION, SOLUTION INTRAVENOUS CONTINUOUS
Status: DISCONTINUED | OUTPATIENT
Start: 2021-11-08 | End: 2021-11-08 | Stop reason: HOSPADM

## 2021-11-08 RX ORDER — TETRACAINE HYDROCHLORIDE 5 MG/ML
SOLUTION OPHTHALMIC PRN
Status: DISCONTINUED | OUTPATIENT
Start: 2021-11-08 | End: 2021-11-08 | Stop reason: HOSPADM

## 2021-11-08 RX ORDER — MOXIFLOXACIN 5 MG/ML
1 SOLUTION/ DROPS OPHTHALMIC 3 TIMES DAILY
Qty: 3 ML | Refills: 1 | Status: SHIPPED | OUTPATIENT
Start: 2021-11-08 | End: 2022-01-21

## 2021-11-08 RX ORDER — ONDANSETRON 4 MG/1
4 TABLET, ORALLY DISINTEGRATING ORAL EVERY 30 MIN PRN
Status: DISCONTINUED | OUTPATIENT
Start: 2021-11-08 | End: 2021-11-09 | Stop reason: HOSPADM

## 2021-11-08 RX ORDER — FENTANYL CITRATE 50 UG/ML
INJECTION, SOLUTION INTRAMUSCULAR; INTRAVENOUS PRN
Status: DISCONTINUED | OUTPATIENT
Start: 2021-11-08 | End: 2021-11-08

## 2021-11-08 RX ORDER — KETOROLAC TROMETHAMINE 4 MG/ML
1 SOLUTION/ DROPS OPHTHALMIC 4 TIMES DAILY
Qty: 5 ML | Refills: 1 | Status: SHIPPED | OUTPATIENT
Start: 2021-11-08 | End: 2022-01-21

## 2021-11-08 RX ORDER — FENTANYL CITRATE 50 UG/ML
25 INJECTION, SOLUTION INTRAMUSCULAR; INTRAVENOUS EVERY 5 MIN PRN
Status: DISCONTINUED | OUTPATIENT
Start: 2021-11-08 | End: 2021-11-08 | Stop reason: HOSPADM

## 2021-11-08 RX ORDER — PREDNISOLONE ACETATE 10 MG/ML
1 SUSPENSION/ DROPS OPHTHALMIC 4 TIMES DAILY
Qty: 10 ML | Refills: 1 | Status: SHIPPED | OUTPATIENT
Start: 2021-11-08 | End: 2022-01-21

## 2021-11-08 RX ORDER — BALANCED SALT SOLUTION 6.4; .75; .48; .3; 3.9; 1.7 MG/ML; MG/ML; MG/ML; MG/ML; MG/ML; MG/ML
SOLUTION OPHTHALMIC PRN
Status: DISCONTINUED | OUTPATIENT
Start: 2021-11-08 | End: 2021-11-08 | Stop reason: HOSPADM

## 2021-11-08 RX ORDER — ONDANSETRON 2 MG/ML
4 INJECTION INTRAMUSCULAR; INTRAVENOUS EVERY 30 MIN PRN
Status: DISCONTINUED | OUTPATIENT
Start: 2021-11-08 | End: 2021-11-09 | Stop reason: HOSPADM

## 2021-11-08 RX ORDER — LIDOCAINE HYDROCHLORIDE 10 MG/ML
INJECTION, SOLUTION EPIDURAL; INFILTRATION; INTRACAUDAL; PERINEURAL PRN
Status: DISCONTINUED | OUTPATIENT
Start: 2021-11-08 | End: 2021-11-08 | Stop reason: HOSPADM

## 2021-11-08 RX ORDER — CYCLOPENTOLAT/TROPIC/PHENYLEPH 1%-1%-2.5%
1 DROPS (EA) OPHTHALMIC (EYE)
Status: COMPLETED | OUTPATIENT
Start: 2021-11-08 | End: 2021-11-08

## 2021-11-08 RX ORDER — LIDOCAINE 40 MG/G
CREAM TOPICAL
Status: DISCONTINUED | OUTPATIENT
Start: 2021-11-08 | End: 2021-11-08 | Stop reason: HOSPADM

## 2021-11-08 RX ADMIN — FENTANYL CITRATE 25 MCG: 50 INJECTION, SOLUTION INTRAMUSCULAR; INTRAVENOUS at 13:10

## 2021-11-08 RX ADMIN — FENTANYL CITRATE 25 MCG: 50 INJECTION, SOLUTION INTRAMUSCULAR; INTRAVENOUS at 13:09

## 2021-11-08 RX ADMIN — Medication 1 DROP: at 12:39

## 2021-11-08 RX ADMIN — Medication 1 DROP: at 12:44

## 2021-11-08 RX ADMIN — Medication 1 DROP: at 12:34

## 2021-11-08 RX ADMIN — SODIUM CHLORIDE, SODIUM LACTATE, POTASSIUM CHLORIDE, CALCIUM CHLORIDE: 600; 310; 30; 20 INJECTION, SOLUTION INTRAVENOUS at 13:00

## 2021-11-08 RX ADMIN — PROPARACAINE HYDROCHLORIDE 1 DROP: 5 SOLUTION/ DROPS OPHTHALMIC at 12:33

## 2021-11-08 RX ADMIN — FENTANYL CITRATE 50 MCG: 50 INJECTION, SOLUTION INTRAMUSCULAR; INTRAVENOUS at 13:03

## 2021-11-08 ASSESSMENT — VISUAL ACUITY: METHOD: SNELLEN - LINEAR

## 2021-11-08 ASSESSMENT — TONOMETRY
IOP_METHOD: TONOPEN
OD_IOP_MMHG: 7

## 2021-11-08 ASSESSMENT — EXTERNAL EXAM - LEFT EYE: OS_EXAM: NORMAL

## 2021-11-08 ASSESSMENT — SLIT LAMP EXAM - LIDS: COMMENTS: NORMAL

## 2021-11-08 ASSESSMENT — MIFFLIN-ST. JEOR: SCORE: 1157.67

## 2021-11-08 ASSESSMENT — EXTERNAL EXAM - RIGHT EYE: OD_EXAM: NORMAL

## 2021-11-08 NOTE — ANESTHESIA CARE TRANSFER NOTE
Patient: Comfort Stage    Procedure: Procedure(s):  COMPLEX RIGHT EYE PHACOEMULSIFICATION, CATARACT, WITH INTRAOCULAR LENS IMPLANT       Diagnosis: Cortical age-related cataract of both eyes [H25.013]  Anatomical narrow angle borderline glaucoma of both eyes [H40.033]  Diagnosis Additional Information: No value filed.    Anesthesia Type:   MAC     Note:    Oropharynx: oropharynx clear of all foreign objects and spontaneously breathing  Level of Consciousness: awake  Oxygen Supplementation: room air    Independent Airway: airway patency satisfactory and stable  Dentition: dentition unchanged  Vital Signs Stable: post-procedure vital signs reviewed and stable  Report to RN Given: handoff report given  Patient transferred to: Phase II    Handoff Report: Identifed the Patient, Identified the Reponsible Provider, Reviewed the pertinent medical history, Discussed the surgical course, Reviewed Intra-OP anesthesia mangement and issues during anesthesia, Set expectations for post-procedure period and Allowed opportunity for questions and acknowledgement of understanding      Vitals:  Vitals Value Taken Time   BP     Temp     Pulse     Resp     SpO2         Electronically Signed By: RAULITO Leyva CRNA  November 8, 2021  1:23 PM

## 2021-11-08 NOTE — ANESTHESIA PREPROCEDURE EVALUATION
Anesthesia Pre-Procedure Evaluation    Patient: Kiera Dobson   MRN: [id number] : 1965        Preoperative Diagnosis: Cortical age-related cataract of both eyes [H25.013]  Anatomical narrow angle borderline glaucoma of both eyes [H40.033]    Procedure : Procedure(s):  RIGHT EYE PHACOEMULSIFICATION, CATARACT, WITH INTRAOCULAR LENS IMPLANT          Past Medical History:   Diagnosis Date     Diabetes mellitus (H)     Type 1     Elevated cholesterol      Glaucoma     Glaucoma suspect     Glaucoma suspect      Kidney stones      Nonsenile cataract       Past Surgical History:   Procedure Laterality Date     APPENDECTOMY OPEN       BIOPSY OF SKIN LESION  2011    returned 2012 for additional removal     c sections  99,97,08     EXTRACORPOREAL SHOCK WAVE LITHOTRIPSY, CYSTOSCOPY, INSERT STENT URETER(S), COMBINED       LASER HOLMIUM LITHOTRIPSY URETER(S), INSERT STENT, COMBINED  2013    Procedure: COMBINED CYSTOSCOPY, URETEROSCOPY, LASER HOLMIUM LITHOTRIPSY URETER(S), INSERT STENT;  Right Ureteroscopy Holmium Laser Lithotripsy Stent Placement;  Surgeon: Kirill Marlow MD;  Location: [facility] OR     PHACOEMULSIFICATION CLEAR CORNEA WITH STANDARD INTRAOCULAR LENS IMPLANT Left 10/25/2021    Procedure: LEFT EYE PHACOEMULSIFICATION, CATARACT, WITH INTRAOCULAR LENS IMPLANT COMPLEX;  Surgeon: Curtis Costa MD;  Location: [facility] OR      Allergies   Allergen Reactions     Ampicillin Sodium Rash      Social History     Tobacco Use     Smoking status: Never Smoker     Smokeless tobacco: Never Used   Substance Use Topics     Alcohol use: Yes     Comment: occasionally 1-2 beers      Wt Readings from Last 1 Encounters:   21 64.4 kg (142 lb)        Anesthesia Evaluation   Pt has had prior anesthetic.     No history of anesthetic complications       ROS/MED HX  ENT/Pulmonary:       Neurologic:       Cardiovascular:     (+) Dyslipidemia -----    METS/Exercise Tolerance: >4 METS     Hematologic:       Musculoskeletal:       GI/Hepatic:       Renal/Genitourinary:     (+) Nephrolithiasis ,     Endo:     (+) type I DM, - using insulin pump.     Psychiatric/Substance Use:       Infectious Disease:       Malignancy:       Other:            Physical Exam    Airway        Mallampati: II   TM distance: > 3 FB      Respiratory Devices and Support         Dental           Cardiovascular          Rhythm and rate: regular     Pulmonary           breath sounds clear to auscultation           OUTSIDE LABS:  CBC:   Lab Results   Component Value Date    WBC 11.6 (H) 03/01/2016    WBC 15.4 (H) 12/21/2015    HGB 12.8 10/22/2021    HGB 11.1 (L) 03/01/2016    HCT 34.8 (L) 03/01/2016    HCT 32.0 (L) 12/21/2015     03/01/2016     12/21/2015     BMP:   Lab Results   Component Value Date     08/20/2019     03/01/2016    POTASSIUM 4.3 10/22/2021    POTASSIUM 4.4 08/20/2019    CHLORIDE 106 08/20/2019    CHLORIDE 106 03/01/2016    CO2 30 08/20/2019    CO2 29 03/01/2016    BUN 13 08/20/2019    BUN 18 03/01/2016    CR 0.70 06/16/2021    CR 0.64 07/24/2020     (H) 03/01/2016    GLC 40 (LL) 12/21/2015     COAGS: No results found for: PTT, INR, FIBR  POC:   Lab Results   Component Value Date     (H) 03/01/2016    HCG Negative 12/12/2013    HCGS Negative 03/01/2016     HEPATIC:   Lab Results   Component Value Date    ALBUMIN 3.8 02/14/2017    PROTTOTAL 7.4 02/14/2017    ALT 15 06/16/2021    AST 15 06/16/2021    ALKPHOS 72 02/14/2017    BILITOTAL 0.4 02/14/2017     OTHER:   Lab Results   Component Value Date    A1C 6.9 (H) 06/16/2021    ROS 7.9 (L) 03/01/2016    PHOS 3.3 06/21/2011    TSH 3.00 06/16/2021    T4 0.84 02/14/2017    CRP 6.0 06/08/2009    SED 16 05/04/2009       Anesthesia Plan    ASA Status:  2      Anesthesia Type: MAC.              Consents    Anesthesia Plan(s) and associated risks, benefits, and realistic alternatives discussed. Questions answered and  patient/representative(s) expressed understanding.     - Discussed with:  Patient         Postoperative Care            Comments:                Joaquin Salguero MD

## 2021-11-08 NOTE — DISCHARGE INSTRUCTIONS
[facility]  Home Care Following Anesthesia  For 24 hours after surgery:  1. Get plenty of rest.  A responsible adult must stay with you for at least 24 hours after you leave the surgery center.  2. Do not drive or use heavy equipment.  If you have weakness or tingling, don't drive or use heavy equipment until this feeling goes away.   3. Do not drink alcohol.   4. Avoid strenuous or risky activities.  Ask for help when climbing stairs.  5. You may feel lightheaded.  IF so, sit for a few minutes before standing.  Have someone help you get up.   6. If you have nausea (feel sick to your stomach): Drink only clear liquids such as apple juice, ginger ale, broth or 7-Up.  Rest may also help.  Be sure to drink enough fluids.  Move to a regular diet as you feel able.   7. You may have a slight fever.  Call the doctor if your fever is over 100 F (37.7 C) (taken under the tongue) or lasts longer than 24 hours.  8. You may have a dry mouth, a sore throat, muscle aches or trouble sleeping. These should go away after 24 hours.  9. Do not make important or legal decisions.   10. It is recommended to avoid smoking.               Tips for taking pain medications  To get the best pain relief possible, remember these points:    Take pain medications as directed, before pain becomes severe.    Pain medication can upset your stomach: taking it with food may help.    Constipation is a common side effect of pain medication. Drink plenty of  fluids.    Eat foods high in fiber. Take a stool softener if recommended by your doctor or pharmacist.    Do not drink alcohol, drive or operate machinery while taking pain medications.    Ask about other ways to control pain, such as with heat, ice or relaxation.    Tylenol/Acetaminophen Consumption  To help encourage the safe use of acetaminophen, the makers of TYLENOL  have lowered the maximum daily dose for single-ingredient Extra Strength TYLENOL   (acetaminophen) products sold in the [place] from 8 pills per day (4,000 mg) to 6 pills per day (3,000 mg). The dosing interval has also changed from 2 pills every 4-6 hours to 2 pills every 6 hours.    If you feel your pain relief is insufficient, you may take Tylenol/Acetaminophen in addition to your narcotic pain medication.     Be careful not to exceed 3,000 mg of Tylenol/Acetaminophen in a 24 hour period from all sources.    If you are taking extra strength Tylenol/acetaminophen (500 mg), the maximum dose is 6 tablets in 24 hours.    If you are taking regular strength acetaminophen (325 mg), the maximum dose is 9 tablets in 24 hours.    Call a doctor for any of the followin. Signs of infection (fever, growing tenderness at the surgery site, a large amount of drainage or bleeding, severe pain, foul-smelling drainage, redness, swelling).  2. It has been over 8 to 10 hours since surgery and you are still not able to urinate (pass water).  3. Headache for over 24 hours.  4. Signs of Covid-19 infection (temperature over 100 degrees, shortness of breath, cough, loss of taste/smell, generalized body aches, persistent headache, chills, sore throat, nausea/vomiting/diarrhea)  Your doctor is:       Dr. Curtis Costa, Ophthalmology: [phone]               Or dial [phone] and ask for the resident on call for:  Ophthalmology  For emergency care, call the:  [place] [facility]:  [phone] (TTY for hearing impaired: [phone])

## 2021-11-08 NOTE — ANESTHESIA POSTPROCEDURE EVALUATION
Patient: Comfort Stage    Procedure: Procedure(s):  COMPLEX RIGHT EYE PHACOEMULSIFICATION, CATARACT, WITH INTRAOCULAR LENS IMPLANT       Diagnosis:Cortical age-related cataract of both eyes [H25.013]  Anatomical narrow angle borderline glaucoma of both eyes [H40.033]  Diagnosis Additional Information: No value filed.    Anesthesia Type:  MAC    Note:  Disposition: Outpatient   Postop Pain Control: Uneventful            Sign Out: Well controlled pain   PONV: No   Neuro/Psych: Uneventful            Sign Out: Acceptable/Baseline neuro status   Airway/Respiratory: Uneventful            Sign Out: Acceptable/Baseline resp. status   CV/Hemodynamics: Uneventful            Sign Out: Acceptable CV status; No obvious hypovolemia; No obvious fluid overload   Other NRE: NONE   DID A NON-ROUTINE EVENT OCCUR? No           Last vitals:  Vitals Value Taken Time   /57 11/08/21 1334   Temp 36.6  C (97.8  F) 11/08/21 1334   Pulse     Resp 16 11/08/21 1334   SpO2 96 % 11/08/21 1334       Electronically Signed By: Joaquin Salguero MD  November 8, 2021  2:36 PM

## 2021-11-08 NOTE — PROGRESS NOTES
POD#0, status post cataract surgery, right eye    No complaints.  Denies eye pain.    Impression/Plan:  Pseudophakia, OD: POD0, good post-operative appearance. IOP reasonable.    Start pred forte 4x/day for 1 week, then 3x/day for 1 week, then taper to 2x/day for 1 week, then daily for 1 week and then stop    Start Moxifloxacin TID for 7 days    Eye protection at all times and eye shield at night for 1 week.    Limited activities with no exercise or heavy lifting for 1 week.    Instructed patient to contact us for decreasing vision, eye pain, new floaters or flashes of light or other concerning symptoms.  Written instructions given  Return to clinic 11/23/21    Sarah Sierra MD  Ophthalmology Resident, PGY-4      Attending Physician Attestation:  Complete documentation of historical and exam elements from today's encounter can be found in the full encounter summary report (not reduplicated in this progress note).  I personally obtained the chief complaint(s) and history of present illness.  I confirmed and edited as necessary the review of systems, past medical/surgical history, family history, social history, and examination findings as documented by others; and I examined the patient myself.  I personally reviewed the relevant tests, images, and reports as documented above.  I formulated and edited as necessary the assessment and plan and discussed the findings and management plan with the patient and family. . - MD adelaide Vigil

## 2021-11-08 NOTE — OP NOTE
PREOPERATIVE DIAGNOSIS:   1. Cortical age-related cataract right eye    2. Anatomical narrow angle borderline glaucoma of both eyes            POSTOPERATIVE DIAGNOSIS: Same   PROCEDURES:   1. Complex cataract extraction with intraocular lens implant Right eye.  SURGEON: Curtis Costa M.D.  Assistant: Justyna Alcala MD                 INDICATIONS: The patient Kiera Dobson presented to the eye clinic with decreased vision secondary to cataract in the Right eye. The risks, benefits and alternatives to cataract extraction were discussed. The patient elected to proceed. All questions were answered to the patient's satisfaction.   DESCRIPTION OF PROCEDURE:   Prior to the procedure, appropriate cardiac and respiratory monitors were applied to the patient.  In the pre-operative holding area, a drop of topical tetracaine followed by lidocaine gel followed by povidone iodine.  The patient was brought to the operating room where a surgical pause was carried out to identify with all members of the surgical team the correct surgical site.  With adequate anesthesia, the Right eye was prepped and draped in the usual sterile fashion. A lid speculum was placed, and the operating microscope was rotated into position. A paracentesis was created.  Trypan blue stain was injected under air to stain the anterior lens capsule.  Trypan blue stain was elected due to a poor red reflex in the setting of dense cataract. Through this limbal paracentesis, the anterior chamber was filled with preservative-free lidocaine followed by viscoelastic.  A temporal wound was created at the limbus using a 2.6 mm blade. A capsulorrhexis was initiated using a bent 25-gauge needle and was completed in continuous and circular fashion using the capsulorrhexis forceps. The lens nucleus was hydrodissected using balanced salt solution.  The lens nucleus was rotated and removed using phacoemulsification in a stop and chop technique.  Residual cortical  material was removed using irrigation-aspiration.  The capsular bag was reinflated to its maximal extent with cohesive viscoelastic.  A 26.0 diopter ZCBOO inserted into the capsular bag.  The lens power selected was reviewed using the intraocular lens power measurements that were obtained preoperatively to confirm that the correct lens was selected for the desired post-operative refractive state. The residual viscoelastic was removed in its entirety, the wound were hydrated and found to be self-sealing.  Intracameral moxifloxacin was administered. Tactile pressure was confirmed to be in a normal range.  Subconjunctival Dexamethasone (2 mg) was injected.. The lid speculum was removed and a patch and shield were applied.  The patient tolerated the procedure well, and there were no complications.    PLAN: The patient will be discharged to home and will follow up tomorrow morning in the eye clinic.  EBL:  None  Complications:  None  Implant Name Type Inv. Item Serial No.  Lot No. LRB No. Used Action   EYE IMP IOL DESTIN PCL TECNIS ZCB00 26.0 - [id number] Lens/Eye Implant EYE IMP IOL DESTIN PCL TECNIS ZCB00 26.0 [id number] [employer]  Right 1 Implanted          Attending Physician Procedure Attestation: I was present for the entire procedure       Curtis Costa MD  , Comprehensive Ophthalmology  Department of Ophthalmology and Visual Neurosciences  [facility]

## 2021-11-14 DIAGNOSIS — N20.0 CALCULUS OF KIDNEY: ICD-10-CM

## 2021-11-14 NOTE — TELEPHONE ENCOUNTER
insulin aspart (NOVOLOG VIAL) 100 UNITS/ML vial  Last Written Prescription Date:  10/13/20  ast Fill Quantity: 60ml,   # refills: 3  Last Office Visit : 9/14/21  [facility]  Future Office visit:  12/28/21    Routing refill request to provider for review/approval because: end date 10/12/20. Endo triage to fill.

## 2021-11-23 ENCOUNTER — OFFICE VISIT (OUTPATIENT)
Dept: OPHTHALMOLOGY | Facility: CLINIC | Age: 56
End: 2021-11-23
Attending: OPHTHALMOLOGY
Payer: COMMERCIAL

## 2021-11-23 DIAGNOSIS — Z96.1 PSEUDOPHAKIA OF BOTH EYES: Primary | ICD-10-CM

## 2021-11-23 PROCEDURE — 99024 POSTOP FOLLOW-UP VISIT: CPT | Performed by: OPHTHALMOLOGY

## 2021-11-23 ASSESSMENT — REFRACTION_MANIFEST
OD_ADD: +2.00
OD_SPHERE: -1.00
OS_SPHERE: -1.75
OS_AXIS: 160
OS_CYLINDER: +1.25
OS_ADD: +2.00
OD_CYLINDER: +0.25
OD_AXIS: 025

## 2021-11-23 ASSESSMENT — VISUAL ACUITY
OD_SC+: -2
METHOD: SNELLEN - LINEAR
OS_SC: 20/50
OD_SC: 20/40
OS_SC+: +2

## 2021-11-23 ASSESSMENT — EXTERNAL EXAM - RIGHT EYE: OD_EXAM: NORMAL

## 2021-11-23 ASSESSMENT — SLIT LAMP EXAM - LIDS
COMMENTS: NORMAL
COMMENTS: NORMAL

## 2021-11-23 ASSESSMENT — TONOMETRY
IOP_METHOD: ICARE
OD_IOP_MMHG: 13
OS_IOP_MMHG: 12

## 2021-11-23 ASSESSMENT — CONF VISUAL FIELD
OS_NORMAL: 1
METHOD: COUNTING FINGERS
OD_NORMAL: 1

## 2021-11-23 ASSESSMENT — CUP TO DISC RATIO
OS_RATIO: 0.7
OD_RATIO: 0.7

## 2021-11-23 ASSESSMENT — EXTERNAL EXAM - LEFT EYE: OS_EXAM: NORMAL

## 2021-11-23 NOTE — PROGRESS NOTES
Chief Complaint(s) and History of Present Illness(es)     Post Op (Ophthalmology) Right Eye     Laterality: right eye (Surgery date 11/8/2021)              Comments     POW#2 CEIOL R eye, 11/8/2021  L eye: 10/25/2021    Patient states R eye is feeling irritated but could be dryness. Finished   post op eye drops L eye on Sunday and 2 more weeks to go for R eye.   Difficult driving at night, glare/haloes persist. No current glasses.    Priscilla Doherty CO 11/23/2021 8:46 AM            Review of systems for the eyes was negative other than the pertinent positives/negatives listed in the HPI.      Assessment & Plan      Kiera Dobson is a 56 year old female with the following diagnoses:   1. Pseudophakia of both eyes         Doing well  Ok to resume normal activities  Taper Predforte as directed  Glasses prescription updated  Artificial tears as needed        Patient disposition:   Return in about 4 weeks (around 12/21/2021) for DFE with Dr. De León.   Igor in 6 mo with VT, OCT Nerve fiber layer            Attending Physician Attestation:  Complete documentation of historical and exam elements from today's encounter can be found in the full encounter summary report (not reduplicated in this progress note).  I personally obtained the chief complaint(s) and history of present illness.  I confirmed and edited as necessary the review of systems, past medical/surgical history, family history, social history, and examination findings as documented by others; and I examined the patient myself.  I personally reviewed the relevant tests, images, and reports as documented above.  I formulated and edited as necessary the assessment and plan and discussed the findings and management plan with the patient and family. . - Curtis Costa MD

## 2021-12-28 ENCOUNTER — OFFICE VISIT (OUTPATIENT)
Dept: ENDOCRINOLOGY | Facility: CLINIC | Age: 56
End: 2021-12-28
Payer: COMMERCIAL

## 2021-12-28 VITALS
DIASTOLIC BLOOD PRESSURE: 76 MMHG | SYSTOLIC BLOOD PRESSURE: 124 MMHG | WEIGHT: 142 LBS | BODY MASS INDEX: 27.88 KG/M2 | HEIGHT: 60 IN | HEART RATE: 93 BPM

## 2021-12-28 DIAGNOSIS — E10.9 TYPE 1 DIABETES MELLITUS WITHOUT COMPLICATION (H): Primary | ICD-10-CM

## 2021-12-28 LAB — HBA1C MFR BLD: 7.1 % (ref 4.3–?)

## 2021-12-28 PROCEDURE — 83036 HEMOGLOBIN GLYCOSYLATED A1C: CPT | Performed by: INTERNAL MEDICINE

## 2021-12-28 PROCEDURE — 99215 OFFICE O/P EST HI 40 MIN: CPT | Performed by: INTERNAL MEDICINE

## 2021-12-28 ASSESSMENT — PAIN SCALES - GENERAL: PAINLEVEL: NO PAIN (0)

## 2021-12-28 ASSESSMENT — MIFFLIN-ST. JEOR: SCORE: 1165.61

## 2021-12-28 NOTE — PATIENT INSTRUCTIONS
We appreciate your assistance in coordinating your healthcare.     Please upload your insulin pump, blood sugar meter and/or continuous glucose monitor at home 1-2 days before your next diabetes-related appointment.   This will allow your provider to review your  data before your scheduled virtual visit.    To ask a question to your Endocrine care team, please send them a [employer] message, or reach them by phone at [phone]     To expedite your medication refill(s), please contact your pharmacy and have them   fax a refill request to: [phone].  *Please allow 3 business days for routine medication refills.  *Please allow 5 business days for controlled substance medication refills.    For after-hours urgent Endocrine issues, that do not require 911, please dial [phone], and ask to speak with the Endocrinologist On-Call        We changed the basal between 10 am and 10:30 PM to 0.7    Please use the pump settings in calculating boluses this week.      Send me your pump and CGM data next week.  We may need to make additional adjustments in your basal rate.     Have the lipid test done    See the dermatologist.  I put in a referral.

## 2021-12-28 NOTE — LETTER
12/28/2021       RE: Kiera Dobson  [address]  [facility] [address]     Dear Colleague,    Thank you for referring your patient, Kiera Dobson, to the [facility] [place] at [facility]. Please see a copy of my visit note below.    This 56-year-old woman was seen for follow-up of her longstanding type 1 diabetes.  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  I haven't seen her in over a year but she has had appointments with Jo Jean.  The last one was in Sept. she is now using a control IQ tandem pump.  She uploaded to the [employer] site and we have the data to review today.  Her current settings are:    Midnight basal 0.65, target 110, carb ratio 12, correction 70    2 AM basal 0.5 target 110, carb 12, correction 70    7 AM basal 0.5, target 110, carb 12, correction 7 0    10 AM basal 0.65, target 110, carb 12, correction 70    10:30 PM basal 0.6, target 110, carb 12, correction 70    She wore her Dexcom 97% of the time during the last 2 weeks.  She was in target between 7 0-180 77% of the time, 180-250 21% of the time, above 250 1% of the time, between 54 and 70 1% of the time, and below 54 0.3% of the time.  She boluses 8 times per day on average during the last 2 weeks, 60 of her boluses were given automatically, 43 were done manually, 30 were overridden, 68 done with the use of the calculator, and 59 were done with a correction.  She reports that she has the sleep target set on her pump from about 11 PM to 6 AM but the download suggests she spent only 5% of her time in sleep. She is also getting auto corrections during the night.   She is not using exercise mode.  Her daily average carb intake is 18 g.  Kiera tells me she feels like she is always needing to correct what the pump is giving her to prevent her from getting high glucose values.  She feels confident with carb counting but is frustrated when her blood  sugars are over 200.  When they are, she will just add extra boluses, sometimes as much as a whole unit in order to get herself down.  As a result of some of this aggressive dosing, she has had some lows.  She always recognizes her lows when she gets into the 50s.  She has not needed the assistance of another to treat a low in a long time.    Sometime ago, we reduce the simvastatin to 40 mg a day.  This was partly done to reduce any symptoms she had that may have been related to the drug.  She has had shooting pains in her legs for some time.  When she reduce the dose, it did not really help with her shooting pains.  She uses a CBD cream at night that helps her with the symptoms.  She has no foot ulcers.    She had a cataract removed from her right eye in Nov 2021 and right eye in Oct 2021.  She saw her retina MD in August and has non proliferative retinopathy.    She also reports that she has had the onset of some strange lesions on her right hand.  These have been there for some time and she wonders what they are.  They do not hurt, itch, or drain any material.    She denies any chest pain or shortness of breath.  Overall she feels pretty well.    Current Outpatient Medications   Medication     aspirin 81 MG chewable tablet     blood glucose (ERICA CONTOUR NEXT) test strip     Continuous Blood Gluc  (DEXCOM G6 ) MELE     Continuous Blood Gluc  (DEXCOM G6 ) MELE     Continuous Blood Gluc Sensor (DEXCOM G6 SENSOR) MISC     Continuous Blood Gluc Sensor (DEXCOM G6 SENSOR) MISC     Continuous Blood Gluc Transmit (DEXCOM G6 TRANSMITTER) MISC     Continuous Blood Gluc Transmit (DEXCOM G6 TRANSMITTER) MISC     Glucagon, rDNA, (GLUCAGON EMERGENCY) 1 MG KIT     glucose (CVS GLUCOSE) 40 % GEL     glucose blood VI test strips strip     insulin aspart (NOVOLOG VIAL) 100 UNITS/ML vial     insulin glargine (LANTUS SOLOSTAR) 100 UNIT/ML pen     ketorolac tromethamine (ACULAR-LS) 0.4 % SOLN  ophthalmic solution     ketorolac tromethamine (ACULAR-LS) 0.4 % SOLN ophthalmic solution     moxifloxacin (VIGAMOX) 0.5 % ophthalmic solution     moxifloxacin (VIGAMOX) 0.5 % ophthalmic solution     prednisoLONE acetate (PRED FORTE) 1 % ophthalmic suspension     prednisoLONE acetate (PRED FORTE) 1 % ophthalmic suspension     simvastatin (ZOCOR) 40 MG tablet     glucagon (GLUCAGON EMERGENCY) 1 MG injection     No current facility-administered medications for this visit.     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  And and her 's have 2 children still at home with them - a 21-year-old son and a 13-year-old son. Their 24-year-old daughter is in graduate school.    /76 (BP Location: Left arm, Patient Position: Sitting, Cuff Size: Adult Regular)   Pulse 93   Ht 1.524 m (5')   Wt 64.4 kg (142 lb)   BMI 27.73 kg/m    VSS  NAD  Eyes - no periorbital edema, conjunctival injection, scleral icterus  CV - RRR.  Normal pulses in feet.  No edema  Neuro - sensation intact to monofilament on soles of feet.  DTR 2/4 biceps  Skin - normal texture, she has 6 macular papular spots on her right hand on the lateral aspect of the dorsum.  They range in size from about 2 to 4 mm.  A couple of them almost look vesicular.  They are reddish-purple in color.  There is no surrounding erythema or heat.  Feet - no ulcers     Recent Labs   Lab Test 12/28/21  1652 09/14/21  0900 06/18/21  1600 06/16/21  0845 06/16/21  0837 07/24/20  1221 07/24/20  1212 08/14/18  1322 08/14/18  1320 02/14/17  1157 02/14/17  1149 03/01/16  0223 02/10/16  0949   A1C  --   --   --   --  6.9*  --  7.0*  --   --   --   --   --   --    HEMOGLOBINA1 7.1 6.9  --   --   --    < >  --    < >  --    < >  --    < >  --    TSH  --   --   --   --  3.00  --  1.91   < > 1.93  --  1.57  --  1.13   T4  --   --   --   --   --   --   --   --   --   --  0.84  --  0.84   LDL  --   --   --   --  90  --   --   --   72   < >  --   --  101*   HDL  --   --   --   --  69  --   --   --  61   < >  --   --  78   TRIG  --   --   --   --  56  --   --   --  94   < >  --   --  58   CR  --   --   --   --  0.70  --  0.64   < > 0.62  --  0.62   < > 0.66   MICROL  --   --  6 55  --    < >  --    < >  --    < >  --   --  8    < > = values in this interval not displayed.     Assessment and plan:    1.  Diabetes control.  Kiera's  A1c is in target but she is having to be very aggressive about bolusing to keep her sugars below 200.  I think this means that her basal rates are not high enough.  I increase the basal rate between 10 AM and 10:30 PM to 0.7.  I asked her to use the settings in the pump to do boluses for the next several days.  She will then upload her data and send it to me.  I am anticipating will need to make additional changes in her basal.    2.  Diabetes complications.  She has symptoms of neuropathy but does not want to take gabapentin for them.  She has preproliferative retinopathy and has seen her eye MD.   Her kidney function is normal.    3.lesions on her hands.  I am not sure what these are.  I will refer her to dermatology.    4.CVD risk.  Her blood pressure is in control.  She is due to get her lipid panel repeated.    F/u with Jazmyne Jean in 3 mo and f/u with me in 6 mo    I spent 35 minutes with the patient today.  On the day of visit I spent an additional 25 minutes reviewing the chart, reviewing and interpreting her labs, reviewing and interpreting her pump/CGM data, ordering referrals, and doing documentation.    Paola Chahal MD

## 2021-12-28 NOTE — PROGRESS NOTES
This 56-year-old woman was seen for follow-up of her longstanding type 1 diabetes.  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  I haven't seen her in over a year but she has had appointments with Jo Jean.  The last one was in Sept. she is now using a control IQ tandem pump.  She uploaded to the [employer] site and we have the data to review today.  Her current settings are:    Midnight basal 0.65, target 110, carb ratio 12, correction 70    2 AM basal 0.5 target 110, carb 12, correction 70    7 AM basal 0.5, target 110, carb 12, correction 7 0    10 AM basal 0.65, target 110, carb 12, correction 70    10:30 PM basal 0.6, target 110, carb 12, correction 70    She wore her Dexcom 97% of the time during the last 2 weeks.  She was in target between 7 0-180 77% of the time, 180-250 21% of the time, above 250 1% of the time, between 54 and 70 1% of the time, and below 54 0.3% of the time.  She boluses 8 times per day on average during the last 2 weeks, 60 of her boluses were given automatically, 43 were done manually, 30 were overridden, 68 done with the use of the calculator, and 59 were done with a correction.  She reports that she has the sleep target set on her pump from about 11 PM to 6 AM but the download suggests she spent only 5% of her time in sleep. She is also getting auto corrections during the night.   She is not using exercise mode.  Her daily average carb intake is 18 g.  Kiera tells me she feels like she is always needing to correct what the pump is giving her to prevent her from getting high glucose values.  She feels confident with carb counting but is frustrated when her blood sugars are over 200.  When they are, she will just add extra boluses, sometimes as much as a whole unit in order to get herself down.  As a result of some of this aggressive dosing, she has had some lows.  She always recognizes her lows when she gets into the 50s.  She has not needed the assistance of another to treat a  low in a long time.    Sometime ago, we reduce the simvastatin to 40 mg a day.  This was partly done to reduce any symptoms she had that may have been related to the drug.  She has had shooting pains in her legs for some time.  When she reduce the dose, it did not really help with her shooting pains.  She uses a CBD cream at night that helps her with the symptoms.  She has no foot ulcers.    She had a cataract removed from her right eye in Nov 2021 and right eye in Oct 2021.  She saw her retina MD in August and has non proliferative retinopathy.    She also reports that she has had the onset of some strange lesions on her right hand.  These have been there for some time and she wonders what they are.  They do not hurt, itch, or drain any material.    She denies any chest pain or shortness of breath.  Overall she feels pretty well.    Current Outpatient Medications   Medication     aspirin 81 MG chewable tablet     blood glucose (ERICA CONTOUR NEXT) test strip     Continuous Blood Gluc  (DEXCOM G6 ) MELE     Continuous Blood Gluc  (DEXCOM G6 ) MELE     Continuous Blood Gluc Sensor (DEXCOM G6 SENSOR) MISC     Continuous Blood Gluc Sensor (DEXCOM G6 SENSOR) MISC     Continuous Blood Gluc Transmit (DEXCOM G6 TRANSMITTER) MISC     Continuous Blood Gluc Transmit (DEXCOM G6 TRANSMITTER) MISC     Glucagon, rDNA, (GLUCAGON EMERGENCY) 1 MG KIT     glucose (CVS GLUCOSE) 40 % GEL     glucose blood VI test strips strip     insulin aspart (NOVOLOG VIAL) 100 UNITS/ML vial     insulin glargine (LANTUS SOLOSTAR) 100 UNIT/ML pen     ketorolac tromethamine (ACULAR-LS) 0.4 % SOLN ophthalmic solution     ketorolac tromethamine (ACULAR-LS) 0.4 % SOLN ophthalmic solution     moxifloxacin (VIGAMOX) 0.5 % ophthalmic solution     moxifloxacin (VIGAMOX) 0.5 % ophthalmic solution     prednisoLONE acetate (PRED FORTE) 1 % ophthalmic suspension     prednisoLONE acetate (PRED FORTE) 1 % ophthalmic suspension      simvastatin (ZOCOR) 40 MG tablet     glucagon (GLUCAGON EMERGENCY) 1 MG injection     No current facility-administered medications for this visit.     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  And and her 's have 2 children still at home with them - a 21-year-old son and a 13-year-old son. Their 24-year-old daughter is in graduate school.    /76 (BP Location: Left arm, Patient Position: Sitting, Cuff Size: Adult Regular)   Pulse 93   Ht 1.524 m (5')   Wt 64.4 kg (142 lb)   BMI 27.73 kg/m    VSS  NAD  Eyes - no periorbital edema, conjunctival injection, scleral icterus  CV - RRR.  Normal pulses in feet.  No edema  Neuro - sensation intact to monofilament on soles of feet.  DTR 2/4 biceps  Skin - normal texture, she has 6 macular papular spots on her right hand on the lateral aspect of the dorsum.  They range in size from about 2 to 4 mm.  A couple of them almost look vesicular.  They are reddish-purple in color.  There is no surrounding erythema or heat.  Feet - no ulcers     Recent Labs   Lab Test 12/28/21  1652 09/14/21  0900 06/18/21  1600 06/16/21  0845 06/16/21  0837 07/24/20  1221 07/24/20  1212 08/14/18  1322 08/14/18  1320 02/14/17  1157 02/14/17  1149 03/01/16  0223 02/10/16  0949   A1C  --   --   --   --  6.9*  --  7.0*  --   --   --   --   --   --    HEMOGLOBINA1 7.1 6.9  --   --   --    < >  --    < >  --    < >  --    < >  --    TSH  --   --   --   --  3.00  --  1.91   < > 1.93  --  1.57  --  1.13   T4  --   --   --   --   --   --   --   --   --   --  0.84  --  0.84   LDL  --   --   --   --  90  --   --   --  72   < >  --   --  101*   HDL  --   --   --   --  69  --   --   --  61   < >  --   --  78   TRIG  --   --   --   --  56  --   --   --  94   < >  --   --  58   CR  --   --   --   --  0.70  --  0.64   < > 0.62  --  0.62   < > 0.66   MICROL  --   --  6 55  --    < >  --    < >  --    < >  --   --  8    < > = values in this  interval not displayed.     Assessment and plan:    1.  Diabetes control.  Kiera's  A1c is in target but she is having to be very aggressive about bolusing to keep her sugars below 200.  I think this means that her basal rates are not high enough.  I increase the basal rate between 10 AM and 10:30 PM to 0.7.  I asked her to use the settings in the pump to do boluses for the next several days.  She will then upload her data and send it to me.  I am anticipating will need to make additional changes in her basal.    2.  Diabetes complications.  She has symptoms of neuropathy but does not want to take gabapentin for them.  She has preproliferative retinopathy and has seen her eye MD.   Her kidney function is normal.    3.lesions on her hands.  I am not sure what these are.  I will refer her to dermatology.    4.CVD risk.  Her blood pressure is in control.  She is due to get her lipid panel repeated.    F/u with Jazmyne Jean in 3 mo and f/u with me in 6 mo    I spent 35 minutes with the patient today.  On the day of visit I spent an additional 25 minutes reviewing the chart, reviewing and interpreting her labs, reviewing and interpreting her pump/CGM data, ordering referrals, and doing documentation.    Paola Chahla MD

## 2021-12-28 NOTE — NURSING NOTE
Chief Complaint   Patient presents with     Diabetes     Vital signs:      BP: 124/76 Pulse: 93           Height: 152.4 cm (5') Weight: 64.4 kg (142 lb)  Estimated body mass index is 27.73 kg/m  as calculated from the following:    Height as of this encounter: 1.524 m (5').    Weight as of this encounter: 64.4 kg (142 lb).    Dianne Doherty, EMT

## 2022-01-21 ENCOUNTER — OFFICE VISIT (OUTPATIENT)
Dept: OPHTHALMOLOGY | Facility: CLINIC | Age: 57
End: 2022-01-21
Attending: OPHTHALMOLOGY
Payer: COMMERCIAL

## 2022-01-21 DIAGNOSIS — E10.3313 MODERATE NONPROLIFERATIVE DIABETIC RETINOPATHY OF BOTH EYES WITH MACULAR EDEMA ASSOCIATED WITH TYPE 1 DIABETES MELLITUS (H): Primary | ICD-10-CM

## 2022-01-21 DIAGNOSIS — H43.393 VITREOUS SYNERESIS OF BOTH EYES: ICD-10-CM

## 2022-01-21 PROCEDURE — 92134 CPTRZ OPH DX IMG PST SGM RTA: CPT | Performed by: OPHTHALMOLOGY

## 2022-01-21 PROCEDURE — 99213 OFFICE O/P EST LOW 20 MIN: CPT | Mod: 24 | Performed by: OPHTHALMOLOGY

## 2022-01-21 PROCEDURE — G0463 HOSPITAL OUTPT CLINIC VISIT: HCPCS | Mod: 25

## 2022-01-21 ASSESSMENT — VISUAL ACUITY
OS_CC+: -2
METHOD: SNELLEN - LINEAR
CORRECTION_TYPE: GLASSES
OS_CC: 20/20
OD_CC: 20/25
OD_CC+: -2

## 2022-01-21 ASSESSMENT — REFRACTION_WEARINGRX
OS_AXIS: 158
OS_ADD: +2.00
OS_SPHERE: -1.75
OD_SPHERE: -1.00
OD_AXIS: 025
OS_CYLINDER: +1.25
OD_ADD: +2.00
SPECS_TYPE: PAL
OD_CYLINDER: +0.25

## 2022-01-21 ASSESSMENT — SLIT LAMP EXAM - LIDS
COMMENTS: NORMAL
COMMENTS: NORMAL

## 2022-01-21 ASSESSMENT — TONOMETRY
OD_IOP_MMHG: 14
IOP_METHOD: TONOPEN
OS_IOP_MMHG: 11

## 2022-01-21 ASSESSMENT — CONF VISUAL FIELD
METHOD: COUNTING FINGERS
OD_NORMAL: 1
OS_NORMAL: 1

## 2022-01-21 ASSESSMENT — CUP TO DISC RATIO
OS_RATIO: 0.7
OD_RATIO: 0.7

## 2022-01-21 ASSESSMENT — EXTERNAL EXAM - LEFT EYE: OS_EXAM: NORMAL

## 2022-01-21 ASSESSMENT — EXTERNAL EXAM - RIGHT EYE: OD_EXAM: NORMAL

## 2022-01-21 NOTE — NURSING NOTE
Chief Complaints and History of Present Illnesses   Patient presents with     Diabetic Retinopathy Follow Up     1.5 year follow up both eyes.     Chief Complaint(s) and History of Present Illness(es)     Diabetic Retinopathy Follow Up     Comments: 1.5 year follow up both eyes.              Comments     Pt states vision has been good since cataract surgery in both eyes. No eye pain today.  No flashes. New floaters in RE, appears to be more since last visit.   No redness. Dryness in BE, relief with drops.    DM1 BS: 214 currently per pt.  Lab Results       Component                Value               Date                  A1C: 7.2 taken about 1 month ago per pt.       A1C                      6.9                 06/16/2021                 A1C                      7.0                 07/24/2020                 A1C                      7.9                 07/16/2013                 A1C                      7.9                 04/03/2013                 A1C                      8.4                 02/07/2013              AKIL Joe January 21, 2022 8:55 AM

## 2022-01-21 NOTE — PROGRESS NOTES
CC -Mild/Mod NPDR OU    INTERVAL HISTORY -    Had CE/IOL OU since DUY happy with improvement  Last A1c 7.3 on 3/2021      PMH  -  Diabetes mellitus I sees Tirso    PAST OCULAR SURGERY  CE/IOL OD 10/25/21 (Costa)  CE/IOL OS 11/8/21 (Costa)      RETINAL IMAGING  OCT 1-21-22  OD - retina normal, milDME PHF attached   OS - retina normal, tr DME PHF attached,     FA 7-24-20  OD - (transit) normal filling, normal macula, 1+ peripheral MAs and ischemia, no NV  OS - normal filling, normal macula, 1+ peripheral MAs and ischemia, no NV    ASSESSMENT:    #.  Mild/moderate NPDR (based on FA) OU with DM I and DME OU   - last FA 7/2020 showed mild  retinopathy, not visible on exam   - BP/BG control      # DME OU   - mild, VA good   - observe    - recheck 6 motnsh      # tr PCO OU   - mild NVS    #. OAG suspect based on CDR   - sees Igor    #.  Asteroid hyalosis OD, syneresis OU   - trace asteroid OD   - advised S/Sx RD 6/2021         return to clinic  6 months, OCT OU, DFE OU        ATTESTATION     Attending Physician Attestation:      Complete documentation of historical and exam elements from today's encounter can be found in the full encounter summary report (not reduplicated in this progress note).  I personally obtained the chief complaint(s) and history of present illness.  I confirmed and edited as necessary the review of systems, past medical/surgical history, family history, social history, and examination findings as documented by others; and I examined the patient myself.  I personally reviewed the relevant tests, images, and reports as documented above.  I personally reviewed the ophthalmic test(s) associated with this encounter, agree with the interpretation(s) as documented by the resident/fellow, and have edited the corresponding report(s) as necessary.   I formulated and edited as necessary the assessment and plan and discussed the findings and management plan with the patient and family    Devika Dorsey  MD, PhD  , Vitreoretinal Surgery  Department of Ophthalmology  [facility]

## 2022-02-19 ENCOUNTER — HEALTH MAINTENANCE LETTER (OUTPATIENT)
Age: 57
End: 2022-02-19

## 2022-04-05 ENCOUNTER — OFFICE VISIT (OUTPATIENT)
Dept: DERMATOLOGY | Facility: CLINIC | Age: 57
End: 2022-04-05
Attending: INTERNAL MEDICINE
Payer: COMMERCIAL

## 2022-04-05 DIAGNOSIS — R21 RASH: ICD-10-CM

## 2022-04-05 DIAGNOSIS — E10.9 TYPE 1 DIABETES MELLITUS WITHOUT COMPLICATION (H): ICD-10-CM

## 2022-04-05 PROCEDURE — 11104 PUNCH BX SKIN SINGLE LESION: CPT | Performed by: DERMATOLOGY

## 2022-04-05 PROCEDURE — 88312 SPECIAL STAINS GROUP 1: CPT | Mod: TC | Performed by: DERMATOLOGY

## 2022-04-05 PROCEDURE — 88312 SPECIAL STAINS GROUP 1: CPT | Mod: 26 | Performed by: DERMATOLOGY

## 2022-04-05 PROCEDURE — 88305 TISSUE EXAM BY PATHOLOGIST: CPT | Mod: 26 | Performed by: DERMATOLOGY

## 2022-04-05 RX ORDER — BETAMETHASONE DIPROPIONATE 0.05 %
OINTMENT (GRAM) TOPICAL 2 TIMES DAILY
Qty: 50 G | Refills: 1 | Status: SHIPPED | OUTPATIENT
Start: 2022-04-05 | End: 2024-01-23

## 2022-04-05 ASSESSMENT — PAIN SCALES - GENERAL: PAINLEVEL: NO PAIN (0)

## 2022-04-05 NOTE — PATIENT INSTRUCTIONS
Preventive Care:    Breast Cancer Screening: During our visit today, we discussed that it is recommended you receive breast cancer screening. Please call or make an appointment with your primary care provider to discuss this with them. You may also call the  [facility] scheduling line ([phone]) to set up a mammography appointment at the Breast Center within the [facility].    Colorectal Cancer Screening: During our visit today, we discussed that it is recommended you receive colorectal cancer screening. Please call or make an appointment with your primary care provider to discuss this. You may also call the  [facility] scheduling line ([phone]) to set up a colonoscopy appointment.    Diabetic Eye Exam Screening: During our visit today, we discussed that it is recommended you receive diabetic eye exam screening. Please call or make an appointment with your primary care provider to discuss this with them. You may also call the  [facility] scheduling line ([phone]) to set up an eye exam at one of the [facility].

## 2022-04-05 NOTE — LETTER
4/5/2022       RE: Kiera Dobson  [address]  [place] [address]     Dear Colleague,    Thank you for referring your patient, Kiera Dobson, to the [facility] [place] [facility]. Please see a copy of my visit note below.    Dermatology Clinic Follow-up Visit    Chief complaint: New rash on hand    Subjective:  Kiera is a very pleasant 56-year-old female with a history of type 1 diabetes who presents to our clinic today with a complaint of a 3-month history of new reddish-purple bumps on her right dorsal hand.  Previously she was seen in our dermatology clinic in 2019, at which time she had a biopsy of a pigmented lesion on the trunk, which returned as a benign nevus.  She has no personal history of skin cancer.  Today she reports a several month history of mostly asymptomatic clustered bumps on along her right dorsal hand lateral surface.  She thinks that this problem may be hereditary, as she thinks her father who is also diabetic had the same issue.  Her right hand is the only area of involvement.  She has not used any treatment to date.    Review of systems: No recent fevers.    Objective:  General: In general this is a well-appearing well-nourished female with normal mood and affect was oriented x3  Skin: A cutaneous exam of the head, neck and bilateral upper extremities was performed.  On the right dorsal hand towards the thenar eminence, there are a cluster of 4 pink to violaceous 2 to 4 mm papules without significant surface change.    Assessment and Plan:  New papular eruption in a patient with type 1 diabetes.  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  These lesions also vaguely resemble perniosis, though she reports that they do not come and go, thus I view this as less likely.  Today's plan is as follows:  1.  We agreed on a punch biopsy today to confirm the  diagnosis.  Please see the procedure note below.  2.  We gave her a prescription for betamethasone ointment to be applied once to twice daily.  She understands to avoid treating the biopsy site for the next several weeks.  3.  We will plan to have her follow-up in approximately 6 weeks time to assess her progress.    Procedure Note  After signed informed consent, the affected area was swabbed with an alcohol pad and injected with 1% lidocaine with 1:100,000 epinephrine.   A 3mm punch biopsy was performed and sent to pathology for review.  Hemostasis was achieved with a single interrupted suture, and petrolatum and bandage were applied.  Patient tolerated procedure without complication.  Appropriate wound care instructions were provided.      Ambrocio Schulte MD  Dermatology Attending

## 2022-04-05 NOTE — NURSING NOTE
"Dermatology Rooming Note    Kiera Dobson's goals for this visit include:   Chief Complaint   Patient presents with     Skin Check     Kiera is here today for purple spots on her hands.  She states \" A couple of months ago I started getting purple dots on her hands.\"     Neyda Cash, RMJOHN  "

## 2022-04-05 NOTE — PROGRESS NOTES
Dermatology Clinic Follow-up Visit    Chief complaint: New rash on hand    Subjective:  Kiera is a very pleasant 56-year-old female with a history of type 1 diabetes who presents to our clinic today with a complaint of a 3-month history of new reddish-purple bumps on her right dorsal hand.  Previously she was seen in our dermatology clinic in 2019, at which time she had a biopsy of a pigmented lesion on the trunk, which returned as a benign nevus.  She has no personal history of skin cancer.  Today she reports a several month history of mostly asymptomatic clustered bumps on along her right dorsal hand lateral surface.  She thinks that this problem may be hereditary, as she thinks her father who is also diabetic had the same issue.  Her right hand is the only area of involvement.  She has not used any treatment to date.    Review of systems: No recent fevers.    Objective:  General: In general this is a well-appearing well-nourished female with normal mood and affect was oriented x3  Skin: A cutaneous exam of the head, neck and bilateral upper extremities was performed.  On the right dorsal hand towards the thenar eminence, there are a cluster of 4 pink to violaceous 2 to 4 mm papules without significant surface change.    Assessment and Plan:  New papular eruption in a patient with type 1 diabetes.  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  These lesions also vaguely resemble perniosis, though she reports that they do not come and go, thus I view this as less likely.  Today's plan is as follows:  1.  We agreed on a punch biopsy today to confirm the diagnosis.  Please see the procedure note below.  2.  We gave her a prescription for betamethasone ointment to be applied once to twice daily.  She understands to avoid treating the biopsy site for the next several weeks.  3.  We will plan to have her follow-up in approximately 6 weeks time to assess her  progress.    Procedure Note  After signed informed consent, the affected area was swabbed with an alcohol pad and injected with 1% lidocaine with 1:100,000 epinephrine.   A 3mm punch biopsy was performed and sent to pathology for review.  Hemostasis was achieved with a single interrupted suture, and petrolatum and bandage were applied.  Patient tolerated procedure without complication.  Appropriate wound care instructions were provided.      Ambrocio Schulte MD  Dermatology Attending

## 2022-04-23 DIAGNOSIS — E10.9 TYPE 1 DIABETES MELLITUS WITHOUT COMPLICATION (H): Primary | ICD-10-CM

## 2022-04-23 DIAGNOSIS — N20.0 CALCULUS OF KIDNEY: ICD-10-CM

## 2022-04-24 NOTE — TELEPHONE ENCOUNTER
insulin aspart (NOVOLOG VIAL) 100 UNITS/ML vial   Last Written Prescription Date:  11/14/21  Last Fill Quantity: 60ml,   # refills: 0  Last Office Visit : 12/28/21  Future Office visit:  5/24/21    Routing refill request to provider for review/approval because: endo triage to fill

## 2022-04-25 RX ORDER — INSULIN ASPART 100 [IU]/ML
INJECTION, SOLUTION INTRAVENOUS; SUBCUTANEOUS
Qty: 60 ML | Refills: 0 | Status: SHIPPED | OUTPATIENT
Start: 2022-04-25 | End: 2022-07-14

## 2022-04-25 NOTE — TELEPHONE ENCOUNTER
Short Acting Insulin Protocol Passed     CCs notified to please help schedule.     RE    F/u with Jazmyne Jean in 3 mo and f/u with me in 6 mo

## 2022-05-01 PROBLEM — R21 RASH: Status: ACTIVE | Noted: 2022-05-01

## 2022-05-02 LAB
PATH REPORT.COMMENTS IMP SPEC: NORMAL
PATH REPORT.FINAL DX SPEC: NORMAL
PATH REPORT.GROSS SPEC: NORMAL
PATH REPORT.MICROSCOPIC SPEC OTHER STN: NORMAL
PATH REPORT.RELEVANT HX SPEC: NORMAL

## 2022-05-24 DIAGNOSIS — H40.003 GLAUCOMA SUSPECT, BOTH EYES: Primary | ICD-10-CM

## 2022-05-24 DIAGNOSIS — H40.033 ANATOMICAL NARROW ANGLE BORDERLINE GLAUCOMA OF BOTH EYES: ICD-10-CM

## 2022-06-02 DIAGNOSIS — E10.65 TYPE 1 DIABETES MELLITUS WITH HYPERGLYCEMIA (H): ICD-10-CM

## 2022-06-06 NOTE — TELEPHONE ENCOUNTER
simvastatin (ZOCOR) 40 MG tablet      Last Written Prescription Date:  6/17/2021  Last Fill Quantity: 90,   # refills: 3  Last Office Visit : 12/28/2021  Future Office visit:  none    Routing refill request to provider for review/approval because:  12 months last LDL (6/16/2021)    LDL Cholesterol Calculated   Date Value Ref Range Status   06/16/2021 90 <100 mg/dL Final     Comment:     Desirable:       <100 mg/dl

## 2022-06-08 RX ORDER — SIMVASTATIN 40 MG
40 TABLET ORAL AT BEDTIME
Qty: 90 TABLET | Refills: 3 | Status: SHIPPED | OUTPATIENT
Start: 2022-06-08 | End: 2023-05-25

## 2022-07-13 DIAGNOSIS — E10.9 TYPE 1 DIABETES MELLITUS WITHOUT COMPLICATION (H): ICD-10-CM

## 2022-07-13 DIAGNOSIS — N20.0 CALCULUS OF KIDNEY: ICD-10-CM

## 2022-07-14 RX ORDER — INSULIN ASPART 100 [IU]/ML
INJECTION, SOLUTION INTRAVENOUS; SUBCUTANEOUS
Qty: 20 ML | Refills: 0 | Status: SHIPPED | OUTPATIENT
Start: 2022-07-14 | End: 2022-10-14

## 2022-07-14 NOTE — TELEPHONE ENCOUNTER
NovoLOG Injection Solution 100 UNIT/ML      Last Written Prescription Date:  4-25-22  Last Fill Quantity: 60 ml,   # refills: 0  Last Office Visit : 12-28-21  Future Office visit:  none    Routing refill request to provider for review/approval because:  Insulin - refilled per clinic

## 2022-07-14 NOTE — TELEPHONE ENCOUNTER
Short Acting Insulin Protocol Failed 07/14/2022 07:58 AM   Protocol Details  Serum creatinine on file in past 12 months    HgbA1C in past 3 or 6 months    Recent (6 mo) or future (30 days) visit within the authorizing provider's specialty     Pt notified to schedule lab draw and follow up visit via Rx.

## 2022-08-06 ENCOUNTER — HEALTH MAINTENANCE LETTER (OUTPATIENT)
Age: 57
End: 2022-08-06

## 2022-08-18 ENCOUNTER — MEDICAL CORRESPONDENCE (OUTPATIENT)
Dept: HEALTH INFORMATION MANAGEMENT | Facility: CLINIC | Age: 57
End: 2022-08-18

## 2022-08-23 ENCOUNTER — MEDICAL CORRESPONDENCE (OUTPATIENT)
Dept: HEALTH INFORMATION MANAGEMENT | Facility: CLINIC | Age: 57
End: 2022-08-23

## 2022-08-23 ENCOUNTER — TELEPHONE (OUTPATIENT)
Dept: ENDOCRINOLOGY | Facility: CLINIC | Age: 57
End: 2022-08-23

## 2022-09-19 NOTE — PROGRESS NOTES
Outcome for 09/19/22 2:30 PM :Left Voicemail for patient to call back   Dianne Lowry    Outcome for 09/19/22 2:33 PM :Sent patient [employer] message asking them to upload their BG data   Dianne Lowry

## 2022-09-20 ENCOUNTER — OFFICE VISIT (OUTPATIENT)
Dept: ENDOCRINOLOGY | Facility: CLINIC | Age: 57
End: 2022-09-20
Payer: COMMERCIAL

## 2022-09-20 VITALS
DIASTOLIC BLOOD PRESSURE: 70 MMHG | HEART RATE: 102 BPM | BODY MASS INDEX: 26.76 KG/M2 | WEIGHT: 137 LBS | SYSTOLIC BLOOD PRESSURE: 117 MMHG

## 2022-09-20 DIAGNOSIS — E10.9 TYPE 1 DIABETES MELLITUS WITHOUT COMPLICATION (H): Primary | ICD-10-CM

## 2022-09-20 LAB — HBA1C MFR BLD: 7 % (ref 4.3–?)

## 2022-09-20 PROCEDURE — 99215 OFFICE O/P EST HI 40 MIN: CPT | Performed by: PHYSICIAN ASSISTANT

## 2022-09-20 PROCEDURE — 83036 HEMOGLOBIN GLYCOSYLATED A1C: CPT | Performed by: PHYSICIAN ASSISTANT

## 2022-09-20 ASSESSMENT — PAIN SCALES - GENERAL: PAINLEVEL: NO PAIN (0)

## 2022-09-20 NOTE — NURSING NOTE
Chief Complaint   Patient presents with     Diabetes     Vital signs:      BP: 117/70 Pulse: 102             Weight: 62.1 kg (137 lb)  Estimated body mass index is 26.76 kg/m  as calculated from the following:    Height as of 12/28/21: 1.524 m (5').    Weight as of this encounter: 62.1 kg (137 lb).

## 2022-09-20 NOTE — PROGRESS NOTES
HPI:  Kiera Dobson is a 56 year old female with type 1 diabetes mellitus.  Pt seen in clinic today for diabetes follow up.  Kiera was dx with type 1 diabetes in 3/1968.  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 Symptoms of peripheral neuropathy in feet.  No nephropathy.  Her hx is also significant for hypoglycemia unawareness.  Kiera continues to use a Tandem insulin pump-control IQ and DexcomG6 sensor.  Her basal insulin rates are set at :  Midnight = 0.65 units/hr.  2 am = 0.5 units/hr.  7 am = 0.5 units/hr.  10 am = 0.7 units/hr.  10:30 pm = 0.6 units/hr.  Her I/C ratio is 1:12. Sensitivity is 70.  Pt's A1C is 7.0 % today.  I have no insulin pump or DexcomG6 sensor download data today.  Her blood sugar control has improved since I asked her to start using breast tissue for her infusion sites.  She has several areas of scar tissue on her abdomen- poor insulin absorption.  On ROS today, less hip and leg pain since she decreased her statin dose.  She continues to have intermittent chest pain which she feels is from anxiety.   No radiation of pain, SOB, n/v or diaphoresis with this pain.  Arthritic pain.  Blurred vision with floaters.  She denies SOB,cough, fever or chills at this time.  Some blurred vision.  Pt denies abd pain, diarrhea, dysuria or hematuria.      DIABETES CARE:  Retinopathy:yes; moderate nonproliferative diabetic retinopathy both eyes without macular edema Seen here in Jan 2022.  Nephropathy: urine microalbuminuria negative on 6/18/2021.  Neuropathy: none.  Feet: no foot ulcers and normal monofilamentous exam today.  Taking ASA: yes.  Lipids: LDL 90 in 6/2021.    Pt is taking Simvastatin.  CAD: no. Normal stress echo in 9/2010.  Mental health:hx of ADHD.  Insulin: Tandem insulin pump- control IQ.  Testing: DexcomG6 sensor.  Hypoglycemia treatment: Pt has updated glucagon kit.  Insulin back up:Pt has Lantus to use in case of insulin pump  failure.      ROS  Please see under HPI.    Allergies  Allergies   Allergen Reactions     Ampicillin Sodium Rash       Medications  Current Outpatient Medications   Medication Sig Dispense Refill     aspirin 81 MG chewable tablet Take 81 mg by mouth every other day  108 tablet 3     betamethasone dipropionate (DIPROSONE) 0.05 % external ointment Apply topically 2 times daily To areas of bumps on hands.  Avoid treating biopsy site for first few weeks. 50 g 1     blood glucose (ERICA CONTOUR NEXT) test strip Use to test blood sugar 4-6 times daily or as directed. 720 strip 3     Continuous Blood Gluc  (DEXCOM G6 ) MELE 1 Device continuous 1 Device 0     Continuous Blood Gluc  (DEXCOM G6 ) MELE 1 each See Admin Instructions Use per 's instructions, to monitor glucose continuously. 1 Device 0     Continuous Blood Gluc Sensor (DEXCOM G6 SENSOR) MISC 1 each every 10 days 9 each 3     Continuous Blood Gluc Sensor (DEXCOM G6 SENSOR) MISC 1 each See Admin Instructions Change every 10 days. 9 each 3     Continuous Blood Gluc Transmit (DEXCOM G6 TRANSMITTER) MISC 1 each every 3 months 1 each 3     Continuous Blood Gluc Transmit (DEXCOM G6 TRANSMITTER) MISC 1 each See Admin Instructions Change every 90 days. 1 each 3     Glucagon, rDNA, (GLUCAGON EMERGENCY) 1 MG KIT Inject 1 mg IM for severe hypoglycemia. 1 kit 1     glucose blood VI test strips strip Test up to ten times per day as directed 5 Box 8     insulin aspart (NOVOLOG VIAL) 100 UNITS/ML vial Via Pump Approx 60 units daily. Lab draw and Follow up visit needed. Call  to schedule. 20 mL 0     insulin glargine (LANTUS SOLOSTAR) 100 UNIT/ML pen Inject 15 units subcutaneous daily ONLY IF INSULIN PUMP FAILS. 15 mL 3     simvastatin (ZOCOR) 40 MG tablet Take 1 tablet (40 mg) by mouth At Bedtime 90 tablet 3     glucagon (GLUCAGON EMERGENCY) 1 MG injection Inject 1 mg into the muscle once for 1 dose Use as directed 1 mg 2      glucose 40 % (400 mg/mL) gel Take 15 g by mouth as needed (Patient not taking: No sig reported)         Family History  family history includes Alzheimer Disease in her father; Autoimmune Disease in her mother; Cancer in her father; Diabetes in her father and another family member; Glaucoma in her father and mother; Hypertension in her mother.    Social History  Smoke: none.  ETOH: rare.   with children.  Pt works full time in a lab.    Past Medical History  Past Medical History:   Diagnosis Date     Diabetes mellitus (H)     Type 1     Elevated cholesterol      Glaucoma     Glaucoma suspect     Glaucoma suspect      Kidney stones      Nonsenile cataract      Rash 5/1/2022     Past Surgical History:   Procedure Laterality Date     APPENDECTOMY OPEN  1981     BIOPSY OF SKIN LESION  12/30/2011    returned January 2012 for additional removal     c sections  99,97,08     EXTRACORPOREAL SHOCK WAVE LITHOTRIPSY, CYSTOSCOPY, INSERT STENT URETER(S), COMBINED  2004     LASER HOLMIUM LITHOTRIPSY URETER(S), INSERT STENT, COMBINED  12/12/2013    Procedure: COMBINED CYSTOSCOPY, URETEROSCOPY, LASER HOLMIUM LITHOTRIPSY URETER(S), INSERT STENT;  Right Ureteroscopy Holmium Laser Lithotripsy Stent Placement;  Surgeon: Kirill Malrow MD;  Location: [facility] OR     PHACOEMULSIFICATION CLEAR CORNEA WITH STANDARD INTRAOCULAR LENS IMPLANT Left 10/25/2021    Procedure: LEFT EYE PHACOEMULSIFICATION, CATARACT, WITH INTRAOCULAR LENS IMPLANT COMPLEX;  Surgeon: Curtis Costa MD;  Location: [facility] OR     PHACOEMULSIFICATION CLEAR CORNEA WITH STANDARD INTRAOCULAR LENS IMPLANT Right 11/8/2021    Procedure: COMPLEX RIGHT EYE PHACOEMULSIFICATION, CATARACT, WITH INTRAOCULAR LENS IMPLANT;  Surgeon: Curtis Costa MD;  Location: [facility] OR     S/P right shoulder surgery in Dec 2012.    Physical Exam  /70 (BP Location: Left arm, Patient Position: Sitting, Cuff Size: Adult Regular)   Pulse 102   Wt 62.1 kg (137 lb)   LMP  04/10/2016 (Approximate)   BMI 26.76 kg/m    GENERAL: Pt in no distress today.  SKIN: Dry.  HEENT: PERRLA; fundi not examined.  LUNGS: clear b/l.  CARDIAC: RRR.  ABDOMEN: Areas of scar tissue.  EXTREMITY: Bilateral hip pain. No pretibial edema.  FEET: Soft and no ulcers; normal monofilamentous exam today.    RESULTS  Creatinine   Date Value Ref Range Status   06/16/2021 0.70 0.52 - 1.04 mg/dL Final     GFR Estimate   Date Value Ref Range Status   06/16/2021 >90 >60 mL/min/[1.73_m2] Final     Comment:     Non  GFR Calc  Starting 12/18/2018, serum creatinine based estimated GFR (eGFR) will be   calculated using the Chronic Kidney Disease Epidemiology Collaboration   (CKD-EPI) equation.       Hemoglobin A1C   Date Value Ref Range Status   06/16/2021 6.9 (H) 0 - 5.6 % Final     Comment:     Normal <5.7% Prediabetes 5.7-6.4%  Diabetes 6.5% or higher - adopted from ADA   consensus guidelines.       Potassium   Date Value Ref Range Status   10/22/2021 4.3 3.4 - 5.3 mmol/L Final   08/20/2019 4.4 3.4 - 5.3 mmol/L Final     ALT   Date Value Ref Range Status   06/16/2021 15 0 - 50 U/L Final     AST   Date Value Ref Range Status   06/16/2021 15 0 - 45 U/L Final     TSH   Date Value Ref Range Status   06/16/2021 3.00 0.40 - 4.00 mU/L Final     T4 Free   Date Value Ref Range Status   02/14/2017 0.84 0.76 - 1.46 ng/dL Final       Cholesterol   Date Value Ref Range Status   06/16/2021 170 <200 mg/dL Final   08/14/2018 152 <200 mg/dL Final     HDL Cholesterol   Date Value Ref Range Status   06/16/2021 69 >49 mg/dL Final   08/14/2018 61 >49 mg/dL Final     LDL Cholesterol Calculated   Date Value Ref Range Status   06/16/2021 90 <100 mg/dL Final     Comment:     Desirable:       <100 mg/dl   08/14/2018 72 <100 mg/dL Final     Comment:     Desirable:       <100 mg/dl     Triglycerides   Date Value Ref Range Status   06/16/2021 56 <150 mg/dL Final   08/14/2018 94 <150 mg/dL Final     Cholesterol/HDL Ratio   Date Value  Ref Range Status   08/18/2014 2.7 0.0 - 5.0 Final   09/17/2013 2.3 0.0 - 5.0 Final       ASSESSMENT/PLAN:    1.  TYPE 1 DIABETES MELLITUS:Type 1 diabetes mellitus complicated by moderate NPDR both eyes without macular edema, neuropathy and hypoglycemia unawareness.  Kiera will continue to use her upper back/sides and breast tissue for infusion sites for better insulin absorption.  I did not make an insulin pump setting changes today since I have no insulin pump or sensor data.  Pt's urine microalbuminuria was negative on 6/18/2021 with a normal creat/GFR.  No foot ulcers and normal monofilamentous exam today.    2. MODERATE NPDR: Last seen by Oph here in Jan 2022 with moderate NPDR both eyes without macular edema.      3.  HYPERLIPIDEMIA:  LDL 90 in 6/2021.  Simvastatin dose was reduced to 40 mg daily given her hx of hip/leg and muscle pain.  Fasting lipid panel ordered.    4.  ADHD/STRESS: She has not seen her therapist lately.    5. HYPOGLYCEMIA UNAWARENESS: She is to wear her DexcomG6 sensor full time.  Pt has updated glucagon kit.    6.  CHEST PAIN: Intermittent chest pain. She has had this in the past- not new.  Pt had normal stress echo in 2010.    7.  FOLLOW UP: with me or Dr. Chahal in 3 months.    Total time spent reviewing chart and labs  today = 6 minutes.  Total time for clinic visit today = 25 minutes.  Total time for documentation today = 15 minutes.    TOTAL TIME FOR VISIT TODAY = 46  minutes.

## 2022-09-20 NOTE — LETTER
9/20/2022       RE: Kiera Dobson  [address]  [facility] [address]     Dear Colleague,    Thank you for referring your patient, Kiera Dobson, to the [facility] [place] at [facility]. Please see a copy of my visit note below.    Outcome for 09/19/22 2:30 PM :Left Voicemail for patient to call back   Dianne Lowry    Outcome for 09/19/22 2:33 PM :Sent patient 4INFO message asking them to upload their BG data   Dianne Lowry          HPI:  Kiera Dobson is a 56 year old female with type 1 diabetes mellitus.  Pt seen in clinic today for diabetes follow up.  Kiera was dx with type 1 diabetes in 3/1970.  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 Symptoms of peripheral neuropathy in feet.  No nephropathy.  Her hx is also significant for hypoglycemia unawareness.  Kiera continues to use a Tandem insulin pump-control IQ and DexcomG6 sensor.  Her basal insulin rates are set at :  Midnight = 0.65 units/hr.  2 am = 0.5 units/hr.  7 am = 0.5 units/hr.  10 am = 0.7 units/hr.  10:30 pm = 0.6 units/hr.  Her I/C ratio is 1:12. Sensitivity is 70.  Pt's A1C is 7.0 % today.  I have no insulin pump or DexcomG6 sensor download data today.  Her blood sugar control has improved since I asked her to start using breast tissue for her infusion sites.  She has several areas of scar tissue on her abdomen- poor insulin absorption.  On ROS today, less hip and leg pain since she decreased her statin dose.  She continues to have intermittent chest pain which she feels is from anxiety.   No radiation of pain, SOB, n/v or diaphoresis with this pain.  Arthritic pain.  Blurred vision with floaters.  She denies SOB,cough, fever or chills at this time.  Some blurred vision.  Pt denies abd pain, diarrhea, dysuria or hematuria.      DIABETES CARE:  Retinopathy:yes; moderate nonproliferative diabetic retinopathy both eyes without  macular edema Seen here in Jan 2022.  Nephropathy: urine microalbuminuria negative on 6/18/2021.  Neuropathy: none.  Feet: no foot ulcers and normal monofilamentous exam today.  Taking ASA: yes.  Lipids: LDL 90 in 6/2021.    Pt is taking Simvastatin.  CAD: no. Normal stress echo in 9/2010.  Mental health:hx of ADHD.  Insulin: Tandem insulin pump- control IQ.  Testing: DexcomG6 sensor.  Hypoglycemia treatment: Pt has updated glucagon kit.  Insulin back up:Pt has Lantus to use in case of insulin pump failure.      ROS  Please see under HPI.    Allergies  Allergies   Allergen Reactions     Ampicillin Sodium Rash       Medications  Current Outpatient Medications   Medication Sig Dispense Refill     aspirin 81 MG chewable tablet Take 81 mg by mouth every other day  108 tablet 3     betamethasone dipropionate (DIPROSONE) 0.05 % external ointment Apply topically 2 times daily To areas of bumps on hands.  Avoid treating biopsy site for first few weeks. 50 g 1     blood glucose (ERICA CONTOUR NEXT) test strip Use to test blood sugar 4-6 times daily or as directed. 720 strip 3     Continuous Blood Gluc  (DEXCOM G6 ) MELE 1 Device continuous 1 Device 0     Continuous Blood Gluc  (DEXCOM G6 ) MELE 1 each See Admin Instructions Use per 's instructions, to monitor glucose continuously. 1 Device 0     Continuous Blood Gluc Sensor (DEXCOM G6 SENSOR) MISC 1 each every 10 days 9 each 3     Continuous Blood Gluc Sensor (DEXCOM G6 SENSOR) MISC 1 each See Admin Instructions Change every 10 days. 9 each 3     Continuous Blood Gluc Transmit (DEXCOM G6 TRANSMITTER) MISC 1 each every 3 months 1 each 3     Continuous Blood Gluc Transmit (DEXCOM G6 TRANSMITTER) MISC 1 each See Admin Instructions Change every 90 days. 1 each 3     Glucagon, rDNA, (GLUCAGON EMERGENCY) 1 MG KIT Inject 1 mg IM for severe hypoglycemia. 1 kit 1     glucose blood VI test strips strip Test up to ten times per day as  directed 5 Box 8     insulin aspart (NOVOLOG VIAL) 100 UNITS/ML vial Via Pump Approx 60 units daily. Lab draw and Follow up visit needed. Call  to schedule. 20 mL 0     insulin glargine (LANTUS SOLOSTAR) 100 UNIT/ML pen Inject 15 units subcutaneous daily ONLY IF INSULIN PUMP FAILS. 15 mL 3     simvastatin (ZOCOR) 40 MG tablet Take 1 tablet (40 mg) by mouth At Bedtime 90 tablet 3     glucagon (GLUCAGON EMERGENCY) 1 MG injection Inject 1 mg into the muscle once for 1 dose Use as directed 1 mg 2     glucose 40 % (400 mg/mL) gel Take 15 g by mouth as needed (Patient not taking: No sig reported)         Family History  family history includes Alzheimer Disease in her father; Autoimmune Disease in her mother; Cancer in her father; Diabetes in her father and another family member; Glaucoma in her father and mother; Hypertension in her mother.    Social History  Smoke: none.  ETOH: rare.   with children.  Pt works full time in a lab.    Past Medical History  Past Medical History:   Diagnosis Date     Diabetes mellitus (H)     Type 1     Elevated cholesterol      Glaucoma     Glaucoma suspect     Glaucoma suspect      Kidney stones      Nonsenile cataract      Rash 5/1/2022     Past Surgical History:   Procedure Laterality Date     APPENDECTOMY OPEN  1981     BIOPSY OF SKIN LESION  12/30/2011    returned January 2012 for additional removal     c sections  99,97,08     EXTRACORPOREAL SHOCK WAVE LITHOTRIPSY, CYSTOSCOPY, INSERT STENT URETER(S), COMBINED  2004     LASER HOLMIUM LITHOTRIPSY URETER(S), INSERT STENT, COMBINED  12/12/2013    Procedure: COMBINED CYSTOSCOPY, URETEROSCOPY, LASER HOLMIUM LITHOTRIPSY URETER(S), INSERT STENT;  Right Ureteroscopy Holmium Laser Lithotripsy Stent Placement;  Surgeon: Kirill Marlow MD;  Location: [facility]     PHACOEMULSIFICATION CLEAR CORNEA WITH STANDARD INTRAOCULAR LENS IMPLANT Left 10/25/2021    Procedure: LEFT EYE PHACOEMULSIFICATION, CATARACT, WITH INTRAOCULAR  LENS IMPLANT COMPLEX;  Surgeon: Curtis Costa MD;  [place] [facility]     PHACOEMULSIFICATION CLEAR CORNEA WITH STANDARD INTRAOCULAR LENS IMPLANT Right 11/8/2021    Procedure: COMPLEX RIGHT EYE PHACOEMULSIFICATION, CATARACT, WITH INTRAOCULAR LENS IMPLANT;  Surgeon: Curtis Costa MD;  [place] [facility] [place]     S/P right shoulder surgery in Dec 2012.    Physical Exam  /70 (BP Location: Left arm, Patient Position: Sitting, Cuff Size: Adult Regular)   Pulse 102   Wt 62.1 kg (137 lb)   LMP 04/10/2016 (Approximate)   BMI 26.76 kg/m    GENERAL: Pt in no distress today.  SKIN: Dry.  HEENT: PERRLA; fundi not examined.  LUNGS: clear b/l.  CARDIAC: RRR.  ABDOMEN: Areas of scar tissue.  EXTREMITY: Bilateral hip pain. No pretibial edema.  FEET: Soft and no ulcers; normal monofilamentous exam today.    RESULTS  Creatinine   Date Value Ref Range Status   06/16/2021 0.70 0.52 - 1.04 mg/dL Final     GFR Estimate   Date Value Ref Range Status   06/16/2021 >90 >60 mL/min/[1.73_m2] Final     Comment:     Non  GFR Calc  Starting 12/18/2018, serum creatinine based estimated GFR (eGFR) will be   calculated using the Chronic Kidney Disease Epidemiology Collaboration   (CKD-EPI) equation.       Hemoglobin A1C   Date Value Ref Range Status   06/16/2021 6.9 (H) 0 - 5.6 % Final     Comment:     Normal <5.7% Prediabetes 5.7-6.4%  Diabetes 6.5% or higher - adopted from ADA   consensus guidelines.       Potassium   Date Value Ref Range Status   10/22/2021 4.3 3.4 - 5.3 mmol/L Final   08/20/2019 4.4 3.4 - 5.3 mmol/L Final     ALT   Date Value Ref Range Status   06/16/2021 15 0 - 50 U/L Final     AST   Date Value Ref Range Status   06/16/2021 15 0 - 45 U/L Final     TSH   Date Value Ref Range Status   06/16/2021 3.00 0.40 - 4.00 mU/L Final     T4 Free   Date Value Ref Range Status   02/14/2017 0.84 0.76 - 1.46 ng/dL Final       Cholesterol   Date Value Ref Range Status   06/16/2021 170 <200 mg/dL Final    08/14/2018 152 <200 mg/dL Final     HDL Cholesterol   Date Value Ref Range Status   06/16/2021 69 >49 mg/dL Final   08/14/2018 61 >49 mg/dL Final     LDL Cholesterol Calculated   Date Value Ref Range Status   06/16/2021 90 <100 mg/dL Final     Comment:     Desirable:       <100 mg/dl   08/14/2018 72 <100 mg/dL Final     Comment:     Desirable:       <100 mg/dl     Triglycerides   Date Value Ref Range Status   06/16/2021 56 <150 mg/dL Final   08/14/2018 94 <150 mg/dL Final     Cholesterol/HDL Ratio   Date Value Ref Range Status   08/18/2014 2.7 0.0 - 5.0 Final   09/17/2013 2.3 0.0 - 5.0 Final       ASSESSMENT/PLAN:    1.  TYPE 1 DIABETES MELLITUS:Type 1 diabetes mellitus complicated by moderate NPDR both eyes without macular edema, neuropathy and hypoglycemia unawareness.  Kiera will continue to use her upper back/sides and breast tissue for infusion sites for better insulin absorption.  I did not make an insulin pump setting changes today since I have no insulin pump or sensor data.  Pt's urine microalbuminuria was negative on 6/18/2021 with a normal creat/GFR.  No foot ulcers and normal monofilamentous exam today.    2. MODERATE NPDR: Last seen by Oph here in Jan 2022 with moderate NPDR both eyes without macular edema.      3.  HYPERLIPIDEMIA:  LDL 90 in 6/2021.  Simvastatin dose was reduced to 40 mg daily given her hx of hip/leg and muscle pain.  Fasting lipid panel ordered.    4.  ADHD/STRESS: She has not seen her therapist lately.    5. HYPOGLYCEMIA UNAWARENESS: She is to wear her DexcomG6 sensor full time.  Pt has updated glucagon kit.    6.  CHEST PAIN: Intermittent chest pain. She has had this in the past- not new.  Pt had normal stress echo in 2010.    7.  FOLLOW UP: with me or Dr. Chahal in 3 months.    Total time spent reviewing chart and labs  today = 6 minutes.  Total time for clinic visit today = 25 minutes.  Total time for documentation today = 15 minutes.    TOTAL TIME FOR VISIT TODAY = 46   minutes.      Jo Jean PA-C

## 2022-09-28 ENCOUNTER — LAB (OUTPATIENT)
Dept: LAB | Facility: CLINIC | Age: 57
End: 2022-09-28
Payer: COMMERCIAL

## 2022-09-28 DIAGNOSIS — E10.9 TYPE 1 DIABETES MELLITUS WITHOUT COMPLICATION (H): ICD-10-CM

## 2022-09-28 DIAGNOSIS — N20.0 CALCULUS OF KIDNEY: ICD-10-CM

## 2022-09-28 LAB
AST SERPL W P-5'-P-CCNC: 21 U/L (ref 10–35)
CHOLEST SERPL-MCNC: 196 MG/DL
CK SERPL-CCNC: 86 U/L (ref 26–192)
CREAT SERPL-MCNC: 0.63 MG/DL (ref 0.51–0.95)
CREAT UR-MCNC: 83 MG/DL
GFR SERPL CREATININE-BSD FRML MDRD: >90 ML/MIN/1.73M2
HBA1C MFR BLD: 7.1 %
HDLC SERPL-MCNC: 77 MG/DL
LDLC SERPL CALC-MCNC: 107 MG/DL
MICROALBUMIN UR-MCNC: 13.5 MG/L
MICROALBUMIN/CREAT UR: 16.27 MG/G CR (ref 0–25)
NONHDLC SERPL-MCNC: 119 MG/DL
TRIGL SERPL-MCNC: 60 MG/DL
TSH SERPL DL<=0.005 MIU/L-ACNC: 2.12 UIU/ML (ref 0.3–4.2)

## 2022-09-28 PROCEDURE — 82043 UR ALBUMIN QUANTITATIVE: CPT | Performed by: PATHOLOGY

## 2022-09-28 PROCEDURE — 99000 SPECIMEN HANDLING OFFICE-LAB: CPT | Performed by: PATHOLOGY

## 2022-09-28 PROCEDURE — 80061 LIPID PANEL: CPT | Performed by: PATHOLOGY

## 2022-09-28 PROCEDURE — 83036 HEMOGLOBIN GLYCOSYLATED A1C: CPT | Performed by: PATHOLOGY

## 2022-09-28 PROCEDURE — 84450 TRANSFERASE (AST) (SGOT): CPT | Performed by: PATHOLOGY

## 2022-09-28 PROCEDURE — 36415 COLL VENOUS BLD VENIPUNCTURE: CPT | Performed by: PATHOLOGY

## 2022-09-28 PROCEDURE — 84443 ASSAY THYROID STIM HORMONE: CPT | Performed by: PATHOLOGY

## 2022-09-28 PROCEDURE — 82565 ASSAY OF CREATININE: CPT | Performed by: PATHOLOGY

## 2022-09-28 PROCEDURE — 82550 ASSAY OF CK (CPK): CPT | Performed by: PATHOLOGY

## 2022-10-13 DIAGNOSIS — E10.9 TYPE 1 DIABETES MELLITUS WITHOUT COMPLICATION (H): ICD-10-CM

## 2022-10-13 DIAGNOSIS — N20.0 CALCULUS OF KIDNEY: ICD-10-CM

## 2022-10-14 RX ORDER — INSULIN ASPART 100 [IU]/ML
INJECTION, SOLUTION INTRAVENOUS; SUBCUTANEOUS
Qty: 60 ML | Refills: 3 | Status: SHIPPED | OUTPATIENT
Start: 2022-10-14 | End: 2023-08-22

## 2022-10-14 NOTE — TELEPHONE ENCOUNTER
NovoLOG Injection Solution 100 UNIT/ML  Last Written Prescription Date:  7/14/2022  Last Fill Quantity: 20,   # refills: 0  Last Office Visit :  9/20/2022  Future Office visit:   12/20/2022    Routing refill request to provider for review/approval because:  Drug not on the [facility], [facility] or [facility] refill protocol or controlled substance      Sapna Andrade RN  Central Triage Red Flags/Med Refills

## 2022-10-16 ENCOUNTER — HEALTH MAINTENANCE LETTER (OUTPATIENT)
Age: 57
End: 2022-10-16

## 2022-11-12 NOTE — TELEPHONE ENCOUNTER
Acute kidney injury Per clinic notes:   KELSI  Kiera Dobson is a 53 year old female with type 1 diabetes mellitus here today for a follow up visit.  Kiera has had type 1 diabetes for 45 + years.  She has mild/moderate nonproliferative diabetic retinopathy. No peripheral neuropathy or nephropathy.  Her hx is also significant for hypoglycemia unawareness.  She has been using her Medtronic 530G insulin pump and Dexcom.

## 2022-11-18 NOTE — NURSING NOTE
Chief Complaint         Glaucoma Follow-Up   HPI    Glaucoma Follow-Up      In right eye. Vision is with a spot. Associated symptoms include floaters. Negative for flashes. Pain was noted as 0/10.   Comments      Pt here for follow up glaucoma  She states she is noticing an increase in floaters since she saw Dr. oDrsey two months ago.  She notes her right eye had a painful twinge today where it felt like something was being torn.  Her left eye is also feeling sore, she thinks it fluctuates with blood sugars and wearing glasses/removing them.      Lab Results       Component                Value               Date                       A1C                      6.9                 06/16/2021                 A1C                      7.0                 07/24/2020                 A1C                      7.9                 07/16/2013                 A1C                      7.9                 04/03/2013                 A1C                      8.4                 02/07/2013              BETH Manning 3:48 PM 08/25/2021        [de-identified] : November 7, 2022 normal sinus rhythm\par November 18, 2022 normal sinus rhythm

## 2022-12-19 ENCOUNTER — TELEPHONE (OUTPATIENT)
Dept: ENDOCRINOLOGY | Facility: CLINIC | Age: 57
End: 2022-12-19

## 2022-12-19 NOTE — TELEPHONE ENCOUNTER
Patient will download the clarity david on her phone and send a Dexcom sharing code via [employer]  Appointment reminder phone call made to patient.   Dianne Lowry

## 2023-01-12 NOTE — PROGRESS NOTES
Outcome for 01/12/23 8:42 AM: Hoodin message sent  Trena Lloyd, VF  Outcome for 01/17/23 3:35 PM: Left Voicemail   Taylor Myhre, VF  Outcome for 01/18/23 9:26 AM: Per patient, will upload device before appointment- sent patient instructions for Dexcom and Tandem- will need pt login for Tandem once set up  Barbara López LPN

## 2023-01-17 ENCOUNTER — TELEPHONE (OUTPATIENT)
Dept: ENDOCRINOLOGY | Facility: CLINIC | Age: 58
End: 2023-01-17
Payer: COMMERCIAL

## 2023-01-17 NOTE — TELEPHONE ENCOUNTER
Called patient and left voicemail. Patient has an appointment on 1/19/2023. Need patient to upload their Tandem and dexcom device to site for provider to review prior to their appointment.  Taylor Myhre, RMA

## 2023-01-18 NOTE — TELEPHONE ENCOUNTER
Spoke with patient- will send instructions over for uploading Dexcom and Tandem via [employer] message. Patient will try to follow instructions and upload. If any trouble will call back.    Barbara López LPN 01/18/23 9:20 AM

## 2023-01-19 ENCOUNTER — VIRTUAL VISIT (OUTPATIENT)
Dept: ENDOCRINOLOGY | Facility: CLINIC | Age: 58
End: 2023-01-19
Payer: COMMERCIAL

## 2023-01-19 ENCOUNTER — TELEPHONE (OUTPATIENT)
Dept: ENDOCRINOLOGY | Facility: CLINIC | Age: 58
End: 2023-01-19

## 2023-01-19 DIAGNOSIS — E10.65 TYPE 1 DIABETES MELLITUS WITH HYPERGLYCEMIA (H): Primary | ICD-10-CM

## 2023-01-19 PROCEDURE — 99215 OFFICE O/P EST HI 40 MIN: CPT | Mod: 95 | Performed by: PHYSICIAN ASSISTANT

## 2023-01-19 NOTE — PROGRESS NOTES
Due to the COVID 19 pandemic this visit was converted to a telephone visit in order to help prevent spread of infection in this patient and the general population.    Time of start: 8:05 am  Time of end: 8:41 pm  Total duration of telephone visit: 34 minute.  Providers location: offsite.  Patients location: home-[place].    HPI:  Kiera Dobson is a 57 year old female with type 1 diabetes mellitus.  Telephone visit for diabetes follow up.   Kiera was dx with type 1 diabetes in 3/1970.  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   No nephropathy. No sx of neuropathy at this time.  Her hx is significant for hypoglycemia unawareness.  Kiera continues to use a Tandem insulin pump-control IQ and DexcomG6 sensor.  Her basal insulin rates are set at :  Midnight = 0.65 units/hr.  2 am = 0.5 units/hr.  7 am = 0.5 units/hr.  10 am = 0.7 units/hr.  10:30 pm = 0.6 units/hr.  Her I/C ratio is 1:12. Sensitivity is 70.  Pt's A1C was 7.0 % in Sept 2022.  I have no insulin pump download data today.  I reviewed and scanned her DexcomG6 sensor download data in her note below.  Less hypoglycemia.  Average glucose is 160 with SD 49 and estimated A1C 7.1 %.  She has several areas of scar tissue on her abdomen- poor insulin absorption.  On ROS today, less hip and leg pain since she decreased her statin dose.  No neuropathy sx at this time. Denies foot ulcers.  She continues to have intermittent chest pain which she feels is from anxiety.   No SOB, n/v or diaphoresis with this pain.  Arthritic pain.  Vision good at this time.  Nausea when her blood sugar is > 200.  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  She denies SOB,cough, fever or chills at this time.  Pt denies abd pain, diarrhea, dysuria or hematuria.    DIABETES CARE:  Retinopathy:yes; moderate nonproliferative diabetic retinopathy both eyes without macular edema.  Nephropathy: urine  microalbuminuria negative in 9/2022.  Neuropathy: none.  Feet: no foot ulcers and normal monofilamentous exam today in clinic in 9/2022.  Taking ASA: yes.  Lipids:  in 9/2022.   Pt is taking Simvastatin.  CAD: no. Normal stress echo in 9/2010.  Mental health:hx of ADHD.  Insulin: Tandem insulin pump- control IQ.  Testing: DexcomG6 sensor.  Hypoglycemia treatment: Pt has updated glucagon kit.  Insulin back up:Pt has Lantus to use in case of insulin pump failure.          ROS  Please see under HPI.    Allergies  Allergies   Allergen Reactions     Ampicillin Sodium Rash       Medications  Current Outpatient Medications   Medication Sig Dispense Refill     aspirin 81 MG chewable tablet Take 81 mg by mouth every other day  108 tablet 3     betamethasone dipropionate (DIPROSONE) 0.05 % external ointment Apply topically 2 times daily To areas of bumps on hands.  Avoid treating biopsy site for first few weeks. 50 g 1     blood glucose (ERICA CONTOUR NEXT) test strip Use to test blood sugar 4-6 times daily or as directed. 720 strip 3     Continuous Blood Gluc  (DEXCOM G6 ) MELE 1 Device continuous 1 Device 0     Continuous Blood Gluc  (DEXCOM G6 ) MELE 1 each See Admin Instructions Use per 's instructions, to monitor glucose continuously. 1 Device 0     Continuous Blood Gluc Sensor (DEXCOM G6 SENSOR) MISC 1 each every 10 days 9 each 3     Continuous Blood Gluc Sensor (DEXCOM G6 SENSOR) MISC 1 each See Admin Instructions Change every 10 days. 9 each 3     Continuous Blood Gluc Transmit (DEXCOM G6 TRANSMITTER) MISC 1 each every 3 months 1 each 3     Continuous Blood Gluc Transmit (DEXCOM G6 TRANSMITTER) MISC 1 each See Admin Instructions Change every 90 days. 1 each 3     glucagon (GLUCAGON EMERGENCY) 1 MG injection Inject 1 mg into the muscle once for 1 dose Use as directed 1 mg 2     Glucagon, rDNA, (GLUCAGON EMERGENCY) 1 MG KIT Inject 1 mg IM for severe hypoglycemia. 1 kit  1     glucose 40 % (400 mg/mL) gel Take 15 g by mouth as needed (Patient not taking: No sig reported)       glucose blood VI test strips strip Test up to ten times per day as directed 5 Box 8     insulin aspart (NOVOLOG VIAL) 100 UNITS/ML vial Via insulin pump. Approx 60 units daily. 60 mL 3     insulin glargine (LANTUS SOLOSTAR) 100 UNIT/ML pen Inject 15 units subcutaneous daily ONLY IF INSULIN PUMP FAILS. 15 mL 3     simvastatin (ZOCOR) 40 MG tablet Take 1 tablet (40 mg) by mouth At Bedtime 90 tablet 3       Family History  family history includes Alzheimer Disease in her father; Autoimmune Disease in her mother; Cancer in her father; Diabetes in her father and another family member; Glaucoma in her father and mother; Hypertension in her mother.    Social History  Smoke: none.  ETOH: rare.   with children.  Pt works full time in a lab.    Past Medical History  Past Medical History:   Diagnosis Date     Diabetes mellitus (H)     Type 1     Elevated cholesterol      Glaucoma     Glaucoma suspect     Glaucoma suspect      Kidney stones      Nonsenile cataract      Rash 5/1/2022     Past Surgical History:   Procedure Laterality Date     APPENDECTOMY OPEN  1981     BIOPSY OF SKIN LESION  12/30/2011    returned January 2012 for additional removal     c sections  99,97,08     EXTRACORPOREAL SHOCK WAVE LITHOTRIPSY, CYSTOSCOPY, INSERT STENT URETER(S), COMBINED  2004     LASER HOLMIUM LITHOTRIPSY URETER(S), INSERT STENT, COMBINED  12/12/2013    Procedure: COMBINED CYSTOSCOPY, URETEROSCOPY, LASER HOLMIUM LITHOTRIPSY URETER(S), INSERT STENT;  Right Ureteroscopy Holmium Laser Lithotripsy Stent Placement;  Surgeon: Kirill Marlow MD;  Location: [facility] OR     PHACOEMULSIFICATION CLEAR CORNEA WITH STANDARD INTRAOCULAR LENS IMPLANT Left 10/25/2021    Procedure: LEFT EYE PHACOEMULSIFICATION, CATARACT, WITH INTRAOCULAR LENS IMPLANT COMPLEX;  Surgeon: Curtis Costa MD;  Location: [facility] OR     PHACOEMULSIFICATION  CLEAR CORNEA WITH STANDARD INTRAOCULAR LENS IMPLANT Right 11/8/2021    Procedure: COMPLEX RIGHT EYE PHACOEMULSIFICATION, CATARACT, WITH INTRAOCULAR LENS IMPLANT;  Surgeon: Curtis Costa MD;  [place] [facility]     S/P right shoulder surgery in Dec 2012.    Physical Exam    No exam today.    RESULTS  Creatinine   Date Value Ref Range Status   09/28/2022 0.63 0.51 - 0.95 mg/dL Final   06/16/2021 0.70 0.52 - 1.04 mg/dL Final     GFR Estimate   Date Value Ref Range Status   09/28/2022 >90 >60 mL/min/1.73m2 Final     Comment:     Effective December 21, 2021 eGFRcr in adults is calculated using the 2021 CKD-EPI creatinine equation which includes age and gender (Los et al., NE, DOI: 10.1056/NLWZlp9128502)   06/16/2021 >90 >60 mL/min/[1.73_m2] Final     Comment:     Non  GFR Calc  Starting 12/18/2018, serum creatinine based estimated GFR (eGFR) will be   calculated using the Chronic Kidney Disease Epidemiology Collaboration   (CKD-EPI) equation.       Hemoglobin A1C   Date Value Ref Range Status   09/28/2022 7.1 (H) <5.7 % Final     Comment:     Normal <5.7%   Prediabetes 5.7-6.4%    Diabetes 6.5% or higher     Note: Adopted from ADA consensus guidelines.   06/16/2021 6.9 (H) 0 - 5.6 % Final     Comment:     Normal <5.7% Prediabetes 5.7-6.4%  Diabetes 6.5% or higher - adopted from ADA   consensus guidelines.       Potassium   Date Value Ref Range Status   10/22/2021 4.3 3.4 - 5.3 mmol/L Final   08/20/2019 4.4 3.4 - 5.3 mmol/L Final     ALT   Date Value Ref Range Status   06/16/2021 15 0 - 50 U/L Final     AST   Date Value Ref Range Status   09/28/2022 21 10 - 35 U/L Final   06/16/2021 15 0 - 45 U/L Final     TSH   Date Value Ref Range Status   09/28/2022 2.12 0.30 - 4.20 uIU/mL Final   06/16/2021 3.00 0.40 - 4.00 mU/L Final     T4 Free   Date Value Ref Range Status   02/14/2017 0.84 0.76 - 1.46 ng/dL Final       Cholesterol   Date Value Ref Range Status   09/28/2022 196 <200 mg/dL Final    06/16/2021 170 <200 mg/dL Final   08/14/2018 152 <200 mg/dL Final     HDL Cholesterol   Date Value Ref Range Status   06/16/2021 69 >49 mg/dL Final   08/14/2018 61 >49 mg/dL Final     Direct Measure HDL   Date Value Ref Range Status   09/28/2022 77 >=50 mg/dL Final     LDL Cholesterol Calculated   Date Value Ref Range Status   09/28/2022 107 (H) <=100 mg/dL Final   06/16/2021 90 <100 mg/dL Final     Comment:     Desirable:       <100 mg/dl   08/14/2018 72 <100 mg/dL Final     Comment:     Desirable:       <100 mg/dl     Triglycerides   Date Value Ref Range Status   09/28/2022 60 <150 mg/dL Final   06/16/2021 56 <150 mg/dL Final   08/14/2018 94 <150 mg/dL Final     Cholesterol/HDL Ratio   Date Value Ref Range Status   08/18/2014 2.7 0.0 - 5.0 Final   09/17/2013 2.3 0.0 - 5.0 Final       ASSESSMENT/PLAN:    1.  TYPE 1 DIABETES MELLITUS:Type 1 diabetes mellitus complicated by moderate NPDR both eyes without macular edema and hypoglycemia unawareness.  No sx of neuropathy at this time.  Her urine microalbuminuria was negative in 9/2022 with normal creat/GFR.  Kiera will continue to use her upper back/sides and breast tissue for infusion sites for better insulin absorption.  I did not make an insulin pump setting changes today since I have no insulin pump data today.  Her DexcomG6 sensor reports an estimated A1C 7.1 % for the past 2 weeks.    2. MODERATE NPDR: Last seen by Oph here in Jan 2022 with moderate NPDR both eyes without macular edema.    Referred to Oph for diabetic eye exam.    3.  HYPERLIPIDEMIA:   with HDL 77 in Sept 2022.  Simvastatin dose was reduced to 40 mg daily given her hx of hip/leg and muscle pain.  Fasting lipid panel ordered.    4.  ADHD/STRESS: Unchanged.    5. HYPOGLYCEMIA UNAWARENESS: Less hypoglycemia since wearing Tandem insulin pump-control IQ.  She wears her DexcomG6 sensor full time.  Pt has updated glucagon kit.    6.  CHEST PAIN: Intermittent chest pain. She has had this in the  past- not new.  Pt had normal stress echo in 2010.  I suggested that she see Cardiology in follow up which she will consider.    7.  FOLLOW UP: with me in clinic in May 2023 and with Dr. Chahal in Aug 2023.  A1C and fasting lipid panel ordered today.  Oph referral placed today.    Time spent reviewing chart, labs and DexcomG6 sensor download data today = 6 minutes.  Time for telephone visit today = 34 minutes.  Time for documentation today = 10 minutes.    TOTAL TIME FOR VISIT TODAY = 50 minutes.    Jo Jean PA-C

## 2023-01-19 NOTE — LETTER
1/19/2023       RE: Kiera Dobson  [address]  [facility] [address]     Dear Colleague,    Thank you for referring your patient, Kiera Dobson, to the [facility] ENDOCRINOLOGY CLINIC [facility]. Please see a copy of my visit note below.    Outcome for 01/12/23 8:42 AM: [facility] sent  Trenanathalie Lloyd, VF  Outcome for 01/17/23 3:35 PM: Left Voicemail   Hanh Myhre, VF  Outcome for 01/18/23 9:26 AM: Per patient, will upload device before appointment- sent patient instructions for Dexcom and Tandem- will need pt login for Tandem once set up  Barbara López LPN               Kiera is a 57 year old who is being evaluated via a billable telephone visit.      What phone number would you like to be contacted at? [phone]   How would you like to obtain your AVS? [employer]    Due to the COVID 19 pandemic this visit was converted to a telephone visit in order to help prevent spread of infection in this patient and the general population.    Time of start: 8:05 am  Time of end: 8:41 pm  Total duration of telephone visit: 34 minute.  Providers location: offsite.  Patients location: home-[place].    HPI:  Kiera Dobson is a 57 year old female with type 1 diabetes mellitus.  Telephone visit for diabetes follow up.   Kiera was dx with type 1 diabetes in 3/1970.  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   No nephropathy. No sx of neuropathy at this time.  Her hx is significant for hypoglycemia unawareness.  Kiera continues to use a Tandem insulin pump-control IQ and DexcomG6 sensor.  Her basal insulin rates are set at :  Midnight = 0.65 units/hr.  2 am = 0.5 units/hr.  7 am = 0.5 units/hr.  10 am = 0.7 units/hr.  10:30 pm = 0.6 units/hr.  Her I/C ratio is 1:12. Sensitivity is 70.  Pt's A1C was 7.0 % in Sept 2022.  I have no insulin pump download data today.  I reviewed and scanned her DexcomG6 sensor download data  in her note below.  Less hypoglycemia.  Average glucose is 160 with SD 49 and estimated A1C 7.1 %.  She has several areas of scar tissue on her abdomen- poor insulin absorption.  On ROS today, less hip and leg pain since she decreased her statin dose.  No neuropathy sx at this time. Denies foot ulcers.  She continues to have intermittent chest pain which she feels is from anxiety.   No SOB, n/v or diaphoresis with this pain.  Arthritic pain.  Vision good at this time.  Nausea when her blood sugar is > 200.  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  She denies SOB,cough, fever or chills at this time.  Pt denies abd pain, diarrhea, dysuria or hematuria.    DIABETES CARE:  Retinopathy:yes; moderate nonproliferative diabetic retinopathy both eyes without macular edema.  Nephropathy: urine microalbuminuria negative in 9/2022.  Neuropathy: none.  Feet: no foot ulcers and normal monofilamentous exam today in clinic in 9/2022.  Taking ASA: yes.  Lipids:  in 9/2022.   Pt is taking Simvastatin.  CAD: no. Normal stress echo in 9/2010.  Mental health:hx of ADHD.  Insulin: Tandem insulin pump- control IQ.  Testing: DexcomG6 sensor.  Hypoglycemia treatment: Pt has updated glucagon kit.  Insulin back up:Pt has Lantus to use in case of insulin pump failure.          ROS  Please see under HPI.    Allergies  Allergies   Allergen Reactions     Ampicillin Sodium Rash       Medications  Current Outpatient Medications   Medication Sig Dispense Refill     aspirin 81 MG chewable tablet Take 81 mg by mouth every other day  108 tablet 3     betamethasone dipropionate (DIPROSONE) 0.05 % external ointment Apply topically 2 times daily To areas of bumps on hands.  Avoid treating biopsy site for first few weeks. 50 g 1     blood glucose (ERICA CONTOUR NEXT) test strip Use to test blood sugar 4-6 times daily or as directed. 720 strip 3     Continuous Blood Gluc   (DEXCOM G6 ) MELE 1 Device continuous 1 Device 0     Continuous Blood Gluc  (DEXCOM G6 ) MELE 1 each See Admin Instructions Use per 's instructions, to monitor glucose continuously. 1 Device 0     Continuous Blood Gluc Sensor (DEXCOM G6 SENSOR) MISC 1 each every 10 days 9 each 3     Continuous Blood Gluc Sensor (DEXCOM G6 SENSOR) MISC 1 each See Admin Instructions Change every 10 days. 9 each 3     Continuous Blood Gluc Transmit (DEXCOM G6 TRANSMITTER) MISC 1 each every 3 months 1 each 3     Continuous Blood Gluc Transmit (DEXCOM G6 TRANSMITTER) MISC 1 each See Admin Instructions Change every 90 days. 1 each 3     glucagon (GLUCAGON EMERGENCY) 1 MG injection Inject 1 mg into the muscle once for 1 dose Use as directed 1 mg 2     Glucagon, rDNA, (GLUCAGON EMERGENCY) 1 MG KIT Inject 1 mg IM for severe hypoglycemia. 1 kit 1     glucose 40 % (400 mg/mL) gel Take 15 g by mouth as needed (Patient not taking: No sig reported)       glucose blood VI test strips strip Test up to ten times per day as directed 5 Box 8     insulin aspart (NOVOLOG VIAL) 100 UNITS/ML vial Via insulin pump. Approx 60 units daily. 60 mL 3     insulin glargine (LANTUS SOLOSTAR) 100 UNIT/ML pen Inject 15 units subcutaneous daily ONLY IF INSULIN PUMP FAILS. 15 mL 3     simvastatin (ZOCOR) 40 MG tablet Take 1 tablet (40 mg) by mouth At Bedtime 90 tablet 3       Family History  family history includes Alzheimer Disease in her father; Autoimmune Disease in her mother; Cancer in her father; Diabetes in her father and another family member; Glaucoma in her father and mother; Hypertension in her mother.    Social History  Smoke: none.  ETOH: rare.   with children.  Pt works full time in a lab.    Past Medical History  Past Medical History:   Diagnosis Date     Diabetes mellitus (H)     Type 1     Elevated cholesterol      Glaucoma     Glaucoma suspect     Glaucoma suspect      Kidney stones      Nonsenile cataract       Rash 5/1/2022     Past Surgical History:   Procedure Laterality Date     APPENDECTOMY OPEN  1981     BIOPSY OF SKIN LESION  12/30/2011    returned January 2012 for additional removal     c sections  99,97,08     EXTRACORPOREAL SHOCK WAVE LITHOTRIPSY, CYSTOSCOPY, INSERT STENT URETER(S), COMBINED  2004     LASER HOLMIUM LITHOTRIPSY URETER(S), INSERT STENT, COMBINED  12/12/2013    Procedure: COMBINED CYSTOSCOPY, URETEROSCOPY, LASER HOLMIUM LITHOTRIPSY URETER(S), INSERT STENT;  Right Ureteroscopy Holmium Laser Lithotripsy Stent Placement;  Surgeon: Kirill Marlow MD;  Location: [facility]     PHACOEMULSIFICATION CLEAR CORNEA WITH STANDARD INTRAOCULAR LENS IMPLANT Left 10/25/2021    Procedure: LEFT EYE PHACOEMULSIFICATION, CATARACT, WITH INTRAOCULAR LENS IMPLANT COMPLEX;  Surgeon: Curtis Costa MD;  Location: [facility] OR     PHACOEMULSIFICATION CLEAR CORNEA WITH STANDARD INTRAOCULAR LENS IMPLANT Right 11/8/2021    Procedure: COMPLEX RIGHT EYE PHACOEMULSIFICATION, CATARACT, WITH INTRAOCULAR LENS IMPLANT;  Surgeon: Curtis Costa MD;  Location: [facility] OR     S/P right shoulder surgery in Dec 2012.    Physical Exam    No exam today.    RESULTS  Creatinine   Date Value Ref Range Status   09/28/2022 0.63 0.51 - 0.95 mg/dL Final   06/16/2021 0.70 0.52 - 1.04 mg/dL Final     GFR Estimate   Date Value Ref Range Status   09/28/2022 >90 >60 mL/min/1.73m2 Final     Comment:     Effective December 21, 2021 eGFRcr in adults is calculated using the 2021 CKD-EPI creatinine equation which includes age and gender (Los et al., NEJ, DOI: 10.1056/DEKIbj6032650)   06/16/2021 >90 >60 mL/min/[1.73_m2] Final     Comment:     Non  GFR Calc  Starting 12/18/2018, serum creatinine based estimated GFR (eGFR) will be   calculated using the Chronic Kidney Disease Epidemiology Collaboration   (CKD-EPI) equation.       Hemoglobin A1C   Date Value Ref Range Status   09/28/2022 7.1 (H) <5.7 % Final     Comment:     Normal  <5.7%   Prediabetes 5.7-6.4%    Diabetes 6.5% or higher     Note: Adopted from ADA consensus guidelines.   06/16/2021 6.9 (H) 0 - 5.6 % Final     Comment:     Normal <5.7% Prediabetes 5.7-6.4%  Diabetes 6.5% or higher - adopted from ADA   consensus guidelines.       Potassium   Date Value Ref Range Status   10/22/2021 4.3 3.4 - 5.3 mmol/L Final   08/20/2019 4.4 3.4 - 5.3 mmol/L Final     ALT   Date Value Ref Range Status   06/16/2021 15 0 - 50 U/L Final     AST   Date Value Ref Range Status   09/28/2022 21 10 - 35 U/L Final   06/16/2021 15 0 - 45 U/L Final     TSH   Date Value Ref Range Status   09/28/2022 2.12 0.30 - 4.20 uIU/mL Final   06/16/2021 3.00 0.40 - 4.00 mU/L Final     T4 Free   Date Value Ref Range Status   02/14/2017 0.84 0.76 - 1.46 ng/dL Final       Cholesterol   Date Value Ref Range Status   09/28/2022 196 <200 mg/dL Final   06/16/2021 170 <200 mg/dL Final   08/14/2018 152 <200 mg/dL Final     HDL Cholesterol   Date Value Ref Range Status   06/16/2021 69 >49 mg/dL Final   08/14/2018 61 >49 mg/dL Final     Direct Measure HDL   Date Value Ref Range Status   09/28/2022 77 >=50 mg/dL Final     LDL Cholesterol Calculated   Date Value Ref Range Status   09/28/2022 107 (H) <=100 mg/dL Final   06/16/2021 90 <100 mg/dL Final     Comment:     Desirable:       <100 mg/dl   08/14/2018 72 <100 mg/dL Final     Comment:     Desirable:       <100 mg/dl     Triglycerides   Date Value Ref Range Status   09/28/2022 60 <150 mg/dL Final   06/16/2021 56 <150 mg/dL Final   08/14/2018 94 <150 mg/dL Final     Cholesterol/HDL Ratio   Date Value Ref Range Status   08/18/2014 2.7 0.0 - 5.0 Final   09/17/2013 2.3 0.0 - 5.0 Final       ASSESSMENT/PLAN:    1.  TYPE 1 DIABETES MELLITUS:Type 1 diabetes mellitus complicated by moderate NPDR both eyes without macular edema and hypoglycemia unawareness.  No sx of neuropathy at this time.  Her urine microalbuminuria was negative in 9/2022 with normal creat/GFR.  Kiera will continue to use  her upper back/sides and breast tissue for infusion sites for better insulin absorption.  I did not make an insulin pump setting changes today since I have no insulin pump data today.  Her DexcomG6 sensor reports an estimated A1C 7.1 % for the past 2 weeks.    2. MODERATE NPDR: Last seen by Oph here in Jan 2022 with moderate NPDR both eyes without macular edema.    Referred to Oph for diabetic eye exam.    3.  HYPERLIPIDEMIA:   with HDL 77 in Sept 2022.  Simvastatin dose was reduced to 40 mg daily given her hx of hip/leg and muscle pain.  Fasting lipid panel ordered.    4.  ADHD/STRESS: Unchanged.    5. HYPOGLYCEMIA UNAWARENESS: Less hypoglycemia since wearing Tandem insulin pump-control IQ.  She wears her DexcomG6 sensor full time.  Pt has updated glucagon kit.    6.  CHEST PAIN: Intermittent chest pain. She has had this in the past- not new.  Pt had normal stress echo in 2010.  I suggested that she see Cardiology in follow up which she will consider.    7.  FOLLOW UP: with me in clinic in May 2023 and with Dr. Chahal in Aug 2023.  A1C and fasting lipid panel ordered today.  Oph referral placed today.    Time spent reviewing chart, labs and DexcomG6 sensor download data today = 6 minutes.  Time for telephone visit today = 34 minutes.  Time for documentation today = 10 minutes.    TOTAL TIME FOR VISIT TODAY = 50 minutes.    Jo Jean PA-C

## 2023-01-19 NOTE — TELEPHONE ENCOUNTER
Scheduled lab on 2/2/23. Scheduled follow up with anthony oates 5/16/23 and Dr. Chahal 1/9/24. Pt prefer's in person appointments. Added to Dr. Matos wait list starting in august 2023. Trena Lloyd on 1/19/2023 at 11:12 AM

## 2023-01-19 NOTE — PROGRESS NOTES
Kiera is a 57 year old who is being evaluated via a billable telephone visit.      What phone number would you like to be contacted at? [phone]   How would you like to obtain your AVS? [employer]

## 2023-01-30 ENCOUNTER — TELEPHONE (OUTPATIENT)
Dept: ENDOCRINOLOGY | Facility: CLINIC | Age: 58
End: 2023-01-30
Payer: COMMERCIAL

## 2023-01-30 NOTE — TELEPHONE ENCOUNTER
Final attempt: LVM, sent MyChart, and sent letter 1/30/23 for pt to c/b to reschedule appointment with Dr. Chahal. MAXIMUM NUMBER OF ATTEMPTS REACHED.

## 2023-02-08 ENCOUNTER — LAB (OUTPATIENT)
Dept: LAB | Facility: CLINIC | Age: 58
End: 2023-02-08
Payer: COMMERCIAL

## 2023-02-08 DIAGNOSIS — E10.65 TYPE 1 DIABETES MELLITUS WITH HYPERGLYCEMIA (H): ICD-10-CM

## 2023-02-08 LAB
CHOLEST SERPL-MCNC: 188 MG/DL
HDLC SERPL-MCNC: 76 MG/DL
LDLC SERPL CALC-MCNC: 99 MG/DL
NONHDLC SERPL-MCNC: 112 MG/DL
TRIGL SERPL-MCNC: 63 MG/DL

## 2023-02-08 PROCEDURE — 80061 LIPID PANEL: CPT | Performed by: PATHOLOGY

## 2023-02-08 PROCEDURE — 36415 COLL VENOUS BLD VENIPUNCTURE: CPT | Performed by: PATHOLOGY

## 2023-03-04 DIAGNOSIS — E10.65 TYPE 1 DIABETES MELLITUS WITH HYPERGLYCEMIA (H): Primary | ICD-10-CM

## 2023-03-06 ENCOUNTER — TELEPHONE (OUTPATIENT)
Dept: ENDOCRINOLOGY | Facility: CLINIC | Age: 58
End: 2023-03-06

## 2023-03-06 LAB — HBA1C MFR BLD: NORMAL %

## 2023-03-06 NOTE — TELEPHONE ENCOUNTER
----- Message from Jo Jean PA-C sent at 3/4/2023 11:02 AM CST -----  Please schedule follow up with Mita SAWYER.  Thanks sarah

## 2023-03-06 NOTE — TELEPHONE ENCOUNTER
Spoke to pt to schedule a f/up with Mita Vargas per Jo Jean. Pt stated that now was not a good time and she would c/back to schedule with Mita Vargas. Writer notified pt that she would send a MyChart with scheduling information.     Judit Rosado on 3/6/2023 at 9:23 AM

## 2023-04-01 ENCOUNTER — HEALTH MAINTENANCE LETTER (OUTPATIENT)
Age: 58
End: 2023-04-01

## 2023-05-12 ENCOUNTER — TELEPHONE (OUTPATIENT)
Dept: ENDOCRINOLOGY | Facility: CLINIC | Age: 58
End: 2023-05-12
Payer: COMMERCIAL

## 2023-05-12 NOTE — TELEPHONE ENCOUNTER
Appointment reminder phone call made to patient.   Patient is showing 5/5 MNCM met.   Dianne Bessy, VF   Outcome for 05/12/23 9:40 AM :Left Voicemail for patient to call back

## 2023-05-16 ENCOUNTER — OFFICE VISIT (OUTPATIENT)
Dept: ENDOCRINOLOGY | Facility: CLINIC | Age: 58
End: 2023-05-16
Payer: COMMERCIAL

## 2023-05-16 VITALS
SYSTOLIC BLOOD PRESSURE: 102 MMHG | BODY MASS INDEX: 26.56 KG/M2 | HEART RATE: 89 BPM | WEIGHT: 136 LBS | DIASTOLIC BLOOD PRESSURE: 66 MMHG | OXYGEN SATURATION: 99 %

## 2023-05-16 DIAGNOSIS — E10.65 TYPE 1 DIABETES MELLITUS WITH HYPERGLYCEMIA (H): Primary | ICD-10-CM

## 2023-05-16 LAB — HBA1C MFR BLD: 7.1 % (ref 4.3–?)

## 2023-05-16 PROCEDURE — 99215 OFFICE O/P EST HI 40 MIN: CPT | Performed by: PHYSICIAN ASSISTANT

## 2023-05-16 PROCEDURE — 83036 HEMOGLOBIN GLYCOSYLATED A1C: CPT | Performed by: PHYSICIAN ASSISTANT

## 2023-05-16 NOTE — LETTER
5/16/2023       RE: Kiera Dobson  [address]  [facility] [address]     Dear Colleague,    Thank you for referring your patient, Kiera Dobson, to the [facility] ENDOCRINOLOGY CLINIC [place] at . Please see a copy of my visit note below.        HPI:  Kiera Dobson is a 57 year old female with type 1 diabetes mellitus.  Clinic visit for diabetes follow up.   Kiera was dx with type 1 diabetes in 3/1970.  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   No nephropathy. No sx of neuropathy at this time.  Her hx is significant for hypoglycemia unawareness.  Kiera continues to use a Tandem insulin pump-control IQ and DexcomG6 sensor.  Her basal insulin rates are set at :  Midnight = 0.65 units/hr.  2 am = 0.5 units/hr.  7 am = 0.5 units/hr.  10 am = 0.7 units/hr.  10:30 pm = 0.6 units/hr.  Her I/C ratio is 1:12. Sensitivity is 70.  Pt's A1C is 7.1 %.  I reviewed her insulin pump download data today.  Some hypoglycemia when she moves.  She has several areas of scar tissue on her abdomen- poor insulin absorption.  She had a bs > 400 this past weekend - infusion set not working properly.  On ROS today, less hip and leg pain since she decreased her statin dose.  No neuropathy sx at this time. Denies foot ulcers.  She continues to have intermittent chest pain which she feels is from anxiety.   No SOB, n/v or diaphoresis with this pain.  Arthritic pain.  Vision good at this time.  Nausea when her blood sugar is > 200.  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  She denies SOB,cough, fever or chills at this time.  Pt denies abd pain, diarrhea, dysuria or hematuria.    DIABETES CARE:  Retinopathy:yes; moderate nonproliferative diabetic retinopathy both eyes without macular edema.  Nephropathy: urine microalbuminuria negative in 9/2022.  Neuropathy:  none.  Feet: no foot ulcers and normal monofilamentous exam.  Taking ASA: yes.  Lipids: LDL 99 in 5/2023.   Pt is taking Simvastatin.  CAD: no. Normal stress echo in 9/2010.  Mental health:hx of ADHD.  Insulin: Tandem insulin pump- control IQ.  Testing: DexcomG6 sensor.  Hypoglycemia treatment: Pt has updated glucagon kit.  Insulin back up:Pt has Lantus to use in case of insulin pump failure.    ROS  Please see under HPI.    Allergies  Allergies   Allergen Reactions    Ampicillin Sodium Rash       Medications  Current Outpatient Medications   Medication Sig Dispense Refill    aspirin 81 MG chewable tablet Take 81 mg by mouth every other day  108 tablet 3    betamethasone dipropionate (DIPROSONE) 0.05 % external ointment Apply topically 2 times daily To areas of bumps on hands.  Avoid treating biopsy site for first few weeks. 50 g 1    blood glucose (ERICA CONTOUR NEXT) test strip Use to test blood sugar 4-6 times daily or as directed. 720 strip 3    Continuous Blood Gluc  (DEXCOM G6 ) MELE 1 Device continuous 1 Device 0    Continuous Blood Gluc Sensor (DEXCOM G6 SENSOR) MISC 1 each every 10 days 9 each 3    Continuous Blood Gluc Transmit (DEXCOM G6 TRANSMITTER) MISC 1 each every 3 months 1 each 3    Glucagon, rDNA, (GLUCAGON EMERGENCY) 1 MG KIT Inject 1 mg IM for severe hypoglycemia. 1 kit 1    glucose blood VI test strips strip Test up to ten times per day as directed 5 Box 8    insulin aspart (NOVOLOG VIAL) 100 UNITS/ML vial Via insulin pump. Approx 60 units daily. 60 mL 3    insulin glargine (LANTUS SOLOSTAR) 100 UNIT/ML pen Inject 15 units subcutaneous daily ONLY IF INSULIN PUMP FAILS. 15 mL 3    simvastatin (ZOCOR) 40 MG tablet Take 1 tablet (40 mg) by mouth At Bedtime 90 tablet 3    glucose 40 % (400 mg/mL) gel Take 15 g by mouth as needed (Patient not taking: Reported on 4/5/2022)         Family History  family history includes Alzheimer Disease in her father; Autoimmune Disease in her mother;  Cancer in her father; Diabetes in her father and another family member; Glaucoma in her father and mother; Hypertension in her mother.    Social History  Smoke: none.  ETOH: rare.   with children.  Pt works full time in a lab.    Past Medical History  Past Medical History:   Diagnosis Date    Diabetes mellitus (H)     Type 1    Elevated cholesterol     Glaucoma     Glaucoma suspect    Glaucoma suspect     Kidney stones     Nonsenile cataract     Rash 5/1/2022     Past Surgical History:   Procedure Laterality Date    APPENDECTOMY OPEN  1981    BIOPSY OF SKIN LESION  12/30/2011    returned January 2012 for additional removal    c sections  99,97,08    EXTRACORPOREAL SHOCK WAVE LITHOTRIPSY, CYSTOSCOPY, INSERT STENT URETER(S), COMBINED  2004    LASER HOLMIUM LITHOTRIPSY URETER(S), INSERT STENT, COMBINED  12/12/2013    Procedure: COMBINED CYSTOSCOPY, URETEROSCOPY, LASER HOLMIUM LITHOTRIPSY URETER(S), INSERT STENT;  Right Ureteroscopy Holmium Laser Lithotripsy Stent Placement;  Surgeon: Kirill Marlow MD;  Location: [facility] OR    PHACOEMULSIFICATION CLEAR CORNEA WITH STANDARD INTRAOCULAR LENS IMPLANT Left 10/25/2021    Procedure: LEFT EYE PHACOEMULSIFICATION, CATARACT, WITH INTRAOCULAR LENS IMPLANT COMPLEX;  Surgeon: Curtis Costa MD;  Location: [facility] OR    PHACOEMULSIFICATION CLEAR CORNEA WITH STANDARD INTRAOCULAR LENS IMPLANT Right 11/8/2021    Procedure: COMPLEX RIGHT EYE PHACOEMULSIFICATION, CATARACT, WITH INTRAOCULAR LENS IMPLANT;  Surgeon: Curtis Costa MD;  Location: [facility]     S/P right shoulder surgery in Dec 2012.    Physical Exam    No exam today.    RESULTS  Creatinine   Date Value Ref Range Status   09/28/2022 0.63 0.51 - 0.95 mg/dL Final   06/16/2021 0.70 0.52 - 1.04 mg/dL Final     GFR Estimate   Date Value Ref Range Status   09/28/2022 >90 >60 mL/min/1.73m2 Final     Comment:     Effective December 21, 2021 eGFRcr in adults is calculated using the 2021 CKD-EPI creatinine equation  which includes age and gender (Los james al., NEJ, DOI: 10.1056/CBIEtf6859117)   06/16/2021 >90 >60 mL/min/[1.73_m2] Final     Comment:     Non  GFR Calc  Starting 12/18/2018, serum creatinine based estimated GFR (eGFR) will be   calculated using the Chronic Kidney Disease Epidemiology Collaboration   (CKD-EPI) equation.       Hemoglobin A1C   Date Value Ref Range Status   02/08/2023   Corrected     Comment:     The previously reported result may be inaccurate due to a laboratory instrument calibration issue. Providers should order a new test to be performed if clinically indicated.  [facility] will issue a credit for this test.  This is a corrected result. Previous result was 8.8 % on 2/8/2023 at  1:31 PM CST   06/16/2021 6.9 (H) 0 - 5.6 % Final     Comment:     Normal <5.7% Prediabetes 5.7-6.4%  Diabetes 6.5% or higher - adopted from ADA   consensus guidelines.       Potassium   Date Value Ref Range Status   10/22/2021 4.3 3.4 - 5.3 mmol/L Final   08/20/2019 4.4 3.4 - 5.3 mmol/L Final     ALT   Date Value Ref Range Status   06/16/2021 15 0 - 50 U/L Final     AST   Date Value Ref Range Status   09/28/2022 21 10 - 35 U/L Final   06/16/2021 15 0 - 45 U/L Final     TSH   Date Value Ref Range Status   09/28/2022 2.12 0.30 - 4.20 uIU/mL Final   06/16/2021 3.00 0.40 - 4.00 mU/L Final     T4 Free   Date Value Ref Range Status   02/14/2017 0.84 0.76 - 1.46 ng/dL Final       Cholesterol   Date Value Ref Range Status   02/08/2023 188 <200 mg/dL Final   09/28/2022 196 <200 mg/dL Final   06/16/2021 170 <200 mg/dL Final   08/14/2018 152 <200 mg/dL Final     HDL Cholesterol   Date Value Ref Range Status   06/16/2021 69 >49 mg/dL Final   08/14/2018 61 >49 mg/dL Final     Direct Measure HDL   Date Value Ref Range Status   02/08/2023 76 >=50 mg/dL Final   09/28/2022 77 >=50 mg/dL Final     LDL Cholesterol Calculated   Date Value Ref Range Status   02/08/2023 99 <=100 mg/dL Final   09/28/2022 107  (H) <=100 mg/dL Final   06/16/2021 90 <100 mg/dL Final     Comment:     Desirable:       <100 mg/dl   08/14/2018 72 <100 mg/dL Final     Comment:     Desirable:       <100 mg/dl     Triglycerides   Date Value Ref Range Status   02/08/2023 63 <150 mg/dL Final   09/28/2022 60 <150 mg/dL Final   06/16/2021 56 <150 mg/dL Final   08/14/2018 94 <150 mg/dL Final     Cholesterol/HDL Ratio   Date Value Ref Range Status   08/18/2014 2.7 0.0 - 5.0 Final   09/17/2013 2.3 0.0 - 5.0 Final     ASSESSMENT/PLAN:    1.  TYPE 1 DIABETES MELLITUS:Type 1 diabetes mellitus complicated by moderate NPDR both eyes without macular edema and hypoglycemia unawareness.  No sx of neuropathy at this time.  Her urine microalbuminuria was negative in 9/2022 with normal creat/GFR.  She has had some low blood sugars with movement.  I had her decrease her 10 am basal insulin rate 0.65 units/hr ( was 0.7 ) and change CF to 75 today ( was 70 ).  /66 today.    2. MODERATE NPDR:  Hx of moderate NPDR both eyes without macular edema.    Kiera has an Oph appointment here on 5/25/2023.    3.  HYPERLIPIDEMIA:  LDL 99 in 5/2023.  Simvastatin dose was reduced to 40 mg daily given her hx of hip/leg and muscle pain.    4.  ADHD/STRESS: Unchanged.    5. HYPOGLYCEMIA UNAWARENESS: Less hypoglycemia since wearing Tandem insulin pump-control IQ.  She wears her DexcomG6 sensor full time.  Pt has updated glucagon kit.    6.  CHEST PAIN: Intermittent chest pain. She has had this in the past- not new.  Pt had normal stress echo in 2010.  I suggested that she see Cardiology in follow up which she will consider.    7.  FOLLOW UP: with me in 3 months, 6 months and Dr. Chahal in Dec 2023.    Time spent reviewing chart, labs and insulin pump data today= 6 minutes.  Time for clinic visit today = 30 minutes.  Time for documentation today = 10 minutes.    TOTAL TIME FOR VISIT TODAY = 45 minutes.    Jo Jean PA-C

## 2023-05-16 NOTE — PROGRESS NOTES
HPI:  Kiera Dobson is a 57 year old female with type 1 diabetes mellitus.  Clinic visit for diabetes follow up.   Kiera was dx with type 1 diabetes in 3/1970.  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   No nephropathy. No sx of neuropathy at this time.  Her hx is significant for hypoglycemia unawareness.  Kiera continues to use a Tandem insulin pump-control IQ and DexcomG6 sensor.  Her basal insulin rates are set at :  Midnight = 0.65 units/hr.  2 am = 0.5 units/hr.  7 am = 0.5 units/hr.  10 am = 0.7 units/hr.  10:30 pm = 0.6 units/hr.  Her I/C ratio is 1:12. Sensitivity is 70.  Pt's A1C is 7.1 %.  I reviewed her insulin pump download data today.  Some hypoglycemia when she moves.  She has several areas of scar tissue on her abdomen- poor insulin absorption.  She had a bs > 400 this past weekend - infusion set not working properly.  On ROS today, less hip and leg pain since she decreased her statin dose.  No neuropathy sx at this time. Denies foot ulcers.  She continues to have intermittent chest pain which she feels is from anxiety.   No SOB, n/v or diaphoresis with this pain.  Arthritic pain.  Vision good at this time.  Nausea when her blood sugar is > 200.  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  She denies SOB,cough, fever or chills at this time.  Pt denies abd pain, diarrhea, dysuria or hematuria.    DIABETES CARE:  Retinopathy:yes; moderate nonproliferative diabetic retinopathy both eyes without macular edema.  Nephropathy: urine microalbuminuria negative in 9/2022.  Neuropathy: none.  Feet: no foot ulcers and normal monofilamentous exam.  Taking ASA: yes.  Lipids: LDL 99 in 5/2023.   Pt is taking Simvastatin.  CAD: no. Normal stress echo in 9/2010.  Mental health:hx of ADHD.  Insulin: Tandem insulin pump- control IQ.  Testing: DexcomG6 sensor.  Hypoglycemia treatment: Pt has updated glucagon  kit.  Insulin back up:Pt has Lantus to use in case of insulin pump failure.    ROS  Please see under HPI.    Allergies  Allergies   Allergen Reactions     Ampicillin Sodium Rash       Medications  Current Outpatient Medications   Medication Sig Dispense Refill     aspirin 81 MG chewable tablet Take 81 mg by mouth every other day  108 tablet 3     betamethasone dipropionate (DIPROSONE) 0.05 % external ointment Apply topically 2 times daily To areas of bumps on hands.  Avoid treating biopsy site for first few weeks. 50 g 1     blood glucose (ERICA CONTOUR NEXT) test strip Use to test blood sugar 4-6 times daily or as directed. 720 strip 3     Continuous Blood Gluc  (DEXCOM G6 ) MELE 1 Device continuous 1 Device 0     Continuous Blood Gluc Sensor (DEXCOM G6 SENSOR) MISC 1 each every 10 days 9 each 3     Continuous Blood Gluc Transmit (DEXCOM G6 TRANSMITTER) MISC 1 each every 3 months 1 each 3     Glucagon, rDNA, (GLUCAGON EMERGENCY) 1 MG KIT Inject 1 mg IM for severe hypoglycemia. 1 kit 1     glucose blood VI test strips strip Test up to ten times per day as directed 5 Box 8     insulin aspart (NOVOLOG VIAL) 100 UNITS/ML vial Via insulin pump. Approx 60 units daily. 60 mL 3     insulin glargine (LANTUS SOLOSTAR) 100 UNIT/ML pen Inject 15 units subcutaneous daily ONLY IF INSULIN PUMP FAILS. 15 mL 3     simvastatin (ZOCOR) 40 MG tablet Take 1 tablet (40 mg) by mouth At Bedtime 90 tablet 3     glucose 40 % (400 mg/mL) gel Take 15 g by mouth as needed (Patient not taking: Reported on 4/5/2022)         Family History  family history includes Alzheimer Disease in her father; Autoimmune Disease in her mother; Cancer in her father; Diabetes in her father and another family member; Glaucoma in her father and mother; Hypertension in her mother.    Social History  Smoke: none.  ETOH: rare.   with children.  Pt works full time in a lab.    Past Medical History  Past Medical History:   Diagnosis Date      Diabetes mellitus (H)     Type 1     Elevated cholesterol      Glaucoma     Glaucoma suspect     Glaucoma suspect      Kidney stones      Nonsenile cataract      Rash 5/1/2022     Past Surgical History:   Procedure Laterality Date     APPENDECTOMY OPEN  1981     BIOPSY OF SKIN LESION  12/30/2011    returned January 2012 for additional removal     c sections  99,97,08     EXTRACORPOREAL SHOCK WAVE LITHOTRIPSY, CYSTOSCOPY, INSERT STENT URETER(S), COMBINED  2004     LASER HOLMIUM LITHOTRIPSY URETER(S), INSERT STENT, COMBINED  12/12/2013    Procedure: COMBINED CYSTOSCOPY, URETEROSCOPY, LASER HOLMIUM LITHOTRIPSY URETER(S), INSERT STENT;  Right Ureteroscopy Holmium Laser Lithotripsy Stent Placement;  Surgeon: Kirill Marlow MD;  Location: [facility] OR     PHACOEMULSIFICATION CLEAR CORNEA WITH STANDARD INTRAOCULAR LENS IMPLANT Left 10/25/2021    Procedure: LEFT EYE PHACOEMULSIFICATION, CATARACT, WITH INTRAOCULAR LENS IMPLANT COMPLEX;  Surgeon: Curtis Costa MD;  Location: [facility] OR     PHACOEMULSIFICATION CLEAR CORNEA WITH STANDARD INTRAOCULAR LENS IMPLANT Right 11/8/2021    Procedure: COMPLEX RIGHT EYE PHACOEMULSIFICATION, CATARACT, WITH INTRAOCULAR LENS IMPLANT;  Surgeon: Curtis Costa MD;  [place] [facility]     S/P right shoulder surgery in Dec 2012.    Physical Exam    No exam today.    RESULTS  Creatinine   Date Value Ref Range Status   09/28/2022 0.63 0.51 - 0.95 mg/dL Final   06/16/2021 0.70 0.52 - 1.04 mg/dL Final     GFR Estimate   Date Value Ref Range Status   09/28/2022 >90 >60 mL/min/1.73m2 Final     Comment:     Effective December 21, 2021 eGFRcr in adults is calculated using the 2021 CKD-EPI creatinine equation which includes age and gender (Los et al., NEJM, DOI: 10.1056/ONKHpv6569823)   06/16/2021 >90 >60 mL/min/[1.73_m2] Final     Comment:     Non  GFR Calc  Starting 12/18/2018, serum creatinine based estimated GFR (eGFR) will be   calculated using the Chronic Kidney Disease  Epidemiology Collaboration   (CKD-EPI) equation.       Hemoglobin A1C   Date Value Ref Range Status   02/08/2023   Corrected     Comment:     The previously reported result may be inaccurate due to a laboratory instrument calibration issue. Providers should order a new test to be performed if clinically indicated.  [email or web address] will issue a credit for this test.  This is a corrected result. Previous result was 8.8 % on 2/8/2023 at  1:31 PM CST   06/16/2021 6.9 (H) 0 - 5.6 % Final     Comment:     Normal <5.7% Prediabetes 5.7-6.4%  Diabetes 6.5% or higher - adopted from ADA   consensus guidelines.       Potassium   Date Value Ref Range Status   10/22/2021 4.3 3.4 - 5.3 mmol/L Final   08/20/2019 4.4 3.4 - 5.3 mmol/L Final     ALT   Date Value Ref Range Status   06/16/2021 15 0 - 50 U/L Final     AST   Date Value Ref Range Status   09/28/2022 21 10 - 35 U/L Final   06/16/2021 15 0 - 45 U/L Final     TSH   Date Value Ref Range Status   09/28/2022 2.12 0.30 - 4.20 uIU/mL Final   06/16/2021 3.00 0.40 - 4.00 mU/L Final     T4 Free   Date Value Ref Range Status   02/14/2017 0.84 0.76 - 1.46 ng/dL Final       Cholesterol   Date Value Ref Range Status   02/08/2023 188 <200 mg/dL Final   09/28/2022 196 <200 mg/dL Final   06/16/2021 170 <200 mg/dL Final   08/14/2018 152 <200 mg/dL Final     HDL Cholesterol   Date Value Ref Range Status   06/16/2021 69 >49 mg/dL Final   08/14/2018 61 >49 mg/dL Final     Direct Measure HDL   Date Value Ref Range Status   02/08/2023 76 >=50 mg/dL Final   09/28/2022 77 >=50 mg/dL Final     LDL Cholesterol Calculated   Date Value Ref Range Status   02/08/2023 99 <=100 mg/dL Final   09/28/2022 107 (H) <=100 mg/dL Final   06/16/2021 90 <100 mg/dL Final     Comment:     Desirable:       <100 mg/dl   08/14/2018 72 <100 mg/dL Final     Comment:     Desirable:       <100 mg/dl     Triglycerides   Date Value Ref Range Status   02/08/2023 63 <150 mg/dL Final   09/28/2022 60 <150 mg/dL Final    06/16/2021 56 <150 mg/dL Final   08/14/2018 94 <150 mg/dL Final     Cholesterol/HDL Ratio   Date Value Ref Range Status   08/18/2014 2.7 0.0 - 5.0 Final   09/17/2013 2.3 0.0 - 5.0 Final       ASSESSMENT/PLAN:    1.  TYPE 1 DIABETES MELLITUS:Type 1 diabetes mellitus complicated by moderate NPDR both eyes without macular edema and hypoglycemia unawareness.  No sx of neuropathy at this time.  Her urine microalbuminuria was negative in 9/2022 with normal creat/GFR.  She has had some low blood sugars with movement.  I had her decrease her 10 am basal insulin rate 0.65 units/hr ( was 0.7 ) and change CF to 75 today ( was 70 ).  /66 today.    2. MODERATE NPDR:  Hx of moderate NPDR both eyes without macular edema.    Kiera has an Oph appointment here on 5/25/2023.    3.  HYPERLIPIDEMIA:  LDL 99 in 5/2023.  Simvastatin dose was reduced to 40 mg daily given her hx of hip/leg and muscle pain.    4.  ADHD/STRESS: Unchanged.    5. HYPOGLYCEMIA UNAWARENESS: Less hypoglycemia since wearing Tandem insulin pump-control IQ.  She wears her DexcomG6 sensor full time.  Pt has updated glucagon kit.    6.  CHEST PAIN: Intermittent chest pain. She has had this in the past- not new.  Pt had normal stress echo in 2010.  I suggested that she see Cardiology in follow up which she will consider.    7.  FOLLOW UP: with me in 3 months, 6 months and Dr. Chahal in Dec 2023.    Time spent reviewing chart, labs and insulin pump data today= 6 minutes.  Time for clinic visit today = 30 minutes.  Time for documentation today = 10 minutes.    TOTAL TIME FOR VISIT TODAY = 45 minutes.    Jo Jean PA-C

## 2023-05-16 NOTE — NURSING NOTE
Chief Complaint   Patient presents with     Diabetes     Vital signs:      BP: 102/66 Pulse: 89     SpO2: 99 %       Weight: 61.7 kg (136 lb)  Estimated body mass index is 26.56 kg/m  as calculated from the following:    Height as of 12/28/21: 1.524 m (5').    Weight as of this encounter: 61.7 kg (136 lb).

## 2023-05-23 DIAGNOSIS — E10.65 TYPE 1 DIABETES MELLITUS WITH HYPERGLYCEMIA (H): ICD-10-CM

## 2023-05-25 ENCOUNTER — OFFICE VISIT (OUTPATIENT)
Dept: OPHTHALMOLOGY | Facility: CLINIC | Age: 58
End: 2023-05-25
Attending: PHYSICIAN ASSISTANT
Payer: COMMERCIAL

## 2023-05-25 DIAGNOSIS — E10.65 TYPE 1 DIABETES MELLITUS WITH HYPERGLYCEMIA (H): ICD-10-CM

## 2023-05-25 DIAGNOSIS — H40.003 GLAUCOMA SUSPECT, BOTH EYES: Primary | ICD-10-CM

## 2023-05-25 DIAGNOSIS — H40.033 ANATOMICAL NARROW ANGLE BORDERLINE GLAUCOMA OF BOTH EYES: ICD-10-CM

## 2023-05-25 PROCEDURE — 92133 CPTRZD OPH DX IMG PST SGM ON: CPT | Performed by: OPHTHALMOLOGY

## 2023-05-25 PROCEDURE — 99214 OFFICE O/P EST MOD 30 MIN: CPT | Performed by: OPHTHALMOLOGY

## 2023-05-25 PROCEDURE — 92014 COMPRE OPH EXAM EST PT 1/>: CPT | Performed by: OPHTHALMOLOGY

## 2023-05-25 RX ORDER — SIMVASTATIN 40 MG
40 TABLET ORAL AT BEDTIME
Qty: 90 TABLET | Refills: 3 | Status: SHIPPED | OUTPATIENT
Start: 2023-05-25 | End: 2023-08-22

## 2023-05-25 ASSESSMENT — CONF VISUAL FIELD
METHOD: COUNTING FINGERS
OD_NORMAL: 1
OS_INFERIOR_NASAL_RESTRICTION: 0
OS_NORMAL: 1
OD_SUPERIOR_NASAL_RESTRICTION: 0
OD_SUPERIOR_TEMPORAL_RESTRICTION: 0
OD_INFERIOR_TEMPORAL_RESTRICTION: 0
OS_SUPERIOR_TEMPORAL_RESTRICTION: 0
OS_SUPERIOR_NASAL_RESTRICTION: 0
OS_INFERIOR_TEMPORAL_RESTRICTION: 0
OD_INFERIOR_NASAL_RESTRICTION: 0

## 2023-05-25 ASSESSMENT — TONOMETRY
OD_IOP_MMHG: 15
OS_IOP_MMHG: 17
IOP_METHOD: TONOPEN

## 2023-05-25 ASSESSMENT — VISUAL ACUITY
OD_CC+: +2
CORRECTION_TYPE: GLASSES
METHOD_MR: PT DEFERS MR TODAY
OD_CC: 20/25
OS_CC: 20/20
METHOD: SNELLEN - LINEAR

## 2023-05-25 ASSESSMENT — EXTERNAL EXAM - RIGHT EYE: OD_EXAM: NORMAL

## 2023-05-25 ASSESSMENT — CUP TO DISC RATIO
OS_RATIO: 0.7
OD_RATIO: 0.7

## 2023-05-25 ASSESSMENT — REFRACTION_WEARINGRX
OS_CYLINDER: +1.25
OD_AXIS: 029
SPECS_TYPE: PAL
OS_ADD: +2.00
OS_AXIS: 159
OD_CYLINDER: +0.25
OD_ADD: +2.00
OD_SPHERE: -1.00
OS_SPHERE: -1.75

## 2023-05-25 ASSESSMENT — SLIT LAMP EXAM - LIDS
COMMENTS: NORMAL
COMMENTS: NORMAL

## 2023-05-25 ASSESSMENT — EXTERNAL EXAM - LEFT EYE: OS_EXAM: NORMAL

## 2023-05-25 NOTE — PROGRESS NOTES
Chief Complaint(s) and History of Present Illness(es)     Diabetic Retinopathy Follow Up            Associated symptoms: floaters.  Negative for dryness, eye pain, redness   and flashes    Treatments tried: no treatments    Pain scale: 0/10    Comments: 1.5 year follow up Mild/moderate NPDR both eyes          Comments    Pt states no changes to vision since last visit.   Stable floaters RE>LE, without flashes.   No eye pain or discomfort today.     DM1   LBS : 154 @ 10:17am  Lab Results       Component                Value               Date                       A1C                                          02/08/2023            This is a corrected result. Previous result was 8.8 % on 2/8/2023 at  1:31   PM CST       A1C                      7.1                 09/28/2022                 A1C                      6.9                 06/16/2021                 A1C                      7.0                 07/24/2020                 A1C                      7.9                 07/16/2013              Santy Ho 10:18 AM May 25, 2023           Review of systems for the eyes was negative other than the pertinent positives/negatives listed in the HPI.      Assessment & Plan      Kiera Dobson is a 57 year old female with the following diagnoses:   1. Glaucoma suspect, both eyes    2. Type 1 diabetes mellitus with hyperglycemia (H)    3. Anatomical narrow angle borderline glaucoma of both eyes         Last seen 1/2022  Glycemic control is doing well with Dexcom and pump  No retinopathy  Stressed good glycemic and hypertensive control  Monitor yearly     Previous narrow angles prior to cataract extraction   Glaucoma suspect based on large C/D  Stable to improved OCT Nerve fiber layer both eyes   Intraocular pressure remains within normal limits   Low risk, annual monitoring to continue      Patient disposition:   Return in about 1 year (around 5/25/2024) for DFE, OCT NFL.           Attending Physician Attestation:  Complete  documentation of historical and exam elements from today's encounter can be found in the full encounter summary report (not reduplicated in this progress note).  I personally obtained the chief complaint(s) and history of present illness.  I confirmed and edited as necessary the review of systems, past medical/surgical history, family history, social history, and examination findings as documented by others; and I examined the patient myself.  I personally reviewed the relevant tests, images, and reports as documented above.  I formulated and edited as necessary the assessment and plan and discussed the findings and management plan with the patient and family. . - Curtis Costa MD

## 2023-05-25 NOTE — TELEPHONE ENCOUNTER
Simvastatin Oral Tablet 40 MG  Last Written Prescription Date:   6/8/2022  Last Fill Quantity: 90,   # refills: 3  Last Office Visit :  5/16/2023  Future Office visit:   8/22/2023  90 Tabs, 3 Refills sent to yi Andrade RN  Central Triage Red Flags/Med Refills     Statins Protocol Passed 05/23/2023 02:00 AM   Protocol Details  LDL on file in past 12 months    No abnormal creatine kinase in past 12 months    Recent (12 mo) or future (30 days) visit within the authorizing provider's specialty    Medication is active on med list    Patient is age 18 or older    No active pregnancy on record    No positive pregnancy test in past 12 months

## 2023-05-25 NOTE — NURSING NOTE
Chief Complaints and History of Present Illnesses   Patient presents with     Diabetic Retinopathy Follow Up     1.5 year follow up Mild/moderate NPDR both eyes     Chief Complaint(s) and History of Present Illness(es)     Diabetic Retinopathy Follow Up            Associated symptoms: floaters.  Negative for dryness, eye pain, redness and flashes    Treatments tried: no treatments    Pain scale: 0/10    Comments: 1.5 year follow up Mild/moderate NPDR both eyes          Comments    Pt states no changes to vision since last visit.   Stable floaters RE>LE, without flashes.   No eye pain or discomfort today.     DM1   LBS : 154 @ 10:17am  Lab Results       Component                Value               Date                       A1C                                          02/08/2023            This is a corrected result. Previous result was 8.8 % on 2/8/2023 at  1:31 PM CST       A1C                      7.1                 09/28/2022                 A1C                      6.9                 06/16/2021                 A1C                      7.0                 07/24/2020                 A1C                      7.9                 07/16/2013              Santy Ho 10:18 AM May 25, 2023

## 2023-06-05 ENCOUNTER — TELEPHONE (OUTPATIENT)
Dept: ENDOCRINOLOGY | Facility: CLINIC | Age: 58
End: 2023-06-05
Payer: COMMERCIAL

## 2023-06-05 DIAGNOSIS — E10.9 TYPE 1 DIABETES MELLITUS WITHOUT COMPLICATION (H): ICD-10-CM

## 2023-06-05 RX ORDER — INSULIN GLARGINE 100 [IU]/ML
INJECTION, SOLUTION SUBCUTANEOUS
Qty: 15 ML | Refills: 3 | Status: SHIPPED | OUTPATIENT
Start: 2023-06-05

## 2023-06-05 NOTE — TELEPHONE ENCOUNTER
Refills have been sent to [facility].Inocencia Resendiz RN on 6/5/2023 at 10:38 AM

## 2023-06-05 NOTE — TELEPHONE ENCOUNTER
[facility] Call Center    Phone Message    May a detailed message be left on voicemail: yes     Reason for Call: Medication Refill Request    Has the patient contacted the pharmacy for the refill? Yes     Name of medication being requested:   * insulin glargine (LANTUS SOLOSTAR) 100 UNIT/ML pen  * Needing needles for the Lantus  * blood glucose (ERICA CONTOUR NEXT) test strip    Provider who prescribed the medication: [place]    Pharmacy: [id number] - [place], [place] - [address]    Date medication is needed: 2023     Patient is using  insulin from January and only has 4 test strips left. Patient states her Insulin pump had stopped working and has a new one on the way.     Patient states she is wanting to get a call back, when she does get her new pump she is wanting to get the information from the clinic to be able to reprogram the new pump. Please advise.     Action Taken: Message routed to:  [facility] Endo    Travel Screening: Not Applicable

## 2023-06-06 ENCOUNTER — TELEPHONE (OUTPATIENT)
Dept: ENDOCRINOLOGY | Facility: CLINIC | Age: 58
End: 2023-06-06
Payer: COMMERCIAL

## 2023-06-06 NOTE — TELEPHONE ENCOUNTER
Left message with patient. Is she getting a new Tslim replacement pump or is she switching pumps? Her most recent settings should be available in tconnect. Asked patient to call back with more information or if she needs us to assist with getting her those settings from [employer].   Melissa Mckoy, RD, LD, CDE  6/6/2023   3:14 PM

## 2023-06-06 NOTE — TELEPHONE ENCOUNTER
[facility] Call Center    Phone Message    May a detailed message be left on voicemail: yes     Reason for Call: Other: Per pt would like if the team can call her and give her the insulin pump numbers. Per pt is having her new pump deliver tonight. Please call pt and if she doesnt answer please send her the insulin pump numbers. Thank you!     Action Taken: Message routed to:  [facility] ENDO    Travel Screening: Not Applicable

## 2023-08-18 NOTE — PROGRESS NOTES
Outcome for 08/18/23 3:27 PM :Sent patient [employer] message asking them to upload their BG data  Dianne Lowry

## 2023-08-22 ENCOUNTER — OFFICE VISIT (OUTPATIENT)
Dept: ENDOCRINOLOGY | Facility: CLINIC | Age: 58
End: 2023-08-22
Payer: COMMERCIAL

## 2023-08-22 VITALS
WEIGHT: 136 LBS | HEART RATE: 85 BPM | SYSTOLIC BLOOD PRESSURE: 111 MMHG | DIASTOLIC BLOOD PRESSURE: 69 MMHG | OXYGEN SATURATION: 98 % | BODY MASS INDEX: 26.56 KG/M2

## 2023-08-22 DIAGNOSIS — E10.65 TYPE 1 DIABETES MELLITUS WITH HYPERGLYCEMIA (H): ICD-10-CM

## 2023-08-22 DIAGNOSIS — N20.0 CALCULUS OF KIDNEY: ICD-10-CM

## 2023-08-22 DIAGNOSIS — E10.9 TYPE 1 DIABETES MELLITUS WITHOUT COMPLICATION (H): ICD-10-CM

## 2023-08-22 LAB — HBA1C MFR BLD: 7.5 % (ref 4.3–?)

## 2023-08-22 PROCEDURE — 83036 HEMOGLOBIN GLYCOSYLATED A1C: CPT | Performed by: PHYSICIAN ASSISTANT

## 2023-08-22 PROCEDURE — 99215 OFFICE O/P EST HI 40 MIN: CPT | Performed by: PHYSICIAN ASSISTANT

## 2023-08-22 RX ORDER — PEN NEEDLE, DIABETIC 32GX 5/32"
NEEDLE, DISPOSABLE MISCELLANEOUS
Qty: 25 EACH | Refills: 1 | Status: SHIPPED | OUTPATIENT
Start: 2023-08-22 | End: 2023-09-12

## 2023-08-22 RX ORDER — INSULIN ASPART 100 [IU]/ML
INJECTION, SOLUTION INTRAVENOUS; SUBCUTANEOUS
Qty: 60 ML | Refills: 3 | Status: SHIPPED | OUTPATIENT
Start: 2023-08-22

## 2023-08-22 RX ORDER — SIMVASTATIN 40 MG
40 TABLET ORAL AT BEDTIME
Qty: 90 TABLET | Refills: 3 | Status: SHIPPED | OUTPATIENT
Start: 2023-08-22 | End: 2024-02-20

## 2023-08-22 ASSESSMENT — PAIN SCALES - GENERAL: PAINLEVEL: NO PAIN (0)

## 2023-08-22 NOTE — LETTER
8/22/2023       RE: Kiera Dobson  [address]  [place] [address]     Dear Colleague,    Thank you for referring your patient, Kiera Dobson, to the [facility]. Please see a copy of my visit note below.    Outcome for 08/18/23 3:27 PM :Sent patient [employer] message asking them to upload their BG data  Dianne Lowry            BG data obtained via CGM website.          Start Time Basal Rate Correction Factor Carb Ratio Target BG  Midnight 0.650 u/hr 1u:75 mg/dL 1u:12.0 g 100 mg/dL  2:00 AM 0.500 u/hr 1u:75 mg/dL 1u:12.0 g 100 mg/dL  7:00 AM 0.500 u/hr 1u:75 mg/dL 1u:12.0 g 100 mg/dL  10:00 AM 0.650 u/hr 1u:75 mg/dL 1u:12.0 g 100 mg/dL  10:30 PM 0.600 u/hr 1u:75 mg/dL 1u:12.0 g 100 mg/dL  Calculated Total Daily Basal 14.3 units  Duration of Insulin: 5:00 hours  Carbohydrates: On      Sincerely,    Jo Jean PA-C

## 2023-08-22 NOTE — PROGRESS NOTES
BG data obtained via CGM website.          Start Time Basal Rate Correction Factor Carb Ratio Target BG  Midnight 0.650 u/hr 1u:75 mg/dL 1u:12.0 g 100 mg/dL  2:00 AM 0.500 u/hr 1u:75 mg/dL 1u:12.0 g 100 mg/dL  7:00 AM 0.500 u/hr 1u:75 mg/dL 1u:12.0 g 100 mg/dL  10:00 AM 0.650 u/hr 1u:75 mg/dL 1u:12.0 g 100 mg/dL  10:30 PM 0.600 u/hr 1u:75 mg/dL 1u:12.0 g 100 mg/dL  Calculated Total Daily Basal 14.3 units  Duration of Insulin: 5:00 hours  Carbohydrates: On

## 2023-08-23 NOTE — PROCEDURES
HPI:  Kiera Dobson is a 57 year old female with type 1 diabetes mellitus.  Clinic visit for diabetes follow up.   Kiera was dx with type 1 diabetes in 3/1970.  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   No nephropathy. No sx of neuropathy at this time.  Her hx is significant for hypoglycemia unawareness.  Kiera continues to use a Tandem insulin pump-control IQ and DexcomG6 sensor.  Her basal insulin rates are set at :  Midnight = 0.65 units/hr.  2 am = 0.5 units/hr.  7 am = 0.5 units/hr.  10 am = 0.65 units/hr.  10:30 pm = 0.6 units/hr.  Her I/C ratio is 1:12. Sensitivity is 75.  Pt's A1C is 7.5 % today. Previous A1C was 7.1 %.   I reviewed her insulin pump download data today.  Some hypoglycemia when she is active. She reports having hypoglycemia even when she uses exercise mode.  The low blood sugars often occur during the day and in evening before bed.  On ROS today, less hip and leg pain since she decreased her statin dose.  No neuropathy sx at this time. Denies foot ulcers.  No recent chest pain.  Vision good at this time.  Nausea when her blood sugar is > 200.  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  She denies SOB,cough, fever or chills at this time.  Pt denies abd pain, diarrhea, dysuria or hematuria.    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  Nephropathy: urine microalbuminuria negative in 9/2022.  Neuropathy: none.  Feet: no foot ulcers.  Taking ASA: yes.  Lipids: LDL 99 in 2/2023.  Pt is taking Simvastatin.  CAD: no. Normal stress echo in 9/2010.  Mental health:hx of ADHD.  Insulin: Tandem insulin pump- control IQ.  Testing: DexcomG6 sensor.  Hypoglycemia treatment: Pt has updated glucagon kit.  Insulin back up:Pt has Lantus to use in case of insulin pump failure.    ROS  Please see under  HPI.    Allergies  Allergies   Allergen Reactions    Ampicillin Sodium Rash       Medications  Current Outpatient Medications   Medication Sig Dispense Refill    aspirin 81 MG chewable tablet Take 81 mg by mouth every other day  108 tablet 3    betamethasone dipropionate (DIPROSONE) 0.05 % external ointment Apply topically 2 times daily To areas of bumps on hands.  Avoid treating biopsy site for first few weeks. 50 g 1    blood glucose (ERICA CONTOUR NEXT) test strip Use to test blood sugar 4-6 times daily or as directed. 720 strip 3    Continuous Blood Gluc  (DEXCOM G6 ) MELE 1 Device continuous 1 Device 0    Continuous Blood Gluc Sensor (DEXCOM G6 SENSOR) MISC 1 each every 10 days 9 each 3    Continuous Blood Gluc Transmit (DEXCOM G6 TRANSMITTER) MISC 1 each every 3 months 1 each 3    Glucagon, rDNA, (GLUCAGON EMERGENCY) 1 MG KIT Inject 1 mg IM for severe hypoglycemia. 1 kit 1    glucose blood VI test strips strip Test up to ten times per day as directed 5 Box 8    insulin aspart (NOVOLOG VIAL) 100 UNITS/ML vial Via insulin pump. Approx 60 units daily. 60 mL 3    insulin glargine (LANTUS SOLOSTAR) 100 UNIT/ML pen Inject 15 units subcutaneous daily ONLY IF INSULIN PUMP FAILS. 15 mL 3    insulin pen needle (BD MARCO U/F) 32G X 4 MM miscellaneous Use with insulin pens ONLY IF INSULIN PUMP FAILS. 25 each 1    simvastatin (ZOCOR) 40 MG tablet Take 1 tablet (40 mg) by mouth At Bedtime 90 tablet 3    glucose 40 % (400 mg/mL) gel Take 15 g by mouth as needed (Patient not taking: Reported on 4/5/2022)         Family History  family history includes Alzheimer Disease in her father; Autoimmune Disease in her mother; Cancer in her father; Diabetes in her father and another family member; Glaucoma in her father and mother; Hypertension in her mother.    Social History  Smoke: none.  ETOH: rare.   with children.  Pt works full time in a lab.    Past Medical History  Past Medical History:   Diagnosis Date     Diabetes mellitus (H)     Type 1    Elevated cholesterol     Glaucoma     Glaucoma suspect    Glaucoma suspect     Kidney stones     Nonsenile cataract     Rash 5/1/2022     Past Surgical History:   Procedure Laterality Date    APPENDECTOMY OPEN  1981    BIOPSY OF SKIN LESION  12/30/2011    returned January 2012 for additional removal    c sections  99,97,08    EXTRACORPOREAL SHOCK WAVE LITHOTRIPSY, CYSTOSCOPY, INSERT STENT URETER(S), COMBINED  2004    LASER HOLMIUM LITHOTRIPSY URETER(S), INSERT STENT, COMBINED  12/12/2013    Procedure: COMBINED CYSTOSCOPY, URETEROSCOPY, LASER HOLMIUM LITHOTRIPSY URETER(S), INSERT STENT;  Right Ureteroscopy Holmium Laser Lithotripsy Stent Placement;  Surgeon: Kirill Marlow MD;  Location: [facility] OR    PHACOEMULSIFICATION CLEAR CORNEA WITH STANDARD INTRAOCULAR LENS IMPLANT Left 10/25/2021    Procedure: LEFT EYE PHACOEMULSIFICATION, CATARACT, WITH INTRAOCULAR LENS IMPLANT COMPLEX;  Surgeon: Curtis Costa MD;  Location: [facility] OR    PHACOEMULSIFICATION CLEAR CORNEA WITH STANDARD INTRAOCULAR LENS IMPLANT Right 11/8/2021    Procedure: COMPLEX RIGHT EYE PHACOEMULSIFICATION, CATARACT, WITH INTRAOCULAR LENS IMPLANT;  Surgeon: Curtis Costa MD;  Location: [facility] OR     S/P right shoulder surgery in Dec 2012.    Physical Exam    /69 (BP Location: Right arm, Patient Position: Sitting, Cuff Size: Adult Regular)   Pulse 85   Wt 61.7 kg (136 lb)   LMP 04/10/2016 (Approximate)   SpO2 98%   BMI 26.56 kg/m       FEET: no ulcers.    RESULTS  Creatinine   Date Value Ref Range Status   09/28/2022 0.63 0.51 - 0.95 mg/dL Final   06/16/2021 0.70 0.52 - 1.04 mg/dL Final     GFR Estimate   Date Value Ref Range Status   09/28/2022 >90 >60 mL/min/1.73m2 Final     Comment:     Effective December 21, 2021 eGFRcr in adults is calculated using the 2021 CKD-EPI creatinine equation which includes age and gender (Los james al., NEJM, DOI: 10.1056/DJRIdi5304519)   06/16/2021 >90 >60  mL/min/[1.73_m2] Final     Comment:     Non  GFR Calc  Starting 12/18/2018, serum creatinine based estimated GFR (eGFR) will be   calculated using the Chronic Kidney Disease Epidemiology Collaboration   (CKD-EPI) equation.       Hemoglobin A1C   Date Value Ref Range Status   02/08/2023   Corrected     Comment:     The previously reported result may be inaccurate due to a laboratory instrument calibration issue. Providers should order a new test to be performed if clinically indicated. Delivery Agent will issue a credit for this test.  This is a corrected result. Previous result was 8.8 % on 2/8/2023 at  1:31 PM CST   06/16/2021 6.9 (H) 0 - 5.6 % Final     Comment:     Normal <5.7% Prediabetes 5.7-6.4%  Diabetes 6.5% or higher - adopted from ADA   consensus guidelines.       Potassium   Date Value Ref Range Status   10/22/2021 4.3 3.4 - 5.3 mmol/L Final   08/20/2019 4.4 3.4 - 5.3 mmol/L Final     ALT   Date Value Ref Range Status   06/16/2021 15 0 - 50 U/L Final     AST   Date Value Ref Range Status   09/28/2022 21 10 - 35 U/L Final   06/16/2021 15 0 - 45 U/L Final     TSH   Date Value Ref Range Status   09/28/2022 2.12 0.30 - 4.20 uIU/mL Final   06/16/2021 3.00 0.40 - 4.00 mU/L Final     T4 Free   Date Value Ref Range Status   02/14/2017 0.84 0.76 - 1.46 ng/dL Final       Cholesterol   Date Value Ref Range Status   02/08/2023 188 <200 mg/dL Final   09/28/2022 196 <200 mg/dL Final   06/16/2021 170 <200 mg/dL Final   08/14/2018 152 <200 mg/dL Final     HDL Cholesterol   Date Value Ref Range Status   06/16/2021 69 >49 mg/dL Final   08/14/2018 61 >49 mg/dL Final     Direct Measure HDL   Date Value Ref Range Status   02/08/2023 76 >=50 mg/dL Final   09/28/2022 77 >=50 mg/dL Final     LDL Cholesterol Calculated   Date Value Ref Range Status   02/08/2023 99 <=100 mg/dL Final   09/28/2022 107 (H) <=100 mg/dL Final   06/16/2021 90 <100 mg/dL Final     Comment:     Desirable:       <100 mg/dl    08/14/2018 72 <100 mg/dL Final     Comment:     Desirable:       <100 mg/dl     Triglycerides   Date Value Ref Range Status   02/08/2023 63 <150 mg/dL Final   09/28/2022 60 <150 mg/dL Final   06/16/2021 56 <150 mg/dL Final   08/14/2018 94 <150 mg/dL Final     Cholesterol/HDL Ratio   Date Value Ref Range Status   08/18/2014 2.7 0.0 - 5.0 Final   09/17/2013 2.3 0.0 - 5.0 Final       ASSESSMENT/PLAN:    1.  TYPE 1 DIABETES MELLITUS:Type 1 diabetes mellitus complicated by moderate NPDR both eyes without macular edema and hypoglycemia unawareness.  No sx of neuropathy at this time.  Her urine microalbuminuria was negative in 9/2022 with normal creat/GFR.  She has had some low blood sugars with activity despite use of exercise mode.  I had her decrease her 10 am basal insulin rate 0.6 units/hr ( was 0.65) and  10:30 pm basal rate to 0.55 units/hr ( was 0.6 ).  Change I/C ratio to 1:15 ( was 1:12 ).  /66 today.    2. MODERATE NPDR:  Hx of moderate NPDR both eyes without macular edema.    Kiera has an Oph appointment here on 5/25/2023.    3.  HYPERLIPIDEMIA:  LDL 99 in 5/2023.  Simvastatin dose was reduced to 40 mg daily given her hx of hip/leg and muscle pain.    4.  ADHD/STRESS: Unchanged.    5. HYPOGLYCEMIA UNAWARENESS: Less hypoglycemia since wearing Tandem insulin pump-control IQ.  She wears her DexcomG6 sensor full time.  Pt has updated glucagon kit.    6.  LEFT SHOULDER PAIN: Referred to Ortho.    7.  FOLLOW UP: with me in 3 months and Dr. Chahal in 6 months.    Time spent reviewing chart, labs and insulin pump data today= 6 minutes.  Time for clinic visit today = 30 minutes.  Time for documentation today = 10 minutes.    TOTAL TIME FOR VISIT TODAY = 46 minutes.    Jo Jean PA-C

## 2023-08-24 DIAGNOSIS — E10.65 TYPE 1 DIABETES MELLITUS WITH HYPERGLYCEMIA (H): Primary | ICD-10-CM

## 2023-08-24 DIAGNOSIS — E10.65 TYPE 1 DIABETES MELLITUS WITH HYPERGLYCEMIA (H): ICD-10-CM

## 2023-08-24 RX ORDER — PROCHLORPERAZINE 25 MG/1
1 SUPPOSITORY RECTAL
Qty: 9 EACH | Refills: 3 | Status: SHIPPED | OUTPATIENT
Start: 2023-08-24

## 2023-09-07 NOTE — TELEPHONE ENCOUNTER
DIAGNOSIS: Type 1 diabetes mellitus with hyperglycemia\Type 1 DM with increase left shoulder pain.\Jo Jean PA-C\ [employer]\ ortho con    APPOINTMENT DATE: 09/14/23   NOTES STATUS DETAILS   OFFICE NOTE from referring provider Internal 08/22/23, 05/16/23, 01/19/23, 01/03/13 - Jo Jean PA-C     11/22/14 - Dr. Jon    12/17/12 - Nickie Genao, PT     09/01/10, 03/01/10 - Paola Chahal    MEDICATION LIST Internal

## 2023-09-09 DIAGNOSIS — E10.65 TYPE 1 DIABETES MELLITUS WITH HYPERGLYCEMIA (H): ICD-10-CM

## 2023-09-13 RX ORDER — PEN NEEDLE, DIABETIC 32GX 5/32"
NEEDLE, DISPOSABLE MISCELLANEOUS 3 TIMES DAILY
Qty: 100 EACH | Refills: 3 | Status: SHIPPED | OUTPATIENT
Start: 2023-09-13

## 2023-09-13 NOTE — TELEPHONE ENCOUNTER
insulin pen needle (BD MARCO U/F) 32G X 4 MM miscellaneous   Please put specific directions Pt needs to use how many times per day of Maxum use for Pt care.   Needs to be specific directions for insurance to cover.    Also needing 100 Each due to insurance coverage.    Please send new order.   Thank you     Sapna Andrade RN  Central Triage Red Flags/Med Refills

## 2023-09-14 ENCOUNTER — OFFICE VISIT (OUTPATIENT)
Dept: ORTHOPEDICS | Facility: CLINIC | Age: 58
End: 2023-09-14
Attending: PHYSICIAN ASSISTANT
Payer: COMMERCIAL

## 2023-09-14 ENCOUNTER — PRE VISIT (OUTPATIENT)
Dept: ORTHOPEDICS | Facility: CLINIC | Age: 58
End: 2023-09-14

## 2023-09-14 ENCOUNTER — ANCILLARY PROCEDURE (OUTPATIENT)
Dept: GENERAL RADIOLOGY | Facility: CLINIC | Age: 58
End: 2023-09-14
Attending: FAMILY MEDICINE
Payer: COMMERCIAL

## 2023-09-14 DIAGNOSIS — E10.65 TYPE 1 DIABETES MELLITUS WITH HYPERGLYCEMIA (H): Primary | ICD-10-CM

## 2023-09-14 DIAGNOSIS — M25.512 LEFT SHOULDER PAIN: ICD-10-CM

## 2023-09-14 DIAGNOSIS — M19.012 GLENOHUMERAL ARTHRITIS, LEFT: ICD-10-CM

## 2023-09-14 DIAGNOSIS — M75.02 ADHESIVE CAPSULITIS OF LEFT SHOULDER: ICD-10-CM

## 2023-09-14 PROCEDURE — 99213 OFFICE O/P EST LOW 20 MIN: CPT | Performed by: FAMILY MEDICINE

## 2023-09-14 PROCEDURE — 73030 X-RAY EXAM OF SHOULDER: CPT | Mod: LT | Performed by: RADIOLOGY

## 2023-09-14 NOTE — PROGRESS NOTES
Sports Medicine Clinic Visit    PCP: Jose Davenport    Kiera Dobson is a 57 year old female who is seen  in consultation at the request of Dr. Jean presenting with left shoulder impingement pain, worse with overhead reaching, outstretched arm.    Injury: Patient noted approximately a year ago that she did fall, landed on her left shoulder.  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  She has been noticing trouble with her left shoulder with impingement pain with overhead reaching.  She wants to return to sports but her left shoulder is limiting her with outstretched arm, overhead activity, putting on clothing.  She had similar problems with her right shoulder, with a frozen shoulder in 2012.    Location of Pain: left lateral  Duration of Pain:   Rating of Pain: 7/10  Pain is better with: massage, warm shower, ibuprofen   Pain is worse with: abduction, IR  Additional Features: lack of ROM  Treatment so far consists of: massage, warm shower, ibuprofen  Prior History of related problems: Previous overhead athlete. Notes previous general shoulder issues    LMP 04/10/2016 (Approximate)       History of diabetes mellitus on insulin    H/o Right-sided shoulder intra-articular glenohumeral joint x-ray guided injection for frozen shoulder 2012    MR CERVICAL SPINE W/O CONTRAST 3/14/2019 6:32 PM     Provided History: Pulsatile tinnitus; Cervicogenic headache; Headache  disorder; Hyperreflexia; History of loss of consciousness; History of  diabetes mellitus, type I     Comparison: 3/14/2019     Technique: Sagittal T1-weighted, sagittal T2-weighted, sagittal STIR,  sagittal diffusion weighted, axial T2-weighted, and axial T2* gradient  echo images of the cervical spine were obtained without intravenous  contrast.     Findings:  The cervical vertebrae are normally aligned.  There is no disc height  narrowing at any level.  There is normal signal within and normal  contour of the cervical  spinal cord.  The findings on a level by level  basis are as follows:     C2-3: No spinal canal or neural foraminal stenosis.     C3-4:  No spinal canal or neural foraminal stenosis     C4-5:  No spinal canal or neural foraminal stenosis.     C5-6:  No spinal canal or neural foraminal stenosis.     C6-7:  No spinal canal or neural foraminal  stenosis.     C7-T1:  No spinal canal or neural foraminal stenosis.     No abnormality of the paraspinous soft tissues.                                                                      Impression: Normal cervical spine MRI.     I have personally reviewed the examination and initial interpretation  and I agree with the findings.     DELLA HERRING MD    PMH:  Past Medical History:   Diagnosis Date    Diabetes mellitus (H)     Type 1    Elevated cholesterol     Glaucoma     Glaucoma suspect    Glaucoma suspect     Kidney stones     Nonsenile cataract     Rash 5/1/2022       Active problem list:  Patient Active Problem List   Diagnosis    Pain in shoulder    Type 1 diabetes mellitus without complication (HCC)    Adhesive capsulitis of shoulder    Superior glenoid labrum lesion    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)    Cortical, lamellar, or zonular cataract, nonsenile    Glaucoma suspect, both eyes    Myopia    Cortical age-related cataract of both eyes    Anatomical narrow angle borderline glaucoma of both eyes    Rash       FH:  Family History   Problem Relation Age of Onset    Diabetes Father     Alzheimer Disease Father     Cancer Father         lung    Glaucoma Father     Hypertension Mother     Glaucoma Mother     Autoimmune Disease Mother         Myasthenia gravis    Diabetes Other     Melanoma No family hx of     Skin Cancer No family hx of     Macular Degeneration No family hx of        SH:  Social History     Socioeconomic History    Marital status:      Spouse name: Not on  file    Number of children: Not on file    Years of education: Not on file    Highest education level: Not on file   Occupational History    Not on file   Tobacco Use    Smoking status: Never    Smokeless tobacco: Never   Substance and Sexual Activity    Alcohol use: Yes     Comment: occasionally 1-2 beers    Drug use: No    Sexual activity: Yes     Partners: Male   Other Topics Concern    Parent/sibling w/ CABG, MI or angioplasty before 65F 55M? Not Asked   Social History Narrative    Not on file     Social Determinants of Health     Financial Resource Strain: Not on file   Food Insecurity: Not on file   Transportation Needs: Not on file   Physical Activity: Not on file   Stress: Not on file   Social Connections: Not on file   Intimate Partner Violence: Not on file   Housing Stability: Not on file       MEDS:  See EMR, reviewed  ALL:  See EMR, reviewed    REVIEW OF SYSTEMS:  CONSTITUTIONAL:NEGATIVE for fever, chills, change in weight  INTEGUMENTARY/SKIN: NEGATIVE for worrisome rashes, moles or lesions  EYES: NEGATIVE for vision changes or irritation  ENT/MOUTH: NEGATIVE for ear, mouth and throat problems  RESP:NEGATIVE for significant cough or SOB  BREAST: NEGATIVE for masses, tenderness or discharge  CV: NEGATIVE for chest pain, palpitations or peripheral edema  GI: NEGATIVE for nausea, abdominal pain, heartburn, or change in bowel habits  :NEGATIVE for frequency, dysuria, or hematuria  :NEGATIVE for frequency, dysuria, or hematuria  NEURO: NEGATIVE for weakness, dizziness or paresthesias  ENDOCRINE: NEGATIVE for temperature intolerance, skin/hair changes  HEME/ALLERGY/IMMUNE: NEGATIVE for bleeding problems  PSYCHIATRIC: NEGATIVE for changes in mood or affect    Objective: In the supine position on the table she has 30 degrees of internal to external rotation arc before she meets an endpoint to passive range of motion.  In the upright position overhead impingement signs are positive and she has 5 out of 5  strength bilaterally to deltoid, supraspinatus, infraspinatus and subscapularis with no breakaway weakness.  No palpable intra-articular shoulder effusion on the left.  Overlying skin is normal.  Mildly tender of the anterior cuff, nontender of the AC joint or biceps tendon.  Sensation is normal distally.  Appropriate conversation and affect.    I personally reviewed with the patient x-rays of the left shoulder that show some small spurring at the glenohumeral joint, consistent with mild DJD, but overall good maintenance of the glenohumeral space.  Mild AC joint DJD.    Assessment: Adhesive capsulitis, left.  Mild glenohumeral DJD.  Type 1 diabetes mellitus on insulin.    Plan: We discussed the natural history of adhesive capsulitis.  She understands on average it takes 9 to 16 months to resolve.  She knows that cortisone injections do not shorten the course of adhesive capsulitis but they can help with pain relief.  She knows that cortisone shots are likely to raise her blood sugars for 3 days and she may have to adjust her insulin accordingly.  We discussed that there is no pill that will shorten the course of adhesive capsulitis.  She was given the option of physical therapy, declined.  She says she remembers her exercises from the last time she had frozen shoulder with her opposite shoulder.  I gave her a handout explaining adhesive capsulitis as a diagnosis and also with associated exercises that she can do at home.  She prefers this to physical therapy.  She would like to try a left-sided intra-articular glenohumeral injection to assist with pain relief, referral for an ultrasound-guided injection placed.  She will look for slow improvements over the upcoming months.

## 2023-09-14 NOTE — LETTER
9/14/2023      RE: Kiera Dobson  [address]  [facility] [address]     Dear Colleague,    Thank you for referring your patient, Kiera Dobson, to the [facility] [place]. Please see a copy of my visit note below.    Sports Medicine Clinic Visit    PCP: Jose Davenport    Kiera Dobson is a 57 year old female who is seen  in consultation at the request of Dr. Jean presenting with left shoulder impingement pain, worse with overhead reaching, outstretched arm.    Injury: Patient noted approximately a year ago that she did fall, landed on her left shoulder.  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  She has been noticing trouble with her left shoulder with impingement pain with overhead reaching.  She wants to return to sports but her left shoulder is limiting her with outstretched arm, overhead activity, putting on clothing.  She had similar problems with her right shoulder, with a frozen shoulder in 2012.    Location of Pain: left lateral  Duration of Pain:   Rating of Pain: 7/10  Pain is better with: massage, warm shower, ibuprofen   Pain is worse with: abduction, IR  Additional Features: lack of ROM  Treatment so far consists of: massage, warm shower, ibuprofen  Prior History of related problems: Previous overhead athlete. Notes previous general shoulder issues    LMP 04/10/2016 (Approximate)       History of diabetes mellitus on insulin    H/o Right-sided shoulder intra-articular glenohumeral joint x-ray guided injection for frozen shoulder 2012    MR CERVICAL SPINE W/O CONTRAST 3/14/2019 6:32 PM     Provided History: Pulsatile tinnitus; Cervicogenic headache; Headache  disorder; Hyperreflexia; History of loss of consciousness; History of  diabetes mellitus, type I     Comparison: 3/14/2019     Technique: Sagittal T1-weighted, sagittal T2-weighted, sagittal STIR,  sagittal diffusion weighted, axial T2-weighted, and axial T2*  gradient  echo images of the cervical spine were obtained without intravenous  contrast.     Findings:  The cervical vertebrae are normally aligned.  There is no disc height  narrowing at any level.  There is normal signal within and normal  contour of the cervical spinal cord.  The findings on a level by level  basis are as follows:     C2-3: No spinal canal or neural foraminal stenosis.     C3-4:  No spinal canal or neural foraminal stenosis     C4-5:  No spinal canal or neural foraminal stenosis.     C5-6:  No spinal canal or neural foraminal stenosis.     C6-7:  No spinal canal or neural foraminal  stenosis.     C7-T1:  No spinal canal or neural foraminal stenosis.     No abnormality of the paraspinous soft tissues.                                                                      Impression: Normal cervical spine MRI.     I have personally reviewed the examination and initial interpretation  and I agree with the findings.     DELLA HERRING MD    PM:  Past Medical History:   Diagnosis Date    Diabetes mellitus (H)     Type 1    Elevated cholesterol     Glaucoma     Glaucoma suspect    Glaucoma suspect     Kidney stones     Nonsenile cataract     Rash 5/1/2022       Active problem list:  Patient Active Problem List   Diagnosis    Pain in shoulder    Type 1 diabetes mellitus without complication (HCC)    Adhesive capsulitis of shoulder    Superior glenoid labrum lesion    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)    Cortical, lamellar, or zonular cataract, nonsenile    Glaucoma suspect, both eyes    Myopia    Cortical age-related cataract of both eyes    Anatomical narrow angle borderline glaucoma of both eyes    Rash       FH:  Family History   Problem Relation Age of Onset    Diabetes Father     Alzheimer Disease Father     Cancer Father         lung    Glaucoma Father     Hypertension Mother     Glaucoma Mother     Autoimmune  Disease Mother         Myasthenia gravis    Diabetes Other     Melanoma No family hx of     Skin Cancer No family hx of     Macular Degeneration No family hx of        SH:  Social History     Socioeconomic History    Marital status:      Spouse name: Not on file    Number of children: Not on file    Years of education: Not on file    Highest education level: Not on file   Occupational History    Not on file   Tobacco Use    Smoking status: Never    Smokeless tobacco: Never   Substance and Sexual Activity    Alcohol use: Yes     Comment: occasionally 1-2 beers    Drug use: No    Sexual activity: Yes     Partners: Male   Other Topics Concern    Parent/sibling w/ CABG, MI or angioplasty before 65F 55M? Not Asked   Social History Narrative    Not on file     Social Determinants of Health     Financial Resource Strain: Not on file   Food Insecurity: Not on file   Transportation Needs: Not on file   Physical Activity: Not on file   Stress: Not on file   Social Connections: Not on file   Intimate Partner Violence: Not on file   Housing Stability: Not on file       MEDS:  See EMR, reviewed  ALL:  See EMR, reviewed    REVIEW OF SYSTEMS:  CONSTITUTIONAL:NEGATIVE for fever, chills, change in weight  INTEGUMENTARY/SKIN: NEGATIVE for worrisome rashes, moles or lesions  EYES: NEGATIVE for vision changes or irritation  ENT/MOUTH: NEGATIVE for ear, mouth and throat problems  RESP:NEGATIVE for significant cough or SOB  BREAST: NEGATIVE for masses, tenderness or discharge  CV: NEGATIVE for chest pain, palpitations or peripheral edema  GI: NEGATIVE for nausea, abdominal pain, heartburn, or change in bowel habits  :NEGATIVE for frequency, dysuria, or hematuria  :NEGATIVE for frequency, dysuria, or hematuria  NEURO: NEGATIVE for weakness, dizziness or paresthesias  ENDOCRINE: NEGATIVE for temperature intolerance, skin/hair changes  HEME/ALLERGY/IMMUNE: NEGATIVE for bleeding problems  PSYCHIATRIC: NEGATIVE for changes in mood  or affect    Objective: In the supine position on the table she has 30 degrees of internal to external rotation arc before she meets an endpoint to passive range of motion.  In the upright position overhead impingement signs are positive and she has 5 out of 5 strength bilaterally to deltoid, supraspinatus, infraspinatus and subscapularis with no breakaway weakness.  No palpable intra-articular shoulder effusion on the left.  Overlying skin is normal.  Mildly tender of the anterior cuff, nontender of the AC joint or biceps tendon.  Sensation is normal distally.  Appropriate conversation and affect.    I personally reviewed with the patient x-rays of the left shoulder that show some small spurring at the glenohumeral joint, consistent with mild DJD, but overall good maintenance of the glenohumeral space.  Mild AC joint DJD.    Assessment: Adhesive capsulitis, left.  Mild glenohumeral DJD.  Type 1 diabetes mellitus on insulin.    Plan: We discussed the natural history of adhesive capsulitis.  She understands on average it takes 9 to 16 months to resolve.  She knows that cortisone injections do not shorten the course of adhesive capsulitis but they can help with pain relief.  She knows that cortisone shots are likely to raise her blood sugars for 3 days and she may have to adjust her insulin accordingly.  We discussed that there is no pill that will shorten the course of adhesive capsulitis.  She was given the option of physical therapy, declined.  She says she remembers her exercises from the last time she had frozen shoulder with her opposite shoulder.  I gave her a handout explaining adhesive capsulitis as a diagnosis and also with associated exercises that she can do at home.  She prefers this to physical therapy.  She would like to try a left-sided intra-articular glenohumeral injection to assist with pain relief, referral for an ultrasound-guided injection placed.  She will look for slow improvements over the  upcoming months.          Again, thank you for allowing me to participate in the care of your patient.      Sincerely,    Gustavo May MD

## 2023-09-27 ENCOUNTER — TELEPHONE (OUTPATIENT)
Dept: ORTHOPEDICS | Facility: CLINIC | Age: 58
End: 2023-09-27
Payer: COMMERCIAL

## 2023-10-03 ENCOUNTER — OFFICE VISIT (OUTPATIENT)
Dept: ORTHOPEDICS | Facility: CLINIC | Age: 58
End: 2023-10-03
Payer: COMMERCIAL

## 2023-10-03 VITALS — BODY MASS INDEX: 26.56 KG/M2 | WEIGHT: 136 LBS

## 2023-10-03 DIAGNOSIS — M75.02 ADHESIVE CAPSULITIS OF LEFT SHOULDER: Primary | ICD-10-CM

## 2023-10-03 DIAGNOSIS — E10.65 TYPE 1 DIABETES MELLITUS WITH HYPERGLYCEMIA (H): ICD-10-CM

## 2023-10-03 PROCEDURE — 20611 DRAIN/INJ JOINT/BURSA W/US: CPT | Mod: LT | Performed by: FAMILY MEDICINE

## 2023-10-03 PROCEDURE — 99207 PR DROP WITH A PROCEDURE: CPT | Performed by: FAMILY MEDICINE

## 2023-10-03 RX ORDER — LIDOCAINE HYDROCHLORIDE 10 MG/ML
4 INJECTION, SOLUTION EPIDURAL; INFILTRATION; INTRACAUDAL; PERINEURAL
Status: SHIPPED | OUTPATIENT
Start: 2023-10-03

## 2023-10-03 RX ORDER — LIDOCAINE HYDROCHLORIDE 10 MG/ML
3 INJECTION, SOLUTION EPIDURAL; INFILTRATION; INTRACAUDAL; PERINEURAL
Status: SHIPPED | OUTPATIENT
Start: 2023-10-03

## 2023-10-03 RX ORDER — METHYLPREDNISOLONE ACETATE 80 MG/ML
80 INJECTION, SUSPENSION INTRA-ARTICULAR; INTRALESIONAL; INTRAMUSCULAR; SOFT TISSUE
Status: SHIPPED | OUTPATIENT
Start: 2023-10-03

## 2023-10-03 RX ADMIN — METHYLPREDNISOLONE ACETATE 80 MG: 80 INJECTION, SUSPENSION INTRA-ARTICULAR; INTRALESIONAL; INTRAMUSCULAR; SOFT TISSUE at 09:15

## 2023-10-03 RX ADMIN — LIDOCAINE HYDROCHLORIDE 4 ML: 10 INJECTION, SOLUTION EPIDURAL; INFILTRATION; INTRACAUDAL; PERINEURAL at 09:15

## 2023-10-03 RX ADMIN — LIDOCAINE HYDROCHLORIDE 3 ML: 10 INJECTION, SOLUTION EPIDURAL; INFILTRATION; INTRACAUDAL; PERINEURAL at 09:15

## 2023-10-03 NOTE — NURSING NOTE
[address] [address]  Dept: [phone]  ______________________________________________________________________________    Patient: Kiera Dobson   : 1965   MRN: [id number]   October 3, 2023    INVASIVE PROCEDURE SAFETY CHECKLIST    Date: 10/3/23   Procedure:Left USG glenohumeral depo injection  Patient Name: Kiera Dobson  MRN: [id number]  YOB: 1965    Action: Complete sections as appropriate. Any discrepancy results in a HARD COPY until resolved.     PRE PROCEDURE:  Patient ID verified with 2 identifiers (name and  or MRN): Yes  Procedure and site verified with patient/designee (when able): Yes  Accurate consent documentation in medical record: Yes  H&P (or appropriate assessment) documented in medical record: Yes  H&P must be up to 20 days prior to procedure and updates within 24 hours of procedure as applicable: NA  Relevant diagnostic and radiology test results appropriately labeled and displayed as applicable: Yes  Procedure site(s) marked with provider initials: NA    TIMEOUT:  Time-Out performed immediately prior to starting procedure, including verbal and active participation of all team members addressing the following:Yes  * Correct patient identify  * Confirmed that the correct side and site are marked  * An accurate procedure consent form  * Agreement on the procedure to be done  * Correct patient position  * Relevant images and results are properly labeled and appropriately displayed  * The need to administer antibiotics or fluids for irrigation purposes during the procedure as applicable   * Safety precautions based on patient history or medication use    DURING PROCEDURE: Verification of correct person, site, and procedures any time the responsibility for care of the patient is transferred to another member of the care team.       Prior to injection, verified patient identity using patient's name and date  of birth.  Due to injection administration, patient instructed to remain in clinic for 15 minutes  afterwards, and to report any adverse reaction to me immediately.    Joint injection was performed.      Drug Amount Wasted:  Yes: 3 mg/ml   Vial/Syringe: Single dose vial  Expiration Date:  04/2027      Justyna Stephens ATC  October 3, 2023

## 2023-10-03 NOTE — LETTER
10/3/2023      RE: Kiera Dobson  [address]  [facility] [address]     Dear Colleague,    Thank you for referring your patient, Kiera Dobson, to the [facility] [place]. Please see a copy of my visit note below.    PROCEDURE ENCOUNTER    [facility]  Orthopedics  Ramos Ram,   October 3, 2023     Name: Kiera Dobson  MRN: [id number]  Age: 57 year old  : 1965    Referring provider: Dr. Katy May MD  Diagnosis: Adhesive Capsulitis of Left Shoulder, Type 1 Diabetes Mellitus.    Glenohumeral Injection - Ultrasound Guided  The patient was informed of the risks and the benefits of the procedure and a written consent was signed.  The patient s left was prepped with chlorhexidine in sterile fashion.   Local anesthesia was performed using a 27-gauge 1.5-inch needle to administer 3 mL of 1% lidocaine without epi.  80 mg of methylprednisolone suspension was drawn up into a 5 mL syringe with 3 mL of 1% lidocaine w/o Epi.  Injection was performed using sterile technique.  Under ultrasound guidance a 3.5-inch 22-gauge needle was used to enter the left glenohumeral joint.  Posterior approach was used with the patient in lateral recumbent position, arm in neutral position at the side.  Needle placement was visualized and documented with ultrasound.  Ultrasound visualization was necessary due to the small joint space entered.  Injection performed long axis to the probe.  Injection solution visualized within the joint space.  Images were permanently stored for the patient's record.  There were no complications. The patient tolerated the procedure well. There was negligible bleeding.   Therapy recommended to follow adhesive capsulitis.  May contact Dr. May if she does wish to proceed.  Insulin pump and she does have correction methods for elevated BS in next 3 days anticipated.  The patient was instructed to call or go to the emergency room with any unusual pain,  swelling, redness, or if otherwise concerned.  Ramos Ram DO CAQSM  Primary Care Sports Medicine  [facility]            Large Joint Injection: L glenohumeral joint    Date/Time: 10/3/2023 9:15 AM    Performed by: Ramos Ram DO  Authorized by: Ramos Ram DO    Indications:  Pain  Needle Size:  22 G  Needle Size comment:  27g for lido  Guidance: ultrasound    Approach:  Posterior  Location:  Shoulder      Site:  L glenohumeral joint  Medications:  80 mg methylPREDNISolone 80 MG/ML; 3 mL lidocaine (PF) 1 %; 4 mL lidocaine (PF) 1 %  Outcome:  Tolerated well, no immediate complications  Procedure discussed: discussed risks, benefits, and alternatives    Consent Given by:  Patient  Timeout: timeout called immediately prior to procedure    Prep: patient was prepped and draped in usual sterile fashion

## 2023-10-03 NOTE — PROGRESS NOTES
Large Joint Injection: L glenohumeral joint    Date/Time: 10/3/2023 9:15 AM    Performed by: Ramos Ram DO  Authorized by: Ramos Ram DO    Indications:  Pain  Needle Size:  22 G  Needle Size comment:  27g for lido  Guidance: ultrasound    Approach:  Posterior  Location:  Shoulder      Site:  L glenohumeral joint  Medications:  80 mg methylPREDNISolone 80 MG/ML; 3 mL lidocaine (PF) 1 %; 4 mL lidocaine (PF) 1 %  Outcome:  Tolerated well, no immediate complications  Procedure discussed: discussed risks, benefits, and alternatives    Consent Given by:  Patient  Timeout: timeout called immediately prior to procedure    Prep: patient was prepped and draped in usual sterile fashion

## 2023-10-03 NOTE — PROGRESS NOTES
PROCEDURE ENCOUNTER    [facility]  Orthopedics  Ramos Ram DO  October 3, 2023     Name: Kiera Dobson  MRN: [id number]  Age: 57 year old  : 1965    Referring provider: Dr. Katy May MD  Diagnosis: Adhesive Capsulitis of Left Shoulder, Type 1 Diabetes Mellitus.    Glenohumeral Injection - Ultrasound Guided  The patient was informed of the risks and the benefits of the procedure and a written consent was signed.  The patient s left was prepped with chlorhexidine in sterile fashion.   Local anesthesia was performed using a 27-gauge 1.5-inch needle to administer 3 mL of 1% lidocaine without epi.  80 mg of methylprednisolone suspension was drawn up into a 5 mL syringe with 3 mL of 1% lidocaine w/o Epi.  Injection was performed using sterile technique.  Under ultrasound guidance a 3.5-inch 22-gauge needle was used to enter the left glenohumeral joint.  Posterior approach was used with the patient in lateral recumbent position, arm in neutral position at the side.  Needle placement was visualized and documented with ultrasound.  Ultrasound visualization was necessary due to the small joint space entered.  Injection performed long axis to the probe.  Injection solution visualized within the joint space.  Images were permanently stored for the patient's record.  There were no complications. The patient tolerated the procedure well. There was negligible bleeding.   Therapy recommended to follow adhesive capsulitis.  May contact Dr. May if she does wish to proceed.  Insulin pump and she does have correction methods for elevated BS in next 3 days anticipated.  The patient was instructed to call or go to the emergency room with any unusual pain, swelling, redness, or if otherwise concerned.  Ramos Ram DO [facility]  Primary Care Sports Medicine  [facility]

## 2023-11-09 NOTE — PROGRESS NOTES
11/9/2023  PATIENT LAB/IMAGING STATUS : No pending lab orders   Patient is showing 5/5 MNCM met.   Outcome for 11/20/23 9:48 AM: [employer] message sent  Alberta Christie CMA

## 2023-11-21 ENCOUNTER — OFFICE VISIT (OUTPATIENT)
Dept: ENDOCRINOLOGY | Facility: CLINIC | Age: 58
End: 2023-11-21
Payer: COMMERCIAL

## 2023-11-21 VITALS
WEIGHT: 134 LBS | DIASTOLIC BLOOD PRESSURE: 60 MMHG | OXYGEN SATURATION: 100 % | SYSTOLIC BLOOD PRESSURE: 112 MMHG | HEIGHT: 60 IN | BODY MASS INDEX: 26.31 KG/M2 | HEART RATE: 90 BPM

## 2023-11-21 DIAGNOSIS — E10.65 TYPE 1 DIABETES MELLITUS WITH HYPERGLYCEMIA (H): Primary | ICD-10-CM

## 2023-11-21 LAB — HBA1C MFR BLD: 7.8 %

## 2023-11-21 PROCEDURE — 99215 OFFICE O/P EST HI 40 MIN: CPT | Performed by: PHYSICIAN ASSISTANT

## 2023-11-21 PROCEDURE — 83036 HEMOGLOBIN GLYCOSYLATED A1C: CPT | Performed by: PATHOLOGY

## 2023-11-21 ASSESSMENT — PAIN SCALES - GENERAL: PAINLEVEL: NO PAIN (0)

## 2023-11-21 NOTE — PROGRESS NOTES
HPI:  Kiera Dobson is a 58 year old female with type 1 diabetes mellitus.  Clinic visit for diabetes follow up.   Kiera was dx with type 1 diabetes in 3/1970.  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   No nephropathy. No sx of neuropathy at this time.  Her hx is significant for hypoglycemia unawareness.  Kiera continues to use a Tandem insulin pump-control IQ and DexcomG6 sensor.  Her basal insulin rates are set at :  Midnight = 0.65 units/hr.  2 am = 0.5 units/hr.  7 am = 0.5 units/hr.  10 am = 0.7 units/hr.  10:30 pm = 0.65 units/hr.  Her I/C ratio is 1:15. Sensitivity is 75.  Pt's A1C is 7.8 % today. Previous A1C was 7.5 %.   I reviewed her insulin pump download data today.  She tells me she is having insulin absorption problems again.  Her average glucose is 175 with SD 58 and estimated A1C 7.5 % for the past 2 weeks.  Her blood sugar is in target 59 % of the time and above target 41 % of the time.  Reports does not show frequent hypoglycemia.  When she sees the 2 arrows going down ( which is often following bolus override ) she will often over treat for a low blood sugar.  Basal % is 54 % and bolus % is 46 %.  Bolus override is 60 %.  Her cartridge change is every 7 days on her reports. She reports changing this more frequent.  On ROS today, less hip and leg pain since she decreased her statin dose.  No neuropathy sx at this time. Denies foot ulcers.  No recent chest pain.  Vision good at this time.  Nausea when her blood sugar is > 200.  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 Had a steroid injection > 3 weeks ago.  She denies SOB,cough, fever or chills at this time.  Pt denies abd pain, diarrhea, dysuria or hematuria.    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  Nephropathy: urine microalbuminuria  negative in 9/2022.  Neuropathy: none.  Feet: no foot ulcers.  Taking ASA: yes.  Lipids: LDL 99 in 2/2023.  Pt is taking Simvastatin.  CAD: no. Normal stress echo in 9/2010.  Mental health:hx of ADHD.  Insulin: Tandem insulin pump- control IQ.  Testing: DexcomG6 sensor.  Hypoglycemia treatment: Pt has updated glucagon kit.  Insulin back up:Pt has Lantus to use in case of insulin pump failure.                    ROS  Please see under HPI.    Allergies  Allergies   Allergen Reactions    Ampicillin Sodium Rash       Medications  Current Outpatient Medications   Medication Sig Dispense Refill    betamethasone dipropionate (DIPROSONE) 0.05 % external ointment Apply topically 2 times daily To areas of bumps on hands.  Avoid treating biopsy site for first few weeks. 50 g 1    blood glucose (ERICA CONTOUR NEXT) test strip Use to test blood sugar 4-6 times daily or as directed. 720 strip 3    Continuous Blood Gluc  (DEXCOM G6 ) MELE 1 Device continuous 1 Device 0    Continuous Blood Gluc Sensor (DEXCOM G6 SENSOR) MISC 1 each every 10 days 9 each 3    Continuous Blood Gluc Transmit (DEXCOM G6 TRANSMITTER) MISC 1 each every 3 months 1 each 3    glucose blood VI test strips strip Test up to ten times per day as directed 5 Box 8    insulin aspart (NOVOLOG VIAL) 100 UNITS/ML vial Via insulin pump. Approx 60 units daily. 60 mL 3    insulin glargine (LANTUS SOLOSTAR) 100 UNIT/ML pen Inject 15 units subcutaneous daily ONLY IF INSULIN PUMP FAILS. 15 mL 3    insulin pen needle (BD MARCO U/F) 32G X 4 MM miscellaneous Inject Subcutaneous 3 times daily 100 each 3    simvastatin (ZOCOR) 40 MG tablet Take 1 tablet (40 mg) by mouth At Bedtime 90 tablet 3    aspirin 81 MG chewable tablet Take 81 mg by mouth every other day  (Patient not taking: Reported on 11/21/2023) 108 tablet 3    Glucagon, rDNA, (GLUCAGON EMERGENCY) 1 MG KIT Inject 1 mg IM for severe hypoglycemia. (Patient not taking: Reported on 11/21/2023) 1 kit 1     "glucose 40 % (400 mg/mL) gel Take 15 g by mouth as needed (Patient not taking: Reported on 4/5/2022)         Family History  family history includes Alzheimer Disease in her father; Autoimmune Disease in her mother; Cancer in her father; Diabetes in her father and another family member; Glaucoma in her father and mother; Hypertension in her mother.    Social History  Smoke: none.  ETOH: rare.   with children.  Pt works full time in a lab.    Past Medical History  Past Medical History:   Diagnosis Date    Diabetes mellitus (H)     Type 1    Elevated cholesterol     Glaucoma     Glaucoma suspect    Glaucoma suspect     Kidney stones     Nonsenile cataract     Rash 5/1/2022     Past Surgical History:   Procedure Laterality Date    APPENDECTOMY OPEN  1981    BIOPSY OF SKIN LESION  12/30/2011    returned January 2012 for additional removal    c sections  99,97,08    EXTRACORPOREAL SHOCK WAVE LITHOTRIPSY, CYSTOSCOPY, INSERT STENT URETER(S), COMBINED  2004    LASER HOLMIUM LITHOTRIPSY URETER(S), INSERT STENT, COMBINED  12/12/2013    Procedure: COMBINED CYSTOSCOPY, URETEROSCOPY, LASER HOLMIUM LITHOTRIPSY URETER(S), INSERT STENT;  Right Ureteroscopy Holmium Laser Lithotripsy Stent Placement;  Surgeon: Kirill Marlow MD;  Location: [facility]    PHACOEMULSIFICATION CLEAR CORNEA WITH STANDARD INTRAOCULAR LENS IMPLANT Left 10/25/2021    Procedure: LEFT EYE PHACOEMULSIFICATION, CATARACT, WITH INTRAOCULAR LENS IMPLANT COMPLEX;  Surgeon: Curtis Costa MD;  [place] [facility] [place]    PHACOEMULSIFICATION CLEAR CORNEA WITH STANDARD INTRAOCULAR LENS IMPLANT Right 11/8/2021    Procedure: COMPLEX RIGHT EYE PHACOEMULSIFICATION, CATARACT, WITH INTRAOCULAR LENS IMPLANT;  Surgeon: Curtis Costa MD;  Location: [facility] [place]     S/P right shoulder surgery in Dec 2012.    Physical Exam    /60   Pulse 90   Ht 1.518 m (4' 11.75\")   Wt 60.8 kg (134 lb)   LMP 04/10/2016 (Approximate)   SpO2 100%   BMI 26.39 kg/m   "     FEET: no ulcers.    RESULTS  Creatinine   Date Value Ref Range Status   09/28/2022 0.63 0.51 - 0.95 mg/dL Final   06/16/2021 0.70 0.52 - 1.04 mg/dL Final     GFR Estimate   Date Value Ref Range Status   09/28/2022 >90 >60 mL/min/1.73m2 Final     Comment:     Effective December 21, 2021 eGFRcr in adults is calculated using the 2021 CKD-EPI creatinine equation which includes age and gender (Los et al., NE, DOI: 10.1056/MLMRfr0690471)   06/16/2021 >90 >60 mL/min/[1.73_m2] Final     Comment:     Non  GFR Calc  Starting 12/18/2018, serum creatinine based estimated GFR (eGFR) will be   calculated using the Chronic Kidney Disease Epidemiology Collaboration   (CKD-EPI) equation.       Hemoglobin A1C   Date Value Ref Range Status   02/08/2023   Corrected     Comment:     The previously reported result may be inaccurate due to a laboratory instrument calibration issue. Providers should order a new test to be performed if clinically indicated. [facility] will issue a credit for this test.  This is a corrected result. Previous result was 8.8 % on 2/8/2023 at  1:31 PM CST   06/16/2021 6.9 (H) 0 - 5.6 % Final     Comment:     Normal <5.7% Prediabetes 5.7-6.4%  Diabetes 6.5% or higher - adopted from ADA   consensus guidelines.       Potassium   Date Value Ref Range Status   10/22/2021 4.3 3.4 - 5.3 mmol/L Final   08/20/2019 4.4 3.4 - 5.3 mmol/L Final     ALT   Date Value Ref Range Status   06/16/2021 15 0 - 50 U/L Final     AST   Date Value Ref Range Status   09/28/2022 21 10 - 35 U/L Final   06/16/2021 15 0 - 45 U/L Final     TSH   Date Value Ref Range Status   09/28/2022 2.12 0.30 - 4.20 uIU/mL Final   06/16/2021 3.00 0.40 - 4.00 mU/L Final     T4 Free   Date Value Ref Range Status   02/14/2017 0.84 0.76 - 1.46 ng/dL Final       Cholesterol   Date Value Ref Range Status   02/08/2023 188 <200 mg/dL Final   09/28/2022 196 <200 mg/dL Final   06/16/2021 170 <200 mg/dL Final   08/14/2018  152 <200 mg/dL Final     HDL Cholesterol   Date Value Ref Range Status   06/16/2021 69 >49 mg/dL Final   08/14/2018 61 >49 mg/dL Final     Direct Measure HDL   Date Value Ref Range Status   02/08/2023 76 >=50 mg/dL Final   09/28/2022 77 >=50 mg/dL Final     LDL Cholesterol Calculated   Date Value Ref Range Status   02/08/2023 99 <=100 mg/dL Final   09/28/2022 107 (H) <=100 mg/dL Final   06/16/2021 90 <100 mg/dL Final     Comment:     Desirable:       <100 mg/dl   08/14/2018 72 <100 mg/dL Final     Comment:     Desirable:       <100 mg/dl     Triglycerides   Date Value Ref Range Status   02/08/2023 63 <150 mg/dL Final   09/28/2022 60 <150 mg/dL Final   06/16/2021 56 <150 mg/dL Final   08/14/2018 94 <150 mg/dL Final     Cholesterol/HDL Ratio   Date Value Ref Range Status   08/18/2014 2.7 0.0 - 5.0 Final   09/17/2013 2.3 0.0 - 5.0 Final       ASSESSMENT/PLAN:    1.  TYPE 1 DIABETES MELLITUS:Type 1 diabetes mellitus complicated by moderate NPDR both eyes without macular edema and hypoglycemia unawareness.  No sx of neuropathy at this time.  Her urine microalbuminuria was negative in 9/2022 with normal creat/GFR.  Pt reports insulin absorption problems. Discussed this with Mita SAWYER today who will see Kiera in follow up.  I asked Kiera to avoid override bolus ( currently 60 %% ) and to avoid over treatment when she sees both arrows doing downward.   Change cartridge every 3 days.  /60 today.    2. MODERATE NPDR:  Hx of moderate NPDR both eyes without macular edema.    Kiera  seen by Oph in 2023.    3.  HYPERLIPIDEMIA:  LDL 99 in 52/2023.   Simvastatin dose was reduced to 40 mg daily given her hx of hip/leg and muscle pain.    4.  ADHD/STRESS: Unchanged.    5. HYPOGLYCEMIA UNAWARENESS: Less hypoglycemia since wearing Tandem insulin pump-control IQ.  She wears her DexcomG6 sensor full time.  Pt has updated glucagon kit and Baqsimi.    6.  LEFT SHOULDER PAIN: Seen by Ortho.    7.  FOLLOW UP: with Dr. Chahal in Rei  2024 and me in Feb 2024.  Mita Vargas CDE will be in contact with patient.    Time spent reviewing chart, labs and insulin pump data today= 6 minutes.  Time for clinic visit today = 30 minutes.  Time for documentation today = 10 minutes.    TOTAL TIME FOR VISIT TODAY = 46 minutes.    Jo Jean PA-C

## 2023-11-21 NOTE — LETTER
11/21/2023       RE: Kiera Dobson  [address]  [place] [address]     Dear Colleague,    Thank you for referring your patient, Kiera Dobson, to the [facility] [place] [facility]. Please see a copy of my visit note below.    11/9/2023  PATIENT LAB/IMAGING STATUS : No pending lab orders   Patient is showing 5/5 MNCM met.   Outcome for 11/20/23 9:48 AM: Referron message sent  Alberta Christie CMA                    HPI:  Kiera Dobson is a 58 year old female with type 1 diabetes mellitus.  Clinic visit for diabetes follow up.   Kiera was dx with type 1 diabetes in [birth year]/1970.  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   No nephropathy. No sx of neuropathy at this time.  Her hx is significant for hypoglycemia unawareness.  Kiera continues to use a Tandem insulin pump-control IQ and DexcomG6 sensor.  Her basal insulin rates are set at :  Midnight = 0.65 units/hr.  2 am = 0.5 units/hr.  7 am = 0.5 units/hr.  10 am = 0.7 units/hr.  10:30 pm = 0.65 units/hr.  Her I/C ratio is 1:15. Sensitivity is 75.  Pt's A1C is 7.8 % today. Previous A1C was 7.5 %.   I reviewed her insulin pump download data today.  She tells me she is having insulin absorption problems again.  Her average glucose is 175 with SD 58 and estimated A1C 7.5 % for the past 2 weeks.  Her blood sugar is in target 59 % of the time and above target 41 % of the time.  Reports does not show frequent hypoglycemia.  When she sees the 2 arrows going down ( which is often following bolus override ) she will often over treat for a low blood sugar.  Basal % is 54 % and bolus % is 46 %.  Bolus override is 60 %.  Her cartridge change is every 7 days on her reports. She reports changing this more frequent.  On ROS today, less hip and leg pain since she decreased her statin dose.  No neuropathy sx at this time. Denies foot ulcers.  No recent chest  pain.  Vision good at this time.  Nausea when her blood sugar is > 200.  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 Had a steroid injection > 3 weeks ago.  She denies SOB,cough, fever or chills at this time.  Pt denies abd pain, diarrhea, dysuria or hematuria.    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  Nephropathy: urine microalbuminuria negative in 9/2022.  Neuropathy: none.  Feet: no foot ulcers.  Taking ASA: yes.  Lipids: LDL 99 in 2/2023.  Pt is taking Simvastatin.  CAD: no. Normal stress echo in 9/2010.  Mental health:hx of ADHD.  Insulin: Tandem insulin pump- control IQ.  Testing: DexcomG6 sensor.  Hypoglycemia treatment: Pt has updated glucagon kit.  Insulin back up:Pt has Lantus to use in case of insulin pump failure.                    ROS  Please see under HPI.    Allergies  Allergies   Allergen Reactions    Ampicillin Sodium Rash       Medications  Current Outpatient Medications   Medication Sig Dispense Refill    betamethasone dipropionate (DIPROSONE) 0.05 % external ointment Apply topically 2 times daily To areas of bumps on hands.  Avoid treating biopsy site for first few weeks. 50 g 1    blood glucose (ERICA CONTOUR NEXT) test strip Use to test blood sugar 4-6 times daily or as directed. 720 strip 3    Continuous Blood Gluc  (DEXCOM G6 ) MELE 1 Device continuous 1 Device 0    Continuous Blood Gluc Sensor (DEXCOM G6 SENSOR) MISC 1 each every 10 days 9 each 3    Continuous Blood Gluc Transmit (DEXCOM G6 TRANSMITTER) MISC 1 each every 3 months 1 each 3    glucose blood VI test strips strip Test up to ten times per day as directed 5 Box 8    insulin aspart (NOVOLOG VIAL) 100 UNITS/ML vial Via insulin pump. Approx 60 units daily. 60 mL 3    insulin glargine (LANTUS SOLOSTAR) 100 UNIT/ML pen Inject 15 units subcutaneous daily  ONLY IF INSULIN PUMP FAILS. 15 mL 3    insulin pen needle (BD MARCO U/F) 32G X 4 MM miscellaneous Inject Subcutaneous 3 times daily 100 each 3    simvastatin (ZOCOR) 40 MG tablet Take 1 tablet (40 mg) by mouth At Bedtime 90 tablet 3    aspirin 81 MG chewable tablet Take 81 mg by mouth every other day  (Patient not taking: Reported on 11/21/2023) 108 tablet 3    Glucagon, rDNA, (GLUCAGON EMERGENCY) 1 MG KIT Inject 1 mg IM for severe hypoglycemia. (Patient not taking: Reported on 11/21/2023) 1 kit 1    glucose 40 % (400 mg/mL) gel Take 15 g by mouth as needed (Patient not taking: Reported on 4/5/2022)         Family History  family history includes Alzheimer Disease in her father; Autoimmune Disease in her mother; Cancer in her father; Diabetes in her father and another family member; Glaucoma in her father and mother; Hypertension in her mother.    Social History  Smoke: none.  ETOH: rare.   with children.  Pt works full time in a lab.    Past Medical History  Past Medical History:   Diagnosis Date    Diabetes mellitus (H)     Type 1    Elevated cholesterol     Glaucoma     Glaucoma suspect    Glaucoma suspect     Kidney stones     Nonsenile cataract     Rash 5/1/2022     Past Surgical History:   Procedure Laterality Date    APPENDECTOMY OPEN  1981    BIOPSY OF SKIN LESION  12/30/2011    returned January 2012 for additional removal    c sections  99,97,08    EXTRACORPOREAL SHOCK WAVE LITHOTRIPSY, CYSTOSCOPY, INSERT STENT URETER(S), COMBINED  2004    LASER HOLMIUM LITHOTRIPSY URETER(S), INSERT STENT, COMBINED  12/12/2013    Procedure: COMBINED CYSTOSCOPY, URETEROSCOPY, LASER HOLMIUM LITHOTRIPSY URETER(S), INSERT STENT;  Right Ureteroscopy Holmium Laser Lithotripsy Stent Placement;  Surgeon: Kirill Marlow MD;  Location: [facility] OR    PHACOEMULSIFICATION CLEAR CORNEA WITH STANDARD INTRAOCULAR LENS IMPLANT Left 10/25/2021    Procedure: LEFT EYE PHACOEMULSIFICATION, CATARACT, WITH INTRAOCULAR LENS IMPLANT  "COMPLEX;  Surgeon: Curtis Costa MD;  Location: [facility]    PHACOEMULSIFICATION CLEAR CORNEA WITH STANDARD INTRAOCULAR LENS IMPLANT Right 11/8/2021    Procedure: COMPLEX RIGHT EYE PHACOEMULSIFICATION, CATARACT, WITH INTRAOCULAR LENS IMPLANT;  Surgeon: Curtis Costa MD;  Location: [facility] OR     S/P right shoulder surgery in Dec 2012.    Physical Exam    /60   Pulse 90   Ht 1.518 m (4' 11.75\")   Wt 60.8 kg (134 lb)   LMP 04/10/2016 (Approximate)   SpO2 100%   BMI 26.39 kg/m       FEET: no ulcers.    RESULTS  Creatinine   Date Value Ref Range Status   09/28/2022 0.63 0.51 - 0.95 mg/dL Final   06/16/2021 0.70 0.52 - 1.04 mg/dL Final     GFR Estimate   Date Value Ref Range Status   09/28/2022 >90 >60 mL/min/1.73m2 Final     Comment:     Effective December 21, 2021 eGFRcr in adults is calculated using the 2021 CKD-EPI creatinine equation which includes age and gender (Los et al., [place], DOI: 10.1056/RTKFhy9505809)   06/16/2021 >90 >60 mL/min/[1.73_m2] Final     Comment:     Non  GFR Calc  Starting 12/18/2018, serum creatinine based estimated GFR (eGFR) will be   calculated using the Chronic Kidney Disease Epidemiology Collaboration   (CKD-EPI) equation.       Hemoglobin A1C   Date Value Ref Range Status   02/08/2023   Corrected     Comment:     The previously reported result may be inaccurate due to a laboratory instrument calibration issue. Providers should order a new test to be performed if clinically indicated. [facility] will issue a credit for this test.  This is a corrected result. Previous result was 8.8 % on 2/8/2023 at  1:31 PM CST   06/16/2021 6.9 (H) 0 - 5.6 % Final     Comment:     Normal <5.7% Prediabetes 5.7-6.4%  Diabetes 6.5% or higher - adopted from ADA   consensus guidelines.       Potassium   Date Value Ref Range Status   10/22/2021 4.3 3.4 - 5.3 mmol/L Final   08/20/2019 4.4 3.4 - 5.3 mmol/L Final     ALT   Date Value Ref Range Status "   06/16/2021 15 0 - 50 U/L Final     AST   Date Value Ref Range Status   09/28/2022 21 10 - 35 U/L Final   06/16/2021 15 0 - 45 U/L Final     TSH   Date Value Ref Range Status   09/28/2022 2.12 0.30 - 4.20 uIU/mL Final   06/16/2021 3.00 0.40 - 4.00 mU/L Final     T4 Free   Date Value Ref Range Status   02/14/2017 0.84 0.76 - 1.46 ng/dL Final       Cholesterol   Date Value Ref Range Status   02/08/2023 188 <200 mg/dL Final   09/28/2022 196 <200 mg/dL Final   06/16/2021 170 <200 mg/dL Final   08/14/2018 152 <200 mg/dL Final     HDL Cholesterol   Date Value Ref Range Status   06/16/2021 69 >49 mg/dL Final   08/14/2018 61 >49 mg/dL Final     Direct Measure HDL   Date Value Ref Range Status   02/08/2023 76 >=50 mg/dL Final   09/28/2022 77 >=50 mg/dL Final     LDL Cholesterol Calculated   Date Value Ref Range Status   02/08/2023 99 <=100 mg/dL Final   09/28/2022 107 (H) <=100 mg/dL Final   06/16/2021 90 <100 mg/dL Final     Comment:     Desirable:       <100 mg/dl   08/14/2018 72 <100 mg/dL Final     Comment:     Desirable:       <100 mg/dl     Triglycerides   Date Value Ref Range Status   02/08/2023 63 <150 mg/dL Final   09/28/2022 60 <150 mg/dL Final   06/16/2021 56 <150 mg/dL Final   08/14/2018 94 <150 mg/dL Final     Cholesterol/HDL Ratio   Date Value Ref Range Status   08/18/2014 2.7 0.0 - 5.0 Final   09/17/2013 2.3 0.0 - 5.0 Final       ASSESSMENT/PLAN:    1.  TYPE 1 DIABETES MELLITUS:Type 1 diabetes mellitus complicated by moderate NPDR both eyes without macular edema and hypoglycemia unawareness.  No sx of neuropathy at this time.  Her urine microalbuminuria was negative in 9/2022 with normal creat/GFR.  Pt reports insulin absorption problems. Discussed this with Mita SAWYER today who will see Kiera in follow up.  I asked Kiera to avoid override bolus ( currently 60 %% ) and to avoid over treatment when she sees both arrows doing downward.   Change cartridge every 3 days.  /60 today.    2. MODERATE NPDR:   Hx of moderate NPDR both eyes without macular edema.    Kiera  seen by Oph in 2023.    3.  HYPERLIPIDEMIA:  LDL 99 in 52/2023.   Simvastatin dose was reduced to 40 mg daily given her hx of hip/leg and muscle pain.    4.  ADHD/STRESS: Unchanged.    5. HYPOGLYCEMIA UNAWARENESS: Less hypoglycemia since wearing Tandem insulin pump-control IQ.  She wears her DexcomG6 sensor full time.  Pt has updated glucagon kit and Baqsimi.    6.  LEFT SHOULDER PAIN: Seen by Ortho.    7.  FOLLOW UP: with Dr. Chahal in Jan 2024 and me in Feb 2024.  Mita Vargas CDE will be in contact with patient.    Time spent reviewing chart, labs and insulin pump data today= 6 minutes.  Time for clinic visit today = 30 minutes.  Time for documentation today = 10 minutes.    TOTAL TIME FOR VISIT TODAY = 46 minutes.    Jo Jean PA-C

## 2023-11-30 ENCOUNTER — CARE COORDINATION (OUTPATIENT)
Dept: EDUCATION SERVICES | Facility: CLINIC | Age: 58
End: 2023-11-30
Payer: COMMERCIAL

## 2023-11-30 NOTE — PROGRESS NOTES
Diabetes Educator Note:    My Chart message sent to Kiera Jean request to get appointment scheduled for the purpose of reviewing pump management and general control.  Waiting for response.      Mita Vargas, BHARGAVN, RN, [facility]  Certified Diabetes Care and   [facility] Endocrinology and Diabetes   [facility]  Clinic [id number]  Phone [phone]

## 2024-01-08 NOTE — PROGRESS NOTES
1/8/2024  PATIENT LAB/IMAGING STATUS : No pending lab orders   Outcome for 01/08/24 11:27 AM: Data obtained via [employer] website  Alberta Christie CMA      This 58-year-old woman was seen for follow-up of her longstanding type 1 diabetes.  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  I haven't seen her in over a year but she has had appointments with Jo Jean.  The last one was in Sept. she is now using a control IQ tandem pump.  Data from her pump and CGM download is in the other note associated with today.  Overall, and thinks the problems with her pump are all related to how she uses it.  She has trouble remembering to bolus before her meals.  She is pretty good with carb counting but does not always enter her carbs.  She is very busy at work and sometimes does not really take breaks.  She is remains very engaged in supporting her son who is a chess player.  She takes him to [employer] matches, does fundraising, and is generally very busy outside of her work hours as well.  All of this activity distracts her from entering carbs and bolusing before she eats.  She is very concerned about hyperglycemia and will override the pump frequently in order to bring her sugar down.  This results in her CGM trajectory looking as a double arrow down which then frightens her.  She will eat to prevent herself from getting too low and this will then send her sugars up.  She has had severe hypoglycemia in the past and she is frightened of this happening again.  She has glucagon at home and her  knows how to use it.  She usually has symptoms when her sugars get low.    She is up-to-date with her eye visits and things are stable.  She denies chest pain or shortness of breath.  She does have pain in her left shoulder.  She got a steroid injection in November that helped for a time but the pain is now back.  She wonders what I think she should do.  She has no GI or  symptoms.  She has no paresthesias or foot  "ulcers.    Current Outpatient Medications   Medication    blood glucose (ERICA CONTOUR NEXT) test strip    Continuous Blood Gluc  (DEXCOM G6 ) MELE    Continuous Blood Gluc Sensor (DEXCOM G6 SENSOR) MISC    Continuous Blood Gluc Transmit (DEXCOM G6 TRANSMITTER) MISC    glucose blood VI test strips strip    insulin aspart (NOVOLOG VIAL) 100 UNITS/ML vial    insulin glargine (LANTUS SOLOSTAR) 100 UNIT/ML pen    insulin pen needle (BD MARCO U/F) 32G X 4 MM miscellaneous    SEMGLEE, YFGN, 100 UNIT/ML SOPN    simvastatin (ZOCOR) 40 MG tablet    aspirin 81 MG chewable tablet    Glucagon, rDNA, (GLUCAGON EMERGENCY) 1 MG KIT    glucose 40 % (400 mg/mL) gel     Current Facility-Administered Medications   Medication    lidocaine (PF) (XYLOCAINE) 1 % injection 3 mL    lidocaine (PF) (XYLOCAINE) 1 % injection 4 mL    methylPREDNISolone (DEPO-Medrol) injection 80 mg         Patient Active Problem List   Diagnosis    Pain in shoulder    Type 1 diabetes mellitus without complication (HCC)    Adhesive capsulitis of shoulder    Superior glenoid labrum lesion    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)    Cortical, lamellar, or zonular cataract, nonsenile    Glaucoma suspect, both eyes    Myopia    Cortical age-related cataract of both eyes    Anatomical narrow angle borderline glaucoma of both eyes    Rash         /71   Pulse 98   Ht 1.511 m (4' 11.5\")   Wt 60.3 kg (133 lb)   LMP 04/10/2016 (Approximate)   SpO2 98%   BMI 26.41 kg/m       VSS  NAD  Eyes - no periorbital edema, conjunctival injection, scleral icterus  Neck - no thyromegaly  CV - RRR.  Normal pulses in feet.  No edema  Neuro - sensation intact to monofilament on soles of feet.  DTR 2/4 biceps  Skin - normal texture   Feet - no ulcers   MSK-she has full range of motion in her shoulders albeit with some discomfort.  Recent Labs   Lab Test 08/22/23  0923 " 05/16/23  0912 02/08/23  1047 09/28/22  0906 09/28/22  0855 09/14/21  0900 06/18/21  1600 06/16/21  0845 06/16/21  0837 02/14/17  1157 02/14/17  1149 03/01/16  0223 02/10/16  0949   A1C  --   --   --   --  7.1*  --   --   --  6.9*   < >  --   --   --    HEMOGLOBINA1 7.5 7.1  --   --   --    < >  --   --   --    < >  --    < >  --    TSH  --   --   --   --  2.12  --   --   --  3.00   < > 1.57  --  1.13   T4  --   --   --   --   --   --   --   --   --   --  0.84  --  0.84   LDL  --   --  99  --  107*  --   --   --  90   < >  --   --  101*   HDL  --   --  76  --  77  --   --   --  69   < >  --   --  78   TRIG  --   --  63  --  60  --   --   --  56   < >  --   --  58   CR  --   --   --   --  0.63  --   --   --  0.70   < > 0.62   < > 0.66   MICROL  --   --   --  13.5  --   --  6   < >  --    < >  --   --  8    < > = values in this interval not displayed.     A1c from November 7.8  Assessment and plan:    1.  Diabetes control.  Kiera's blood sugars are above target.  It sometimes appears that she needs a more aggressive carb ratio but with her tendency to overcorrect any highs, I am worried that we might just cause a problem if we change her carb ratio.  I asked her to stop overriding the pump for the 3-day.  So that I can see how things really look.  At that point I should be able to know if the carb ratio should be changed.  I reviewed the importance of getting her bolus insulin in before she eats.  I stressed that the correction doses that she gives after she gets high from eating actually increase her risk for hypoglycemia.  She recognizes this and admits she has quite a bit of anxiety about extremes of glycemia.  She is not interested in seeing the nurse educator again.  She thinks she knows what she needs to do.    2.  Diabetes complications.  She has pretty proliferative retinopathy with regular eye follow-up.  I will check her urine test and creatinine today.  Her feet are fine.    3.CVD risk.  She is on a statin.   I will check her lipids today.  Blood pressure is in target.    4.left shoulder pain.  I recommended she see the physical therapist as her orthopedic doctor recommended.    Follow-up with Jo Jean in 3 months and me in 6 months.    Paola Chahal MD

## 2024-01-23 ENCOUNTER — OFFICE VISIT (OUTPATIENT)
Dept: ENDOCRINOLOGY | Facility: CLINIC | Age: 59
End: 2024-01-23
Payer: COMMERCIAL

## 2024-01-23 VITALS
OXYGEN SATURATION: 98 % | SYSTOLIC BLOOD PRESSURE: 133 MMHG | WEIGHT: 133 LBS | BODY MASS INDEX: 26.11 KG/M2 | HEIGHT: 60 IN | HEART RATE: 98 BPM | DIASTOLIC BLOOD PRESSURE: 71 MMHG

## 2024-01-23 DIAGNOSIS — E10.65 TYPE 1 DIABETES MELLITUS WITH HYPERGLYCEMIA (H): Primary | ICD-10-CM

## 2024-01-23 PROCEDURE — 99214 OFFICE O/P EST MOD 30 MIN: CPT | Performed by: INTERNAL MEDICINE

## 2024-01-23 RX ORDER — INSULIN GLARGINE-YFGN 100 [IU]/ML
INJECTION, SOLUTION SUBCUTANEOUS
COMMUNITY
Start: 2023-12-07

## 2024-01-23 ASSESSMENT — PAIN SCALES - GENERAL: PAINLEVEL: MODERATE PAIN (5)

## 2024-01-23 NOTE — LETTER
1/23/2024       RE: Kiera Dobson  [address]  [facility] [address]     Dear Colleague,    Thank you for referring your patient, Kiera Dobson, to the [facility] [place] at [facility]. Please see a copy of my visit note below.    1/8/2024  PATIENT LAB/IMAGING STATUS : No pending lab orders   Outcome for 01/08/24 11:27 AM: Data obtained via [employer] website  Alberta Christie CMA      This 58-year-old woman was seen for follow-up of her longstanding type 1 diabetes.  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  I haven't seen her in over a year but she has had appointments with Jo Jean.  The last one was in Sept. she is now using a control IQ tandem pump.  Data from her pump and CGM download is in the other note associated with today.  Overall, and thinks the problems with her pump are all related to how she uses it.  She has trouble remembering to bolus before her meals.  She is pretty good with carb counting but does not always enter her carbs.  She is very busy at work and sometimes does not really take breaks.  She is remains very engaged in supporting her son who is a chess player.  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  All of this activity distracts her from entering carbs and bolusing before she eats.  She is very concerned about hyperglycemia and will override the pump frequently in order to bring her sugar down.  This results in her CGM trajectory looking as a double arrow down which then frightens her.  She will eat to prevent herself from getting too low and this will then send her sugars up.  She has had severe hypoglycemia in the past and she is frightened of this happening again.  She has glucagon at home and her  knows how to use it.  She usually has symptoms when her sugars get low.    She is up-to-date with her eye visits  "and things are stable.  She denies chest pain or shortness of breath.  She does have pain in her left shoulder.  She got a steroid injection in November that helped for a time but the pain is now back.  She wonders what I think she should do.  She has no GI or  symptoms.  She has no paresthesias or foot ulcers.    Current Outpatient Medications   Medication    blood glucose (ERICA CONTOUR NEXT) test strip    Continuous Blood Gluc  (DEXCOM G6 ) MELE    Continuous Blood Gluc Sensor (DEXCOM G6 SENSOR) MISC    Continuous Blood Gluc Transmit (DEXCOM G6 TRANSMITTER) MISC    glucose blood VI test strips strip    insulin aspart (NOVOLOG VIAL) 100 UNITS/ML vial    insulin glargine (LANTUS SOLOSTAR) 100 UNIT/ML pen    insulin pen needle (BD MARCO U/F) 32G X 4 MM miscellaneous    SEMGLEE, YFGN, 100 UNIT/ML SOPN    simvastatin (ZOCOR) 40 MG tablet    aspirin 81 MG chewable tablet    Glucagon, rDNA, (GLUCAGON EMERGENCY) 1 MG KIT    glucose 40 % (400 mg/mL) gel     Current Facility-Administered Medications   Medication    lidocaine (PF) (XYLOCAINE) 1 % injection 3 mL    lidocaine (PF) (XYLOCAINE) 1 % injection 4 mL    methylPREDNISolone (DEPO-Medrol) injection 80 mg         Patient Active Problem List   Diagnosis    Pain in shoulder    Type 1 diabetes mellitus without complication (HCC)    Adhesive capsulitis of shoulder    Superior glenoid labrum lesion    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)    Cortical, lamellar, or zonular cataract, nonsenile    Glaucoma suspect, both eyes    Myopia    Cortical age-related cataract of both eyes    Anatomical narrow angle borderline glaucoma of both eyes    Rash         /71   Pulse 98   Ht 1.511 m (4' 11.5\")   Wt 60.3 kg (133 lb)   LMP 04/10/2016 (Approximate)   SpO2 98%   BMI 26.41 kg/m       VSS  NAD  Eyes - no periorbital edema, conjunctival injection, scleral icterus  Neck - " no thyromegaly  CV - RRR.  Normal pulses in feet.  No edema  Neuro - sensation intact to monofilament on soles of feet.  DTR 2/4 biceps  Skin - normal texture   Feet - no ulcers   MSK-she has full range of motion in her shoulders albeit with some discomfort.  Recent Labs   Lab Test 08/22/23  0923 05/16/23  0912 02/08/23  1047 09/28/22  0906 09/28/22  0855 09/14/21  0900 06/18/21  1600 06/16/21  0845 06/16/21  0837 02/14/17  1157 02/14/17  1149 03/01/16  0223 02/10/16  0949   A1C  --   --   --   --  7.1*  --   --   --  6.9*   < >  --   --   --    HEMOGLOBINA1 7.5 7.1  --   --   --    < >  --   --   --    < >  --    < >  --    TSH  --   --   --   --  2.12  --   --   --  3.00   < > 1.57  --  1.13   T4  --   --   --   --   --   --   --   --   --   --  0.84  --  0.84   LDL  --   --  99  --  107*  --   --   --  90   < >  --   --  101*   HDL  --   --  76  --  77  --   --   --  69   < >  --   --  78   TRIG  --   --  63  --  60  --   --   --  56   < >  --   --  58   CR  --   --   --   --  0.63  --   --   --  0.70   < > 0.62   < > 0.66   MICROL  --   --   --  13.5  --   --  6   < >  --    < >  --   --  8    < > = values in this interval not displayed.     A1c from November 7.8  Assessment and plan:    1.  Diabetes control.  Kiera's blood sugars are above target.  It sometimes appears that she needs a more aggressive carb ratio but with her tendency to overcorrect any highs, I am worried that we might just cause a problem if we change her carb ratio.  I asked her to stop overriding the pump for the 3-day.  So that I can see how things really look.  At that point I should be able to know if the carb ratio should be changed.  I reviewed the importance of getting her bolus insulin in before she eats.  I stressed that the correction doses that she gives after she gets high from eating actually increase her risk for hypoglycemia.  She recognizes this and admits she has quite a bit of anxiety about extremes of glycemia.  She is not  interested in seeing the nurse educator again.  She thinks she knows what she needs to do.    2.  Diabetes complications.  She has pretty proliferative retinopathy with regular eye follow-up.  I will check her urine test and creatinine today.  Her feet are fine.    3.CVD risk.  She is on a statin.  I will check her lipids today.  Blood pressure is in target.    4.left shoulder pain.  I recommended she see the physical therapist as her orthopedic doctor recommended.    Follow-up with Jo Jean in 3 months and me in 6 months.    Paola Chahal MD

## 2024-01-23 NOTE — PATIENT INSTRUCTIONS
For 3 days avoid overriding any of the pumps recommendations.  Let me know when you do this.  I will then go to the website and look at your glucose and pump settings.  Hopefully we can make sure that you are carb and correction settings are appropriate.    Think about going to physical therapy for your shoulder

## 2024-01-25 ENCOUNTER — LAB (OUTPATIENT)
Dept: LAB | Facility: CLINIC | Age: 59
End: 2024-01-25
Payer: COMMERCIAL

## 2024-01-25 DIAGNOSIS — E10.65 TYPE 1 DIABETES MELLITUS WITH HYPERGLYCEMIA (H): ICD-10-CM

## 2024-01-25 LAB
CHOLEST SERPL-MCNC: 199 MG/DL
CREAT SERPL-MCNC: 0.66 MG/DL (ref 0.51–0.95)
CREAT UR-MCNC: 67.7 MG/DL
EGFRCR SERPLBLD CKD-EPI 2021: >90 ML/MIN/1.73M2
FASTING STATUS PATIENT QL REPORTED: ABNORMAL
HDLC SERPL-MCNC: 71 MG/DL
LDLC SERPL CALC-MCNC: 112 MG/DL
MICROALBUMIN UR-MCNC: 15.7 MG/L
MICROALBUMIN/CREAT UR: 23.19 MG/G CR (ref 0–25)
NONHDLC SERPL-MCNC: 128 MG/DL
TRIGL SERPL-MCNC: 79 MG/DL

## 2024-01-25 PROCEDURE — 82043 UR ALBUMIN QUANTITATIVE: CPT | Performed by: INTERNAL MEDICINE

## 2024-01-25 PROCEDURE — 36415 COLL VENOUS BLD VENIPUNCTURE: CPT | Performed by: PATHOLOGY

## 2024-01-25 PROCEDURE — 82565 ASSAY OF CREATININE: CPT | Performed by: PATHOLOGY

## 2024-01-25 PROCEDURE — 80061 LIPID PANEL: CPT | Performed by: PATHOLOGY

## 2024-01-25 PROCEDURE — 99000 SPECIMEN HANDLING OFFICE-LAB: CPT | Performed by: PATHOLOGY

## 2024-02-15 NOTE — PROGRESS NOTES
Outcome for 02/15/24 12:56 PM :Sent patient [employer] message asking them to upload their BG data  Dianne Lowry

## 2024-02-20 ENCOUNTER — OFFICE VISIT (OUTPATIENT)
Dept: ENDOCRINOLOGY | Facility: CLINIC | Age: 59
End: 2024-02-20
Payer: COMMERCIAL

## 2024-02-20 VITALS
HEART RATE: 88 BPM | SYSTOLIC BLOOD PRESSURE: 118 MMHG | DIASTOLIC BLOOD PRESSURE: 77 MMHG | OXYGEN SATURATION: 99 % | BODY MASS INDEX: 26.41 KG/M2 | WEIGHT: 133 LBS

## 2024-02-20 DIAGNOSIS — E10.65 TYPE 1 DIABETES MELLITUS WITH HYPERGLYCEMIA (H): Primary | ICD-10-CM

## 2024-02-20 LAB — HBA1C MFR BLD: 7.3 %

## 2024-02-20 PROCEDURE — 83036 HEMOGLOBIN GLYCOSYLATED A1C: CPT | Performed by: PATHOLOGY

## 2024-02-20 PROCEDURE — 99215 OFFICE O/P EST HI 40 MIN: CPT | Performed by: PHYSICIAN ASSISTANT

## 2024-02-20 RX ORDER — ROSUVASTATIN CALCIUM 20 MG/1
20 TABLET, COATED ORAL DAILY
Qty: 90 TABLET | Refills: 1 | Status: SHIPPED | OUTPATIENT
Start: 2024-02-20 | End: 2024-08-16

## 2024-02-20 ASSESSMENT — PAIN SCALES - GENERAL: PAINLEVEL: SEVERE PAIN (6)

## 2024-02-20 NOTE — PROGRESS NOTES
"HPI:  Kiera Dobson is a 58 year old female with type 1 diabetes mellitus.  Clinic visit for diabetes follow up.   Kiera was dx with type 1 diabetes in 3/1970.  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   No nephropathy. No sx of neuropathy at this time.  Her hx is significant for hypoglycemia unawareness.  Kiera continues to use a Tandem insulin pump-control IQ and DexcomG6 sensor.  Her basal insulin rates are set at :  Midnight = 0.65 units/hr.  2 am = 0.5 units/hr.  7 am = 0.5 units/hr.  10 am = 0.7 units/hr.  10:30 pm = 0.65 units/hr.  Her I/C ratio is 1:15. Sensitivity is 75.  Pt's A1C is 7.3 % today. Previous A1C was 7.8 % in Nov 2023.   I reviewed her insulin pump download data today.  She tells me she continues to have problems with insulin absorption - \"running out of sites\" for insulin infusion set.  She also had problems with her insulin pump overheating when charging too long - insulin went bad from the heat.  This has improved with a new pump and not charging for long period of time.  Reminded her to change her cartridge every 3 days ( changing every 4.8 days- this is better than in the past which was every 7 days ).  Still does override corrections, but less.  Her average glucose is 155 with SD 55 and estimated A1C 7.0 % for the past 2 weeks.  Her blood sugar is in target 71 % of the time. Less hypoglycemia.  She has been using activity mode at work when she is busy walking around which has been helpful.  Her blood sugar was good overnight with blood sugar in the 120 range and  this am.  On ROS today, shoulder, arm, hip and leg pain.  No neuropathy sx at this time. Denies foot ulcers.  No recent chest pain.  Vision good at this time.  Nausea when her blood sugar is > 200.  She denies SOB,cough, fever or chills at this time.  Pt denies abd pain, diarrhea, dysuria or hematuria.    DIABETES CARE:  Retinopathy:yes; moderate nonproliferative diabetic retinopathy " both eyes without macular edema.Pt seen by Oph in May 2023.  Nephropathy: urine microalbuminuria negative in 9/2022.  Neuropathy: none.  Feet: no foot ulcers.  Taking ASA: yes.  Lipids:  in Jan 2024.  Pt is taking Simvastatin. DISCONTINUING Simvastatin and starting Crestor today.  CAD: no. Normal stress echo in 9/2010.  Mental health:hx of ADHD.  Insulin: Tandem insulin pump- control IQ.  Testing: DexcomG6 sensor.  Hypoglycemia treatment: Pt has updated glucagon kit.  Insulin back up:Pt has Lantus to use in case of insulin pump failure.                  ROS  Please see under HPI.    Allergies  Allergies   Allergen Reactions    Ampicillin Sodium Rash       Medications  Current Outpatient Medications   Medication Sig Dispense Refill    aspirin 81 MG chewable tablet Take 81 mg by mouth every other day 108 tablet 3    blood glucose (ERICA CONTOUR NEXT) test strip Use to test blood sugar 4-6 times daily or as directed. 720 strip 3    Continuous Blood Gluc  (DEXCOM G6 ) MELE 1 Device continuous 1 Device 0    Continuous Blood Gluc Sensor (DEXCOM G6 SENSOR) MISC 1 each every 10 days 9 each 3    Continuous Blood Gluc Transmit (DEXCOM G6 TRANSMITTER) MISC 1 each every 3 months 1 each 3    Glucagon, rDNA, (GLUCAGON EMERGENCY) 1 MG KIT Inject 1 mg IM for severe hypoglycemia. 1 kit 1    glucose 40 % (400 mg/mL) gel Take by mouth as needed      glucose blood VI test strips strip Test up to ten times per day as directed 5 Box 8    insulin aspart (NOVOLOG VIAL) 100 UNITS/ML vial Via insulin pump. Approx 60 units daily. 60 mL 3    insulin glargine (LANTUS SOLOSTAR) 100 UNIT/ML pen Inject 15 units subcutaneous daily ONLY IF INSULIN PUMP FAILS. 15 mL 3    insulin pen needle (BD MARCO U/F) 32G X 4 MM miscellaneous Inject Subcutaneous 3 times daily 100 each 3    SEMGLEE, YFGN, 100 UNIT/ML SOPN INJECT 15 UNITS SUBCUTANEOUS DAILY ONLY IF INSULIN PUMP FAILS.*      simvastatin (ZOCOR) 40 MG tablet Take 1 tablet (40 mg)  by mouth At Bedtime 90 tablet 3       Family History  family history includes Alzheimer Disease in her father; Autoimmune Disease in her mother; Cancer in her father; Diabetes in her father and another family member; Glaucoma in her father and mother; Hypertension in her mother.    Social History  Smoke: none.  ETOH: rare.   with children.  Pt works full time in a lab.    Past Medical History  Past Medical History:   Diagnosis Date    Diabetes mellitus (H)     Type 1    Elevated cholesterol     Glaucoma     Glaucoma suspect    Glaucoma suspect     Kidney stones     Nonsenile cataract     Rash 5/1/2022     Past Surgical History:   Procedure Laterality Date    APPENDECTOMY OPEN  1981    BIOPSY OF SKIN LESION  12/30/2011    returned January 2012 for additional removal    c sections  99,97,08    EXTRACORPOREAL SHOCK WAVE LITHOTRIPSY, CYSTOSCOPY, INSERT STENT URETER(S), COMBINED  2004    LASER HOLMIUM LITHOTRIPSY URETER(S), INSERT STENT, COMBINED  12/12/2013    Procedure: COMBINED CYSTOSCOPY, URETEROSCOPY, LASER HOLMIUM LITHOTRIPSY URETER(S), INSERT STENT;  Right Ureteroscopy Holmium Laser Lithotripsy Stent Placement;  Surgeon: Kirill Marlow MD;  Location: [facility] OR    PHACOEMULSIFICATION CLEAR CORNEA WITH STANDARD INTRAOCULAR LENS IMPLANT Left 10/25/2021    Procedure: LEFT EYE PHACOEMULSIFICATION, CATARACT, WITH INTRAOCULAR LENS IMPLANT COMPLEX;  Surgeon: Curtis Costa MD;  Location: [facility] OR    PHACOEMULSIFICATION CLEAR CORNEA WITH STANDARD INTRAOCULAR LENS IMPLANT Right 11/8/2021    Procedure: COMPLEX RIGHT EYE PHACOEMULSIFICATION, CATARACT, WITH INTRAOCULAR LENS IMPLANT;  Surgeon: Curtis Costa MD;  Location: [facility] OR     S/P right shoulder surgery in Dec 2012.    Physical Exam    /77 (BP Location: Right arm, Patient Position: Sitting, Cuff Size: Adult Regular)   Pulse 88   Wt 60.3 kg (133 lb)   LMP 04/10/2016 (Approximate)   SpO2 99%   BMI 26.41 kg/m       FEET: no  ulcers.    RESULTS  Creatinine   Date Value Ref Range Status   01/25/2024 0.66 0.51 - 0.95 mg/dL Final   06/16/2021 0.70 0.52 - 1.04 mg/dL Final     GFR Estimate   Date Value Ref Range Status   01/25/2024 >90 >60 mL/min/1.73m2 Final   06/16/2021 >90 >60 mL/min/[1.73_m2] Final     Comment:     Non  GFR Calc  Starting 12/18/2018, serum creatinine based estimated GFR (eGFR) will be   calculated using the Chronic Kidney Disease Epidemiology Collaboration   (CKD-EPI) equation.       Hemoglobin A1C   Date Value Ref Range Status   02/08/2023   Corrected     Comment:     The previously reported result may be inaccurate due to a laboratory instrument calibration issue. Providers should order a new test to be performed if clinically indicated. "[employer] will issue a credit for this test.  This is a corrected result. Previous result was 8.8 % on 2/8/2023 at  1:31 PM CST   06/16/2021 6.9 (H) 0 - 5.6 % Final     Comment:     Normal <5.7% Prediabetes 5.7-6.4%  Diabetes 6.5% or higher - adopted from ADA   consensus guidelines.       Potassium   Date Value Ref Range Status   10/22/2021 4.3 3.4 - 5.3 mmol/L Final   08/20/2019 4.4 3.4 - 5.3 mmol/L Final     ALT   Date Value Ref Range Status   06/16/2021 15 0 - 50 U/L Final     AST   Date Value Ref Range Status   09/28/2022 21 10 - 35 U/L Final   06/16/2021 15 0 - 45 U/L Final     TSH   Date Value Ref Range Status   09/28/2022 2.12 0.30 - 4.20 uIU/mL Final   06/16/2021 3.00 0.40 - 4.00 mU/L Final     T4 Free   Date Value Ref Range Status   02/14/2017 0.84 0.76 - 1.46 ng/dL Final       Cholesterol   Date Value Ref Range Status   01/25/2024 199 <200 mg/dL Final   02/08/2023 188 <200 mg/dL Final   06/16/2021 170 <200 mg/dL Final   08/14/2018 152 <200 mg/dL Final     HDL Cholesterol   Date Value Ref Range Status   06/16/2021 69 >49 mg/dL Final   08/14/2018 61 >49 mg/dL Final     Direct Measure HDL   Date Value Ref Range Status   01/25/2024 71 >=50  mg/dL Final   02/08/2023 76 >=50 mg/dL Final     LDL Cholesterol Calculated   Date Value Ref Range Status   01/25/2024 112 (H) <=100 mg/dL Final   02/08/2023 99 <=100 mg/dL Final   06/16/2021 90 <100 mg/dL Final     Comment:     Desirable:       <100 mg/dl   08/14/2018 72 <100 mg/dL Final     Comment:     Desirable:       <100 mg/dl     Triglycerides   Date Value Ref Range Status   01/25/2024 79 <150 mg/dL Final   02/08/2023 63 <150 mg/dL Final   06/16/2021 56 <150 mg/dL Final   08/14/2018 94 <150 mg/dL Final     Cholesterol/HDL Ratio   Date Value Ref Range Status   08/18/2014 2.7 0.0 - 5.0 Final   09/17/2013 2.3 0.0 - 5.0 Final       ASSESSMENT/PLAN:    1.  TYPE 1 DIABETES MELLITUS:Type 1 diabetes mellitus complicated by moderate NPDR both eyes without macular edema and hypoglycemia unawareness.  No sx of neuropathy at this time.  Her urine microalbuminuria was negative in 1/2025 with normal creat/GFR.  Reminded pt to change her insulin cartridge every 3 days.  Activity mode has been helpful at work to prevent her from having low blood sugars.  No change in pump settings today.   /70 today.    2. MODERATE NPDR:  Hx of moderate NPDR both eyes without macular edema.    Kiera  seen by Oph in 2023.    3.  HYPERLIPIDEMIA:   in Jan 2024.   Discontinue Simvastatin and add Crestor 20 mg daily.  Recheck fasting lipid panel in 3 months.    4.  ADHD/STRESS: Not addressed today.     5. HYPOGLYCEMIA UNAWARENESS: Less hypoglycemia since wearing Tandem insulin pump-control IQ.  She wears her DexcomG6 sensor full time.  Pt has updated glucagon kit and Baqsimi.    6.  LEFT SHOULDER PAIN: Seen by Ortho.    7.  FOLLOW UP: with me in late May 2024 and Dr. Chahal in 7/2024.      Time spent reviewing chart, labs and insulin pump data today= 5 minutes.  Time for clinic visit today = 30 minutes.  Time for documentation today = 10 minutes.    TOTAL TIME FOR VISIT TODAY = 45 minutes.    Jo Jean PA-C

## 2024-02-20 NOTE — LETTER
"2/20/2024       RE: Kiera Dobson  [address]  [facility] [address]     Dear Colleague,    Thank you for referring your patient, Kiera Dobson, to the [facility] ENDOCRINOLOGY CLINIC [place] at [facility]. Please see a copy of my visit note below.    Outcome for 02/15/24 12:56 PM :Sent patient [employer] message asking them to upload their BG data  Dianne Lowry          HPI:  Kiera Dobson is a 58 year old female with type 1 diabetes mellitus.  Clinic visit for diabetes follow up.   Kiera was dx with type 1 diabetes in 3/1970.  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   No nephropathy. No sx of neuropathy at this time.  Her hx is significant for hypoglycemia unawareness.  Kiera continues to use a Tandem insulin pump-control IQ and DexcomG6 sensor.  Her basal insulin rates are set at :  Midnight = 0.65 units/hr.  2 am = 0.5 units/hr.  7 am = 0.5 units/hr.  10 am = 0.7 units/hr.  10:30 pm = 0.65 units/hr.  Her I/C ratio is 1:15. Sensitivity is 75.  Pt's A1C is 7.3 % today. Previous A1C was 7.8 % in Nov 2023.   I reviewed her insulin pump download data today.  She tells me she continues to have problems with insulin absorption - \"running out of sites\" for insulin infusion set.  She also had problems with her insulin pump overheating when charging too long - insulin went bad from the heat.  This has improved with a new pump and not charging for long period of time.  Reminded her to change her cartridge every 3 days ( changing every 4.8 days- this is better than in the past which was every 7 days ).  Still does override corrections, but less.  Her average glucose is 155 with SD 55 and estimated A1C 7.0 % for the past 2 weeks.  Her blood sugar is in target 71 % of the time. Less hypoglycemia.  She has been using activity mode at work when she is busy walking around which has been helpful.  Her blood sugar was good " overnight with blood sugar in the 120 range and  this am.  On ROS today, shoulder, arm, hip and leg pain.  No neuropathy sx at this time. Denies foot ulcers.  No recent chest pain.  Vision good at this time.  Nausea when her blood sugar is > 200.  She denies SOB,cough, fever or chills at this time.  Pt denies abd pain, diarrhea, dysuria or hematuria.    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  Nephropathy: urine microalbuminuria negative in 9/2022.  Neuropathy: none.  Feet: no foot ulcers.  Taking ASA: yes.  Lipids:  in Jan 2024.  Pt is taking Simvastatin. DISCONTINUING Simvastatin and starting Crestor today.  CAD: no. Normal stress echo in 9/2010.  Mental health:hx of ADHD.  Insulin: Tandem insulin pump- control IQ.  Testing: DexcomG6 sensor.  Hypoglycemia treatment: Pt has updated glucagon kit.  Insulin back up:Pt has Lantus to use in case of insulin pump failure.                  ROS  Please see under HPI.    Allergies  Allergies   Allergen Reactions    Ampicillin Sodium Rash       Medications  Current Outpatient Medications   Medication Sig Dispense Refill    aspirin 81 MG chewable tablet Take 81 mg by mouth every other day 108 tablet 3    blood glucose (ERICA CONTOUR NEXT) test strip Use to test blood sugar 4-6 times daily or as directed. 720 strip 3    Continuous Blood Gluc  (DEXCOM G6 ) MELE 1 Device continuous 1 Device 0    Continuous Blood Gluc Sensor (DEXCOM G6 SENSOR) MISC 1 each every 10 days 9 each 3    Continuous Blood Gluc Transmit (DEXCOM G6 TRANSMITTER) MISC 1 each every 3 months 1 each 3    Glucagon, rDNA, (GLUCAGON EMERGENCY) 1 MG KIT Inject 1 mg IM for severe hypoglycemia. 1 kit 1    glucose 40 % (400 mg/mL) gel Take by mouth as needed      glucose blood VI test strips strip Test up to ten times per day as directed 5 Box 8    insulin aspart (NOVOLOG VIAL) 100 UNITS/ML  vial Via insulin pump. Approx 60 units daily. 60 mL 3    insulin glargine (LANTUS SOLOSTAR) 100 UNIT/ML pen Inject 15 units subcutaneous daily ONLY IF INSULIN PUMP FAILS. 15 mL 3    insulin pen needle (BD MARCO U/F) 32G X 4 MM miscellaneous Inject Subcutaneous 3 times daily 100 each 3    SEMGLEE, YFGN, 100 UNIT/ML SOPN INJECT 15 UNITS SUBCUTANEOUS DAILY ONLY IF INSULIN PUMP FAILS.*      simvastatin (ZOCOR) 40 MG tablet Take 1 tablet (40 mg) by mouth At Bedtime 90 tablet 3       Family History  family history includes Alzheimer Disease in her father; Autoimmune Disease in her mother; Cancer in her father; Diabetes in her father and another family member; Glaucoma in her father and mother; Hypertension in her mother.    Social History  Smoke: none.  ETOH: rare.   with children.  Pt works full time in a lab.    Past Medical History  Past Medical History:   Diagnosis Date    Diabetes mellitus (H)     Type 1    Elevated cholesterol     Glaucoma     Glaucoma suspect    Glaucoma suspect     Kidney stones     Nonsenile cataract     Rash 5/1/2022     Past Surgical History:   Procedure Laterality Date    APPENDECTOMY OPEN  1981    BIOPSY OF SKIN LESION  12/30/2011    returned January 2012 for additional removal    c sections  99,97,08    EXTRACORPOREAL SHOCK WAVE LITHOTRIPSY, CYSTOSCOPY, INSERT STENT URETER(S), COMBINED  2004    LASER HOLMIUM LITHOTRIPSY URETER(S), INSERT STENT, COMBINED  12/12/2013    Procedure: COMBINED CYSTOSCOPY, URETEROSCOPY, LASER HOLMIUM LITHOTRIPSY URETER(S), INSERT STENT;  Right Ureteroscopy Holmium Laser Lithotripsy Stent Placement;  Surgeon: Kirill Marlow MD;  Location: [facility]    PHACOEMULSIFICATION CLEAR CORNEA WITH STANDARD INTRAOCULAR LENS IMPLANT Left 10/25/2021    Procedure: LEFT EYE PHACOEMULSIFICATION, CATARACT, WITH INTRAOCULAR LENS IMPLANT COMPLEX;  Surgeon: Curtis Costa MD;  Location: [facility] OR    PHACOEMULSIFICATION CLEAR CORNEA WITH STANDARD INTRAOCULAR LENS IMPLANT  Right 11/8/2021    Procedure: COMPLEX RIGHT EYE PHACOEMULSIFICATION, CATARACT, WITH INTRAOCULAR LENS IMPLANT;  Surgeon: Curtis Costa MD;  Location: [facility] OR     S/P right shoulder surgery in Dec 2012.    Physical Exam    /77 (BP Location: Right arm, Patient Position: Sitting, Cuff Size: Adult Regular)   Pulse 88   Wt 60.3 kg (133 lb)   LMP 04/10/2016 (Approximate)   SpO2 99%   BMI 26.41 kg/m       FEET: no ulcers.    RESULTS  Creatinine   Date Value Ref Range Status   01/25/2024 0.66 0.51 - 0.95 mg/dL Final   06/16/2021 0.70 0.52 - 1.04 mg/dL Final     GFR Estimate   Date Value Ref Range Status   01/25/2024 >90 >60 mL/min/1.73m2 Final   06/16/2021 >90 >60 mL/min/[1.73_m2] Final     Comment:     Non  GFR Calc  Starting 12/18/2018, serum creatinine based estimated GFR (eGFR) will be   calculated using the Chronic Kidney Disease Epidemiology Collaboration   (CKD-EPI) equation.       Hemoglobin A1C   Date Value Ref Range Status   02/08/2023   Corrected     Comment:     The previously reported result may be inaccurate due to a laboratory instrument calibration issue. Providers should order a new test to be performed if clinically indicated.  [employer] will issue a credit for this test.  This is a corrected result. Previous result was 8.8 % on 2/8/2023 at  1:31 PM CST   06/16/2021 6.9 (H) 0 - 5.6 % Final     Comment:     Normal <5.7% Prediabetes 5.7-6.4%  Diabetes 6.5% or higher - adopted from ADA   consensus guidelines.       Potassium   Date Value Ref Range Status   10/22/2021 4.3 3.4 - 5.3 mmol/L Final   08/20/2019 4.4 3.4 - 5.3 mmol/L Final     ALT   Date Value Ref Range Status   06/16/2021 15 0 - 50 U/L Final     AST   Date Value Ref Range Status   09/28/2022 21 10 - 35 U/L Final   06/16/2021 15 0 - 45 U/L Final     TSH   Date Value Ref Range Status   09/28/2022 2.12 0.30 - 4.20 uIU/mL Final   06/16/2021 3.00 0.40 - 4.00 mU/L Final     T4 Free   Date Value Ref  Range Status   02/14/2017 0.84 0.76 - 1.46 ng/dL Final       Cholesterol   Date Value Ref Range Status   01/25/2024 199 <200 mg/dL Final   02/08/2023 188 <200 mg/dL Final   06/16/2021 170 <200 mg/dL Final   08/14/2018 152 <200 mg/dL Final     HDL Cholesterol   Date Value Ref Range Status   06/16/2021 69 >49 mg/dL Final   08/14/2018 61 >49 mg/dL Final     Direct Measure HDL   Date Value Ref Range Status   01/25/2024 71 >=50 mg/dL Final   02/08/2023 76 >=50 mg/dL Final     LDL Cholesterol Calculated   Date Value Ref Range Status   01/25/2024 112 (H) <=100 mg/dL Final   02/08/2023 99 <=100 mg/dL Final   06/16/2021 90 <100 mg/dL Final     Comment:     Desirable:       <100 mg/dl   08/14/2018 72 <100 mg/dL Final     Comment:     Desirable:       <100 mg/dl     Triglycerides   Date Value Ref Range Status   01/25/2024 79 <150 mg/dL Final   02/08/2023 63 <150 mg/dL Final   06/16/2021 56 <150 mg/dL Final   08/14/2018 94 <150 mg/dL Final     Cholesterol/HDL Ratio   Date Value Ref Range Status   08/18/2014 2.7 0.0 - 5.0 Final   09/17/2013 2.3 0.0 - 5.0 Final       ASSESSMENT/PLAN:    1.  TYPE 1 DIABETES MELLITUS:Type 1 diabetes mellitus complicated by moderate NPDR both eyes without macular edema and hypoglycemia unawareness.  No sx of neuropathy at this time.  Her urine microalbuminuria was negative in 1/2025 with normal creat/GFR.  Reminded pt to change her insulin cartridge every 3 days.  Activity mode has been helpful at work to prevent her from having low blood sugars.  No change in pump settings today.   /70 today.    2. MODERATE NPDR:  Hx of moderate NPDR both eyes without macular edema.    Kiera  seen by Oph in 2023.    3.  HYPERLIPIDEMIA:   in Jan 2024.   Discontinue Simvastatin and add Crestor 20 mg daily.  Recheck fasting lipid panel in 3 months.    4.  ADHD/STRESS: Not addressed today.     5. HYPOGLYCEMIA UNAWARENESS: Less hypoglycemia since wearing Tandem insulin pump-control IQ.  She wears her DexcomG6  sensor full time.  Pt has updated glucagon kit and Baqsimi.    6.  LEFT SHOULDER PAIN: Seen by Ortho.    7.  FOLLOW UP: with me in late May 2024 and Dr. Chahal in 7/2024.      Time spent reviewing chart, labs and insulin pump data today= 5 minutes.  Time for clinic visit today = 30 minutes.  Time for documentation today = 10 minutes.    TOTAL TIME FOR VISIT TODAY = 45 minutes.    Jo Jean PA-C

## 2024-02-20 NOTE — NURSING NOTE
Chief Complaint   Patient presents with    Diabetes     Blood pressure 118/77, pulse 88, weight 60.3 kg (133 lb), last menstrual period 04/10/2016, SpO2 99%, not currently breastfeeding.    Dianne Lowry

## 2024-03-06 ENCOUNTER — MEDICAL CORRESPONDENCE (OUTPATIENT)
Dept: HEALTH INFORMATION MANAGEMENT | Facility: CLINIC | Age: 59
End: 2024-03-06

## 2024-03-07 ENCOUNTER — TELEPHONE (OUTPATIENT)
Dept: ENDOCRINOLOGY | Facility: CLINIC | Age: 59
End: 2024-03-07
Payer: COMMERCIAL

## 2024-03-07 NOTE — TELEPHONE ENCOUNTER
[facility] Call Center    Phone Message    May a detailed message be left on voicemail: yes     Reason for Call: Calling to see if you have received the forms for the CGM and  if not please call and if so please fax back ASAP to:  [phone] or [phone]      Action Taken: Message routed to:  [facility] ([facility]): Endo    Travel Screening: Not Applicable

## 2024-03-18 ENCOUNTER — MEDICAL CORRESPONDENCE (OUTPATIENT)
Dept: HEALTH INFORMATION MANAGEMENT | Facility: CLINIC | Age: 59
End: 2024-03-18

## 2024-03-23 ENCOUNTER — HEALTH MAINTENANCE LETTER (OUTPATIENT)
Age: 59
End: 2024-03-23

## 2024-05-31 ENCOUNTER — TELEPHONE (OUTPATIENT)
Dept: ENDOCRINOLOGY | Facility: CLINIC | Age: 59
End: 2024-05-31
Payer: COMMERCIAL

## 2024-05-31 NOTE — TELEPHONE ENCOUNTER
Called pt to let her know her 7/2 visit is on a virtual day only and it will not be able to be in person. No answer. LVM asking pt if a virtual visit will be sufficient. Also sent [employer] message to notify her

## 2024-06-01 ENCOUNTER — HEALTH MAINTENANCE LETTER (OUTPATIENT)
Age: 59
End: 2024-06-01

## 2024-06-18 ENCOUNTER — OFFICE VISIT (OUTPATIENT)
Dept: ENDOCRINOLOGY | Facility: CLINIC | Age: 59
End: 2024-06-18
Payer: COMMERCIAL

## 2024-06-18 VITALS
HEART RATE: 89 BPM | WEIGHT: 132 LBS | BODY MASS INDEX: 26.21 KG/M2 | OXYGEN SATURATION: 99 % | SYSTOLIC BLOOD PRESSURE: 120 MMHG | DIASTOLIC BLOOD PRESSURE: 76 MMHG

## 2024-06-18 DIAGNOSIS — E10.65 TYPE 1 DIABETES MELLITUS WITH HYPERGLYCEMIA (H): Primary | ICD-10-CM

## 2024-06-18 LAB — HBA1C MFR BLD: 7.2 %

## 2024-06-18 PROCEDURE — 99215 OFFICE O/P EST HI 40 MIN: CPT | Performed by: PHYSICIAN ASSISTANT

## 2024-06-18 PROCEDURE — 83036 HEMOGLOBIN GLYCOSYLATED A1C: CPT | Performed by: PATHOLOGY

## 2024-06-18 ASSESSMENT — PAIN SCALES - GENERAL: PAINLEVEL: NO PAIN (0)

## 2024-06-18 NOTE — LETTER
"6/18/2024       RE: Kiera Dobson  [address]  [facility] [address]     Dear Colleague,    Thank you for referring your patient, Kiera Dobson, to the [facility] ENDOCRINOLOGY CLINIC [place] at [facility]. Please see a copy of my visit note below.    Outcome for 06/10/24 2:06 PM: [employer] message sent  RAYMON Aldana        Blood Glucose Monitoring :                 HPI:  Kiera Dobson is a 58 year old female with type 1 diabetes mellitus.  Clinic visit for diabetes follow up.   Kiera was dx with type 1 diabetes in 3/1970.  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   No nephropathy. No sx of neuropathy at this time.  Her hx is significant for hypoglycemia unawareness.  Kiera continues to use a Tandem insulin pump-control IQ and DexcomG6 sensor.  Her basal insulin rates are set at :  Midnight = 0.65 units/hr.  2 am = 0.5 units/hr.  10 am = 0.65 units/hr.  10:30 pm = 0.6 units/hr.  Her I/C ratio is 1:12. Sensitivity is 75.  Pt's A1C is 7.2 % today. Previous A1C was 7.2 % in Feb 2024.  I reviewed and scanned her insulin pump download data today.  She tells me she continues to have problems with insulin absorption - \"running out of sites\" for insulin infusion set.  Reminded her to change her cartridge every 3 days.   Still needs to work on entering carbs and use bolus insulin before eating.   Using manual boluses.  Average glucose is 154 with SD 54.  Her blood sugar is in target 71 % of the time. Less hypoglycemia.  Blood sugars good overnight in sleep mode.  Using sleep mode during day intermittently.  On ROS today, increase left shoulder pain which may be work related.  No neuropathy sx at this time. Denies foot ulcers.  No recent chest pain.  Vision good at this time.  Nausea when her blood sugar is > 200.  She denies SOB,cough, fever or chills at this time.  Pt denies abd pain, diarrhea, dysuria or " hematuria.    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  Nephropathy: urine microalbuminuria negative in 9/2022.  Neuropathy: none.  Feet: no foot ulcers.  Taking ASA: yes.  Lipids:  in Jan 2024.  Pt is taking Crestor.  CAD: no. Normal stress echo in 9/2010.  Mental health:hx of ADHD.  Insulin: Tandem insulin pump- control IQ.  Testing: DexcomG6 sensor.  Hypoglycemia treatment: Pt has updated glucagon kit.  Insulin back up:Pt has Lantus to use in case of insulin pump failure.                    ROS  Please see under HPI.    Allergies  Allergies   Allergen Reactions    Ampicillin Sodium Rash       Medications  Current Outpatient Medications   Medication Sig Dispense Refill    aspirin 81 MG chewable tablet Take 81 mg by mouth every other day 108 tablet 3    blood glucose (ERICA CONTOUR NEXT) test strip Use to test blood sugar 4-6 times daily or as directed. 720 strip 3    Continuous Blood Gluc  (DEXCOM G6 ) MELE 1 Device continuous 1 Device 0    Continuous Blood Gluc Sensor (DEXCOM G6 SENSOR) MISC 1 each every 10 days 9 each 3    Continuous Blood Gluc Transmit (DEXCOM G6 TRANSMITTER) MISC 1 each every 3 months 1 each 3    Glucagon, rDNA, (GLUCAGON EMERGENCY) 1 MG KIT Inject 1 mg IM for severe hypoglycemia. 1 kit 1    glucose 40 % (400 mg/mL) gel Take by mouth as needed      glucose blood VI test strips strip Test up to ten times per day as directed 5 Box 8    insulin aspart (NOVOLOG VIAL) 100 UNITS/ML vial Via insulin pump. Approx 60 units daily. 60 mL 3    insulin glargine (LANTUS SOLOSTAR) 100 UNIT/ML pen Inject 15 units subcutaneous daily ONLY IF INSULIN PUMP FAILS. 15 mL 3    insulin pen needle (BD MARCO U/F) 32G X 4 MM miscellaneous Inject Subcutaneous 3 times daily 100 each 3    rosuvastatin (CRESTOR) 20 MG tablet Take 1 tablet (20 mg) by mouth daily 90 tablet 1    SEMGLEE, YFGN, 100 UNIT/ML  SOPN INJECT 15 UNITS SUBCUTANEOUS DAILY ONLY IF INSULIN PUMP FAILS.*         Family History  family history includes Alzheimer Disease in her father; Autoimmune Disease in her mother; Cancer in her father; Diabetes in her father and another family member; Glaucoma in her father and mother; Hypertension in her mother.    Social History  Smoke: none.  ETOH: rare.   with children.  Pt works full time in a lab.    Past Medical History  Past Medical History:   Diagnosis Date    Diabetes mellitus (H)     Type 1    Elevated cholesterol     Glaucoma     Glaucoma suspect    Glaucoma suspect     Kidney stones     Nonsenile cataract     Rash 5/1/2022     Past Surgical History:   Procedure Laterality Date    APPENDECTOMY OPEN  1981    BIOPSY OF SKIN LESION  12/30/2011    returned January 2012 for additional removal    c sections  99,97,08    EXTRACORPOREAL SHOCK WAVE LITHOTRIPSY, CYSTOSCOPY, INSERT STENT URETER(S), COMBINED  2004    LASER HOLMIUM LITHOTRIPSY URETER(S), INSERT STENT, COMBINED  12/12/2013    Procedure: COMBINED CYSTOSCOPY, URETEROSCOPY, LASER HOLMIUM LITHOTRIPSY URETER(S), INSERT STENT;  Right Ureteroscopy Holmium Laser Lithotripsy Stent Placement;  Surgeon: Kirill Marlow MD;  [facility]    PHACOEMULSIFICATION CLEAR CORNEA WITH STANDARD INTRAOCULAR LENS IMPLANT Left 10/25/2021    Procedure: LEFT EYE PHACOEMULSIFICATION, CATARACT, WITH INTRAOCULAR LENS IMPLANT COMPLEX;  Surgeon: Curtis Costa MD;  Location: [facility] OR    PHACOEMULSIFICATION CLEAR CORNEA WITH STANDARD INTRAOCULAR LENS IMPLANT Right 11/8/2021    Procedure: COMPLEX RIGHT EYE PHACOEMULSIFICATION, CATARACT, WITH INTRAOCULAR LENS IMPLANT;  Surgeon: Curtis Costa MD;  Location: [facility] OR     S/P right shoulder surgery in Dec 2012.    Physical Exam    /76   Pulse 89   Wt 59.9 kg (132 lb)   LMP 04/10/2016 (Approximate)   SpO2 99%   BMI 26.21 kg/m       FEET: no ulcers. Normal monofilamentous exam  today.    RESULTS  Creatinine   Date Value Ref Range Status   01/25/2024 0.66 0.51 - 0.95 mg/dL Final   06/16/2021 0.70 0.52 - 1.04 mg/dL Final     GFR Estimate   Date Value Ref Range Status   01/25/2024 >90 >60 mL/min/1.73m2 Final   06/16/2021 >90 >60 mL/min/[1.73_m2] Final     Comment:     Non  GFR Calc  Starting 12/18/2018, serum creatinine based estimated GFR (eGFR) will be   calculated using the Chronic Kidney Disease Epidemiology Collaboration   (CKD-EPI) equation.       Hemoglobin A1C   Date Value Ref Range Status   02/08/2023   Corrected     Comment:     The previously reported result may be inaccurate due to a laboratory instrument calibration issue. Providers should order a new test to be performed if clinically indicated. [employer] will issue a credit for this test.  This is a corrected result. Previous result was 8.8 % on 2/8/2023 at  1:31 PM CST   06/16/2021 6.9 (H) 0 - 5.6 % Final     Comment:     Normal <5.7% Prediabetes 5.7-6.4%  Diabetes 6.5% or higher - adopted from ADA   consensus guidelines.       Afinion Hemoglobin A1c POCT   Date Value Ref Range Status   06/18/2024 7.2 (H) <=5.7 % Final     Comment:     Normal <5.7%   Prediabetes 5.7-6.4%     Diabetes 6.5% or higher       Note: Adopted from ADA consensus guidelines.     Potassium   Date Value Ref Range Status   10/22/2021 4.3 3.4 - 5.3 mmol/L Final   08/20/2019 4.4 3.4 - 5.3 mmol/L Final     ALT   Date Value Ref Range Status   06/16/2021 15 0 - 50 U/L Final     AST   Date Value Ref Range Status   09/28/2022 21 10 - 35 U/L Final   06/16/2021 15 0 - 45 U/L Final     TSH   Date Value Ref Range Status   09/28/2022 2.12 0.30 - 4.20 uIU/mL Final   06/16/2021 3.00 0.40 - 4.00 mU/L Final     T4 Free   Date Value Ref Range Status   02/14/2017 0.84 0.76 - 1.46 ng/dL Final       Cholesterol   Date Value Ref Range Status   01/25/2024 199 <200 mg/dL Final   02/08/2023 188 <200 mg/dL Final   06/16/2021 170 <200 mg/dL  Final   08/14/2018 152 <200 mg/dL Final     HDL Cholesterol   Date Value Ref Range Status   06/16/2021 69 >49 mg/dL Final   08/14/2018 61 >49 mg/dL Final     Direct Measure HDL   Date Value Ref Range Status   01/25/2024 71 >=50 mg/dL Final   02/08/2023 76 >=50 mg/dL Final     LDL Cholesterol Calculated   Date Value Ref Range Status   01/25/2024 112 (H) <=100 mg/dL Final   02/08/2023 99 <=100 mg/dL Final   06/16/2021 90 <100 mg/dL Final     Comment:     Desirable:       <100 mg/dl   08/14/2018 72 <100 mg/dL Final     Comment:     Desirable:       <100 mg/dl     Triglycerides   Date Value Ref Range Status   01/25/2024 79 <150 mg/dL Final   02/08/2023 63 <150 mg/dL Final   06/16/2021 56 <150 mg/dL Final   08/14/2018 94 <150 mg/dL Final     Cholesterol/HDL Ratio   Date Value Ref Range Status   08/18/2014 2.7 0.0 - 5.0 Final   09/17/2013 2.3 0.0 - 5.0 Final       ASSESSMENT/PLAN:    1.  TYPE 1 DIABETES MELLITUS:Type 1 diabetes mellitus complicated by moderate NPDR both eyes without macular edema and hypoglycemia unawareness.  No sx of neuropathy at this time with normal foot exam today.  Her urine microalbuminuria was negative in 1/2024 with normal creat/GFR.  Reminded pt to change her insulin cartridge every 3 days.  Again, reminded her to enter her carbs in her insulin pump and to bolus before eating.  She prefers to use the sleep mode intermittently instead of activity mode. She is aware that she will not be getting auto corrections in sleep mode.  No change in pump settings today.   /76 today.    2. MODERATE NPDR:  Hx of moderate NPDR both eyes without macular edema.    Kiera seen by Oph in 2023.    3.  HYPERLIPIDEMIA:   in Jan 2024.   Pt taking Crestor daily.    4.  ADHD/STRESS: Not addressed today.     5. HYPOGLYCEMIA UNAWARENESS: Less hypoglycemia since wearing Tandem insulin pump-control IQ.  She wears her DexcomG6 sensor full time.  Pt has updated glucagon kit and Baqsimi.    6.  LEFT SHOULDER PAIN:  Referred to Ortho today.    7.  HEALTH MAINTENANCE: Reminded pt to see her PCP for health maintenance.    8.  FOLLOW UP: with Dr. Chahal in 7/2024.  Ortho referral placed today.    Time spent reviewing chart, labs and insulin pump data today= 5 minutes.  Time for clinic visit today =25   minutes.  Time for documentation today = 10 minutes.    TOTAL TIME FOR VISIT TODAY = 40  minutes.    Jo Jean PA-C

## 2024-06-18 NOTE — PROGRESS NOTES
"HPI:  Kiera Dobson is a 58 year old female with type 1 diabetes mellitus.  Clinic visit for diabetes follow up.   Kiera was dx with type 1 diabetes in 3/1970.  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   No nephropathy. No sx of neuropathy at this time.  Her hx is significant for hypoglycemia unawareness.  Kiera continues to use a Tandem insulin pump-control IQ and DexcomG6 sensor.  Her basal insulin rates are set at :  Midnight = 0.65 units/hr.  2 am = 0.5 units/hr.  10 am = 0.65 units/hr.  10:30 pm = 0.6 units/hr.  Her I/C ratio is 1:12. Sensitivity is 75.  Pt's A1C is 7.2 % today. Previous A1C was 7.2 % in Feb 2024.  I reviewed and scanned her insulin pump download data today.  She tells me she continues to have problems with insulin absorption - \"running out of sites\" for insulin infusion set.  Reminded her to change her cartridge every 3 days.   Still needs to work on entering carbs and use bolus insulin before eating.   Using manual boluses.  Average glucose is 154 with SD 54.  Her blood sugar is in target 71 % of the time. Less hypoglycemia.  Blood sugars good overnight in sleep mode.  Using sleep mode during day intermittently.  On ROS today, increase left shoulder pain which may be work related.  No neuropathy sx at this time. Denies foot ulcers.  No recent chest pain.  Vision good at this time.  Nausea when her blood sugar is > 200.  She denies SOB,cough, fever or chills at this time.  Pt denies abd pain, diarrhea, dysuria or hematuria.    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  Nephropathy: urine microalbuminuria negative in 9/2022.  Neuropathy: none.  Feet: no foot ulcers.  Taking ASA: yes.  Lipids:  in Jan 2024.  Pt is taking Crestor.  CAD: no. Normal stress echo in 9/2010.  Mental health:hx of ADHD.  Insulin: Tandem insulin pump- control IQ.  Testing: " DexcomG6 sensor.  Hypoglycemia treatment: Pt has updated glucagon kit.  Insulin back up:Pt has Lantus to use in case of insulin pump failure.                    ROS  Please see under HPI.    Allergies  Allergies   Allergen Reactions    Ampicillin Sodium Rash       Medications  Current Outpatient Medications   Medication Sig Dispense Refill    aspirin 81 MG chewable tablet Take 81 mg by mouth every other day 108 tablet 3    blood glucose (ERICA CONTOUR NEXT) test strip Use to test blood sugar 4-6 times daily or as directed. 720 strip 3    Continuous Blood Gluc  (DEXCOM G6 ) MELE 1 Device continuous 1 Device 0    Continuous Blood Gluc Sensor (DEXCOM G6 SENSOR) MISC 1 each every 10 days 9 each 3    Continuous Blood Gluc Transmit (DEXCOM G6 TRANSMITTER) MISC 1 each every 3 months 1 each 3    Glucagon, rDNA, (GLUCAGON EMERGENCY) 1 MG KIT Inject 1 mg IM for severe hypoglycemia. 1 kit 1    glucose 40 % (400 mg/mL) gel Take by mouth as needed      glucose blood VI test strips strip Test up to ten times per day as directed 5 Box 8    insulin aspart (NOVOLOG VIAL) 100 UNITS/ML vial Via insulin pump. Approx 60 units daily. 60 mL 3    insulin glargine (LANTUS SOLOSTAR) 100 UNIT/ML pen Inject 15 units subcutaneous daily ONLY IF INSULIN PUMP FAILS. 15 mL 3    insulin pen needle (BD MARCO U/F) 32G X 4 MM miscellaneous Inject Subcutaneous 3 times daily 100 each 3    rosuvastatin (CRESTOR) 20 MG tablet Take 1 tablet (20 mg) by mouth daily 90 tablet 1    SEMGLEE, YFGN, 100 UNIT/ML SOPN INJECT 15 UNITS SUBCUTANEOUS DAILY ONLY IF INSULIN PUMP FAILS.*         Family History  family history includes Alzheimer Disease in her father; Autoimmune Disease in her mother; Cancer in her father; Diabetes in her father and another family member; Glaucoma in her father and mother; Hypertension in her mother.    Social History  Smoke: none.  ETOH: rare.   with children.  Pt works full time in a lab.    Past Medical  History  Past Medical History:   Diagnosis Date    Diabetes mellitus (H)     Type 1    Elevated cholesterol     Glaucoma     Glaucoma suspect    Glaucoma suspect     Kidney stones     Nonsenile cataract     Rash 5/1/2022     Past Surgical History:   Procedure Laterality Date    APPENDECTOMY OPEN  1981    BIOPSY OF SKIN LESION  12/30/2011    returned January 2012 for additional removal    c sections  99,97,08    EXTRACORPOREAL SHOCK WAVE LITHOTRIPSY, CYSTOSCOPY, INSERT STENT URETER(S), COMBINED  2004    LASER HOLMIUM LITHOTRIPSY URETER(S), INSERT STENT, COMBINED  12/12/2013    Procedure: COMBINED CYSTOSCOPY, URETEROSCOPY, LASER HOLMIUM LITHOTRIPSY URETER(S), INSERT STENT;  Right Ureteroscopy Holmium Laser Lithotripsy Stent Placement;  Surgeon: Kirill Marlow MD;  Location: [facility]    PHACOEMULSIFICATION CLEAR CORNEA WITH STANDARD INTRAOCULAR LENS IMPLANT Left 10/25/2021    Procedure: LEFT EYE PHACOEMULSIFICATION, CATARACT, WITH INTRAOCULAR LENS IMPLANT COMPLEX;  Surgeon: Curtis Costa MD;  Location: [facility] OR    PHACOEMULSIFICATION CLEAR CORNEA WITH STANDARD INTRAOCULAR LENS IMPLANT Right 11/8/2021    Procedure: COMPLEX RIGHT EYE PHACOEMULSIFICATION, CATARACT, WITH INTRAOCULAR LENS IMPLANT;  Surgeon: Curtis Costa MD;  Location: [facility] OR     S/P right shoulder surgery in Dec 2012.    Physical Exam    /76   Pulse 89   Wt 59.9 kg (132 lb)   LMP 04/10/2016 (Approximate)   SpO2 99%   BMI 26.21 kg/m       FEET: no ulcers. Normal monofilamentous exam today.    RESULTS  Creatinine   Date Value Ref Range Status   01/25/2024 0.66 0.51 - 0.95 mg/dL Final   06/16/2021 0.70 0.52 - 1.04 mg/dL Final     GFR Estimate   Date Value Ref Range Status   01/25/2024 >90 >60 mL/min/1.73m2 Final   06/16/2021 >90 >60 mL/min/[1.73_m2] Final     Comment:     Non  GFR Calc  Starting 12/18/2018, serum creatinine based estimated GFR (eGFR) will be   calculated using the Chronic Kidney Disease  Epidemiology Collaboration   (CKD-EPI) equation.       Hemoglobin A1C   Date Value Ref Range Status   02/08/2023   Corrected     Comment:     The previously reported result may be inaccurate due to a laboratory instrument calibration issue. Providers should order a new test to be performed if clinically indicated. [facility] [employer] will issue a credit for this test.  This is a corrected result. Previous result was 8.8 % on 2/8/2023 at  1:31 PM CST   06/16/2021 6.9 (H) 0 - 5.6 % Final     Comment:     Normal <5.7% Prediabetes 5.7-6.4%  Diabetes 6.5% or higher - adopted from ADA   consensus guidelines.       Afinion Hemoglobin A1c POCT   Date Value Ref Range Status   06/18/2024 7.2 (H) <=5.7 % Final     Comment:     Normal <5.7%   Prediabetes 5.7-6.4%     Diabetes 6.5% or higher       Note: Adopted from ADA consensus guidelines.     Potassium   Date Value Ref Range Status   10/22/2021 4.3 3.4 - 5.3 mmol/L Final   08/20/2019 4.4 3.4 - 5.3 mmol/L Final     ALT   Date Value Ref Range Status   06/16/2021 15 0 - 50 U/L Final     AST   Date Value Ref Range Status   09/28/2022 21 10 - 35 U/L Final   06/16/2021 15 0 - 45 U/L Final     TSH   Date Value Ref Range Status   09/28/2022 2.12 0.30 - 4.20 uIU/mL Final   06/16/2021 3.00 0.40 - 4.00 mU/L Final     T4 Free   Date Value Ref Range Status   02/14/2017 0.84 0.76 - 1.46 ng/dL Final       Cholesterol   Date Value Ref Range Status   01/25/2024 199 <200 mg/dL Final   02/08/2023 188 <200 mg/dL Final   06/16/2021 170 <200 mg/dL Final   08/14/2018 152 <200 mg/dL Final     HDL Cholesterol   Date Value Ref Range Status   06/16/2021 69 >49 mg/dL Final   08/14/2018 61 >49 mg/dL Final     Direct Measure HDL   Date Value Ref Range Status   01/25/2024 71 >=50 mg/dL Final   02/08/2023 76 >=50 mg/dL Final     LDL Cholesterol Calculated   Date Value Ref Range Status   01/25/2024 112 (H) <=100 mg/dL Final   02/08/2023 99 <=100 mg/dL Final   06/16/2021 90 <100 mg/dL Final      Comment:     Desirable:       <100 mg/dl   08/14/2018 72 <100 mg/dL Final     Comment:     Desirable:       <100 mg/dl     Triglycerides   Date Value Ref Range Status   01/25/2024 79 <150 mg/dL Final   02/08/2023 63 <150 mg/dL Final   06/16/2021 56 <150 mg/dL Final   08/14/2018 94 <150 mg/dL Final     Cholesterol/HDL Ratio   Date Value Ref Range Status   08/18/2014 2.7 0.0 - 5.0 Final   09/17/2013 2.3 0.0 - 5.0 Final       ASSESSMENT/PLAN:    1.  TYPE 1 DIABETES MELLITUS:Type 1 diabetes mellitus complicated by moderate NPDR both eyes without macular edema and hypoglycemia unawareness.  No sx of neuropathy at this time with normal foot exam today.  Her urine microalbuminuria was negative in 1/2024 with normal creat/GFR.  Reminded pt to change her insulin cartridge every 3 days.  Again, reminded her to enter her carbs in her insulin pump and to bolus before eating.  She prefers to use the sleep mode intermittently instead of activity mode. She is aware that she will not be getting auto corrections in sleep mode.  No change in pump settings today.   /76 today.    2. MODERATE NPDR:  Hx of moderate NPDR both eyes without macular edema.    Kiera seen by Oph in 2023.    3.  HYPERLIPIDEMIA:   in Jan 2024.   Pt taking Crestor daily.    4.  ADHD/STRESS: Not addressed today.     5. HYPOGLYCEMIA UNAWARENESS: Less hypoglycemia since wearing Tandem insulin pump-control IQ.  She wears her DexcomG6 sensor full time.  Pt has updated glucagon kit and Baqsimi.    6.  LEFT SHOULDER PAIN: Referred to Ortho today.    7.  HEALTH MAINTENANCE: Reminded pt to see her PCP for health maintenance.    8.  FOLLOW UP: with Dr. Chahal in 7/2024.  Ortho referral placed today.    Time spent reviewing chart, labs and insulin pump data today= 5 minutes.  Time for clinic visit today =25   minutes.  Time for documentation today = 10 minutes.    TOTAL TIME FOR VISIT TODAY = 40  minutes.    Jo Jean PA-C

## 2024-07-11 ENCOUNTER — OFFICE VISIT (OUTPATIENT)
Dept: ORTHOPEDICS | Facility: CLINIC | Age: 59
End: 2024-07-11
Payer: COMMERCIAL

## 2024-07-11 ENCOUNTER — PRE VISIT (OUTPATIENT)
Dept: ORTHOPEDICS | Facility: CLINIC | Age: 59
End: 2024-07-11

## 2024-07-11 DIAGNOSIS — E10.65 TYPE 1 DIABETES MELLITUS WITH HYPERGLYCEMIA (H): ICD-10-CM

## 2024-07-11 PROCEDURE — 99207 PR NO CHARGE LOS: CPT | Performed by: STUDENT IN AN ORGANIZED HEALTH CARE EDUCATION/TRAINING PROGRAM

## 2024-07-11 NOTE — TELEPHONE ENCOUNTER
DIAGNOSIS: XR 9/14/23. LEFT shoulder pain / Jo Jean PA-C in [facility] DIABETES / [employer] / Imaging ON FILE    APPOINTMENT DATE:  7.11.24   NOTES STATUS DETAILS   OFFICE NOTE from referring provider Internal 6.18.24  Ted  Endo   OFFICE NOTE from other specialist Internal 10.3.23  Yo  Spts    9.14.23  Lalo  Spts    12.11.12, 11.30.12  Shannan  Ortho   MEDICATION LIST Internal    XRAYS (IMAGES & REPORTS) Internal 9.14.23  XR Shoulder Left

## 2024-07-11 NOTE — PROGRESS NOTES
" Subjective:   Kiera Dobson is a 58 year old female who left shoulder pain since before November 2023. Saw Dr. Ram on 10/3/23 for Left GH USGI for adhesive capsulitis. She did not note any pain relief from this.     Background:   Date of injury: ***   Duration of symptoms: {NUMBER1-12:860275} {DAYS (weeks):694815}  Mechanism of Injury: {ACUTE CHRONIC INSIDIOUS:836209453}; {SPORTS RELATED ACTIVITY RELATED:682662076} ***  Intensity: {1-10:369209}/10 at rest, {1-10:776716}/10 with activity   Aggravating factors: ***  Relieving Factors: {[facility] RELIEVING FACTORS:916273212}  Prior Evaluation: {[facility] PRIOR EVAL:510465180}    PAST MEDICAL, SOCIAL, SURGICAL AND FAMILY HISTORY: {HISTORY:329757662}  ALLERGIES: She is allergic to ampicillin sodium.    CURRENT MEDICATIONS: She has a current medication list which includes the following prescription(s): aspirin, blood glucose, dexcom g6 , dexcom g6 sensor, dexcom g6 transmitter, glucagon emergency, glucose, blood glucose, insulin aspart, lantus solostar, bd brittnee u/f, rosuvastatin, and semglee (yfgn), and the following Facility-Administered Medications: lidocaine (pf), lidocaine (pf), and methylprednisolone.     REVIEW OF SYSTEMS: {ORTHO ROS:025875047}     Exam:   LMP 04/10/2016 (Approximate)            CONSTITUTIONAL: {GENERAL APPEARANCE:591776::\"healthy\",\"alert\",\"no distress\"}  HEAD: {HEAD EXAM:301::\"Normocephalic. No masses, lesions, tenderness or abnormalities\"}  SKIN: {[facility] SKIN:410147477}  GAIT: {EXAM GAIT:028273172::normal}  NEUROLOGIC: {[facility] NEURO EXAM:076283446}  PSYCHIATRIC: {ROSA PATIENT PSYCH APPEARANCE:854775::\"mentation appears normal.\",\"affect normal/bright\"}    MUSCULOSKELETAL: ***       Assessment/Plan:   ***    X-RAY INTERPRETATION:   {[facility] XRAY INTERP:947356418}  "

## 2024-07-11 NOTE — Clinical Note
"  7/11/2024      RE: Kiera Dobson  [address]  [place] [address]     Dear Colleague,    Thank you for referring your patient, Kiera Dobson, to the [facility] [place]. Please see a copy of my visit note below.     Subjective:   Kiera Dobson is a 58 year old female who left shoulder pain since before November 2023. Saw Dr. Ram on 10/3/23 for Left GH USGI for adhesive capsulitis. She did not note any pain relief from this.     Background:   Date of injury: ***   Duration of symptoms: {NUMBER1-12:552660} {DAYS (weeks):143809}  Mechanism of Injury: {ACUTE CHRONIC INSIDIOUS:138115223}; {SPORTS RELATED ACTIVITY RELATED:081466847} ***  Intensity: {1-10:177925}/10 at rest, {1-10:031808}/10 with activity   Aggravating factors: ***  Relieving Factors: {[facility] RELIEVING FACTORS:740512931}  Prior Evaluation: {[facility] PRIOR EVAL:312885426}    PAST MEDICAL, SOCIAL, SURGICAL AND FAMILY HISTORY: {HISTORY:877190197}  ALLERGIES: She is allergic to ampicillin sodium.    CURRENT MEDICATIONS: She has a current medication list which includes the following prescription(s): aspirin, blood glucose, dexcom g6 , dexcom g6 sensor, dexcom g6 transmitter, glucagon emergency, glucose, blood glucose, insulin aspart, lantus solostar, bd brittnee u/f, rosuvastatin, and semglee (yfgn), and the following Facility-Administered Medications: lidocaine (pf), lidocaine (pf), and methylprednisolone.     REVIEW OF SYSTEMS: {ORTHO ROS:485954997}     Exam:   LMP 04/10/2016 (Approximate)            CONSTITUTIONAL: {GENERAL APPEARANCE:315976::\"healthy\",\"alert\",\"no distress\"}  HEAD: {HEAD EXAM:301::\"Normocephalic. No masses, lesions, tenderness or abnormalities\"}  SKIN: {[facility] SKIN:292219040}  GAIT: {EXAM GAIT:168777915::normal}  NEUROLOGIC: {[facility] NEURO EXAM:051886579}  PSYCHIATRIC: {ROSA PATIENT PSYCH APPEARANCE:706959::\"mentation appears normal.\",\"affect normal/bright\"}    MUSCULOSKELETAL: ***       " Assessment/Plan:   ***    X-RAY INTERPRETATION:   {[facility] SM XRAY INTERP:545312700}      Again, thank you for allowing me to participate in the care of your patient.      Sincerely,    David Gray, DO

## 2024-07-12 NOTE — PROGRESS NOTES
" Subjective:   Kiera Dobson is a 58 year old female who left shoulder pain since before November 2023. Saw Dr. Ram on 10/3/23 for Left GH USGI for adhesive capsulitis. She did not note any pain relief from this.     Background:   Date of injury: ***   Duration of symptoms: {NUMBER1-12:158216} {DAYS (weeks):002345}  Mechanism of Injury: {ACUTE CHRONIC INSIDIOUS:458518681}; {SPORTS RELATED ACTIVITY RELATED:834299926} ***  Intensity: {1-10:216199}/10 at rest, {1-10:064739}/10 with activity   Aggravating factors: ***  Relieving Factors: {[facility] RELIEVING FACTORS:726548584}  Prior Evaluation: {[facility] PRIOR EVAL:260761765}    PAST MEDICAL, SOCIAL, SURGICAL AND FAMILY HISTORY: {HISTORY:972061705}  ALLERGIES: She is allergic to ampicillin sodium.    CURRENT MEDICATIONS: She has a current medication list which includes the following prescription(s): aspirin, blood glucose, dexcom g6 , dexcom g6 sensor, dexcom g6 transmitter, glucagon emergency, glucose, blood glucose, insulin aspart, lantus solostar, bd brittnee u/f, rosuvastatin, and semglee (yfgn), and the following Facility-Administered Medications: lidocaine (pf), lidocaine (pf), and methylprednisolone.     REVIEW OF SYSTEMS: {ORTHO ROS:512337297}     Exam:   LMP 04/10/2016 (Approximate)            CONSTITUTIONAL: {GENERAL APPEARANCE:254059::\"healthy\",\"alert\",\"no distress\"}  HEAD: {HEAD EXAM:301::\"Normocephalic. No masses, lesions, tenderness or abnormalities\"}  SKIN: {[facility] SKIN:336423457}  GAIT: {EXAM GAIT:592990416::normal}  NEUROLOGIC: {[facility] NEURO EXAM:739121582}  PSYCHIATRIC: {ROSA PATIENT PSYCH APPEARANCE:728333::\"mentation appears normal.\",\"affect normal/bright\"}    MUSCULOSKELETAL: ***       Assessment/Plan:   ***    X-RAY INTERPRETATION:   {[facility] XRAY INTERP:814638450}  "

## 2024-07-29 ENCOUNTER — TELEPHONE (OUTPATIENT)
Dept: ENDOCRINOLOGY | Facility: CLINIC | Age: 59
End: 2024-07-29
Payer: COMMERCIAL

## 2024-07-29 NOTE — TELEPHONE ENCOUNTER
[facility] Call Center    Phone Message    May a detailed message be left on voicemail: yes     Reason for Call: Other: patient stated that supplier is moving from Dexcom G6 to the G7  and requesting a call back to discuss her options and equipment. Please call her at [phone]. Thank you     Action Taken: Message routed to:  [facility]):      Travel Screening: Not Applicable     Date of Service:

## 2024-07-29 NOTE — TELEPHONE ENCOUNTER
Left message on machine to call back or send Commutablet message.    Jaci Reyes RD, LD [facility]  Diabetes Educator

## 2024-07-30 DIAGNOSIS — E10.9 TYPE 1 DIABETES MELLITUS WITHOUT COMPLICATION (H): Primary | ICD-10-CM

## 2024-07-30 RX ORDER — ACYCLOVIR 400 MG/1
TABLET ORAL
Qty: 9 EACH | Refills: 4 | Status: SHIPPED | OUTPATIENT
Start: 2024-07-30

## 2024-08-10 NOTE — PROGRESS NOTES
"ASSESSMENT/PLAN:    (M75.02) Adhesive capsulitis of left shoulder  (primary encounter diagnosis)  Comment: it has been nearly a year w/ symptoms and no improvement w/ GH injection or home exercises per pt; this argues against adhesive capsulitis though her exam is still consistent w/ this; discussed option of checking an MRI and she would like to defer; we discussed trying a subacromial injection as her cuff and bursa may also be inflamed and limiting progress; she was amenable to this today and tolerated procedure well; encouraged formal PT which she will setup asap; f/up in 6 wks; if no better, I would recommend checking an MRI  Plan: Physical Therapy  Referral          (M75.52) Bursitis of left shoulder  Comment: see above  Plan: Physical Therapy  Referral,         triamcinolone (KENALOG-40) 40 MG/ML injection,         lidocaine (PF) (XYLOCAINE) 1 % injection, Large        Joint Injection: L subacromial bursa          Attestation:  This patient has been seen and evaluated by me, Neil Fernández MD with the resident, Dr Kwon and the care team. I agree with the findings and plan of care as documented in this note.    Neil Fernández MD  August 12, 2024  9:14 AM        Pt is a 58 year old female last seen by Dr May in 9/2023 here today for:     HPI:   L Shoulder pain: following up for frozen shoulder, interrupting sleep now.   Location: \"In the joint\"   Duration: Ongoing, no improvement since Sept 2023  Injury? Inciting activity? Very active, lifting >30-40lb   Radiation: Goes down to elbow, soreness into neck.    Numbness/tingling? Yes  Weakness? Yes    Instability? No   Clicking/ catching? No, limited abduction, internal/external rotation   Imaging? X-ray 9/2023  Impression:  1. No acute osseous abnormality.  2. Mild glenohumeral degenerative change.   Treatment? GH cortisone injection 10/2023 by Dr Ram  - \"Zocor\" for joints - hips, shoulders, knees. Not helpful for L shoulder though  - PT - " bands/muscle balls    - Not interested in work accommodations   Pmhx of T1DM - has an insulin pump, moderately well-controlled     Per Dr May' note in 9/2023:  Assessment: Adhesive capsulitis, left.  Mild glenohumeral DJD.  Type 1 diabetes mellitus on insulin.     Plan: We discussed the natural history of adhesive capsulitis.  She understands on average it takes 9 to 16 months to resolve.  She knows that cortisone injections do not shorten the course of adhesive capsulitis but they can help with pain relief.  She knows that cortisone shots are likely to raise her blood sugars for 3 days and she may have to adjust her insulin accordingly.  We discussed that there is no pill that will shorten the course of adhesive capsulitis.  She was given the option of physical therapy, declined.  She says she remembers her exercises from the last time she had frozen shoulder with her opposite shoulder.  I gave her a handout explaining adhesive capsulitis as a diagnosis and also with associated exercises that she can do at home.  She prefers this to physical therapy.  She would like to try a left-sided intra-articular glenohumeral injection to assist with pain relief, referral for an ultrasound-guided injection placed.  She will look for slow improvements over the upcoming months.    Past Medical History:   Diagnosis Date    Diabetes mellitus (H)     Type 1    Elevated cholesterol     Glaucoma     Glaucoma suspect    Glaucoma suspect     Kidney stones     Nonsenile cataract     Rash 5/1/2022      Past Surgical History:   Procedure Laterality Date    APPENDECTOMY OPEN  1981    BIOPSY OF SKIN LESION  12/30/2011    returned January 2012 for additional removal    c sections  99,97,08    EXTRACORPOREAL SHOCK WAVE LITHOTRIPSY, CYSTOSCOPY, INSERT STENT URETER(S), COMBINED  2004    LASER HOLMIUM LITHOTRIPSY URETER(S), INSERT STENT, COMBINED  12/12/2013    Procedure: COMBINED CYSTOSCOPY, URETEROSCOPY, LASER HOLMIUM LITHOTRIPSY URETER(S),  INSERT STENT;  Right Ureteroscopy Holmium Laser Lithotripsy Stent Placement;  Surgeon: Kirill Marlow MD;  Location: [facility] OR    PHACOEMULSIFICATION CLEAR CORNEA WITH STANDARD INTRAOCULAR LENS IMPLANT Left 10/25/2021    Procedure: LEFT EYE PHACOEMULSIFICATION, CATARACT, WITH INTRAOCULAR LENS IMPLANT COMPLEX;  Surgeon: Curtis Costa MD;  Location: [facility] OR    PHACOEMULSIFICATION CLEAR CORNEA WITH STANDARD INTRAOCULAR LENS IMPLANT Right 11/8/2021    Procedure: COMPLEX RIGHT EYE PHACOEMULSIFICATION, CATARACT, WITH INTRAOCULAR LENS IMPLANT;  Surgeon: Curtis Costa MD;  Location: [facility] OR      Current Outpatient Medications   Medication Sig Dispense Refill    aspirin 81 MG chewable tablet Take 81 mg by mouth every other day 108 tablet 3    blood glucose (ERICA CONTOUR NEXT) test strip Use to test blood sugar 4-6 times daily or as directed. 720 strip 3    Continuous Blood Gluc  (DEXCOM G6 ) MELE 1 Device continuous 1 Device 0    Continuous Blood Gluc Sensor (DEXCOM G6 SENSOR) MISC 1 each every 10 days 9 each 3    Continuous Blood Gluc Transmit (DEXCOM G6 TRANSMITTER) MISC 1 each every 3 months 1 each 3    Continuous Glucose Sensor (DEXCOM G7 SENSOR) MISC Change every 10 days. 9 each 4    Glucagon, rDNA, (GLUCAGON EMERGENCY) 1 MG KIT Inject 1 mg IM for severe hypoglycemia. 1 kit 1    glucose 40 % (400 mg/mL) gel Take by mouth as needed      glucose blood VI test strips strip Test up to ten times per day as directed 5 Box 8    insulin aspart (NOVOLOG VIAL) 100 UNITS/ML vial Via insulin pump. Approx 60 units daily. 60 mL 3    insulin glargine (LANTUS SOLOSTAR) 100 UNIT/ML pen Inject 15 units subcutaneous daily ONLY IF INSULIN PUMP FAILS. 15 mL 3    insulin pen needle (BD MARCO U/F) 32G X 4 MM miscellaneous Inject Subcutaneous 3 times daily 100 each 3    rosuvastatin (CRESTOR) 20 MG tablet Take 1 tablet (20 mg) by mouth daily 90 tablet 1    SEMGLMAXIMINO PERKINSGN, 100 UNIT/ML SOPN INJECT 15 UNITS  SUBCUTANEOUS DAILY ONLY IF INSULIN PUMP FAILS.*        Allergies   Allergen Reactions    Ampicillin Sodium Rash      ROS:   Gen- no fevers/chills   Derm - no rash/ redness   Neuro - see HPI  Remainder of ROS negative.     Exam:   LMP 04/10/2016 (Approximate)      L Shoulder:   Inspection: asymmetry? YES  ROM:   Active - forward flexion - 130/ abduction - 110/ int rot - limited/ ext rot - 10  Passive- forward flexion - 100/ abduction - 70; ER - 0  Strength: deltoid/supraspinatous - 5/5; infraspinatous/ teres minor - 5/5; subscapularis - 5/5   Maneuvers:   RTC - empty can - mild pain; painful arc - POS; lift off - deferred  Palpation: AC - neg; Acromion/ supraspinatus - POS; scapula - neg; bicipital groove - neg       Procedure Note:  Pt was verbally consented regarding risks including transient hyperglycemia given her Type I DM. L shoulder was sterilely prepped in usual fashion. 4 cc of 1% lidocaine and 1 cc of 40mg/ml Kenalog was injected into subacromial space without complication. Pt tolerated procedure well.

## 2024-08-12 ENCOUNTER — OFFICE VISIT (OUTPATIENT)
Dept: ORTHOPEDICS | Facility: CLINIC | Age: 59
End: 2024-08-12
Payer: COMMERCIAL

## 2024-08-12 DIAGNOSIS — M75.52 BURSITIS OF LEFT SHOULDER: ICD-10-CM

## 2024-08-12 DIAGNOSIS — M75.02 ADHESIVE CAPSULITIS OF LEFT SHOULDER: Primary | ICD-10-CM

## 2024-08-12 PROCEDURE — 99213 OFFICE O/P EST LOW 20 MIN: CPT | Mod: 25 | Performed by: FAMILY MEDICINE

## 2024-08-12 PROCEDURE — 20610 DRAIN/INJ JOINT/BURSA W/O US: CPT | Mod: LT | Performed by: FAMILY MEDICINE

## 2024-08-12 RX ORDER — TRIAMCINOLONE ACETONIDE 40 MG/ML
40 INJECTION, SUSPENSION INTRA-ARTICULAR; INTRAMUSCULAR
Status: SHIPPED | OUTPATIENT
Start: 2024-08-12

## 2024-08-12 RX ORDER — LIDOCAINE HYDROCHLORIDE 10 MG/ML
4 INJECTION, SOLUTION EPIDURAL; INFILTRATION; INTRACAUDAL; PERINEURAL
Status: SHIPPED | OUTPATIENT
Start: 2024-08-12

## 2024-08-12 RX ADMIN — TRIAMCINOLONE ACETONIDE 40 MG: 40 INJECTION, SUSPENSION INTRA-ARTICULAR; INTRAMUSCULAR at 09:06

## 2024-08-12 RX ADMIN — LIDOCAINE HYDROCHLORIDE 4 ML: 10 INJECTION, SOLUTION EPIDURAL; INFILTRATION; INTRACAUDAL; PERINEURAL at 09:06

## 2024-08-12 NOTE — NURSING NOTE
[address] [address]  Dept: [phone]  ______________________________________________________________________________    Patient: Kiera Dobson   : 1965   MRN: [id number]   2024    INVASIVE PROCEDURE SAFETY CHECKLIST    Date: 24   Procedure: Left subacromial Bursa CSI  Patient Name: Kiera Dobson  MRN: [id number]  YOB: 1965    Action: Complete sections as appropriate. Any discrepancy results in a HARD COPY until resolved.     PRE PROCEDURE:  Patient ID verified with 2 identifiers (name and  or MRN): Yes  Procedure and site verified with patient/designee (when able): Yes  Accurate consent documentation in medical record: Yes  H&P (or appropriate assessment) documented in medical record: Yes  H&P must be up to 20 days prior to procedure and updates within 24 hours of procedure as applicable: NA  Relevant diagnostic and radiology test results appropriately labeled and displayed as applicable: Yes  Procedure site(s) marked with provider initials: NA    TIMEOUT:  Time-Out performed immediately prior to starting procedure, including verbal and active participation of all team members addressing the following:Yes  * Correct patient identify  * Confirmed that the correct side and site are marked  * An accurate procedure consent form  * Agreement on the procedure to be done  * Correct patient position  * Relevant images and results are properly labeled and appropriately displayed  * The need to administer antibiotics or fluids for irrigation purposes during the procedure as applicable   * Safety precautions based on patient history or medication use    DURING PROCEDURE: Verification of correct person, site, and procedures any time the responsibility for care of the patient is transferred to another member of the care team.       Prior to injection, verified patient identity using patient's name and date of  birth.  Due to injection administration, patient instructed to remain in clinic for 15 minutes  afterwards, and to report any adverse reaction to me immediately.    Bursa injection was performed.      Drug Amount Wasted:  Yes: 1 mg/ml   Vial/Syringe: Multi dose vial  Expiration Date:  3/1/26      Francis Klein, [facility]  August 12, 2024

## 2024-08-12 NOTE — LETTER
"  8/12/2024      RE: Kiera Dobson  [address]  [facility] [address]     Dear Colleague,    Thank you for referring your patient, Kiera Dobson, to the [facility] [place]. Please see a copy of my visit note below.    ASSESSMENT/PLAN:    (M75.02) Adhesive capsulitis of left shoulder  (primary encounter diagnosis)  Comment: it has been nearly a year w/ symptoms and no improvement w/ GH injection or home exercises per pt; this argues against adhesive capsulitis though her exam is still consistent w/ this; discussed option of checking an MRI and she would like to defer; we discussed trying a subacromial injection as her cuff and bursa may also be inflamed and limiting progress; she was amenable to this today and tolerated procedure well; encouraged formal PT which she will setup asap; f/up in 6 wks; if no better, I would recommend checking an MRI  Plan: Physical Therapy  Referral          (M75.52) Bursitis of left shoulder  Comment: see above  Plan: Physical Therapy  Referral,         triamcinolone (KENALOG-40) 40 MG/ML injection,         lidocaine (PF) (XYLOCAINE) 1 % injection, Large        Joint Injection: L subacromial bursa          Attestation:  This patient has been seen and evaluated by me, Neil Fernández MD with the resident, Dr Kwon and the care team. I agree with the findings and plan of care as documented in this note.    Neil Fernández MD  August 12, 2024  9:14 AM        Pt is a 58 year old female last seen by Dr May in 9/2023 here today for:     HPI:   L Shoulder pain: following up for frozen shoulder, interrupting sleep now.   Location: \"In the joint\"   Duration: Ongoing, no improvement since Sept 2023  Injury? Inciting activity? Very active, lifting >30-40lb   Radiation: Goes down to elbow, soreness into neck.    Numbness/tingling? Yes  Weakness? Yes    Instability? No   Clicking/ catching? No, limited abduction, internal/external rotation " "  Imaging? X-ray 9/2023  Impression:  1. No acute osseous abnormality.  2. Mild glenohumeral degenerative change.   Treatment? GH cortisone injection 10/2023 by Dr Ram  - \"Zocor\" for joints - hips, shoulders, knees. Not helpful for L shoulder though  - PT - bands/muscle balls    - Not interested in work accommodations   Pmhx of T1DM - has an insulin pump, moderately well-controlled     Per Dr May' note in 9/2023:  Assessment: Adhesive capsulitis, left.  Mild glenohumeral DJD.  Type 1 diabetes mellitus on insulin.     Plan: We discussed the natural history of adhesive capsulitis.  She understands on average it takes 9 to 16 months to resolve.  She knows that cortisone injections do not shorten the course of adhesive capsulitis but they can help with pain relief.  She knows that cortisone shots are likely to raise her blood sugars for 3 days and she may have to adjust her insulin accordingly.  We discussed that there is no pill that will shorten the course of adhesive capsulitis.  She was given the option of physical therapy, declined.  She says she remembers her exercises from the last time she had frozen shoulder with her opposite shoulder.  I gave her a handout explaining adhesive capsulitis as a diagnosis and also with associated exercises that she can do at home.  She prefers this to physical therapy.  She would like to try a left-sided intra-articular glenohumeral injection to assist with pain relief, referral for an ultrasound-guided injection placed.  She will look for slow improvements over the upcoming months.    Past Medical History:   Diagnosis Date     Diabetes mellitus (H)     Type 1     Elevated cholesterol      Glaucoma     Glaucoma suspect     Glaucoma suspect      Kidney stones      Nonsenile cataract      Rash 5/1/2022      Past Surgical History:   Procedure Laterality Date     APPENDECTOMY OPEN  1981     BIOPSY OF SKIN LESION  12/30/2011    returned January 2012 for additional removal     c " sections  99,97,08     EXTRACORPOREAL SHOCK WAVE LITHOTRIPSY, CYSTOSCOPY, INSERT STENT URETER(S), COMBINED  2004     LASER HOLMIUM LITHOTRIPSY URETER(S), INSERT STENT, COMBINED  12/12/2013    Procedure: COMBINED CYSTOSCOPY, URETEROSCOPY, LASER HOLMIUM LITHOTRIPSY URETER(S), INSERT STENT;  Right Ureteroscopy Holmium Laser Lithotripsy Stent Placement;  Surgeon: Kirill Marlow MD;  Location: [facility] OR     PHACOEMULSIFICATION CLEAR CORNEA WITH STANDARD INTRAOCULAR LENS IMPLANT Left 10/25/2021    Procedure: LEFT EYE PHACOEMULSIFICATION, CATARACT, WITH INTRAOCULAR LENS IMPLANT COMPLEX;  Surgeon: Curtis Costa MD;  Location: [facility] OR     PHACOEMULSIFICATION CLEAR CORNEA WITH STANDARD INTRAOCULAR LENS IMPLANT Right 11/8/2021    Procedure: COMPLEX RIGHT EYE PHACOEMULSIFICATION, CATARACT, WITH INTRAOCULAR LENS IMPLANT;  Surgeon: Curtis Costa MD;  Location: [facility] OR      Current Outpatient Medications   Medication Sig Dispense Refill     aspirin 81 MG chewable tablet Take 81 mg by mouth every other day 108 tablet 3     blood glucose (ERICA CONTOUR NEXT) test strip Use to test blood sugar 4-6 times daily or as directed. 720 strip 3     Continuous Blood Gluc  (DEXCOM G6 ) MELE 1 Device continuous 1 Device 0     Continuous Blood Gluc Sensor (DEXCOM G6 SENSOR) MISC 1 each every 10 days 9 each 3     Continuous Blood Gluc Transmit (DEXCOM G6 TRANSMITTER) MISC 1 each every 3 months 1 each 3     Continuous Glucose Sensor (DEXCOM G7 SENSOR) MISC Change every 10 days. 9 each 4     Glucagon, rDNA, (GLUCAGON EMERGENCY) 1 MG KIT Inject 1 mg IM for severe hypoglycemia. 1 kit 1     glucose 40 % (400 mg/mL) gel Take by mouth as needed       glucose blood VI test strips strip Test up to ten times per day as directed 5 Box 8     insulin aspart (NOVOLOG VIAL) 100 UNITS/ML vial Via insulin pump. Approx 60 units daily. 60 mL 3     insulin glargine (LANTUS SOLOSTAR) 100 UNIT/ML pen Inject 15 units subcutaneous  daily ONLY IF INSULIN PUMP FAILS. 15 mL 3     insulin pen needle (BD MARCO U/F) 32G X 4 MM miscellaneous Inject Subcutaneous 3 times daily 100 each 3     rosuvastatin (CRESTOR) 20 MG tablet Take 1 tablet (20 mg) by mouth daily 90 tablet 1     SEMGLGREGORIO YFGN, 100 UNIT/ML SOPN INJECT 15 UNITS SUBCUTANEOUS DAILY ONLY IF INSULIN PUMP FAILS.*        Allergies   Allergen Reactions     Ampicillin Sodium Rash      ROS:   Gen- no fevers/chills   Derm - no rash/ redness   Neuro - see HPI  Remainder of ROS negative.     Exam:   LMP 04/10/2016 (Approximate)      L Shoulder:   Inspection: asymmetry? YES  ROM:   Active - forward flexion - 130/ abduction - 110/ int rot - limited/ ext rot - 10  Passive- forward flexion - 100/ abduction - 70; ER - 0  Strength: deltoid/supraspinatous - 5/5; infraspinatous/ teres minor - 5/5; subscapularis - 5/5   Maneuvers:   RTC - empty can - mild pain; painful arc - POS; lift off - deferred  Palpation: AC - neg; Acromion/ supraspinatus - POS; scapula - neg; bicipital groove - neg       Procedure Note:  Pt was verbally consented regarding risks including transient hyperglycemia given her Type I DM. L shoulder was sterilely prepped in usual fashion. 4 cc of 1% lidocaine and 1 cc of 40mg/ml Kenalog was injected into subacromial space without complication. Pt tolerated procedure well.       Large Joint Injection: L subacromial bursa    Date/Time: 8/12/2024 9:06 AM    Performed by: Neil Fernández MD  Authorized by: Neil Fernández MD    Indications:  Pain  Needle Size comment:  23G  Guidance: landmark guided    Approach:  Posterolateral  Location:  Shoulder      Site:  L subacromial bursa  Medications:  40 mg triamcinolone 40 MG/ML; 4 mL lidocaine (PF) 1 %  Outcome:  Tolerated well, no immediate complications  Procedure discussed: discussed risks, benefits, and alternatives    Consent Given by:  Patient  Timeout: timeout called immediately prior to procedure    Prep: patient was prepped and draped in usual  sterile fashion       rFancis Klein M.A., LAT, ATC  Certified Athletic Trainer            Again, thank you for allowing me to participate in the care of your patient.      Sincerely,    Neil Fernández MD

## 2024-08-12 NOTE — PROGRESS NOTES
Large Joint Injection: L subacromial bursa    Date/Time: 8/12/2024 9:06 AM    Performed by: Neil Fernández MD  Authorized by: Neil Fernández MD    Indications:  Pain  Needle Size comment:  23G  Guidance: landmark guided    Approach:  Posterolateral  Location:  Shoulder      Site:  L subacromial bursa  Medications:  40 mg triamcinolone 40 MG/ML; 4 mL lidocaine (PF) 1 %  Outcome:  Tolerated well, no immediate complications  Procedure discussed: discussed risks, benefits, and alternatives    Consent Given by:  Patient  Timeout: timeout called immediately prior to procedure    Prep: patient was prepped and draped in usual sterile fashion       Francis Klein M.A., LAT, ATC  Certified Athletic Trainer

## 2024-08-16 ENCOUNTER — MYC REFILL (OUTPATIENT)
Dept: ENDOCRINOLOGY | Facility: CLINIC | Age: 59
End: 2024-08-16
Payer: COMMERCIAL

## 2024-08-16 DIAGNOSIS — E10.65 TYPE 1 DIABETES MELLITUS WITH HYPERGLYCEMIA (H): ICD-10-CM

## 2024-08-16 RX ORDER — ROSUVASTATIN CALCIUM 20 MG/1
20 TABLET, COATED ORAL DAILY
Qty: 90 TABLET | Refills: 1 | Status: SHIPPED | OUTPATIENT
Start: 2024-08-16

## 2024-08-27 ENCOUNTER — THERAPY VISIT (OUTPATIENT)
Dept: PHYSICAL THERAPY | Facility: CLINIC | Age: 59
End: 2024-08-27
Payer: COMMERCIAL

## 2024-08-27 DIAGNOSIS — M75.02 ADHESIVE CAPSULITIS OF LEFT SHOULDER: ICD-10-CM

## 2024-08-27 DIAGNOSIS — M75.52 BURSITIS OF LEFT SHOULDER: ICD-10-CM

## 2024-08-27 PROCEDURE — 97110 THERAPEUTIC EXERCISES: CPT | Mod: GP

## 2024-08-27 PROCEDURE — 97161 PT EVAL LOW COMPLEX 20 MIN: CPT | Mod: GP

## 2024-08-27 PROCEDURE — 97140 MANUAL THERAPY 1/> REGIONS: CPT | Mod: GP

## 2024-08-27 PROCEDURE — 97112 NEUROMUSCULAR REEDUCATION: CPT | Mod: GP

## 2024-08-27 ASSESSMENT — ACTIVITIES OF DAILY LIVING (ADL)
PUSHING_WITH_THE_INVOLVED_ARM: 3
WASHING_YOUR_BACK: 5
AT_ITS_WORST?: 5
PLACING_AN_OBJECT_ON_A_HIGH_SHELF: 2
PLEASE_INDICATE_YOR_PRIMARY_REASON_FOR_REFERRAL_TO_THERAPY:: SHOULDER
REMOVING_SOMETHING_FROM_YOUR_BACK_POCKET: 5
WASHING_YOUR_HAIR?: 2
REACHING_FOR_SOMETHING_ON_A_HIGH_SHELF: 0
PUTTING_ON_YOUR_PANTS: 1
PUTTING_ON_A_SHIRT_THAT_BUTTONS_DOWN_THE_FRONT: 1
WHEN_LYING_ON_THE_INVOLVED_SIDE: 5
CARRYING_A_HEAVY_OBJECT_OF_10_POUNDS: 0
PUTTING_ON_AN_UNDERSHIRT_OR_A_PULLOVER_SWEATER: 4
TOUCHING_THE_BACK_OF_YOUR_NECK: 5

## 2024-08-27 NOTE — PROGRESS NOTES
PHYSICAL THERAPY EVALUATION  Type of Visit: Evaluation       Fall Risk Screen:  Fall screen completed by: PT  Have you fallen 2 or more times in the past year?: No  Have you fallen and had an injury in the past year?: No  Is patient a fall risk?: No    Subjective       Presenting condition or subjective complaint: thought it was diabetic frozen shoulder have had 2 cortizone injections  had this issue over a year Pt is complaining of left shoulder pain and stiffness that has been going on for over 1 a year.  She has an injection in the shoulder back in November that was not helpful, but then received a subacromial injection early in August, which has provided some benefit.  She is left handed and uses her hands a lot.  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    Date of onset: 08/27/24 (chronic, 1+ years)    Relevant medical history: Diabetes; Pain at night or rest right shoulder adhesive capsulitis, Type I DM  Dates & types of surgery: 3 c-sections   appendiitis  exploratory surgery 2 kidney stones removed     Prior diagnostic imaging/testing results: X-ray   XR  Prior therapy history for the same diagnosis, illness or injury:     no    Prior Level of Function  Transfers: Independent  Ambulation: Independent  ADL: Independent  IADL:  IND    Living Environment  Social support: With a significant other or spouse   Type of home: House; Multi-level house  Stairs to enter the home:         Ramp: No   Stairs inside the home: Yes 20 Is there a railing: Yes     Help at home: None lives with family  Equipment owned:       Employment:     work is medical research  Hobbies/Interests:   tennis, softball, basketball, swimming    Patient goals for therapy: play sports    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  INTEGUMENTARY: Intact  POSTURE: Sitting Posture: Rounded shoulders, Forward head  PALPATION: ttp of anterior shoulder, posterior cuff    RANGE OF MOTION:   (Degrees) Left AROM Left PROM Right AROM  Right PROM   Shoulder Flexion 125  175    Shoulder Extension/IR Iliac crest  L1    Shoulder Abduction 112  175    Shoulder External Rotation 30  65    Pain:   End feel:     STRENGTH:   Pain: - none + mild ++ moderate +++ severe  Strength Scale: 0-5/5 Left Right   Shoulder Flexion 4+* 5   Shoulder Abduction 4+* 5   Shoulder Internal Rotation 4+* 5   Shoulder External Rotation 5 5   Elbow Flexion 5 5   Elbow Extension 5 5       Assessment & Plan   CLINICAL IMPRESSIONS  Medical Diagnosis: Adhesive capsulitis of left shoulder (M75.02), Bursitis of left shoulder (M75.52)    Treatment Diagnosis: Left shoulder pain with impaired mobility   Impression/Assessment: Patient is a 58 year old female with left shoulder complaints.  The following significant findings have been identified: Pain, Decreased ROM/flexibility, Decreased joint mobility, Decreased strength, Impaired muscle performance, Decreased activity tolerance, and Impaired posture. These impairments interfere with their ability to perform self care tasks, work tasks, recreational activities, and household chores as compared to previous level of function.     Clinical Decision Making (Complexity):  Clinical Presentation: Stable/Uncomplicated  Clinical Presentation Rationale: based on medical and personal factors listed in PT evaluation  Clinical Decision Making (Complexity): Low complexity    PLAN OF CARE  Treatment Interventions:  Interventions: Manual Therapy, Neuromuscular Re-education, Therapeutic Activity, Therapeutic Exercise    Long Term Goals     PT  Goal 1  Goal Identifier: Activity tolerance  Goal Description: Pt will be able to participate in desired athletic activities (tennis, basketball, swimming) without pain in order to improve QOL.  Goal Progress: ongoing  Target Date: 11/27/24      Frequency of Treatment: 1x/week, taper to 2x/month  Duration of Treatment: 3 months    Recommended Referrals to Other Professionals:     Education Assessment:   Learner/Method: Patient  Education Comments: HEP, POC    Risks and benefits of evaluation/treatment have been explained.   Patient/Family/caregiver agrees with Plan of Care.     Evaluation Time:     PT Eval, Low Complexity Minutes (38396): 10       Signing Clinician: Joaquin Bryant PT

## 2024-09-24 ENCOUNTER — THERAPY VISIT (OUTPATIENT)
Dept: PHYSICAL THERAPY | Facility: CLINIC | Age: 59
End: 2024-09-24
Payer: COMMERCIAL

## 2024-09-24 DIAGNOSIS — M75.52 BURSITIS OF LEFT SHOULDER: ICD-10-CM

## 2024-09-24 DIAGNOSIS — M75.02 ADHESIVE CAPSULITIS OF LEFT SHOULDER: Primary | ICD-10-CM

## 2024-09-24 PROCEDURE — 97112 NEUROMUSCULAR REEDUCATION: CPT | Mod: GP

## 2024-09-24 PROCEDURE — 97140 MANUAL THERAPY 1/> REGIONS: CPT | Mod: GP

## 2024-09-24 PROCEDURE — 97110 THERAPEUTIC EXERCISES: CPT | Mod: GP

## 2024-10-03 ENCOUNTER — OFFICE VISIT (OUTPATIENT)
Dept: ORTHOPEDICS | Facility: CLINIC | Age: 59
End: 2024-10-03
Attending: FAMILY MEDICINE
Payer: COMMERCIAL

## 2024-10-03 DIAGNOSIS — M75.02 ADHESIVE CAPSULITIS OF LEFT SHOULDER: Primary | ICD-10-CM

## 2024-10-03 PROCEDURE — 99212 OFFICE O/P EST SF 10 MIN: CPT | Performed by: FAMILY MEDICINE

## 2024-10-03 NOTE — LETTER
"  10/3/2024      RE: Kiera Dobson  [address]  [facility] [address]     Dear Colleague,    Thank you for referring your patient, Kiera Dobson, to the [facility] [place]. Please see a copy of my visit note below.    ASSESSMENT/PLAN:    (M75.02) Adhesive capsulitis of left shoulder  (primary encounter diagnosis)  Comment:   Plan: improved greatly w/ subacromial injection; will continue PT; f/up on 11/14/24 to consider repeat injection; can cancel if better; if pain worsens, I recommended she reach out in advance of her appt and I would order a shoulder MRI; precautions/ anticipatory guidance given    Neil Fernández MD  October 3, 2024  3:53 PM      Pt is a 58 year old female last seen on 8/12/24 here for follow up of:     L shoulder - \"that shot helped a lot\"  \"Literally night and day - I can sleep\"   Not taking meds  Just therapy and bands at home  Working on back and neck and that is helping shoulder  Feels stronger  ROM is better  Taking more breaks at work      Per last PT note on 9/24/24:  Pt states that shoulder is going okay.  ROM is improving, some of the exercises are uncomfortable.     Per my last note:  (M75.02) Adhesive capsulitis of left shoulder  (primary encounter diagnosis)  Comment: it has been nearly a year w/ symptoms and no improvement w/ GH injection or home exercises per pt; this argues against adhesive capsulitis though her exam is still consistent w/ this; discussed option of checking an MRI and she would like to defer; we discussed trying a subacromial injection as her cuff and bursa may also be inflamed and limiting progress; she was amenable to this today and tolerated procedure well; encouraged formal PT which she will setup asap; f/up in 6 wks; if no better, I would recommend checking an MRI  Plan: Physical Therapy  Referral          (M75.52) Bursitis of left shoulder  Comment: see above  Plan: Physical Therapy  Referral,       "   triamcinolone (KENALOG-40) 40 MG/ML injection,         lidocaine (PF) (XYLOCAINE) 1 % injection, Large        Joint Injection: L subacromial bursa    Past Medical History:   Diagnosis Date     Diabetes mellitus (H)     Type 1     Elevated cholesterol      Glaucoma     Glaucoma suspect     Glaucoma suspect      Kidney stones      Nonsenile cataract      Rash 5/1/2022      Current Outpatient Medications   Medication Sig Dispense Refill     aspirin 81 MG chewable tablet Take 81 mg by mouth every other day 108 tablet 3     blood glucose (ERICA CONTOUR NEXT) test strip Use to test blood sugar 4-6 times daily or as directed. 720 strip 3     Continuous Blood Gluc  (DEXCOM G6 ) MELE 1 Device continuous 1 Device 0     Continuous Blood Gluc Sensor (DEXCOM G6 SENSOR) MISC 1 each every 10 days 9 each 3     Continuous Blood Gluc Transmit (DEXCOM G6 TRANSMITTER) MISC 1 each every 3 months 1 each 3     Continuous Glucose Sensor (DEXCOM G7 SENSOR) MISC Change every 10 days. 9 each 4     Glucagon, rDNA, (GLUCAGON EMERGENCY) 1 MG KIT Inject 1 mg IM for severe hypoglycemia. 1 kit 1     glucose 40 % (400 mg/mL) gel Take by mouth as needed       glucose blood VI test strips strip Test up to ten times per day as directed 5 Box 8     insulin aspart (NOVOLOG VIAL) 100 UNITS/ML vial Via insulin pump. Approx 60 units daily. 60 mL 3     insulin glargine (LANTUS SOLOSTAR) 100 UNIT/ML pen Inject 15 units subcutaneous daily ONLY IF INSULIN PUMP FAILS. 15 mL 3     insulin pen needle (BD MARCO U/F) 32G X 4 MM miscellaneous Inject Subcutaneous 3 times daily 100 each 3     rosuvastatin (CRESTOR) 20 MG tablet Take 1 tablet (20 mg) by mouth daily 90 tablet 1     SEMGLEE, YFGN, 100 UNIT/ML SOPN INJECT 15 UNITS SUBCUTANEOUS DAILY ONLY IF INSULIN PUMP FAILS.*        Allergies   Allergen Reactions     Ampicillin Sodium Rash      ROS:   Gen- no fevers/chills   Rheum - no morning stiffness   Derm - no rash/ redness   Neuro - + numbness in L  hand, no tingling   Remainder of ROS negative.     Exam:     L shoulder:  Limited ER  FF an abduction to >120!         Again, thank you for allowing me to participate in the care of your patient.      Sincerely,    Neil Fernández MD

## 2024-10-03 NOTE — PROGRESS NOTES
"ASSESSMENT/PLAN:    (M75.02) Adhesive capsulitis of left shoulder  (primary encounter diagnosis)  Comment:   Plan: improved greatly w/ subacromial injection; will continue PT; f/up on 11/14/24 to consider repeat injection; can cancel if better; if pain worsens, I recommended she reach out in advance of her appt and I would order a shoulder MRI; precautions/ anticipatory guidance given    Neil Fernández MD  October 3, 2024  3:53 PM      Pt is a 58 year old female last seen on 8/12/24 here for follow up of:     L shoulder - \"that shot helped a lot\"  \"Literally night and day - I can sleep\"   Not taking meds  Just therapy and bands at home  Working on back and neck and that is helping shoulder  Feels stronger  ROM is better  Taking more breaks at work      Per last PT note on 9/24/24:  Pt states that shoulder is going okay.  ROM is improving, some of the exercises are uncomfortable.     Per my last note:  (M75.02) Adhesive capsulitis of left shoulder  (primary encounter diagnosis)  Comment: it has been nearly a year w/ symptoms and no improvement w/ GH injection or home exercises per pt; this argues against adhesive capsulitis though her exam is still consistent w/ this; discussed option of checking an MRI and she would like to defer; we discussed trying a subacromial injection as her cuff and bursa may also be inflamed and limiting progress; she was amenable to this today and tolerated procedure well; encouraged formal PT which she will setup asap; f/up in 6 wks; if no better, I would recommend checking an MRI  Plan: Physical Therapy  Referral          (M75.52) Bursitis of left shoulder  Comment: see above  Plan: Physical Therapy  Referral,         triamcinolone (KENALOG-40) 40 MG/ML injection,         lidocaine (PF) (XYLOCAINE) 1 % injection, Large        Joint Injection: L subacromial bursa    Past Medical History:   Diagnosis Date    Diabetes mellitus (H)     Type 1    Elevated cholesterol     " Glaucoma     Glaucoma suspect    Glaucoma suspect     Kidney stones     Nonsenile cataract     Rash 5/1/2022      Current Outpatient Medications   Medication Sig Dispense Refill    aspirin 81 MG chewable tablet Take 81 mg by mouth every other day 108 tablet 3    blood glucose (ERICA CONTOUR NEXT) test strip Use to test blood sugar 4-6 times daily or as directed. 720 strip 3    Continuous Blood Gluc  (DEXCOM G6 ) MELE 1 Device continuous 1 Device 0    Continuous Blood Gluc Sensor (DEXCOM G6 SENSOR) MISC 1 each every 10 days 9 each 3    Continuous Blood Gluc Transmit (DEXCOM G6 TRANSMITTER) MISC 1 each every 3 months 1 each 3    Continuous Glucose Sensor (DEXCOM G7 SENSOR) MISC Change every 10 days. 9 each 4    Glucagon, rDNA, (GLUCAGON EMERGENCY) 1 MG KIT Inject 1 mg IM for severe hypoglycemia. 1 kit 1    glucose 40 % (400 mg/mL) gel Take by mouth as needed      glucose blood VI test strips strip Test up to ten times per day as directed 5 Box 8    insulin aspart (NOVOLOG VIAL) 100 UNITS/ML vial Via insulin pump. Approx 60 units daily. 60 mL 3    insulin glargine (LANTUS SOLOSTAR) 100 UNIT/ML pen Inject 15 units subcutaneous daily ONLY IF INSULIN PUMP FAILS. 15 mL 3    insulin pen needle (BD MARCO U/F) 32G X 4 MM miscellaneous Inject Subcutaneous 3 times daily 100 each 3    rosuvastatin (CRESTOR) 20 MG tablet Take 1 tablet (20 mg) by mouth daily 90 tablet 1    SEMGLEE, YFGN, 100 UNIT/ML SOPN INJECT 15 UNITS SUBCUTANEOUS DAILY ONLY IF INSULIN PUMP FAILS.*        Allergies   Allergen Reactions    Ampicillin Sodium Rash      ROS:   Gen- no fevers/chills   Rheum - no morning stiffness   Derm - no rash/ redness   Neuro - + numbness in L hand, no tingling   Remainder of ROS negative.     Exam:     L shoulder:  Limited ER  FF an abduction to >120!

## 2024-10-04 ENCOUNTER — THERAPY VISIT (OUTPATIENT)
Dept: PHYSICAL THERAPY | Facility: CLINIC | Age: 59
End: 2024-10-04
Payer: COMMERCIAL

## 2024-10-04 DIAGNOSIS — M75.52 BURSITIS OF LEFT SHOULDER: ICD-10-CM

## 2024-10-04 DIAGNOSIS — M75.02 ADHESIVE CAPSULITIS OF LEFT SHOULDER: Primary | ICD-10-CM

## 2024-10-04 PROCEDURE — 97110 THERAPEUTIC EXERCISES: CPT | Mod: GP

## 2024-10-04 PROCEDURE — 97112 NEUROMUSCULAR REEDUCATION: CPT | Mod: GP

## 2024-10-04 PROCEDURE — 97140 MANUAL THERAPY 1/> REGIONS: CPT | Mod: GP

## 2024-10-18 ENCOUNTER — THERAPY VISIT (OUTPATIENT)
Dept: PHYSICAL THERAPY | Facility: CLINIC | Age: 59
End: 2024-10-18
Payer: COMMERCIAL

## 2024-10-18 DIAGNOSIS — M75.02 ADHESIVE CAPSULITIS OF LEFT SHOULDER: Primary | ICD-10-CM

## 2024-10-18 DIAGNOSIS — M75.52 BURSITIS OF LEFT SHOULDER: ICD-10-CM

## 2024-10-18 PROCEDURE — 97112 NEUROMUSCULAR REEDUCATION: CPT | Mod: GP

## 2024-10-18 PROCEDURE — 97110 THERAPEUTIC EXERCISES: CPT | Mod: GP

## 2024-10-18 PROCEDURE — 97140 MANUAL THERAPY 1/> REGIONS: CPT | Mod: GP

## 2024-11-12 ENCOUNTER — THERAPY VISIT (OUTPATIENT)
Dept: PHYSICAL THERAPY | Facility: CLINIC | Age: 59
End: 2024-11-12
Payer: COMMERCIAL

## 2024-11-12 DIAGNOSIS — M75.02 ADHESIVE CAPSULITIS OF LEFT SHOULDER: Primary | ICD-10-CM

## 2024-11-12 DIAGNOSIS — M75.52 BURSITIS OF LEFT SHOULDER: ICD-10-CM

## 2024-11-12 PROCEDURE — 97110 THERAPEUTIC EXERCISES: CPT | Mod: GP

## 2024-11-12 PROCEDURE — 97140 MANUAL THERAPY 1/> REGIONS: CPT | Mod: GP

## 2024-11-12 PROCEDURE — 97530 THERAPEUTIC ACTIVITIES: CPT | Mod: GP

## 2024-11-12 NOTE — PROGRESS NOTES
11/12/24 9:36 AM  PATIENT LAB/IMAGING STATUS : No pending lab orders     Alberta Christie CMA

## 2024-11-16 NOTE — PROGRESS NOTES
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 Providers should order a new test to be performed if clinically indicated. [employer] will issue a credit for this test.  This is a corrected result. Previous result was 8.8 % on 2/8/2023 at  1:31 PM CST    A1C 7.1 09/28/2022    A1C 6.9 06/16/2021    A1C 7.0 07/24/2020    A1C 7.9 07/16/2013    A1C 7.9 04/03/2013    A1C 8.4 02/07/2013       Per last PT note on 11/12/24:  Pt states that her shoulder pain has improved 50-60%.  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     Per my last note:  (M75.02) Adhesive capsulitis of left shoulder  (primary encounter diagnosis)  Comment:   Plan: improved greatly w/ subacromial injection; will continue PT; f/up on 11/14/24 to consider repeat injection; can cancel if better; if pain worsens, I recommended she reach out in advance of her appt and I would order a shoulder MRI; precautions/ anticipatory guidance given       Past Medical History:   Diagnosis Date    Diabetes mellitus (H)     Type 1     Elevated cholesterol     Glaucoma     Glaucoma suspect    Glaucoma suspect     Kidney stones     Nonsenile cataract     Rash 5/1/2022      Current Outpatient Medications   Medication Sig Dispense Refill    aspirin 81 MG chewable tablet Take 81 mg by mouth every other day 108 tablet 3    blood glucose (ERICA CONTOUR NEXT) test strip Use to test blood sugar 4-6 times daily or as directed. 720 strip 3    Continuous Blood Gluc  (DEXCOM G6 ) MELE 1 Device continuous 1 Device 0    Continuous Blood Gluc Sensor (DEXCOM G6 SENSOR) MISC 1 each every 10 days 9 each 3    Continuous Blood Gluc Transmit (DEXCOM G6 TRANSMITTER) MISC 1 each every 3 months 1 each 3    Continuous Glucose Sensor (DEXCOM G7 SENSOR) MISC Change every 10 days. 9 each 4    Glucagon, rDNA, (GLUCAGON EMERGENCY) 1 MG KIT Inject 1 mg IM for severe hypoglycemia. 1 kit 1    glucose 40 % (400 mg/mL) gel Take by mouth as needed      glucose blood VI test strips strip Test up to ten times per day as directed 5 Box 8    insulin aspart (NOVOLOG VIAL) 100 UNITS/ML vial Via insulin pump. Approx 60 units daily. 60 mL 3    insulin glargine (LANTUS SOLOSTAR) 100 UNIT/ML pen Inject 15 units subcutaneous daily ONLY IF INSULIN PUMP FAILS. 15 mL 3    insulin pen needle (BD MARCO U/F) 32G X 4 MM miscellaneous Inject Subcutaneous 3 times daily 100 each 3    rosuvastatin (CRESTOR) 20 MG tablet Take 1 tablet (20 mg) by mouth daily 90 tablet 1    SEMGLEE, YFGN, 100 UNIT/ML SOPN INJECT 15 UNITS SUBCUTANEOUS DAILY ONLY IF INSULIN PUMP FAILS.*        Allergies   Allergen Reactions    Ampicillin Sodium Rash      ROS:   Gen- no fevers/chills   Rheum - no morning stiffness   Derm - no rash/ redness   Neuro - no numbness, no tingling   Remainder of ROS negative.     Exam:     L shoulder:  Active - FF- 140 Abd - 120; ER -10  Passive FF-120; Abd - 100; ER - 0  Strength 4+/5 w/ supraspinatus; 5/5 w/ ER/IR  Pos empty can

## 2024-11-19 ENCOUNTER — OFFICE VISIT (OUTPATIENT)
Dept: ORTHOPEDICS | Facility: CLINIC | Age: 59
End: 2024-11-19
Payer: COMMERCIAL

## 2024-11-19 ENCOUNTER — OFFICE VISIT (OUTPATIENT)
Dept: ENDOCRINOLOGY | Facility: CLINIC | Age: 59
End: 2024-11-19
Payer: COMMERCIAL

## 2024-11-19 VITALS — BODY MASS INDEX: 26.31 KG/M2 | HEIGHT: 60 IN | WEIGHT: 134 LBS

## 2024-11-19 VITALS
WEIGHT: 134 LBS | OXYGEN SATURATION: 99 % | SYSTOLIC BLOOD PRESSURE: 111 MMHG | HEART RATE: 101 BPM | HEIGHT: 60 IN | DIASTOLIC BLOOD PRESSURE: 70 MMHG | BODY MASS INDEX: 26.31 KG/M2

## 2024-11-19 DIAGNOSIS — M75.02 ADHESIVE CAPSULITIS OF LEFT SHOULDER: Primary | ICD-10-CM

## 2024-11-19 DIAGNOSIS — E10.65 TYPE 1 DIABETES MELLITUS WITH HYPERGLYCEMIA (H): Primary | ICD-10-CM

## 2024-11-19 LAB
EST. AVERAGE GLUCOSE BLD GHB EST-MCNC: 171 MG/DL
HBA1C MFR BLD: 7.6 %

## 2024-11-19 PROCEDURE — 99213 OFFICE O/P EST LOW 20 MIN: CPT | Performed by: FAMILY MEDICINE

## 2024-11-19 ASSESSMENT — PAIN SCALES - GENERAL: PAINLEVEL_OUTOF10: NO PAIN (0)

## 2024-11-19 NOTE — PROGRESS NOTES
"HPI:  Kiera Dobson is a 59 year old female with type 1 diabetes mellitus.  Clinic visit for diabetes follow up.   Kiera was dx with type 1 diabetes in 3/1970.  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   No nephropathy. No sx of neuropathy at this time.  Her hx is significant for hypoglycemia unawareness.  Kiera continues to use a Tandem insulin pump-control IQ and DexcomG7 sensor.  Her basal insulin rates are set at :  Midnight = 0.65 units/hr.  2 am = 0.5 units/hr.  10 am = 0.65 units/hr.  10:30 pm = 0.6 units/hr.  Her I/C ratio is 1:12. Sensitivity is 75.  Pt's A1C is 7.6 % today. Previous A1C was 7.2 % in June 2024.  I reviewed and scanned her insulin pump download data today.  She often does not enter her carbs in the pump she plans to consume and uses manual boluses or no bolus.  She is concerned she may have hypoglycemia if she enters the carbs.  She tells me she continues to have problems with insulin absorption - \"running out of sites\" for insulin infusion set.  Reminded her to change her cartridge every 3 days.   Also reminded pt to bolus before eating.   Average glucose is 153 with SD 51.  Her blood sugar is in target 70 % of the time with 2.9 % below target range.   She often forgets to use exercise mode when active.  On ROS today, left shoulder pain and she has PT scheduled for today.  No neuropathy sx at this time. No foot ulcers.  No recent chest pain.  Vision good at this time.  She denies SOB,cough, fever or chills at this time.  Pt denies abd pain, diarrhea, dysuria or hematuria.    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  Nephropathy: urine microalbuminuria negative in Jan 2024.  Neuropathy: none.  Feet: no foot ulcers.  Taking ASA: yes.  Lipids:  in Jan 2024.  Pt is taking Crestor.  CAD: no. Normal stress echo in 9/2010.  Mental health:hx of " ADHD.  Insulin: Tandem insulin pump- control IQ.  Testing: DexcomG7 sensor.  Hypoglycemia treatment: Pt has updated glucagon kit.  Insulin back up:Pt has Lantus to use in case of insulin pump failure.                    ROS  Please see under HPI.    Allergies  Allergies   Allergen Reactions    Ampicillin Sodium Rash       Medications  Current Outpatient Medications   Medication Sig Dispense Refill    aspirin 81 MG chewable tablet Take 81 mg by mouth every other day 108 tablet 3    blood glucose (ERICA CONTOUR NEXT) test strip Use to test blood sugar 4-6 times daily or as directed. 720 strip 3    Continuous Glucose Sensor (DEXCOM G7 SENSOR) MISC Change every 10 days. 9 each 4    Glucagon, rDNA, (GLUCAGON EMERGENCY) 1 MG KIT Inject 1 mg IM for severe hypoglycemia. 1 kit 1    glucose 40 % (400 mg/mL) gel Take by mouth as needed      glucose blood VI test strips strip Test up to ten times per day as directed 5 Box 8    insulin aspart (NOVOLOG VIAL) 100 UNITS/ML vial Via insulin pump. Approx 60 units daily. 60 mL 3    insulin glargine (LANTUS SOLOSTAR) 100 UNIT/ML pen Inject 15 units subcutaneous daily ONLY IF INSULIN PUMP FAILS. 15 mL 3    insulin pen needle (BD MARCO U/F) 32G X 4 MM miscellaneous Inject Subcutaneous 3 times daily 100 each 3    rosuvastatin (CRESTOR) 20 MG tablet Take 1 tablet (20 mg) by mouth daily 90 tablet 1    SEMGLEE, YFGN, 100 UNIT/ML SOPN INJECT 15 UNITS SUBCUTANEOUS DAILY ONLY IF INSULIN PUMP FAILS.*         Family History  family history includes Alzheimer Disease in her father; Autoimmune Disease in her mother; Cancer in her father; Diabetes in her father and another family member; Glaucoma in her father and mother; Hypertension in her mother.    Social History  Smoke: none.  ETOH: rare.   with children.  Pt works full time in a lab.    Past Medical History  Past Medical History:   Diagnosis Date    Diabetes mellitus (H)     Type 1    Elevated cholesterol     Glaucoma     Glaucoma  "suspect    Glaucoma suspect     Kidney stones     Nonsenile cataract     Rash 5/1/2022     Past Surgical History:   Procedure Laterality Date    APPENDECTOMY OPEN  1981    BIOPSY OF SKIN LESION  12/30/2011    returned January 2012 for additional removal    c sections  99,97,08    EXTRACORPOREAL SHOCK WAVE LITHOTRIPSY, CYSTOSCOPY, INSERT STENT URETER(S), COMBINED  2004    LASER HOLMIUM LITHOTRIPSY URETER(S), INSERT STENT, COMBINED  12/12/2013    Procedure: COMBINED CYSTOSCOPY, URETEROSCOPY, LASER HOLMIUM LITHOTRIPSY URETER(S), INSERT STENT;  Right Ureteroscopy Holmium Laser Lithotripsy Stent Placement;  Surgeon: Kirill Marlow MD;  Location: [facility] OR    PHACOEMULSIFICATION CLEAR CORNEA WITH STANDARD INTRAOCULAR LENS IMPLANT Left 10/25/2021    Procedure: LEFT EYE PHACOEMULSIFICATION, CATARACT, WITH INTRAOCULAR LENS IMPLANT COMPLEX;  Surgeon: Curtis Costa MD;  Location: [facility] OR    PHACOEMULSIFICATION CLEAR CORNEA WITH STANDARD INTRAOCULAR LENS IMPLANT Right 11/8/2021    Procedure: COMPLEX RIGHT EYE PHACOEMULSIFICATION, CATARACT, WITH INTRAOCULAR LENS IMPLANT;  Surgeon: Curtis Costa MD;  Location: [facility] OR     S/P right shoulder surgery in Dec 2012.    Physical Exam    /70   Pulse 101   Ht 1.518 m (4' 11.75\")   Wt 60.8 kg (134 lb)   LMP 04/10/2016 (Approximate)   SpO2 99%   BMI 26.39 kg/m       FEET: no ulcers. Normal monofilamentous exam.      RESULTS  Creatinine   Date Value Ref Range Status   01/25/2024 0.66 0.51 - 0.95 mg/dL Final   06/16/2021 0.70 0.52 - 1.04 mg/dL Final     GFR Estimate   Date Value Ref Range Status   01/25/2024 >90 >60 mL/min/1.73m2 Final   06/16/2021 >90 >60 mL/min/[1.73_m2] Final     Comment:     Non  GFR Calc  Starting 12/18/2018, serum creatinine based estimated GFR (eGFR) will be   calculated using the Chronic Kidney Disease Epidemiology Collaboration   (CKD-EPI) equation.       Hemoglobin A1C   Date Value Ref Range Status   02/08/2023   " Corrected     Comment:     The previously reported result may be inaccurate due to a laboratory instrument calibration issue. Providers should order a new test to be performed if clinically indicated. [facility] [employer] will issue a credit for this test.  This is a corrected result. Previous result was 8.8 % on 2/8/2023 at  1:31 PM CST   06/16/2021 6.9 (H) 0 - 5.6 % Final     Comment:     Normal <5.7% Prediabetes 5.7-6.4%  Diabetes 6.5% or higher - adopted from ADA   consensus guidelines.       Afinion Hemoglobin A1c POCT   Date Value Ref Range Status   11/19/2024 7.6 (H) <=5.7 % Final     Comment:     Normal <5.7%   Prediabetes 5.7-6.4%    Diabetes 6.5% or higher     Note: Adopted from ADA consensus guidelines.     Potassium   Date Value Ref Range Status   10/22/2021 4.3 3.4 - 5.3 mmol/L Final   08/20/2019 4.4 3.4 - 5.3 mmol/L Final     ALT   Date Value Ref Range Status   06/16/2021 15 0 - 50 U/L Final     AST   Date Value Ref Range Status   09/28/2022 21 10 - 35 U/L Final   06/16/2021 15 0 - 45 U/L Final     TSH   Date Value Ref Range Status   09/28/2022 2.12 0.30 - 4.20 uIU/mL Final   06/16/2021 3.00 0.40 - 4.00 mU/L Final     T4 Free   Date Value Ref Range Status   02/14/2017 0.84 0.76 - 1.46 ng/dL Final       Cholesterol   Date Value Ref Range Status   01/25/2024 199 <200 mg/dL Final   02/08/2023 188 <200 mg/dL Final   06/16/2021 170 <200 mg/dL Final   08/14/2018 152 <200 mg/dL Final     HDL Cholesterol   Date Value Ref Range Status   06/16/2021 69 >49 mg/dL Final   08/14/2018 61 >49 mg/dL Final     Direct Measure HDL   Date Value Ref Range Status   01/25/2024 71 >=50 mg/dL Final   02/08/2023 76 >=50 mg/dL Final     LDL Cholesterol Calculated   Date Value Ref Range Status   01/25/2024 112 (H) <=100 mg/dL Final   02/08/2023 99 <=100 mg/dL Final   06/16/2021 90 <100 mg/dL Final     Comment:     Desirable:       <100 mg/dl   08/14/2018 72 <100 mg/dL Final     Comment:     Desirable:       <100 mg/dl      Triglycerides   Date Value Ref Range Status   01/25/2024 79 <150 mg/dL Final   02/08/2023 63 <150 mg/dL Final   06/16/2021 56 <150 mg/dL Final   08/14/2018 94 <150 mg/dL Final     Cholesterol/HDL Ratio   Date Value Ref Range Status   08/18/2014 2.7 0.0 - 5.0 Final   09/17/2013 2.3 0.0 - 5.0 Final       ASSESSMENT/PLAN:    1.  TYPE 1 DIABETES MELLITUS:Type 1 diabetes mellitus complicated by moderate NPDR both eyes without macular edema and hypoglycemia unawareness.  No sx of neuropathy at this time with normal foot exam today.  Her urine microalbuminuria was negative in 1/2024 with normal creat/GFR.  Reminded pt to change her insulin cartridge every 3 days.  Again, reminded her to enter her carbs in her insulin pump and to bolus before eating.  Reminded her to use activity mode if she is active and concerned about hypoglycemia.  No change in pump settings today.   /70 today.    2. MODERATE NPDR:  Hx of moderate NPDR both eyes without macular edema.    Ophthalmology follow-up scheduled today.    3.  HYPERLIPIDEMIA:   in Jan 2024.   Pt taking Crestor daily.    4.  ADHD/STRESS: Not addressed today.     5. HYPOGLYCEMIA UNAWARENESS: Less hypoglycemia since wearing Tandem insulin pump-control IQ.  She wears her DexcomG7 sensor full time.  Pt has updated glucagon kit and Baqsimi.    6.  LEFT SHOULDER PAIN: Undergoing physical therapy.    7.  HEALTH MAINTENANCE: Reminded pt to see her PCP for health maintenance.    8.  FOLLOW UP: with Dr. Chahal in Feb 2025 and me in May 2025.   Ortho referral placed today.  Annual fasting diabetes labs ordered-to be done prior to next visit.    Time spent reviewing chart, labs and insulin pump data today= 5 minutes.  Time for clinic visit today = 20   minutes.  Time for documentation today = 10 minutes.    TOTAL TIME FOR VISIT TODAY = 35 minutes.    Jo Jean PA-C

## 2024-11-19 NOTE — LETTER
11/19/2024      RE: Kiera Dobson  [address]  [place] [address]     Dear Colleague,    Thank you for referring your patient, Kiera Dobson, to the [facility] [place]. Please see a copy of my visit note below.    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 Providers should order a new test to be performed if clinically indicated. [facility] [employer] will issue a credit for this test.  This is a corrected result. Previous result was 8.8 % on 2/8/2023 at  1:31 PM CST    A1C 7.1 09/28/2022    A1C 6.9 06/16/2021    A1C 7.0 07/24/2020    A1C 7.9 07/16/2013    A1C 7.9 04/03/2013    A1C 8.4 02/07/2013       Per last PT note on 11/12/24:  Pt states that her shoulder pain has improved 50-60%.  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     Per my last note:  (M75.02) Adhesive capsulitis of left shoulder  (primary encounter diagnosis)  Comment:   Plan: improved greatly w/ subacromial injection; will continue PT; f/up on 11/14/24 to consider  repeat injection; can cancel if better; if pain worsens, I recommended she reach out in advance of her appt and I would order a shoulder MRI; precautions/ anticipatory guidance given       Past Medical History:   Diagnosis Date     Diabetes mellitus (H)     Type 1     Elevated cholesterol      Glaucoma     Glaucoma suspect     Glaucoma suspect      Kidney stones      Nonsenile cataract      Rash 5/1/2022      Current Outpatient Medications   Medication Sig Dispense Refill     aspirin 81 MG chewable tablet Take 81 mg by mouth every other day 108 tablet 3     blood glucose (ERICA CONTOUR NEXT) test strip Use to test blood sugar 4-6 times daily or as directed. 720 strip 3     Continuous Blood Gluc  (DEXCOM G6 ) MELE 1 Device continuous 1 Device 0     Continuous Blood Gluc Sensor (DEXCOM G6 SENSOR) MISC 1 each every 10 days 9 each 3     Continuous Blood Gluc Transmit (DEXCOM G6 TRANSMITTER) MISC 1 each every 3 months 1 each 3     Continuous Glucose Sensor (DEXCOM G7 SENSOR) MISC Change every 10 days. 9 each 4     Glucagon, rDNA, (GLUCAGON EMERGENCY) 1 MG KIT Inject 1 mg IM for severe hypoglycemia. 1 kit 1     glucose 40 % (400 mg/mL) gel Take by mouth as needed       glucose blood VI test strips strip Test up to ten times per day as directed 5 Box 8     insulin aspart (NOVOLOG VIAL) 100 UNITS/ML vial Via insulin pump. Approx 60 units daily. 60 mL 3     insulin glargine (LANTUS SOLOSTAR) 100 UNIT/ML pen Inject 15 units subcutaneous daily ONLY IF INSULIN PUMP FAILS. 15 mL 3     insulin pen needle (BD MARCO U/F) 32G X 4 MM miscellaneous Inject Subcutaneous 3 times daily 100 each 3     rosuvastatin (CRESTOR) 20 MG tablet Take 1 tablet (20 mg) by mouth daily 90 tablet 1     SEMGLEE, YFGN, 100 UNIT/ML SOPN INJECT 15 UNITS SUBCUTANEOUS DAILY ONLY IF INSULIN PUMP FAILS.*        Allergies   Allergen Reactions     Ampicillin Sodium Rash      ROS:   Gen- no fevers/chills   Rheum - no morning stiffness   Derm - no  rash/ redness   Neuro - no numbness, no tingling   Remainder of ROS negative.     Exam:     L shoulder:  Active - FF- 140 Abd - 120; ER -10  Passive FF-120; Abd - 100; ER - 0  Strength 4+/5 w/ supraspinatus; 5/5 w/ ER/IR  Pos empty can         Again, thank you for allowing me to participate in the care of your patient.      Sincerely,    Neil Fernández MD

## 2024-11-19 NOTE — LETTER
"11/19/2024       RE: Kiera Dobson  [address]  [facility] [address]     Dear Colleague,    Thank you for referring your patient, Kiera Dobson, to the [facility] ENDOCRINOLOGY CLINIC [place] at [facility]. Please see a copy of my visit note below.    11/12/24 9:36 AM  PATIENT LAB/IMAGING STATUS : No pending lab orders     Alberta Christie CMA                            HPI:  Kiera Dobson is a 59 year old female with type 1 diabetes mellitus.  Clinic visit for diabetes follow up.   Kiera was dx with type 1 diabetes in 3/1970.  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   No nephropathy. No sx of neuropathy at this time.  Her hx is significant for hypoglycemia unawareness.  Kiera continues to use a Tandem insulin pump-control IQ and DexcomG7 sensor.  Her basal insulin rates are set at :  Midnight = 0.65 units/hr.  2 am = 0.5 units/hr.  10 am = 0.65 units/hr.  10:30 pm = 0.6 units/hr.  Her I/C ratio is 1:12. Sensitivity is 75.  Pt's A1C is 7.6 % today. Previous A1C was 7.2 % in June 2024.  I reviewed and scanned her insulin pump download data today.  She often does not enter her carbs in the pump she plans to consume and uses manual boluses or no bolus.  She is concerned she may have hypoglycemia if she enters the carbs.  She tells me she continues to have problems with insulin absorption - \"running out of sites\" for insulin infusion set.  Reminded her to change her cartridge every 3 days.   Also reminded pt to bolus before eating.   Average glucose is 153 with SD 51.  Her blood sugar is in target 70 % of the time with 2.9 % below target range.   She often forgets to use exercise mode when active.  On ROS today, left shoulder pain and she has PT scheduled for today.  No neuropathy sx at this time. No foot ulcers.  No recent chest pain.  Vision good at this time.  She denies SOB,cough, fever or chills at this " time.  Pt denies abd pain, diarrhea, dysuria or hematuria.    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  Nephropathy: urine microalbuminuria negative in Jan 2024.  Neuropathy: none.  Feet: no foot ulcers.  Taking ASA: yes.  Lipids:  in Jan 2024.  Pt is taking Crestor.  CAD: no. Normal stress echo in 9/2010.  Mental health:hx of ADHD.  Insulin: Tandem insulin pump- control IQ.  Testing: DexcomG7 sensor.  Hypoglycemia treatment: Pt has updated glucagon kit.  Insulin back up:Pt has Lantus to use in case of insulin pump failure.                    ROS  Please see under HPI.    Allergies  Allergies   Allergen Reactions     Ampicillin Sodium Rash       Medications  Current Outpatient Medications   Medication Sig Dispense Refill     aspirin 81 MG chewable tablet Take 81 mg by mouth every other day 108 tablet 3     blood glucose (ERICA CONTOUR NEXT) test strip Use to test blood sugar 4-6 times daily or as directed. 720 strip 3     Continuous Glucose Sensor (DEXCOM G7 SENSOR) MISC Change every 10 days. 9 each 4     Glucagon, rDNA, (GLUCAGON EMERGENCY) 1 MG KIT Inject 1 mg IM for severe hypoglycemia. 1 kit 1     glucose 40 % (400 mg/mL) gel Take by mouth as needed       glucose blood VI test strips strip Test up to ten times per day as directed 5 Box 8     insulin aspart (NOVOLOG VIAL) 100 UNITS/ML vial Via insulin pump. Approx 60 units daily. 60 mL 3     insulin glargine (LANTUS SOLOSTAR) 100 UNIT/ML pen Inject 15 units subcutaneous daily ONLY IF INSULIN PUMP FAILS. 15 mL 3     insulin pen needle (BD MARCO U/F) 32G X 4 MM miscellaneous Inject Subcutaneous 3 times daily 100 each 3     rosuvastatin (CRESTOR) 20 MG tablet Take 1 tablet (20 mg) by mouth daily 90 tablet 1     SEMGLEE, YFGN, 100 UNIT/ML SOPN INJECT 15 UNITS SUBCUTANEOUS DAILY ONLY IF INSULIN PUMP FAILS.*         Family History  family history includes Alzheimer  "Disease in her father; Autoimmune Disease in her mother; Cancer in her father; Diabetes in her father and another family member; Glaucoma in her father and mother; Hypertension in her mother.    Social History  Smoke: none.  ETOH: rare.   with children.  Pt works full time in a lab.    Past Medical History  Past Medical History:   Diagnosis Date     Diabetes mellitus (H)     Type 1     Elevated cholesterol      Glaucoma     Glaucoma suspect     Glaucoma suspect      Kidney stones      Nonsenile cataract      Rash 5/1/2022     Past Surgical History:   Procedure Laterality Date     APPENDECTOMY OPEN  1981     BIOPSY OF SKIN LESION  12/30/2011    returned January 2012 for additional removal     c sections  99,97,08     EXTRACORPOREAL SHOCK WAVE LITHOTRIPSY, CYSTOSCOPY, INSERT STENT URETER(S), COMBINED  2004     LASER HOLMIUM LITHOTRIPSY URETER(S), INSERT STENT, COMBINED  12/12/2013    Procedure: COMBINED CYSTOSCOPY, URETEROSCOPY, LASER HOLMIUM LITHOTRIPSY URETER(S), INSERT STENT;  Right Ureteroscopy Holmium Laser Lithotripsy Stent Placement;  Surgeon: Kirill Marlow MD;  Location: [facility] OR     PHACOEMULSIFICATION CLEAR CORNEA WITH STANDARD INTRAOCULAR LENS IMPLANT Left 10/25/2021    Procedure: LEFT EYE PHACOEMULSIFICATION, CATARACT, WITH INTRAOCULAR LENS IMPLANT COMPLEX;  Surgeon: Curtis Costa MD;  Location: [facility] OR     PHACOEMULSIFICATION CLEAR CORNEA WITH STANDARD INTRAOCULAR LENS IMPLANT Right 11/8/2021    Procedure: COMPLEX RIGHT EYE PHACOEMULSIFICATION, CATARACT, WITH INTRAOCULAR LENS IMPLANT;  Surgeon: Curtis Costa MD;  Location: [facility] OR     S/P right shoulder surgery in Dec 2012.    Physical Exam    /70   Pulse 101   Ht 1.518 m (4' 11.75\")   Wt 60.8 kg (134 lb)   LMP 04/10/2016 (Approximate)   SpO2 99%   BMI 26.39 kg/m       FEET: no ulcers. Normal monofilamentous exam.      RESULTS  Creatinine   Date Value Ref Range Status   01/25/2024 0.66 0.51 - 0.95 mg/dL Final "   06/16/2021 0.70 0.52 - 1.04 mg/dL Final     GFR Estimate   Date Value Ref Range Status   01/25/2024 >90 >60 mL/min/1.73m2 Final   06/16/2021 >90 >60 mL/min/[1.73_m2] Final     Comment:     Non  GFR Calc  Starting 12/18/2018, serum creatinine based estimated GFR (eGFR) will be   calculated using the Chronic Kidney Disease Epidemiology Collaboration   (CKD-EPI) equation.       Hemoglobin A1C   Date Value Ref Range Status   02/08/2023   Corrected     Comment:     The previously reported result may be inaccurate due to a laboratory instrument calibration issue. Providers should order a new test to be performed if clinically indicated.  [employer] will issue a credit for this test.  This is a corrected result. Previous result was 8.8 % on 2/8/2023 at  1:31 PM CST   06/16/2021 6.9 (H) 0 - 5.6 % Final     Comment:     Normal <5.7% Prediabetes 5.7-6.4%  Diabetes 6.5% or higher - adopted from ADA   consensus guidelines.       Afinion Hemoglobin A1c POCT   Date Value Ref Range Status   11/19/2024 7.6 (H) <=5.7 % Final     Comment:     Normal <5.7%   Prediabetes 5.7-6.4%    Diabetes 6.5% or higher     Note: Adopted from ADA consensus guidelines.     Potassium   Date Value Ref Range Status   10/22/2021 4.3 3.4 - 5.3 mmol/L Final   08/20/2019 4.4 3.4 - 5.3 mmol/L Final     ALT   Date Value Ref Range Status   06/16/2021 15 0 - 50 U/L Final     AST   Date Value Ref Range Status   09/28/2022 21 10 - 35 U/L Final   06/16/2021 15 0 - 45 U/L Final     TSH   Date Value Ref Range Status   09/28/2022 2.12 0.30 - 4.20 uIU/mL Final   06/16/2021 3.00 0.40 - 4.00 mU/L Final     T4 Free   Date Value Ref Range Status   02/14/2017 0.84 0.76 - 1.46 ng/dL Final       Cholesterol   Date Value Ref Range Status   01/25/2024 199 <200 mg/dL Final   02/08/2023 188 <200 mg/dL Final   06/16/2021 170 <200 mg/dL Final   08/14/2018 152 <200 mg/dL Final     HDL Cholesterol   Date Value Ref Range Status   06/16/2021 69 >49  mg/dL Final   08/14/2018 61 >49 mg/dL Final     Direct Measure HDL   Date Value Ref Range Status   01/25/2024 71 >=50 mg/dL Final   02/08/2023 76 >=50 mg/dL Final     LDL Cholesterol Calculated   Date Value Ref Range Status   01/25/2024 112 (H) <=100 mg/dL Final   02/08/2023 99 <=100 mg/dL Final   06/16/2021 90 <100 mg/dL Final     Comment:     Desirable:       <100 mg/dl   08/14/2018 72 <100 mg/dL Final     Comment:     Desirable:       <100 mg/dl     Triglycerides   Date Value Ref Range Status   01/25/2024 79 <150 mg/dL Final   02/08/2023 63 <150 mg/dL Final   06/16/2021 56 <150 mg/dL Final   08/14/2018 94 <150 mg/dL Final     Cholesterol/HDL Ratio   Date Value Ref Range Status   08/18/2014 2.7 0.0 - 5.0 Final   09/17/2013 2.3 0.0 - 5.0 Final       ASSESSMENT/PLAN:    1.  TYPE 1 DIABETES MELLITUS:Type 1 diabetes mellitus complicated by moderate NPDR both eyes without macular edema and hypoglycemia unawareness.  No sx of neuropathy at this time with normal foot exam today.  Her urine microalbuminuria was negative in 1/2024 with normal creat/GFR.  Reminded pt to change her insulin cartridge every 3 days.  Again, reminded her to enter her carbs in her insulin pump and to bolus before eating.  Reminded her to use activity mode if she is active and concerned about hypoglycemia.  No change in pump settings today.   /70 today.    2. MODERATE NPDR:  Hx of moderate NPDR both eyes without macular edema.    Ophthalmology follow-up scheduled today.    3.  HYPERLIPIDEMIA:   in Jan 2024.   Pt taking Crestor daily.    4.  ADHD/STRESS: Not addressed today.     5. HYPOGLYCEMIA UNAWARENESS: Less hypoglycemia since wearing Tandem insulin pump-control IQ.  She wears her DexcomG7 sensor full time.  Pt has updated glucagon kit and Baqsimi.    6.  LEFT SHOULDER PAIN: Undergoing physical therapy.    7.  HEALTH MAINTENANCE: Reminded pt to see her PCP for health maintenance.    8.  FOLLOW UP: with Dr. Chahal in Feb 2025 and  me in May 2025.   Ortho referral placed today.  Annual fasting diabetes labs ordered-to be done prior to next visit.    Time spent reviewing chart, labs and insulin pump data today= 5 minutes.  Time for clinic visit today = 20   minutes.  Time for documentation today = 10 minutes.    TOTAL TIME FOR VISIT TODAY = 35 minutes.    Jo Jean PA-C                    Again, thank you for allowing me to participate in the care of your patient.      Sincerely,    Jo Jean PA-C

## 2024-11-22 ENCOUNTER — LAB (OUTPATIENT)
Dept: LAB | Facility: CLINIC | Age: 59
End: 2024-11-22
Payer: COMMERCIAL

## 2024-11-22 DIAGNOSIS — E10.65 TYPE 1 DIABETES MELLITUS WITH HYPERGLYCEMIA (H): ICD-10-CM

## 2024-11-22 LAB
AST SERPL W P-5'-P-CCNC: 18 U/L (ref 0–45)
CHOLEST SERPL-MCNC: 183 MG/DL
CREAT SERPL-MCNC: 0.75 MG/DL (ref 0.51–0.95)
CREAT UR-MCNC: 64 MG/DL
EGFRCR SERPLBLD CKD-EPI 2021: >90 ML/MIN/1.73M2
EST. AVERAGE GLUCOSE BLD GHB EST-MCNC: 169 MG/DL
FASTING STATUS PATIENT QL REPORTED: YES
HBA1C MFR BLD: 7.5 %
HDLC SERPL-MCNC: 79 MG/DL
LDLC SERPL CALC-MCNC: 94 MG/DL
MICROALBUMIN UR-MCNC: <12 MG/L
MICROALBUMIN/CREAT UR: NORMAL MG/G{CREAT}
NONHDLC SERPL-MCNC: 104 MG/DL
TRIGL SERPL-MCNC: 50 MG/DL
TSH SERPL DL<=0.005 MIU/L-ACNC: 2.16 UIU/ML (ref 0.3–4.2)

## 2024-11-22 PROCEDURE — 36415 COLL VENOUS BLD VENIPUNCTURE: CPT | Performed by: PATHOLOGY

## 2024-11-22 PROCEDURE — 80061 LIPID PANEL: CPT | Performed by: PATHOLOGY

## 2024-11-22 PROCEDURE — 82043 UR ALBUMIN QUANTITATIVE: CPT | Performed by: PHYSICIAN ASSISTANT

## 2024-11-22 PROCEDURE — 99000 SPECIMEN HANDLING OFFICE-LAB: CPT | Performed by: PATHOLOGY

## 2024-11-22 PROCEDURE — 82570 ASSAY OF URINE CREATININE: CPT | Performed by: PHYSICIAN ASSISTANT

## 2024-11-22 PROCEDURE — 84450 TRANSFERASE (AST) (SGOT): CPT | Performed by: PATHOLOGY

## 2024-11-22 PROCEDURE — 82565 ASSAY OF CREATININE: CPT | Performed by: PATHOLOGY

## 2024-11-22 PROCEDURE — 83036 HEMOGLOBIN GLYCOSYLATED A1C: CPT | Performed by: PHYSICIAN ASSISTANT

## 2024-11-22 PROCEDURE — 84443 ASSAY THYROID STIM HORMONE: CPT | Performed by: PATHOLOGY

## 2024-12-02 ENCOUNTER — HOSPITAL ENCOUNTER (OUTPATIENT)
Dept: MRI IMAGING | Facility: CLINIC | Age: 59
Discharge: HOME | End: 2024-12-02
Attending: FAMILY MEDICINE | Admitting: FAMILY MEDICINE
Payer: COMMERCIAL

## 2024-12-02 DIAGNOSIS — M75.02 ADHESIVE CAPSULITIS OF LEFT SHOULDER: ICD-10-CM

## 2024-12-02 PROCEDURE — 73221 MRI JOINT UPR EXTREM W/O DYE: CPT | Mod: 26 | Performed by: RADIOLOGY

## 2024-12-02 PROCEDURE — 73221 MRI JOINT UPR EXTREM W/O DYE: CPT | Mod: LT

## 2024-12-09 ENCOUNTER — OFFICE VISIT (OUTPATIENT)
Dept: ORTHOPEDICS | Facility: CLINIC | Age: 59
End: 2024-12-09
Payer: COMMERCIAL

## 2024-12-09 DIAGNOSIS — M75.02 ADHESIVE CAPSULITIS OF LEFT SHOULDER: Primary | ICD-10-CM

## 2024-12-09 PROCEDURE — 99214 OFFICE O/P EST MOD 30 MIN: CPT | Performed by: FAMILY MEDICINE

## 2024-12-09 RX ORDER — MELOXICAM 15 MG/1
15 TABLET ORAL DAILY
Qty: 14 TABLET | Refills: 1 | Status: SHIPPED | OUTPATIENT
Start: 2024-12-09

## 2024-12-09 NOTE — LETTER
12/9/2024      RE: Kiera Dobson  [address]  [facility] [address]     Dear Colleague,    Thank you for referring your patient, Kiera Dobson, to the [facility] SPORTS MEDICINE CLINIC [place]. Please see a copy of my visit note below.    ASSESSMENT/PLAN:    (M75.02) Adhesive capsulitis of left shoulder  (primary encounter diagnosis)  Comment: reviewed MRI and tx plan w/ pt; she would like to avoid another injection so will continue her home exercises; will adjust meds and follow-up in 4 wks; precautions given  Plan: meloxicam (MOBIC) 15 MG tablet          Neil Fernández MD  December 9, 2024  4:20 PM      Pt is a 59 year old female last seen on 11/19/24 here for follow up of:     L shoulder - Fell at [employer] this weekend - shoulder is sore  GH cortisone injection in 10/2023 - no relief  Subacromial injection in 8/2024 - very good relief but was transient   Extensive PT      MRI L shoulder 12/2/24:  Findings:     ROTATOR CUFF and ASSOCIATED STRUCTURES  Rotator cuff: On a background of tendinosis, tiny focal low-grade  intrasubstance tear of the supraspinatus/infraspinous junctional  fibers adjacent to the footprint with associated apparent  enthesopathic change at the greater tuberosity with associated  edema-like marrow signal intensity. Teres minor tendon is intact.  Subscapularis tendon is intact.     Bursa: No subacromial or subdeltoid bursal fluid.     Musculature: Muscle bulk of rotator cuff is preserved.  Deltoid muscle  bulk is also preserved.  No muscle edema.     Acromioclavicular joint  There is no substantial degenerative changes of the acromioclavicular  joint. Acromion is type 1 in sagittal morphology.  Coracoacromial  ligament is not thickened.     OSSEOUS STRUCTURES  No fracture, marrow contusion or marrow infiltration. Tiny subcortical  cystic change at the greater tuberosity posteriorly.     Chronic appearing contour deformity of the posterior scapular margin  including area of  spinoglenoid notch without underlying marrow signal  alteration. There is associated approximately 5 x 3 x 6 mm cystic  appearing focus with surrounding.     LONG BICIPITAL TENDON  The long head of the biceps tendon is normally situated within the  bicipital groove. Intra-articular segment tendinosis. No complete or  partial biceps tendon tear is present.     GLENOHUMERAL JOINT  Joint fluid: Physiologic amount of joint fluid is  present.     Cartilage and subarticular bone:  No focal hyaline cartilage defects  are noted. No Hill-Sachs, reverse Hill-Sachs, or bony Bankart lesions  are seen.     Labrum: Limited assessment on this study with relative lack of joint  distention shows loss of normal hypointense signal posterosuperior  labrum with glenoid subchondral edema like marrow intensity,  concerning for labral tear.     ANCILLARY FINDINGS:  Partial effacement of the rotator interval fat without associated  axillary pathology edema or thickening.                                                                      Impression:  1. Partial effacement of the rotator interval fat, which may be  concordant with clinical entity of adhesive capsulitis.  2. Tiny focal low-grade intrasubstance tear of the  supraspinatus/infraspinous junctional fibers adjacent to the  footprint.  3. Query posterosuperior labral tear.  4. Biceps long head intra-articular segment tendinosis.  5. Chronic appearing contour deformity of the posterior scapular  margin including area of spinoglenoid notch. This is technically  nonspecific but likely have been present dating back to old radiograph  11/2012. Therefore, this is favoring benign entity.    Per my last note:  (M75.02) Adhesive capsulitis of left shoulder  (primary encounter diagnosis)  Comment: given she has plateaued in PT, will check MRI; f/up in person after scan  Plan: MR Shoulder Left w/o Contrast    Past Medical History:   Diagnosis Date     Diabetes mellitus (H)     Type 1      Elevated cholesterol      Glaucoma     Glaucoma suspect     Glaucoma suspect      Kidney stones      Nonsenile cataract      Rash 5/1/2022      Current Outpatient Medications   Medication Sig Dispense Refill     aspirin 81 MG chewable tablet Take 81 mg by mouth every other day 108 tablet 3     blood glucose (ERICA CONTOUR NEXT) test strip Use to test blood sugar 4-6 times daily or as directed. 720 strip 3     Continuous Glucose Sensor (DEXCOM G7 SENSOR) MISC Change every 10 days. 9 each 4     Glucagon, rDNA, (GLUCAGON EMERGENCY) 1 MG KIT Inject 1 mg IM for severe hypoglycemia. 1 kit 1     glucose 40 % (400 mg/mL) gel Take by mouth as needed       glucose blood VI test strips strip Test up to ten times per day as directed 5 Box 8     insulin aspart (NOVOLOG VIAL) 100 UNITS/ML vial Via insulin pump. Approx 60 units daily. 60 mL 3     insulin glargine (LANTUS SOLOSTAR) 100 UNIT/ML pen Inject 15 units subcutaneous daily ONLY IF INSULIN PUMP FAILS. 15 mL 3     insulin pen needle (BD MARCO U/F) 32G X 4 MM miscellaneous Inject Subcutaneous 3 times daily 100 each 3     rosuvastatin (CRESTOR) 20 MG tablet Take 1 tablet (20 mg) by mouth daily 90 tablet 1     SEMGLEE, YFGN, 100 UNIT/ML SOPN INJECT 15 UNITS SUBCUTANEOUS DAILY ONLY IF INSULIN PUMP FAILS.*        Allergies   Allergen Reactions     Ampicillin Sodium Rash      ROS:   Gen- no fevers/chills   Derm - no rash/ redness   Neuro - see HPI  Remainder of ROS negative.     Exam:   deferred      Again, thank you for allowing me to participate in the care of your patient.      Sincerely,    Neil Fernández MD

## 2024-12-09 NOTE — PROGRESS NOTES
ASSESSMENT/PLAN:    (M75.02) Adhesive capsulitis of left shoulder  (primary encounter diagnosis)  Comment: reviewed MRI and tx plan w/ pt; she would like to avoid another injection so will continue her home exercises; will adjust meds and follow-up in 4 wks; precautions given  Plan: meloxicam (MOBIC) 15 MG tablet          Neil Fernández MD  December 9, 2024  4:20 PM      Pt is a 59 year old female last seen on 11/19/24 here for follow up of:     L shoulder - Fell at [employer] this weekend - shoulder is sore  GH cortisone injection in 10/2023 - no relief  Subacromial injection in 8/2024 - very good relief but was transient   Extensive PT      MRI L shoulder 12/2/24:  Findings:     ROTATOR CUFF and ASSOCIATED STRUCTURES  Rotator cuff: On a background of tendinosis, tiny focal low-grade  intrasubstance tear of the supraspinatus/infraspinous junctional  fibers adjacent to the footprint with associated apparent  enthesopathic change at the greater tuberosity with associated  edema-like marrow signal intensity. Teres minor tendon is intact.  Subscapularis tendon is intact.     Bursa: No subacromial or subdeltoid bursal fluid.     Musculature: Muscle bulk of rotator cuff is preserved.  Deltoid muscle  bulk is also preserved.  No muscle edema.     Acromioclavicular joint  There is no substantial degenerative changes of the acromioclavicular  joint. Acromion is type 1 in sagittal morphology.  Coracoacromial  ligament is not thickened.     OSSEOUS STRUCTURES  No fracture, marrow contusion or marrow infiltration. Tiny subcortical  cystic change at the greater tuberosity posteriorly.     Chronic appearing contour deformity of the posterior scapular margin  including area of spinoglenoid notch without underlying marrow signal  alteration. There is associated approximately 5 x 3 x 6 mm cystic  appearing focus with surrounding.     LONG BICIPITAL TENDON  The long head of the biceps tendon is normally situated within the  bicipital  groove. Intra-articular segment tendinosis. No complete or  partial biceps tendon tear is present.     GLENOHUMERAL JOINT  Joint fluid: Physiologic amount of joint fluid is  present.     Cartilage and subarticular bone:  No focal hyaline cartilage defects  are noted. No Hill-Sachs, reverse Hill-Sachs, or bony Bankart lesions  are seen.     Labrum: Limited assessment on this study with relative lack of joint  distention shows loss of normal hypointense signal posterosuperior  labrum with glenoid subchondral edema like marrow intensity,  concerning for labral tear.     ANCILLARY FINDINGS:  Partial effacement of the rotator interval fat without associated  axillary pathology edema or thickening.                                                                      Impression:  1. Partial effacement of the rotator interval fat, which may be  concordant with clinical entity of adhesive capsulitis.  2. Tiny focal low-grade intrasubstance tear of the  supraspinatus/infraspinous junctional fibers adjacent to the  footprint.  3. Query posterosuperior labral tear.  4. Biceps long head intra-articular segment tendinosis.  5. Chronic appearing contour deformity of the posterior scapular  margin including area of spinoglenoid notch. This is technically  nonspecific but likely have been present dating back to old radiograph  11/2012. Therefore, this is favoring benign entity.    Per my last note:  (M75.02) Adhesive capsulitis of left shoulder  (primary encounter diagnosis)  Comment: given she has plateaued in PT, will check MRI; f/up in person after scan  Plan: MR Shoulder Left w/o Contrast    Past Medical History:   Diagnosis Date    Diabetes mellitus (H)     Type 1    Elevated cholesterol     Glaucoma     Glaucoma suspect    Glaucoma suspect     Kidney stones     Nonsenile cataract     Rash 5/1/2022      Current Outpatient Medications   Medication Sig Dispense Refill    aspirin 81 MG chewable tablet Take 81 mg by mouth every  other day 108 tablet 3    blood glucose (ERICA CONTOUR NEXT) test strip Use to test blood sugar 4-6 times daily or as directed. 720 strip 3    Continuous Glucose Sensor (DEXCOM G7 SENSOR) MISC Change every 10 days. 9 each 4    Glucagon, rDNA, (GLUCAGON EMERGENCY) 1 MG KIT Inject 1 mg IM for severe hypoglycemia. 1 kit 1    glucose 40 % (400 mg/mL) gel Take by mouth as needed      glucose blood VI test strips strip Test up to ten times per day as directed 5 Box 8    insulin aspart (NOVOLOG VIAL) 100 UNITS/ML vial Via insulin pump. Approx 60 units daily. 60 mL 3    insulin glargine (LANTUS SOLOSTAR) 100 UNIT/ML pen Inject 15 units subcutaneous daily ONLY IF INSULIN PUMP FAILS. 15 mL 3    insulin pen needle (BD MARCO U/F) 32G X 4 MM miscellaneous Inject Subcutaneous 3 times daily 100 each 3    rosuvastatin (CRESTOR) 20 MG tablet Take 1 tablet (20 mg) by mouth daily 90 tablet 1    SEMGLEE, YFGN, 100 UNIT/ML SOPN INJECT 15 UNITS SUBCUTANEOUS DAILY ONLY IF INSULIN PUMP FAILS.*        Allergies   Allergen Reactions    Ampicillin Sodium Rash      ROS:   Gen- no fevers/chills   Derm - no rash/ redness   Neuro - see HPI  Remainder of ROS negative.     Exam:   deferred

## 2025-01-02 NOTE — PROGRESS NOTES
BASSESSMENT/PLAN:    (M75.02) Adhesive capsulitis of left shoulder  Comment: slow and steady progress to date; deferred repeat injection; will restart PT and use meloxicam prn; f/up in 3 months  Plan: meloxicam (MOBIC) 15 MG tablet, Physical Therapy  Referral          Attestation:  This patient has been seen and evaluated by me, Neil Fernández MD with the resident, Dr Sesay and the care team. I agree with the findings and plan of care as documented in this note.      Neil Fernández MD  January 6, 2025  9:09 AM      Pt is a 59 year old female last seen on 12/9/24 here for follow up of:     L shoulder adhesive capsulitis - sleeping better w/ meloxicam;   Also got flu and covid shots in L arm  R arm has her CGM  ROM improving   Does feel that work aggravated things from time to time       Per my last note:  (M75.02) Adhesive capsulitis of left shoulder  (primary encounter diagnosis)  Comment: reviewed MRI and tx plan w/ pt; she would like to avoid another injection so will continue her home exercises; will adjust meds and follow-up in 4 wks; precautions given  Plan: meloxicam (MOBIC) 15 MG tablet    Past Medical History:   Diagnosis Date    Diabetes mellitus (H)     Type 1    Elevated cholesterol     Glaucoma     Glaucoma suspect    Glaucoma suspect     Kidney stones     Nonsenile cataract     Rash 5/1/2022      Current Outpatient Medications   Medication Sig Dispense Refill    aspirin 81 MG chewable tablet Take 81 mg by mouth every other day 108 tablet 3    blood glucose (ERICA CONTOUR NEXT) test strip Use to test blood sugar 4-6 times daily or as directed. 720 strip 3    Continuous Glucose Sensor (DEXCOM G7 SENSOR) MISC Change every 10 days. 9 each 4    Glucagon, rDNA, (GLUCAGON EMERGENCY) 1 MG KIT Inject 1 mg IM for severe hypoglycemia. 1 kit 1    glucose 40 % (400 mg/mL) gel Take by mouth as needed      glucose blood VI test strips strip Test up to ten times per day as directed 5 Box 8    insulin aspart  (NOVOLOG VIAL) 100 UNITS/ML vial Via insulin pump. Approx 60 units daily. 60 mL 3    insulin glargine (LANTUS SOLOSTAR) 100 UNIT/ML pen Inject 15 units subcutaneous daily ONLY IF INSULIN PUMP FAILS. 15 mL 3    insulin pen needle (BD MARCO U/F) 32G X 4 MM miscellaneous Inject Subcutaneous 3 times daily 100 each 3    meloxicam (MOBIC) 15 MG tablet Take 1 tablet (15 mg) by mouth daily. Take with food 14 tablet 1    rosuvastatin (CRESTOR) 20 MG tablet Take 1 tablet (20 mg) by mouth daily 90 tablet 1    SEMGLEE, YFGN, 100 UNIT/ML SOPN INJECT 15 UNITS SUBCUTANEOUS DAILY ONLY IF INSULIN PUMP FAILS.*        Allergies   Allergen Reactions    Ampicillin Sodium Rash      ROS:   Gen- no fevers/chills   Rheum - no morning stiffness   Derm - no rash/ redness   Neuro - no numbness, no tingling   Remainder of ROS negative.     Exam:     L shoulder exam  ROM: Full ROM with shoulder flexion; abduction limited to 110 degrees. Reduced ROM with shoulder internal rotation/external rotation on the left.   Strength: 5/5 strength with shoulder flexion, abduction, forearm flexion/extension  Special tests: empty can positive for pain; hawkin's/neer's negative; lift off positive for pain.

## 2025-01-06 ENCOUNTER — OFFICE VISIT (OUTPATIENT)
Dept: ORTHOPEDICS | Facility: CLINIC | Age: 60
End: 2025-01-06
Payer: COMMERCIAL

## 2025-01-06 DIAGNOSIS — M75.02 ADHESIVE CAPSULITIS OF LEFT SHOULDER: ICD-10-CM

## 2025-01-06 PROCEDURE — 99214 OFFICE O/P EST MOD 30 MIN: CPT | Mod: GC | Performed by: FAMILY MEDICINE

## 2025-01-06 RX ORDER — MELOXICAM 15 MG/1
15 TABLET ORAL DAILY
Qty: 30 TABLET | Refills: 5 | Status: SHIPPED | OUTPATIENT
Start: 2025-01-06

## 2025-01-06 NOTE — LETTER
1/6/2025      RE: Kirea Dobson  [address]  [facility] [address]     Dear Colleague,    Thank you for referring your patient, Kiera Dobson, to the [facility] [place]. Please see a copy of my visit note below.    BASSESSMENT/PLAN:    (M75.02) Adhesive capsulitis of left shoulder  Comment: slow and steady progress to date; deferred repeat injection; will restart PT and use meloxicam prn; f/up in 3 months  Plan: meloxicam (MOBIC) 15 MG tablet, Physical Therapy  Referral          Attestation:  This patient has been seen and evaluated by me, Neil Fernández MD with the resident, Dr Sesay and the care team. I agree with the findings and plan of care as documented in this note.      Neil Fernández MD  January 6, 2025  9:09 AM      Pt is a 59 year old female last seen on 12/9/24 here for follow up of:     L shoulder adhesive capsulitis - sleeping better w/ meloxicam;   Also got flu and covid shots in L arm  R arm has her CGM  ROM improving   Does feel that work aggravated things from time to time       Per my last note:  (M75.02) Adhesive capsulitis of left shoulder  (primary encounter diagnosis)  Comment: reviewed MRI and tx plan w/ pt; she would like to avoid another injection so will continue her home exercises; will adjust meds and follow-up in 4 wks; precautions given  Plan: meloxicam (MOBIC) 15 MG tablet    Past Medical History:   Diagnosis Date     Diabetes mellitus (H)     Type 1     Elevated cholesterol      Glaucoma     Glaucoma suspect     Glaucoma suspect      Kidney stones      Nonsenile cataract      Rash 5/1/2022      Current Outpatient Medications   Medication Sig Dispense Refill     aspirin 81 MG chewable tablet Take 81 mg by mouth every other day 108 tablet 3     blood glucose (ERICA CONTOUR NEXT) test strip Use to test blood sugar 4-6 times daily or as directed. 720 strip 3     Continuous Glucose Sensor (DEXCOM G7 SENSOR) MISC Change every 10 days. 9  each 4     Glucagon, rDNA, (GLUCAGON EMERGENCY) 1 MG KIT Inject 1 mg IM for severe hypoglycemia. 1 kit 1     glucose 40 % (400 mg/mL) gel Take by mouth as needed       glucose blood VI test strips strip Test up to ten times per day as directed 5 Box 8     insulin aspart (NOVOLOG VIAL) 100 UNITS/ML vial Via insulin pump. Approx 60 units daily. 60 mL 3     insulin glargine (LANTUS SOLOSTAR) 100 UNIT/ML pen Inject 15 units subcutaneous daily ONLY IF INSULIN PUMP FAILS. 15 mL 3     insulin pen needle (BD MARCO U/F) 32G X 4 MM miscellaneous Inject Subcutaneous 3 times daily 100 each 3     meloxicam (MOBIC) 15 MG tablet Take 1 tablet (15 mg) by mouth daily. Take with food 14 tablet 1     rosuvastatin (CRESTOR) 20 MG tablet Take 1 tablet (20 mg) by mouth daily 90 tablet 1     SEMGLEE, YFGN, 100 UNIT/ML SOPN INJECT 15 UNITS SUBCUTANEOUS DAILY ONLY IF INSULIN PUMP FAILS.*        Allergies   Allergen Reactions     Ampicillin Sodium Rash      ROS:   Gen- no fevers/chills   Rheum - no morning stiffness   Derm - no rash/ redness   Neuro - no numbness, no tingling   Remainder of ROS negative.     Exam:     L shoulder exam  ROM: Full ROM with shoulder flexion; abduction limited to 110 degrees. Reduced ROM with shoulder internal rotation/external rotation on the left.   Strength: 5/5 strength with shoulder flexion, abduction, forearm flexion/extension  Special tests: empty can positive for pain; hawkin's/neer's negative; lift off positive for pain.        Again, thank you for allowing me to participate in the care of your patient.      Sincerely,    Neil Fernández MD

## 2025-02-06 DIAGNOSIS — E10.65 TYPE 1 DIABETES MELLITUS WITH HYPERGLYCEMIA (H): ICD-10-CM

## 2025-02-11 RX ORDER — ROSUVASTATIN CALCIUM 20 MG/1
20 TABLET, COATED ORAL DAILY
Qty: 90 TABLET | Refills: 3 | Status: SHIPPED | OUTPATIENT
Start: 2025-02-11

## 2025-02-11 NOTE — TELEPHONE ENCOUNTER
LVD:  11/19/2024  [facility] [place]  NVD: 2/25/25 [place]  Medication last written as and requested as: rosuvastatin (CRESTOR) 20 MG [id number]   Sig - Route: Take 1 tablet (20 mg) by mouth daily - Oral    LDL Cholesterol Calculated   Date Value Ref Range Status   11/22/2024 94 <100 mg/dL Final   06/16/2021 90 <100 mg/dL Final     Comment:     Desirable:       <100 mg/dl       Refill decision: Medication refilled per  Medication Refill in Ambulatory Care  policy.

## 2025-02-25 ENCOUNTER — OFFICE VISIT (OUTPATIENT)
Dept: ENDOCRINOLOGY | Facility: CLINIC | Age: 60
End: 2025-02-25
Payer: COMMERCIAL

## 2025-02-25 VITALS
DIASTOLIC BLOOD PRESSURE: 74 MMHG | HEIGHT: 60 IN | HEART RATE: 85 BPM | SYSTOLIC BLOOD PRESSURE: 121 MMHG | BODY MASS INDEX: 26.9 KG/M2 | WEIGHT: 137 LBS | OXYGEN SATURATION: 98 %

## 2025-02-25 DIAGNOSIS — E10.9 TYPE 1 DIABETES MELLITUS WITHOUT COMPLICATION (H): Primary | ICD-10-CM

## 2025-02-25 LAB
EST. AVERAGE GLUCOSE BLD GHB EST-MCNC: 166 MG/DL
HBA1C MFR BLD: 7.4 %

## 2025-02-25 PROCEDURE — 83036 HEMOGLOBIN GLYCOSYLATED A1C: CPT | Performed by: PATHOLOGY

## 2025-02-25 ASSESSMENT — PAIN SCALES - GENERAL: PAINLEVEL_OUTOF10: MODERATE PAIN (6)

## 2025-02-25 NOTE — PROGRESS NOTES
ASSESSMENT/PLAN:    (M75.02) Adhesive capsulitis of left shoulder  (primary encounter diagnosis)  Comment:   Plan: stable/ improved; will continue home program; precautions given; f/up prn    Attestation:  This patient has been seen and evaluated by me, Neil Fernández MD with the resident, Dr Kwon and the care team. I agree with the findings and plan of care as documented in this note.    Neil Fernández MD  March 3, 2025  9:04 AM      Pt is a 59 year old female last seen on 1/6/25 here for follow up of:     L shoulder - patient has been going to physical therapy. She feels like her range of motion has improved. She does get soreness with shoulder flexion. Over all doing much better.      Crossover - soreness.   Pain - mostly soreness, manages w/meloxicam     Per my last note:  (M75.02) Adhesive capsulitis of left shoulder  Comment: slow and steady progress to date; deferred repeat injection; will restart PT and use meloxicam prn; f/up in 3 months  Plan: meloxicam (MOBIC) 15 MG tablet, Physical Therapy  Referral    Past Medical History:   Diagnosis Date    Diabetes mellitus (H)     Type 1    Elevated cholesterol     Glaucoma     Glaucoma suspect    Glaucoma suspect     Kidney stones     Nonsenile cataract     Rash 5/1/2022      Current Outpatient Medications   Medication Sig Dispense Refill    aspirin 81 MG chewable tablet Take 81 mg by mouth every other day 108 tablet 3    blood glucose (ERICA CONTOUR NEXT) test strip Use to test blood sugar 4-6 times daily or as directed. 720 strip 3    Continuous Glucose Sensor (DEXCOM G7 SENSOR) MISC Change every 10 days. 9 each 4    Glucagon, rDNA, (GLUCAGON EMERGENCY) 1 MG KIT Inject 1 mg IM for severe hypoglycemia. 1 kit 1    glucose 40 % (400 mg/mL) gel Take by mouth as needed      glucose blood VI test strips strip Test up to ten times per day as directed 5 Box 8    insulin aspart (NOVOLOG VIAL) 100 UNITS/ML vial Via insulin pump. Approx 60 units daily. 60 mL 3     insulin glargine (LANTUS SOLOSTAR) 100 UNIT/ML pen Inject 15 units subcutaneous daily ONLY IF INSULIN PUMP FAILS. 15 mL 3    insulin pen needle (BD MARCO U/F) 32G X 4 MM miscellaneous Inject Subcutaneous 3 times daily 100 each 3    meloxicam (MOBIC) 15 MG tablet Take 1 tablet (15 mg) by mouth daily. Take with food 30 tablet 5    rosuvastatin (CRESTOR) 20 MG tablet Take 1 tablet (20 mg) by mouth daily 90 tablet 3    SEMGLEE, YFGN, 100 UNIT/ML SOPN INJECT 15 UNITS SUBCUTANEOUS DAILY ONLY IF INSULIN PUMP FAILS.*        Allergies   Allergen Reactions    Ampicillin Sodium Rash      ROS:   Gen- no fevers/chills   Rheum - no morning stiffness   Derm - no rash/ redness   Neuro - no numbness, no tingling   Remainder of ROS negative.     Exam:   Passive ER - ~15deg.   AROM:  Abduction/adduction - wnl  Flexion/extension - wnl.   Crossover - limited d/t pain   Strengthn 5/5 in all planes.  Neurovascularly intact.

## 2025-02-25 NOTE — LETTER
2/25/2025       RE: Kiera Dobson  [address]  [facility] [address]     Dear Colleague,    Thank you for referring your patient, Kiera Dobson, to the [facility] [place] at [facility]. Please see a copy of my visit note below.    This 59-year-old woman was seen for follow-up of her longstanding type 1 diabetes.  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  I haven't seen her in over a year but she has had appointments with Jo Jean.  she is now using a control IQ tandem pump.  Download of the pump and CGM is in the other note of the same day.  Overall, Kiera thinks she is doing the best she can.  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  This sends her blood sugar up.  As soon as she gets up, her blood sugar drops.  She is trying to be cautious giving herself corrections.  She really does not eat meals during the day and instead has small snacks.  She sometimes does bolus for them but usually not.  She prefers to keep the sensor in daytime mode overnight so she can get corrections.  She continues to have a lot of trouble finding adequate sites for her infusion set and sensor.  She has to change them frequently to make sure that continuing to work well together.  She has not had any hypoglycemia.  She does not usually recognize her symptoms but will get an alarm when she is low.  She has not needed the assistance of another.    She has seen her eye doctor and says things are stable.  She has no paresthesias or foot ulcers.  She does get some reflux symptoms but she thinks this is because she drinks so much coffee.  She is pretty stressed by her work and the challenges faced by her 2 sons.  She knows this concerned her blood pressure.  She is taking her Crestor.    She is no longer menstruating.  She eats a lot of cheese and think she  "probably gets enough calcium each day.    She continues to have pain in her shoulders.  She is seeing orthopedics about this for some time.    Current Outpatient Medications   Medication Sig Dispense Refill     blood glucose (ERICA CONTOUR NEXT) test strip Use to test blood sugar 4-6 times daily or as directed. 720 strip 3     Continuous Glucose Sensor (DEXCOM G7 SENSOR) MISC Change every 10 days. 9 each 4     Glucagon, rDNA, (GLUCAGON EMERGENCY) 1 MG KIT Inject 1 mg IM for severe hypoglycemia. 1 kit 1     glucose 40 % (400 mg/mL) gel Take by mouth as needed       glucose blood VI test strips strip Test up to ten times per day as directed 5 Box 8     insulin aspart (NOVOLOG VIAL) 100 UNITS/ML vial Via insulin pump. Approx 60 units daily. 60 mL 3     insulin glargine (LANTUS SOLOSTAR) 100 UNIT/ML pen Inject 15 units subcutaneous daily ONLY IF INSULIN PUMP FAILS. 15 mL 3     insulin pen needle (BD MARCO U/F) 32G X 4 MM miscellaneous Inject Subcutaneous 3 times daily 100 each 3     meloxicam (MOBIC) 15 MG tablet Take 1 tablet (15 mg) by mouth daily. Take with food 30 tablet 5     rosuvastatin (CRESTOR) 20 MG tablet Take 1 tablet (20 mg) by mouth daily 90 tablet 3     SEMGLEE, YFGN, 100 UNIT/ML SOPN INJECT 15 UNITS SUBCUTANEOUS DAILY ONLY IF INSULIN PUMP FAILS.*       aspirin 81 MG chewable tablet Take 81 mg by mouth every other day 108 tablet 3     No current facility-administered medications for this visit.        /74   Pulse 85   Ht 1.518 m (4' 11.75\")   Wt 62.1 kg (137 lb)   LMP 04/10/2016 (Approximate)   SpO2 98%   BMI 26.98 kg/m      NAD  Eyes - no periorbital edema, conjunctival injection, scleral icterus  CV - RRR.  Normal pulses in feet.  No edema  Neuro - sensation intact to monofilament on soles of feet.    Skin - normal texture   Feet - no ulcers     Recent Labs   Lab Test 02/25/25  1538 11/22/24  0921 11/22/24  0918 02/20/24  0839 01/25/24  1040 01/25/24  1027 11/21/23  0953 08/22/23  0923 " 05/16/23  0912 09/28/22  0906 09/28/22  0855 02/14/17  1157 02/14/17  1149   A1C 7.4*  --  7.5*   < >  --   --    < >  --   --   --  7.1*   < >  --    HEMOGLOBINA1  --   --   --   --   --   --   --  7.5 7.1  --   --    < >  --    TSH  --   --  2.16  --   --   --   --   --   --   --  2.12   < > 1.57   T4  --   --   --   --   --   --   --   --   --   --   --   --  0.84   LDL  --   --  94  --   --  112*  --   --   --    < > 107*   < >  --    HDL  --   --  79  --   --  71  --   --   --    < > 77   < >  --    TRIG  --   --  50  --   --  79  --   --   --    < > 60   < >  --    CR  --   --  0.75  --   --  0.66  --   --   --   --  0.63   < > 0.62   MICROL  --  <12.0  --   --  15.7  --   --   --   --    < >  --    < >  --     < > = values in this interval not displayed.   Assessment and plan:    1.  Diabetes control.  Her A1c is just above target and she is not having hypoglycemia because the sensor alarms.  We talked about various changes we could make today including adding an SGLT2 to help with the high sugars when she is so sedentary.  Ultimately we decided not to make those changes.  Her pump settings were unchanged today.    2.  Diabetes complications.  She is up-to-date with her eye visits and has background retinopathy.  She is has no renal disease.  Her feet are fine.    3.hyperlipidemia.  She is on her statin.    I will schedule her for follow-up with Jo Jean or myself every 3 months or so through the end of the year.    The time I spent with the patient today is exclusive of the assessment of the continuous glucose monitor.    Paola Chahal MD                                                            Again, thank you for allowing me to participate in the care of your patient.      Sincerely,    Paola Chahal MD

## 2025-02-25 NOTE — PROGRESS NOTES
This 59-year-old woman was seen for follow-up of her longstanding type 1 diabetes.  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  I haven't seen her in over a year but she has had appointments with Jo Jean.  she is now using a control IQ tandem pump.  Download of the pump and CGM is in the other note of the same day.  Overall, Kiera thinks she is doing the best she can.  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  This sends her blood sugar up.  As soon as she gets up, her blood sugar drops.  She is trying to be cautious giving herself corrections.  She really does not eat meals during the day and instead has small snacks.  She sometimes does bolus for them but usually not.  She prefers to keep the sensor in daytime mode overnight so she can get corrections.  She continues to have a lot of trouble finding adequate sites for her infusion set and sensor.  She has to change them frequently to make sure that continuing to work well together.  She has not had any hypoglycemia.  She does not usually recognize her symptoms but will get an alarm when she is low.  She has not needed the assistance of another.    She has seen her eye doctor and says things are stable.  She has no paresthesias or foot ulcers.  She does get some reflux symptoms but she thinks this is because she drinks so much coffee.  She is pretty stressed by her work and the challenges faced by her 2 sons.  She knows this concerned her blood pressure.  She is taking her Crestor.    She is no longer menstruating.  She eats a lot of cheese and think she probably gets enough calcium each day.    She continues to have pain in her shoulders.  She is seeing orthopedics about this for some time.    Current Outpatient Medications   Medication Sig Dispense Refill    blood glucose (ERICA CONTOUR NEXT) test strip Use to test blood sugar 4-6 times daily or as directed. 720 strip 3  "   Continuous Glucose Sensor (DEXCOM G7 SENSOR) MISC Change every 10 days. 9 each 4    Glucagon, rDNA, (GLUCAGON EMERGENCY) 1 MG KIT Inject 1 mg IM for severe hypoglycemia. 1 kit 1    glucose 40 % (400 mg/mL) gel Take by mouth as needed      glucose blood VI test strips strip Test up to ten times per day as directed 5 Box 8    insulin aspart (NOVOLOG VIAL) 100 UNITS/ML vial Via insulin pump. Approx 60 units daily. 60 mL 3    insulin glargine (LANTUS SOLOSTAR) 100 UNIT/ML pen Inject 15 units subcutaneous daily ONLY IF INSULIN PUMP FAILS. 15 mL 3    insulin pen needle (BD MARCO U/F) 32G X 4 MM miscellaneous Inject Subcutaneous 3 times daily 100 each 3    meloxicam (MOBIC) 15 MG tablet Take 1 tablet (15 mg) by mouth daily. Take with food 30 tablet 5    rosuvastatin (CRESTOR) 20 MG tablet Take 1 tablet (20 mg) by mouth daily 90 tablet 3    SEMGLEE, YFGN, 100 UNIT/ML SOPN INJECT 15 UNITS SUBCUTANEOUS DAILY ONLY IF INSULIN PUMP FAILS.*      aspirin 81 MG chewable tablet Take 81 mg by mouth every other day 108 tablet 3     No current facility-administered medications for this visit.        /74   Pulse 85   Ht 1.518 m (4' 11.75\")   Wt 62.1 kg (137 lb)   LMP 04/10/2016 (Approximate)   SpO2 98%   BMI 26.98 kg/m      NAD  Eyes - no periorbital edema, conjunctival injection, scleral icterus  CV - RRR.  Normal pulses in feet.  No edema  Neuro - sensation intact to monofilament on soles of feet.    Skin - normal texture   Feet - no ulcers     Recent Labs   Lab Test 02/25/25  1538 11/22/24  0921 11/22/24  0918 02/20/24  0839 01/25/24  1040 01/25/24  1027 11/21/23  0953 08/22/23  0923 05/16/23  0912 09/28/22  0906 09/28/22  0855 02/14/17  1157 02/14/17  1149   A1C 7.4*  --  7.5*   < >  --   --    < >  --   --   --  7.1*   < >  --    HEMOGLOBINA1  --   --   --   --   --   --   --  7.5 7.1  --   --    < >  --    TSH  --   --  2.16  --   --   --   --   --   --   --  2.12   < > 1.57   T4  --   --   --   --   --   --   --   " --   --   --   --   --  0.84   LDL  --   --  94  --   --  112*  --   --   --    < > 107*   < >  --    HDL  --   --  79  --   --  71  --   --   --    < > 77   < >  --    TRIG  --   --  50  --   --  79  --   --   --    < > 60   < >  --    CR  --   --  0.75  --   --  0.66  --   --   --   --  0.63   < > 0.62   MICROL  --  <12.0  --   --  15.7  --   --   --   --    < >  --    < >  --     < > = values in this interval not displayed.   Assessment and plan:    1.  Diabetes control.  Her A1c is just above target and she is not having hypoglycemia because the sensor alarms.  We talked about various changes we could make today including adding an SGLT2 to help with the high sugars when she is so sedentary.  Ultimately we decided not to make those changes.  Her pump settings were unchanged today.    2.  Diabetes complications.  She is up-to-date with her eye visits and has background retinopathy.  She is has no renal disease.  Her feet are fine.    3.hyperlipidemia.  She is on her statin.    I will schedule her for follow-up with Jo Jean or myself every 3 months or so through the end of the year.    The time I spent with the patient today is exclusive of the assessment of the continuous glucose monitor.    Paola Chahal MD

## 2025-03-03 ENCOUNTER — OFFICE VISIT (OUTPATIENT)
Dept: ORTHOPEDICS | Facility: CLINIC | Age: 60
End: 2025-03-03
Attending: FAMILY MEDICINE
Payer: COMMERCIAL

## 2025-03-03 DIAGNOSIS — M75.02 ADHESIVE CAPSULITIS OF LEFT SHOULDER: Primary | ICD-10-CM

## 2025-03-03 PROCEDURE — 99212 OFFICE O/P EST SF 10 MIN: CPT | Mod: GC | Performed by: FAMILY MEDICINE

## 2025-03-03 PROCEDURE — 1125F AMNT PAIN NOTED PAIN PRSNT: CPT | Performed by: FAMILY MEDICINE

## 2025-03-03 NOTE — LETTER
3/3/2025      RE: Kiera Dobson  [address]  [facility] [address]     Dear Colleague,    Thank you for referring your patient, Kiera Dobson, to the [facility] [place]. Please see a copy of my visit note below.    ASSESSMENT/PLAN:    (M75.02) Adhesive capsulitis of left shoulder  (primary encounter diagnosis)  Comment:   Plan: stable/ improved; will continue home program; precautions given; f/up prn    Attestation:  This patient has been seen and evaluated by me, Neil Fernández MD with the resident, Dr Kwon and the care team. I agree with the findings and plan of care as documented in this note.    Neil Fernández MD  March 3, 2025  9:04 AM      Pt is a 59 year old female last seen on 1/6/25 here for follow up of:     L shoulder - patient has been going to physical therapy. She feels like her range of motion has improved. She does get soreness with shoulder flexion. Over all doing much better.      Crossover - soreness.   Pain - mostly soreness, manages w/meloxicam     Per my last note:  (M75.02) Adhesive capsulitis of left shoulder  Comment: slow and steady progress to date; deferred repeat injection; will restart PT and use meloxicam prn; f/up in 3 months  Plan: meloxicam (MOBIC) 15 MG tablet, Physical Therapy  Referral    Past Medical History:   Diagnosis Date     Diabetes mellitus (H)     Type 1     Elevated cholesterol      Glaucoma     Glaucoma suspect     Glaucoma suspect      Kidney stones      Nonsenile cataract      Rash 5/1/2022      Current Outpatient Medications   Medication Sig Dispense Refill     aspirin 81 MG chewable tablet Take 81 mg by mouth every other day 108 tablet 3     blood glucose (ERICA CONTOUR NEXT) test strip Use to test blood sugar 4-6 times daily or as directed. 720 strip 3     Continuous Glucose Sensor (DEXCOM G7 SENSOR) MISC Change every 10 days. 9 each 4     Glucagon, rDNA, (GLUCAGON EMERGENCY) 1 MG KIT Inject 1 mg IM for severe  hypoglycemia. 1 kit 1     glucose 40 % (400 mg/mL) gel Take by mouth as needed       glucose blood VI test strips strip Test up to ten times per day as directed 5 Box 8     insulin aspart (NOVOLOG VIAL) 100 UNITS/ML vial Via insulin pump. Approx 60 units daily. 60 mL 3     insulin glargine (LANTUS SOLOSTAR) 100 UNIT/ML pen Inject 15 units subcutaneous daily ONLY IF INSULIN PUMP FAILS. 15 mL 3     insulin pen needle (BD MARCO U/F) 32G X 4 MM miscellaneous Inject Subcutaneous 3 times daily 100 each 3     meloxicam (MOBIC) 15 MG tablet Take 1 tablet (15 mg) by mouth daily. Take with food 30 tablet 5     rosuvastatin (CRESTOR) 20 MG tablet Take 1 tablet (20 mg) by mouth daily 90 tablet 3     SEMGLEE, YFGN, 100 UNIT/ML SOPN INJECT 15 UNITS SUBCUTANEOUS DAILY ONLY IF INSULIN PUMP FAILS.*        Allergies   Allergen Reactions     Ampicillin Sodium Rash      ROS:   Gen- no fevers/chills   Rheum - no morning stiffness   Derm - no rash/ redness   Neuro - no numbness, no tingling   Remainder of ROS negative.     Exam:   Passive ER - ~15deg.   AROM:  Abduction/adduction - wnl  Flexion/extension - wnl.   Crossover - limited d/t pain   Strengthn 5/5 in all planes.  Neurovascularly intact.       Again, thank you for allowing me to participate in the care of your patient.      Sincerely,    Neil Fernández MD

## 2025-04-21 ENCOUNTER — TELEPHONE (OUTPATIENT)
Dept: ENDOCRINOLOGY | Facility: CLINIC | Age: 60
End: 2025-04-21
Payer: COMMERCIAL

## 2025-04-21 NOTE — TELEPHONE ENCOUNTER
Left Voicemail (1st Attempt) for the patient to call back and schedule the following:    Appointment type: Return diabetes   Provider: Ted or tyrell   Return date: re-sabrina 5/6 to option below or next avail   Specialty phone number: [phone]  Additional appointment(s) needed:   Additonal Notes: LVM, MyC x1   Due to changes in the providers sabrina appt on 5/6 needs to be re-sabrina. Pt is established with Ted and Dr. Chahal. Can see any of them with the soonest availability.     Examples:  -6/16 Tyrell in person csc (okay to use hold slots if still avail)    Paola Tobar on 4/21/2025 at 2:47 PM

## 2025-05-27 NOTE — PROGRESS NOTES
05/27/25 12:21 PM  PATIENT LAB/IMAGING STATUS : No pending lab orders   06/13/25 2:29 PM : Appointment reminder phone call made to patient. Pt verbalized understanding    This 59-year-old woman was seen for follow-up of her longstanding type 1 diabetes.  It is complicated by pre-proliferative retinopathy.She is comanaged with Jo Jean.  she is now using a control IQ tandem pump.  Download of the pump and CGM is below.  Overall, kiera is surprised her A1c is so well-controlled.  She is sedentary for hours at a time at work and as soon as she stands up, she starts getting low.  When we go through her CGM data we seen these lows seem to follow her corrections.  She does feel her lows in the 90s.  She has not needed to have anyone else to help her with a low.  She continues to have a lot of trouble finding adequate sites for her infusion set and sensor.  She has to change them frequently to make sure that continuing to work well together.      She has seen her eye doctor and says things are stable.  She has no paresthesias or foot ulcers.  She is taking her Crestor.    She is no longer menstruating.  She eats a lot of cheese and think she probably gets enough calcium each day.    She continues to have pain in her shoulders.  She is seeing orthopedics about this for some time.  She is not interested in having another steroid injection.  She is doing her physical therapy at home.    Kiera tells me she has had trouble finding a primary care provider.  Every time she find someone, they move on.  She has not had a mammogram in a couple of years and has never had a screening colonoscopy.                    Current Outpatient Medications   Medication Sig Dispense Refill    blood glucose (ERICA CONTOUR NEXT) test strip Use to test blood sugar 4-6 times daily or as directed. 720 strip 3    Continuous Glucose Sensor (DEXCOM G7 SENSOR) MISC Change every 10 days. 9 each 4    Glucagon, rDNA, (GLUCAGON EMERGENCY) 1 MG KIT Inject 1 mg  IM for severe hypoglycemia. 1 kit 1    glucose 40 % (400 mg/mL) gel Take by mouth as needed      glucose blood VI test strips strip Test up to ten times per day as directed 5 Box 8    insulin aspart (NOVOLOG VIAL) 100 UNITS/ML vial Via insulin pump. Approx 60 units daily. 60 mL 3    insulin glargine (LANTUS SOLOSTAR) 100 UNIT/ML pen Inject 15 units subcutaneous daily ONLY IF INSULIN PUMP FAILS. 15 mL 3    insulin pen needle (BD MARCO U/F) 32G X 4 MM miscellaneous Inject Subcutaneous 3 times daily 100 each 3    meloxicam (MOBIC) 15 MG tablet Take 1 tablet (15 mg) by mouth daily. Take with food 30 tablet 5    rosuvastatin (CRESTOR) 20 MG tablet Take 1 tablet (20 mg) by mouth daily 90 tablet 3    SEMGLEE, YFGN, 100 UNIT/ML SOPN INJECT 15 UNITS SUBCUTANEOUS DAILY ONLY IF INSULIN PUMP FAILS.*       No current facility-administered medications for this visit.         /75   Pulse 87   Wt 62.1 kg (137 lb)   LMP 04/10/2016 (Approximate)   SpO2 100%   BMI 26.98 kg/m      VSS  NAD  Eyes - no periorbital edema, conjunctival injection, scleral icterus  CV - RRR.  Normal pulses in feet.  No edema  Neuro - sensation intact to monofilament on soles of feet.    Skin - normal texture   Feet - no ulcers     Recent Labs   Lab Test 06/16/25  0808 02/25/25  1538 11/22/24  0921 11/22/24  0918 02/20/24  0839 01/25/24  1040 01/25/24  1027 11/21/23  0953 08/22/23  0923 05/16/23  0912 09/28/22  0906 09/28/22  0855   A1C 7.0* 7.4*  --  7.5*   < >  --   --    < >  --   --   --  7.1*   HEMOGLOBINA1  --   --   --   --   --   --   --   --  7.5 7.1  --   --    TSH  --   --   --  2.16  --   --   --   --   --   --   --  2.12   LDL  --   --   --  94  --   --  112*  --   --   --    < > 107*   HDL  --   --   --  79  --   --  71  --   --   --    < > 77   TRIG  --   --   --  50  --   --  79  --   --   --    < > 60   CR  --   --   --  0.75  --   --  0.66  --   --   --   --  0.63   MICROL  --   --  <12.0  --   --  15.7  --   --   --   --    < >   --     < > = values in this interval not displayed.     Assessment and plan:    1.  Diabetes control.  overall she is doing quite well.  I think her corrections are too aggressive so we changed them to 1 unit to drop her 100 points.      2.  Diabetes complications. her renal function is normal.  Her foot exam is normal.  She has regular follow-up with her eye doctor.  Her eyes are stable.    3.CVD risk.  She is taking her statin.  Her blood pressure is well-controlled.      4.screening.  I will order a mammogram and send her for a screening colonoscopy.  I told her to find a primary care provider near her home.    Follow-up Jo Jean in 4 months and me in 8 months.    I spent time exclusive of the time spent with the patient reviewing and interpreting her CGM and pump data.  That interpretation is above.    Paola Chahal MD

## 2025-06-14 ENCOUNTER — HEALTH MAINTENANCE LETTER (OUTPATIENT)
Age: 60
End: 2025-06-14

## 2025-06-16 ENCOUNTER — OFFICE VISIT (OUTPATIENT)
Dept: ENDOCRINOLOGY | Facility: CLINIC | Age: 60
End: 2025-06-16
Payer: COMMERCIAL

## 2025-06-16 VITALS
HEART RATE: 87 BPM | BODY MASS INDEX: 26.98 KG/M2 | DIASTOLIC BLOOD PRESSURE: 75 MMHG | OXYGEN SATURATION: 100 % | SYSTOLIC BLOOD PRESSURE: 117 MMHG | WEIGHT: 137 LBS

## 2025-06-16 DIAGNOSIS — E10.3299 TYPE 1 DIABETES MELLITUS WITH MILD NONPROLIFERATIVE RETINOPATHY WITHOUT MACULAR EDEMA, UNSPECIFIED LATERALITY (H): ICD-10-CM

## 2025-06-16 DIAGNOSIS — N20.0 CALCULUS OF KIDNEY: ICD-10-CM

## 2025-06-16 LAB
EST. AVERAGE GLUCOSE BLD GHB EST-MCNC: 154 MG/DL
HBA1C MFR BLD: 7 %

## 2025-06-16 PROCEDURE — 1126F AMNT PAIN NOTED NONE PRSNT: CPT | Performed by: INTERNAL MEDICINE

## 2025-06-16 PROCEDURE — 83036 HEMOGLOBIN GLYCOSYLATED A1C: CPT | Performed by: PATHOLOGY

## 2025-06-16 PROCEDURE — 95251 CONT GLUC MNTR ANALYSIS I&R: CPT | Performed by: INTERNAL MEDICINE

## 2025-06-16 PROCEDURE — 3078F DIAST BP <80 MM HG: CPT | Performed by: INTERNAL MEDICINE

## 2025-06-16 PROCEDURE — 99214 OFFICE O/P EST MOD 30 MIN: CPT | Mod: 25 | Performed by: INTERNAL MEDICINE

## 2025-06-16 PROCEDURE — 3074F SYST BP LT 130 MM HG: CPT | Performed by: INTERNAL MEDICINE

## 2025-06-16 RX ORDER — INSULIN ASPART 100 [IU]/ML
INJECTION, SOLUTION INTRAVENOUS; SUBCUTANEOUS
Qty: 60 ML | Refills: 3 | Status: SHIPPED | OUTPATIENT
Start: 2025-06-16

## 2025-06-16 ASSESSMENT — PAIN SCALES - GENERAL: PAINLEVEL_OUTOF10: NO PAIN (0)

## 2025-06-16 NOTE — NURSING NOTE
"Chief Complaint   Patient presents with    Diabetes     Vital signs:      BP: 117/75 Pulse: 87     SpO2: 100 %       Weight: 62.1 kg (137 lb)  Estimated body mass index is 26.98 kg/m  as calculated from the following:    Height as of 2/25/25: 1.518 m (4' 11.75\").    Weight as of this encounter: 62.1 kg (137 lb).        "

## 2025-06-16 NOTE — PATIENT INSTRUCTIONS
We changed your correction to 1 unit to drop you 100 today.    If you continue to have problems with lows at work, you might want to turn on the exercise mode the target is 150 during the hours.    Find a primary care doctor

## 2025-06-16 NOTE — LETTER
6/16/2025       RE: Kiera Dobson  [address]  [place] [address]     Dear Colleague,    Thank you for referring your patient, Kiera Dobson, to the [facility] [place] at [facility]. Please see a copy of my visit note below.    05/27/25 12:21 PM  PATIENT LAB/IMAGING STATUS : No pending lab orders   06/13/25 2:29 PM : Appointment reminder phone call made to patient. Pt verbalized understanding    This 59-year-old woman was seen for follow-up of her longstanding type 1 diabetes.  It is complicated by pre-proliferative retinopathy.She is comanaged with Jo Jean.  she is now using a control IQ tandem pump.  Download of the pump and CGM is below.  Overall, kiera is surprised her A1c is so well-controlled.  She is sedentary for hours at a time at work and as soon as she stands up, she starts getting low.  When we go through her CGM data we seen these lows seem to follow her corrections.  She does feel her lows in the 90s.  She has not needed to have anyone else to help her with a low.  She continues to have a lot of trouble finding adequate sites for her infusion set and sensor.  She has to change them frequently to make sure that continuing to work well together.      She has seen her eye doctor and says things are stable.  She has no paresthesias or foot ulcers.  She is taking her Crestor.    She is no longer menstruating.  She eats a lot of cheese and think she probably gets enough calcium each day.    She continues to have pain in her shoulders.  She is seeing orthopedics about this for some time.  She is not interested in having another steroid injection.  She is doing her physical therapy at home.    Kiera tells me she has had trouble finding a primary care provider.  Every time she find someone, they move on.  She has not had a mammogram in a couple of years and has never had a screening  colonoscopy.                    Current Outpatient Medications   Medication Sig Dispense Refill     blood glucose (ERICA CONTOUR NEXT) test strip Use to test blood sugar 4-6 times daily or as directed. 720 strip 3     Continuous Glucose Sensor (DEXCOM G7 SENSOR) MISC Change every 10 days. 9 each 4     Glucagon, rDNA, (GLUCAGON EMERGENCY) 1 MG KIT Inject 1 mg IM for severe hypoglycemia. 1 kit 1     glucose 40 % (400 mg/mL) gel Take by mouth as needed       glucose blood VI test strips strip Test up to ten times per day as directed 5 Box 8     insulin aspart (NOVOLOG VIAL) 100 UNITS/ML vial Via insulin pump. Approx 60 units daily. 60 mL 3     insulin glargine (LANTUS SOLOSTAR) 100 UNIT/ML pen Inject 15 units subcutaneous daily ONLY IF INSULIN PUMP FAILS. 15 mL 3     insulin pen needle (BD MARCO U/F) 32G X 4 MM miscellaneous Inject Subcutaneous 3 times daily 100 each 3     meloxicam (MOBIC) 15 MG tablet Take 1 tablet (15 mg) by mouth daily. Take with food 30 tablet 5     rosuvastatin (CRESTOR) 20 MG tablet Take 1 tablet (20 mg) by mouth daily 90 tablet 3     SEMGLEE, YFGN, 100 UNIT/ML SOPN INJECT 15 UNITS SUBCUTANEOUS DAILY ONLY IF INSULIN PUMP FAILS.*       No current facility-administered medications for this visit.         /75   Pulse 87   Wt 62.1 kg (137 lb)   LMP 04/10/2016 (Approximate)   SpO2 100%   BMI 26.98 kg/m      VSS  NAD  Eyes - no periorbital edema, conjunctival injection, scleral icterus  CV - RRR.  Normal pulses in feet.  No edema  Neuro - sensation intact to monofilament on soles of feet.    Skin - normal texture   Feet - no ulcers     Recent Labs   Lab Test 06/16/25  0808 02/25/25  1538 11/22/24  0921 11/22/24  0918 02/20/24  0839 01/25/24  1040 01/25/24  1027 11/21/23  0953 08/22/23  0923 05/16/23  0912 09/28/22  0906 09/28/22  0855   A1C 7.0* 7.4*  --  7.5*   < >  --   --    < >  --   --   --  7.1*   HEMOGLOBINA1  --   --   --   --   --   --   --   --  7.5 7.1  --   --    TSH  --   --    --  2.16  --   --   --   --   --   --   --  2.12   LDL  --   --   --  94  --   --  112*  --   --   --    < > 107*   HDL  --   --   --  79  --   --  71  --   --   --    < > 77   TRIG  --   --   --  50  --   --  79  --   --   --    < > 60   CR  --   --   --  0.75  --   --  0.66  --   --   --   --  0.63   MICROL  --   --  <12.0  --   --  15.7  --   --   --   --    < >  --     < > = values in this interval not displayed.     Assessment and plan:    1.  Diabetes control.  overall she is doing quite well.  I think her corrections are too aggressive so we changed them to 1 unit to drop her 100 points.      2.  Diabetes complications. her renal function is normal.  Her foot exam is normal.  She has regular follow-up with her eye doctor.  Her eyes are stable.    3.CVD risk.  She is taking her statin.  Her blood pressure is well-controlled.      4.screening.  I will order a mammogram and send her for a screening colonoscopy.  I told her to find a primary care provider near her home.    Follow-up Jo Jean in 4 months and me in 8 months.    I spent time exclusive of the time spent with the patient reviewing and interpreting her CGM and pump data.  That interpretation is above.    Paola Chahal MD                Again, thank you for allowing me to participate in the care of your patient.      Sincerely,    Paola Chahal MD

## 2025-06-17 ENCOUNTER — PATIENT OUTREACH (OUTPATIENT)
Dept: CARE COORDINATION | Facility: CLINIC | Age: 60
End: 2025-06-17
Payer: COMMERCIAL

## 2025-07-02 ENCOUNTER — TELEPHONE (OUTPATIENT)
Dept: GASTROENTEROLOGY | Facility: CLINIC | Age: 60
End: 2025-07-02
Payer: COMMERCIAL

## 2025-07-02 NOTE — TELEPHONE ENCOUNTER
"Endoscopy Scheduling Screen    Caller: patient    Have you had any respiratory illness or flu-like symptoms in the last 10 days?  No    Patient is ACTIVE on [employer].  Inform patient that all appointment instructions will be sent via [employer].    Review patient's insurance for any non participating payor.    Ordering/Referring Provider: ERA ESPINAL   (If ordering provider performs procedure, schedule with ordering provider unless otherwise instructed. )    BMI: Estimated body mass index is 26.98 kg/m  as calculated from the following:    Height as of 2/25/25: 1.518 m (4' 11.75\").    Weight as of 6/16/25: 62.1 kg (137 lb).     Sedation Ordered  moderate sedation.   If patient BMI > 50 do not schedule in [facility].    If patient [facility] > 45 [facility]    Are you taking methadone or Suboxone?  NO, No RN review required.    Have you been diagnosed and are being treated for severe PTSD or severe anxiety?  NO, No RN review required.    Are you taking any prescription medications for pain 3 or more times per week?   NO, No RN review required.    Do you have a history of malignant hyperthermia?  No    (Females) Are you currently pregnant?   No     Have you been diagnosed or told you have pulmonary hypertension?   No    Do you have an LVAD?  No    Have you been told you have moderate to severe sleep apnea?  No.    Have you been told you have COPD, asthma, or any other lung disease?  No    Has your doctor ordered any cardiac tests like echo, angiogram, stress test, ablation, or EKG, that you have not completed yet?  No    Do you  have a history of any heart conditions?  No     Have you ever had or are you waiting for an organ transplant?  No. Continue scheduling, no site restrictions.    Have you had a stroke or transient ischemic attack (TIA aka \"mini stroke\") in the last 2 years?   No.    Have you been diagnosed with or been told you have cirrhosis of the liver?   No.    Are you currently on " "dialysis?   No    Do you need assistance transferring?   No    BMI: Estimated body mass index is 26.98 kg/m  as calculated from the following:    Height as of 2/25/25: 1.518 m (4' 11.75\").    Weight as of 6/16/25: 62.1 kg (137 lb).     Is patients BMI > 40 and scheduling location UP?  No    Do you take an injectable or oral medication for weight loss or diabetes (excluding insulin)?  No    Do you take the medication Naltrexone?  No    Do you take blood thinners?  No       Prep   Are you currently on dialysis or do you have chronic kidney disease?  No    Do you have a diagnosis of diabetes?  Yes (Golytely Prep)    Do you have a diagnosis of cystic fibrosis (CF)?  No    On a regular basis do you go 3 -5 days between bowel movements?  Yes (Extended Prep)    BMI > 40?  No    Preferred Pharmacy:    [id number] - [facility] [address]  [address] [address]  Phone: [phone] Fax: [phone]      Final Scheduling Details     Procedure scheduled  Colonoscopy    Surgeon:  JB     Date of procedure:  8/4/25     Pre-OP / PAC:   No - Not required for this site.    Location  [facility] - Patient preference.    Sedation   Moderate Sedation - Per order.      Patient Reminders:   You will receive a call from a Nurse to review instructions and health history.  This assessment must be completed prior to your procedure.  Failure to complete the Nurse assessment may result in the procedure being cancelled.      On the day of your procedure, please designate an adult(s) who can drive you home stay with you for the next 24 hours. The medicines used in the exam will make you sleepy. You will not be able to drive.      You cannot take public transportation, ride share services, or non-medical taxi service without a responsible caregiver.  Medical transport services are allowed with the requirement that a responsible caregiver will receive you at your destination.  We require that drivers and caregivers " are confirmed prior to your procedure.

## 2025-07-14 ENCOUNTER — TELEPHONE (OUTPATIENT)
Dept: GASTROENTEROLOGY | Facility: CLINIC | Age: 60
End: 2025-07-14
Payer: COMMERCIAL

## 2025-07-14 DIAGNOSIS — Z12.11 ENCOUNTER FOR SCREENING COLONOSCOPY: Primary | ICD-10-CM

## 2025-07-14 RX ORDER — BISACODYL 5 MG
TABLET, DELAYED RELEASE (ENTERIC COATED) ORAL
Qty: 4 TABLET | Refills: 0 | Status: SHIPPED | OUTPATIENT
Start: 2025-07-14

## 2025-07-14 NOTE — TELEPHONE ENCOUNTER
Pre visit planning completed.      Procedure details:    Patient scheduled for Colonoscopy on 08.04.2025.     Arrival time: 0835. Procedure time 0920    Facility location: [facility]; [address] [place] [address]. Check in location: Main entrance, door #1 on the North side of the building under roundabout awning. DO NOT GO TO SURGERY/ED ENTRANCE.     Sedation type: Conscious sedation     Pre op exam needed? No.    Indication for procedure: Screening       Chart review:     Electronic implanted devices? No    Recent diagnosis of diverticulitis within the last 6 weeks? No      Medication review:    Diabetic? Yes. Insulin: Consult with managing provider.     Anticoagulants? No    Weight loss medication/injectable? No GLP-1 medication per patient's medication list. Nursing to verify with pre-assessment call.    Other medication HOLDING recommendations:  N/A      Prep for procedure:     Bowel prep recommendation: Extended Golytely. Bowel prep sent to [id number] - [place], [place] - [address] [place] [address].  Due to: constipation noted or reported and diabetes    Procedure information and instructions sent via [employer]         Lurdes Galo RN  Endoscopy Procedure Pre Assessment   [phone] option 3

## 2025-07-17 NOTE — TELEPHONE ENCOUNTER
Pre assessment completed for upcoming procedure.   (Please see previous telephone encounter notes for complete details)    Patient returned call.       Procedure details:    Procedure date 08/04/2025, arrival time 0835 AM and facility location reviewed.    Pre op exam needed? No.    Designated  policy reviewed. Instructed to have someone stay 6  hours post procedure.       Medication review:    Medications reviewed. Please see supporting documentation below. Holding recommendations discussed (if applicable).   Insulin. Consult with your managing provider. Patient has insulin pump      Prep for procedure:     Procedure prep instructions reviewed.        Any additional information needed:  N/A      Patient verbalized understanding and had no questions or concerns at this time.      Diana Orosco LPN  Endoscopy Procedure Pre Assessment   [phone] option 3

## 2025-07-17 NOTE — TELEPHONE ENCOUNTER
Attempted to contact patient in order to complete pre assessment questions.     No answer. Left message to return call to [phone] option 3.    Callback communication sent via [facility].    Diana Orosco LPN

## 2025-08-04 ENCOUNTER — HOSPITAL ENCOUNTER (OUTPATIENT)
Facility: CLINIC | Age: 60
Discharge: HOME | End: 2025-08-04
Attending: SURGERY | Admitting: SURGERY
Payer: COMMERCIAL

## 2025-08-04 VITALS
HEIGHT: 60 IN | DIASTOLIC BLOOD PRESSURE: 64 MMHG | HEART RATE: 80 BPM | WEIGHT: 138 LBS | BODY MASS INDEX: 27.09 KG/M2 | OXYGEN SATURATION: 98 % | RESPIRATION RATE: 16 BRPM | SYSTOLIC BLOOD PRESSURE: 109 MMHG

## 2025-08-04 LAB
COLONOSCOPY: NORMAL
GLUCOSE BLDC GLUCOMTR-MCNC: 134 MG/DL (ref 70–99)

## 2025-08-04 PROCEDURE — 99152 MOD SED SAME PHYS/QHP 5/>YRS: CPT | Mod: 59 | Performed by: SURGERY

## 2025-08-04 PROCEDURE — G0121 COLON CA SCRN NOT HI RSK IND: HCPCS | Performed by: SURGERY

## 2025-08-04 PROCEDURE — 82962 GLUCOSE BLOOD TEST: CPT

## 2025-08-04 PROCEDURE — 250N000011 HC RX IP 250 OP 636: Performed by: SURGERY

## 2025-08-04 PROCEDURE — G0500 MOD SEDAT ENDO SERVICE >5YRS: HCPCS | Performed by: SURGERY

## 2025-08-04 RX ORDER — PROCHLORPERAZINE MALEATE 10 MG
10 TABLET ORAL EVERY 6 HOURS PRN
Status: CANCELLED | OUTPATIENT
Start: 2025-08-04

## 2025-08-04 RX ORDER — FLUMAZENIL 0.1 MG/ML
0.2 INJECTION, SOLUTION INTRAVENOUS
Status: CANCELLED | OUTPATIENT
Start: 2025-08-04 | End: 2025-08-04

## 2025-08-04 RX ORDER — ONDANSETRON 4 MG/1
4 TABLET, ORALLY DISINTEGRATING ORAL EVERY 6 HOURS PRN
Status: CANCELLED | OUTPATIENT
Start: 2025-08-04

## 2025-08-04 RX ORDER — FENTANYL CITRATE 50 UG/ML
25-100 INJECTION, SOLUTION INTRAMUSCULAR; INTRAVENOUS EVERY 5 MIN PRN
Refills: 0 | Status: DISCONTINUED | OUTPATIENT
Start: 2025-08-04 | End: 2025-08-04 | Stop reason: HOSPADM

## 2025-08-04 RX ORDER — NALOXONE HYDROCHLORIDE 0.4 MG/ML
0.4 INJECTION, SOLUTION INTRAMUSCULAR; INTRAVENOUS; SUBCUTANEOUS
Status: DISCONTINUED | OUTPATIENT
Start: 2025-08-04 | End: 2025-08-04 | Stop reason: HOSPADM

## 2025-08-04 RX ORDER — FLUMAZENIL 0.1 MG/ML
0.2 INJECTION, SOLUTION INTRAVENOUS
Status: DISCONTINUED | OUTPATIENT
Start: 2025-08-04 | End: 2025-08-04 | Stop reason: HOSPADM

## 2025-08-04 RX ORDER — NALOXONE HYDROCHLORIDE 0.4 MG/ML
0.2 INJECTION, SOLUTION INTRAMUSCULAR; INTRAVENOUS; SUBCUTANEOUS
Status: DISCONTINUED | OUTPATIENT
Start: 2025-08-04 | End: 2025-08-04 | Stop reason: HOSPADM

## 2025-08-04 RX ORDER — DIPHENHYDRAMINE HYDROCHLORIDE 50 MG/ML
25-50 INJECTION, SOLUTION INTRAMUSCULAR; INTRAVENOUS
Status: DISCONTINUED | OUTPATIENT
Start: 2025-08-04 | End: 2025-08-04 | Stop reason: HOSPADM

## 2025-08-04 RX ORDER — LIDOCAINE 40 MG/G
CREAM TOPICAL
Status: DISCONTINUED | OUTPATIENT
Start: 2025-08-04 | End: 2025-08-04 | Stop reason: HOSPADM

## 2025-08-04 RX ORDER — ONDANSETRON 2 MG/ML
4 INJECTION INTRAMUSCULAR; INTRAVENOUS EVERY 6 HOURS PRN
Status: CANCELLED | OUTPATIENT
Start: 2025-08-04

## 2025-08-04 RX ORDER — EPINEPHRINE 1 MG/ML
0.1 INJECTION, SOLUTION, CONCENTRATE INTRAVENOUS
Status: DISCONTINUED | OUTPATIENT
Start: 2025-08-04 | End: 2025-08-04 | Stop reason: HOSPADM

## 2025-08-04 RX ORDER — SIMETHICONE 40MG/0.6ML
133 SUSPENSION, DROPS(FINAL DOSAGE FORM)(ML) ORAL
Status: DISCONTINUED | OUTPATIENT
Start: 2025-08-04 | End: 2025-08-04 | Stop reason: HOSPADM

## 2025-08-04 RX ORDER — ONDANSETRON 2 MG/ML
4 INJECTION INTRAMUSCULAR; INTRAVENOUS
Status: DISCONTINUED | OUTPATIENT
Start: 2025-08-04 | End: 2025-08-04 | Stop reason: HOSPADM

## 2025-08-04 RX ORDER — ATROPINE SULFATE 0.1 MG/ML
1 INJECTION INTRAVENOUS
Status: DISCONTINUED | OUTPATIENT
Start: 2025-08-04 | End: 2025-08-04 | Stop reason: HOSPADM

## 2025-08-04 RX ADMIN — MIDAZOLAM 2 MG: 1 INJECTION INTRAMUSCULAR; INTRAVENOUS at 09:23

## 2025-08-04 RX ADMIN — FENTANYL CITRATE 50 MCG: 50 INJECTION, SOLUTION INTRAMUSCULAR; INTRAVENOUS at 09:28

## 2025-08-04 RX ADMIN — FENTANYL CITRATE 100 MCG: 50 INJECTION, SOLUTION INTRAMUSCULAR; INTRAVENOUS at 09:23

## 2025-08-04 ASSESSMENT — ACTIVITIES OF DAILY LIVING (ADL)
ADLS_ACUITY_SCORE: 41
ADLS_ACUITY_SCORE: 41

## 2025-08-05 ENCOUNTER — OFFICE VISIT (OUTPATIENT)
Dept: ENDOCRINOLOGY | Facility: CLINIC | Age: 60
End: 2025-08-05
Payer: COMMERCIAL

## 2025-08-05 VITALS
OXYGEN SATURATION: 100 % | DIASTOLIC BLOOD PRESSURE: 70 MMHG | HEART RATE: 78 BPM | WEIGHT: 140 LBS | BODY MASS INDEX: 27.57 KG/M2 | SYSTOLIC BLOOD PRESSURE: 109 MMHG

## 2025-08-05 DIAGNOSIS — E10.9 TYPE 1 DIABETES MELLITUS WITHOUT COMPLICATION (H): ICD-10-CM

## 2025-08-05 DIAGNOSIS — E10.65 TYPE 1 DIABETES MELLITUS WITH HYPERGLYCEMIA (H): ICD-10-CM

## 2025-08-05 PROCEDURE — 3074F SYST BP LT 130 MM HG: CPT | Performed by: PHYSICIAN ASSISTANT

## 2025-08-05 PROCEDURE — 3078F DIAST BP <80 MM HG: CPT | Performed by: PHYSICIAN ASSISTANT

## 2025-08-05 PROCEDURE — 99215 OFFICE O/P EST HI 40 MIN: CPT | Performed by: PHYSICIAN ASSISTANT

## 2025-08-05 PROCEDURE — 1125F AMNT PAIN NOTED PAIN PRSNT: CPT | Performed by: PHYSICIAN ASSISTANT

## 2025-08-05 RX ORDER — PEN NEEDLE, DIABETIC 32GX 5/32"
NEEDLE, DISPOSABLE MISCELLANEOUS 3 TIMES DAILY
Qty: 100 EACH | Refills: 3 | Status: CANCELLED | OUTPATIENT
Start: 2025-08-05

## 2025-08-05 RX ORDER — INSULIN GLARGINE 100 [IU]/ML
INJECTION, SOLUTION SUBCUTANEOUS
Qty: 15 ML | Refills: 3 | Status: CANCELLED | OUTPATIENT
Start: 2025-08-05

## 2025-08-05 ASSESSMENT — PAIN SCALES - GENERAL: PAINLEVEL_OUTOF10: MILD PAIN (3)

## (undated) DEVICE — EYE TIP IRRIGATION & ASPIRATION POLYMER CVD 0.3MM 8065751512

## (undated) DEVICE — LINEN TOWEL PACK X5 5464

## (undated) DEVICE — EYE KNIFE SLIT XSTAR VISITEC 2.6MM 45DEG 373726

## (undated) DEVICE — EYE SHIELD PLASTIC

## (undated) DEVICE — EYE CANN IRR 27GA ANTERIOR CHAMBER 581280

## (undated) DEVICE — EYE PACK CUSTOM ANTERIOR 30DEG TIP CENTURION PPK6682-04

## (undated) DEVICE — EYE KNIFE STILETTO VISITEC 1.1MM ANG 45DEG SIDEPORT 376620

## (undated) DEVICE — EYE CANN IRR 25GA CYSTOTOME 581610

## (undated) DEVICE — KIT ENDO TURNOVER/PROCEDURE W/CLEAN A SCOPE LINERS 103888

## (undated) DEVICE — PACK CATARACT CUSTOM ASC SEY15CPUMC

## (undated) DEVICE — SOL WATER IRRIG 500ML BOTTLE 2F7113

## (undated) DEVICE — GLOVE PROTEXIS MICRO 7.5  2D73PM75

## (undated) RX ORDER — FENTANYL CITRATE 50 UG/ML
INJECTION, SOLUTION INTRAMUSCULAR; INTRAVENOUS
Status: DISPENSED
Start: 2025-08-04

## (undated) RX ORDER — METHYLPREDNISOLONE ACETATE 80 MG/ML
INJECTION, SUSPENSION INTRA-ARTICULAR; INTRALESIONAL; INTRAMUSCULAR; SOFT TISSUE
Status: DISPENSED
Start: 2023-10-03

## (undated) RX ORDER — LIDOCAINE HYDROCHLORIDE 10 MG/ML
INJECTION, SOLUTION EPIDURAL; INFILTRATION; INTRACAUDAL; PERINEURAL
Status: DISPENSED
Start: 2023-10-03

## (undated) RX ORDER — FENTANYL CITRATE 50 UG/ML
INJECTION, SOLUTION INTRAMUSCULAR; INTRAVENOUS
Status: DISPENSED
Start: 2021-10-25

## (undated) RX ORDER — LIDOCAINE HYDROCHLORIDE 10 MG/ML
INJECTION, SOLUTION EPIDURAL; INFILTRATION; INTRACAUDAL; PERINEURAL
Status: DISPENSED
Start: 2024-08-12

## (undated) RX ORDER — FENTANYL CITRATE 50 UG/ML
INJECTION, SOLUTION INTRAMUSCULAR; INTRAVENOUS
Status: DISPENSED
Start: 2021-11-08

## (undated) RX ORDER — ACETAMINOPHEN 325 MG/1
TABLET ORAL
Status: DISPENSED
Start: 2021-10-25

## (undated) RX ORDER — TRIAMCINOLONE ACETONIDE 40 MG/ML
INJECTION, SUSPENSION INTRA-ARTICULAR; INTRAMUSCULAR
Status: DISPENSED
Start: 2024-08-12